# Patient Record
Sex: FEMALE | Race: WHITE | NOT HISPANIC OR LATINO | Employment: FULL TIME | URBAN - METROPOLITAN AREA
[De-identification: names, ages, dates, MRNs, and addresses within clinical notes are randomized per-mention and may not be internally consistent; named-entity substitution may affect disease eponyms.]

---

## 2017-01-26 ENCOUNTER — OFFICE VISIT (OUTPATIENT)
Dept: FAMILY MEDICINE | Facility: CLINIC | Age: 62
End: 2017-01-26
Payer: COMMERCIAL

## 2017-01-26 VITALS
HEIGHT: 66 IN | WEIGHT: 168.63 LBS | RESPIRATION RATE: 18 BRPM | SYSTOLIC BLOOD PRESSURE: 122 MMHG | BODY MASS INDEX: 27.1 KG/M2 | DIASTOLIC BLOOD PRESSURE: 70 MMHG | OXYGEN SATURATION: 99 % | HEART RATE: 73 BPM

## 2017-01-26 DIAGNOSIS — R00.2 PALPITATIONS: Primary | ICD-10-CM

## 2017-01-26 DIAGNOSIS — D35.00 ADRENAL ADENOMA, UNSPECIFIED LATERALITY: ICD-10-CM

## 2017-01-26 PROCEDURE — 3074F SYST BP LT 130 MM HG: CPT | Mod: S$GLB,,, | Performed by: EMERGENCY MEDICINE

## 2017-01-26 PROCEDURE — 99999 PR PBB SHADOW E&M-EST. PATIENT-LVL III: CPT | Mod: PBBFAC,,, | Performed by: EMERGENCY MEDICINE

## 2017-01-26 PROCEDURE — 1159F MED LIST DOCD IN RCRD: CPT | Mod: S$GLB,,, | Performed by: EMERGENCY MEDICINE

## 2017-01-26 PROCEDURE — 99213 OFFICE O/P EST LOW 20 MIN: CPT | Mod: S$GLB,,, | Performed by: EMERGENCY MEDICINE

## 2017-01-26 PROCEDURE — 3078F DIAST BP <80 MM HG: CPT | Mod: S$GLB,,, | Performed by: EMERGENCY MEDICINE

## 2017-01-26 NOTE — PROGRESS NOTES
Subjective:   THIS NOTE IS DONE WITH VOICE RECOGNITION        Patient ID: Ynes Talavera is a 61 y.o. female.    Chief Complaint: Palpitations (pt has adrenal adenoma that are benign, pt gets winded at times)      HPI     She is in today with palpitations for the last 12 weeks.  She has numerous decisions about work, money, and other challenges that are casuing her stress.  No chest pain.  No shortness of breath.      She is concerned about adrenal issues, as she has documented small lesion identified.      Impression       The left adrenal mass is thought to represent 8-mm benign adrenal adenoma.    Benign-appearing parapelvic renal cysts are noted bilaterally, particularly on the left.    Patient status post cholecystectomy and MRI the abdomen is otherwise unremarkable.  ______________________________________     Electronically signed by: YELITZA SHAVER MD  Date: 01/19/12  Time: 08:47         Encounter      View Encounter                Otherwise she is doing well    Immunization History   Administered Date(s) Administered    Hepatitis B 10/22/2000    Influenza Split 12/07/2012, 10/22/2013    influenza - Quadrivalent - PF (ADULT) 11/28/2014         Current Outpatient Prescriptions   Medication Sig Dispense Refill    aspirin (ECOTRIN) 81 MG EC tablet Take 81 mg by mouth once daily.        lisinopril 10 MG tablet TAKE 1 TABLET BY MOUTH ONCE DAILY 90 tablet 3    melatonin 5 mg Tab Take 5 mg by mouth once daily.      multivitamin capsule Take 1 capsule by mouth once daily.        trazodone (DESYREL) 100 MG tablet Take 1 tablet (100 mg total) by mouth nightly. 90 tablet 3     No current facility-administered medications for this visit.          Review of Systems   Constitutional: Negative for activity change, appetite change, chills, diaphoresis, fatigue, fever and unexpected weight change.   HENT: Negative for congestion, ear pain, hearing loss, rhinorrhea, trouble swallowing and voice change.    Eyes:  Negative for pain and visual disturbance.   Respiratory: Negative for cough, chest tightness and shortness of breath.    Cardiovascular: Positive for palpitations. Negative for chest pain and leg swelling.   Gastrointestinal: Negative for abdominal distention, abdominal pain and blood in stool.   Genitourinary: Negative for difficulty urinating, flank pain, frequency and urgency.   Musculoskeletal: Negative for arthralgias, back pain, joint swelling, myalgias, neck pain and neck stiffness.   Skin: Negative for pallor and rash.   Neurological: Negative for dizziness, tremors, syncope, weakness and headaches.   Hematological: Negative for adenopathy.   Psychiatric/Behavioral: Positive for sleep disturbance (worried about things). Negative for dysphoric mood. The patient is nervous/anxious.        Objective:      Physical Exam    Assessment:       1. Palpitations    2. Adrenal adenoma, unspecified laterality        Plan:       1. Palpitations  Increasing  Likely situationally related  Not at a point additional pharmacological intervention recommended  Continue to avoid cardiac stimulants, caffeine, decongestants, etc.  Addressing underlying issues, particularly financial recommended    2. Adrenal adenoma, unspecified laterality  MRI reviewed  I have suggested a follow-up noncontrasted CT for evaluation  This has been communicated to the patient.

## 2017-01-26 NOTE — Clinical Note
After visit, I elected to move forward with noncontrast CT of the abdomen.  A message has been left for her on her phone.  Please contact and schedule at her convenience.  There is no urgency.

## 2017-01-29 NOTE — PATIENT INSTRUCTIONS
Ynes,    As per my phone call, I do recommend a noncontrast CT to follow-up on your adrenal adenoma.    I've notified my staff, and they should be contacting you to arrange for this particular exam.    PAKO

## 2017-01-30 ENCOUNTER — TELEPHONE (OUTPATIENT)
Dept: FAMILY MEDICINE | Facility: CLINIC | Age: 62
End: 2017-01-30

## 2017-01-30 NOTE — TELEPHONE ENCOUNTER
----- Message from Grant Hawkins Jr., MD sent at 1/29/2017 12:17 PM CST -----  After visit, I elected to move forward with noncontrast CT of the abdomen.  A message has been left for her on her phone.    Please contact and schedule at her convenience.  There is no urgency.

## 2017-01-31 ENCOUNTER — TELEPHONE (OUTPATIENT)
Dept: FAMILY MEDICINE | Facility: CLINIC | Age: 62
End: 2017-01-31

## 2017-01-31 DIAGNOSIS — R00.2 PALPITATIONS: Primary | ICD-10-CM

## 2017-01-31 NOTE — TELEPHONE ENCOUNTER
Seen dr tinsley last Thursday talked about some symptoms she is having. recc ekg   She is set up ct scan, feb 9th   Do you want ekg and holter on her please add orders.

## 2017-01-31 NOTE — TELEPHONE ENCOUNTER
----- Message from Luz Maria Abbott sent at 1/31/2017  2:20 PM CST -----  Please call patient in regards to EKG orders, [phone] (home) [phone] (work)

## 2017-02-01 NOTE — TELEPHONE ENCOUNTER
Called, no answer.    I will put an order in for EKG, and Holter.  This would be a 48 hour Holter monitor.  Diagnosis palpitations.

## 2017-02-02 ENCOUNTER — TELEPHONE (OUTPATIENT)
Dept: FAMILY MEDICINE | Facility: CLINIC | Age: 62
End: 2017-02-02

## 2017-02-02 NOTE — TELEPHONE ENCOUNTER
----- Message from Huma Jean-Baptiste sent at 2/2/2017  2:14 PM CST -----  Contact: michael   Needs DX code   And procedure code and NPI number   Call back  [phone]

## 2017-02-09 ENCOUNTER — HOSPITAL ENCOUNTER (OUTPATIENT)
Dept: RADIOLOGY | Facility: HOSPITAL | Age: 62
Discharge: HOME | End: 2017-02-09
Attending: EMERGENCY MEDICINE
Payer: COMMERCIAL

## 2017-02-09 DIAGNOSIS — D35.00 ADRENAL ADENOMA, UNSPECIFIED LATERALITY: ICD-10-CM

## 2017-02-09 PROCEDURE — 74150 CT ABDOMEN W/O CONTRAST: CPT | Mod: TC,PO

## 2017-02-09 PROCEDURE — 74150 CT ABDOMEN W/O CONTRAST: CPT | Mod: 26,,, | Performed by: RADIOLOGY

## 2017-02-15 ENCOUNTER — PATIENT MESSAGE (OUTPATIENT)
Dept: FAMILY MEDICINE | Facility: CLINIC | Age: 62
End: 2017-02-15

## 2017-02-19 ENCOUNTER — PATIENT MESSAGE (OUTPATIENT)
Dept: FAMILY MEDICINE | Facility: CLINIC | Age: 62
End: 2017-02-19

## 2017-02-19 DIAGNOSIS — F32.9 REACTIVE DEPRESSION: Primary | ICD-10-CM

## 2017-02-20 RX ORDER — ESCITALOPRAM OXALATE 10 MG/1
10 TABLET ORAL DAILY
Qty: 30 TABLET | Refills: 1 | Status: SHIPPED | OUTPATIENT
Start: 2017-02-20 | End: 2017-04-17 | Stop reason: SDUPTHER

## 2017-04-03 ENCOUNTER — PATIENT MESSAGE (OUTPATIENT)
Dept: FAMILY MEDICINE | Facility: CLINIC | Age: 62
End: 2017-04-03

## 2017-04-17 DIAGNOSIS — F32.9 REACTIVE DEPRESSION: ICD-10-CM

## 2017-04-17 RX ORDER — ESCITALOPRAM OXALATE 10 MG/1
TABLET ORAL
Qty: 30 TABLET | Refills: 6 | Status: SHIPPED | OUTPATIENT
Start: 2017-04-17 | End: 2017-11-12 | Stop reason: SDUPTHER

## 2017-06-21 ENCOUNTER — NURSE TRIAGE (OUTPATIENT)
Dept: ADMINISTRATIVE | Facility: CLINIC | Age: 62
End: 2017-06-21

## 2017-06-21 NOTE — TELEPHONE ENCOUNTER
Reason for Disposition   Dizziness, lightheadedness, or weakness    Protocols used: ST HEART RATE AND HEARTBEAT [id number]

## 2017-06-21 NOTE — TELEPHONE ENCOUNTER
----- Message from Makenzie Osullivan sent at 6/21/2017  4:27 PM CDT -----  Patient is calling with heart palpitations and feeling fatiqued. I am turning her over to the nurse on call.      Attempted to call pt left message to go to ER. Please advise.

## 2017-06-23 NOTE — TELEPHONE ENCOUNTER
i spoke with pt she did go to the emergency room with light headedness palpitations.wednesday evening. Was told she was slightly dehydrated and ekg was done. She was told to take toprol    She was directed to dr flores. She saw him this am. He is going to do a nuclear stress test, and monitor,   She sis not start taking the toprol, dr flores told her she could take it when she had palpitations. But not when she has chest pain.   She appreciated the call and will call us back id she needs anything from us.

## 2017-09-06 DIAGNOSIS — G47.9 SLEEP DISTURBANCE: Chronic | ICD-10-CM

## 2017-09-06 RX ORDER — TRAZODONE HYDROCHLORIDE 100 MG/1
TABLET ORAL
Qty: 90 TABLET | Refills: 1 | Status: SHIPPED | OUTPATIENT
Start: 2017-09-06 | End: 2018-03-08 | Stop reason: SDUPTHER

## 2017-11-12 DIAGNOSIS — F32.9 REACTIVE DEPRESSION: ICD-10-CM

## 2017-11-12 DIAGNOSIS — I10 ESSENTIAL HYPERTENSION: ICD-10-CM

## 2017-11-12 RX ORDER — ESCITALOPRAM OXALATE 10 MG/1
TABLET ORAL
Qty: 30 TABLET | Refills: 2 | Status: SHIPPED | OUTPATIENT
Start: 2017-11-12 | End: 2018-02-06 | Stop reason: SDUPTHER

## 2017-11-12 RX ORDER — LISINOPRIL 10 MG/1
TABLET ORAL
Qty: 90 TABLET | Refills: 0 | Status: SHIPPED | OUTPATIENT
Start: 2017-11-12 | End: 2018-02-09 | Stop reason: SDUPTHER

## 2017-11-12 NOTE — TELEPHONE ENCOUNTER
Medications refilled.  Labs due in January.  Orders in.  Please schedule.  She is also due for a blood pressure check and influenza.  .

## 2017-11-24 ENCOUNTER — CLINICAL SUPPORT (OUTPATIENT)
Dept: FAMILY MEDICINE | Facility: CLINIC | Age: 62
End: 2017-11-24
Payer: COMMERCIAL

## 2017-11-24 ENCOUNTER — LAB VISIT (OUTPATIENT)
Dept: LAB | Facility: HOSPITAL | Age: 62
End: 2017-11-24
Attending: EMERGENCY MEDICINE
Payer: COMMERCIAL

## 2017-11-24 VITALS — DIASTOLIC BLOOD PRESSURE: 64 MMHG | SYSTOLIC BLOOD PRESSURE: 116 MMHG

## 2017-11-24 DIAGNOSIS — Z01.30 BP CHECK: ICD-10-CM

## 2017-11-24 DIAGNOSIS — I10 ESSENTIAL HYPERTENSION: ICD-10-CM

## 2017-11-24 DIAGNOSIS — I10 HYPERTENSION, UNSPECIFIED TYPE: Primary | ICD-10-CM

## 2017-11-24 LAB
ALBUMIN SERPL BCP-MCNC: 3.6 G/DL
ALP SERPL-CCNC: 84 U/L
ALT SERPL W/O P-5'-P-CCNC: 16 U/L
ANION GAP SERPL CALC-SCNC: 7 MMOL/L
AST SERPL-CCNC: 21 U/L
BILIRUB SERPL-MCNC: 0.5 MG/DL
BUN SERPL-MCNC: 15 MG/DL
CALCIUM SERPL-MCNC: 9.7 MG/DL
CHLORIDE SERPL-SCNC: 107 MMOL/L
CHOLEST SERPL-MCNC: 176 MG/DL
CHOLEST/HDLC SERPL: 3.1 {RATIO}
CO2 SERPL-SCNC: 28 MMOL/L
CREAT SERPL-MCNC: 0.7 MG/DL
EST. GFR  (AFRICAN AMERICAN): >60 ML/MIN/1.73 M^2
EST. GFR  (NON AFRICAN AMERICAN): >60 ML/MIN/1.73 M^2
GLUCOSE SERPL-MCNC: 102 MG/DL
HDLC SERPL-MCNC: 56 MG/DL
HDLC SERPL: 31.8 %
LDLC SERPL CALC-MCNC: 106.2 MG/DL
NONHDLC SERPL-MCNC: 120 MG/DL
POTASSIUM SERPL-SCNC: 4.1 MMOL/L
PROT SERPL-MCNC: 6.8 G/DL
SODIUM SERPL-SCNC: 142 MMOL/L
TRIGL SERPL-MCNC: 69 MG/DL

## 2017-11-24 PROCEDURE — 36415 COLL VENOUS BLD VENIPUNCTURE: CPT | Mod: PO

## 2017-11-24 PROCEDURE — 80061 LIPID PANEL: CPT

## 2017-11-24 PROCEDURE — 99999 PR PBB SHADOW E&M-EST. PATIENT-LVL I: CPT | Mod: PBBFAC,,,

## 2017-11-24 PROCEDURE — 80053 COMPREHEN METABOLIC PANEL: CPT

## 2017-11-24 NOTE — PROGRESS NOTES
Ynes Talavera 62 y.o. female is here today for Blood Pressure check.   History of HTN yes.    Review of patient's allergies indicates:  Not on File  Creatinine   Date Value Ref Range Status   09/22/2016 0.8 0.5 - 1.4 mg/dL Final     Sodium   Date Value Ref Range Status   09/22/2016 138 136 - 145 mmol/L Final     Potassium   Date Value Ref Range Status   09/22/2016 5.2 (H) 3.5 - 5.1 mmol/L Final   ]  Patient verifies taking blood pressure medications on a regular basis at the same time of the day.     Current Outpatient Prescriptions:     aspirin (ECOTRIN) 81 MG EC tablet, Take 81 mg by mouth once daily.  , Disp: , Rfl:     escitalopram oxalate (LEXAPRO) 10 MG tablet, TAKE 1 TABLET BY MOUTH ONCE DAILY., Disp: 30 tablet, Rfl: 2    lisinopril 10 MG tablet, TAKE 1 TABLET BY MOUTH ONCE DAILY, Disp: 90 tablet, Rfl: 0    melatonin 5 mg Tab, Take 5 mg by mouth once daily., Disp: , Rfl:     multivitamin capsule, Take 1 capsule by mouth once daily.  , Disp: , Rfl:     trazodone (DESYREL) 100 MG tablet, TAKE 1 TABLET BY MOUTH NIGHTLY, Disp: 90 tablet, Rfl: 1  Does patient have record of home blood pressure readings no. Readings have been averaging 120/60.   Last dose of blood pressure medication was taken at am.  Patient is asymptomatic.   Complains of none.    BP: 116/64 ,   .    Blood pressure reading after 15 minutes was 00/0, Pulse 0.  Dr. Hawkins notified.

## 2018-02-06 DIAGNOSIS — F32.9 REACTIVE DEPRESSION: ICD-10-CM

## 2018-02-06 RX ORDER — ESCITALOPRAM OXALATE 10 MG/1
TABLET ORAL
Qty: 30 TABLET | Refills: 5 | Status: SHIPPED | OUTPATIENT
Start: 2018-02-06 | End: 2018-08-06 | Stop reason: SDUPTHER

## 2018-02-09 DIAGNOSIS — I10 ESSENTIAL HYPERTENSION: ICD-10-CM

## 2018-02-10 RX ORDER — LISINOPRIL 10 MG/1
TABLET ORAL
Qty: 90 TABLET | Refills: 0 | Status: SHIPPED | OUTPATIENT
Start: 2018-02-10 | End: 2018-05-08 | Stop reason: SDUPTHER

## 2018-03-08 DIAGNOSIS — G47.9 SLEEP DISTURBANCE: Chronic | ICD-10-CM

## 2018-03-08 RX ORDER — TRAZODONE HYDROCHLORIDE 100 MG/1
TABLET ORAL
Qty: 90 TABLET | Refills: 3 | Status: SHIPPED | OUTPATIENT
Start: 2018-03-08 | End: 2019-05-12 | Stop reason: SDUPTHER

## 2018-04-26 ENCOUNTER — PATIENT MESSAGE (OUTPATIENT)
Dept: FAMILY MEDICINE | Facility: CLINIC | Age: 63
End: 2018-04-26

## 2018-04-30 ENCOUNTER — LAB VISIT (OUTPATIENT)
Dept: LAB | Facility: HOSPITAL | Age: 63
End: 2018-04-30
Attending: NURSE PRACTITIONER
Payer: MEDICARE

## 2018-04-30 ENCOUNTER — OFFICE VISIT (OUTPATIENT)
Dept: FAMILY MEDICINE | Facility: CLINIC | Age: 63
End: 2018-04-30
Payer: MEDICARE

## 2018-04-30 VITALS
OXYGEN SATURATION: 96 % | HEART RATE: 75 BPM | DIASTOLIC BLOOD PRESSURE: 82 MMHG | WEIGHT: 163.38 LBS | SYSTOLIC BLOOD PRESSURE: 124 MMHG | HEIGHT: 66 IN | BODY MASS INDEX: 26.26 KG/M2

## 2018-04-30 DIAGNOSIS — Z11.59 ENCOUNTER FOR HEPATITIS C SCREENING TEST FOR LOW RISK PATIENT: ICD-10-CM

## 2018-04-30 DIAGNOSIS — Z23 NEED FOR VACCINATION AGAINST STREPTOCOCCUS PNEUMONIAE USING PNEUMOCOCCAL CONJUGATE VACCINE 13: ICD-10-CM

## 2018-04-30 DIAGNOSIS — R53.83 FATIGUE, UNSPECIFIED TYPE: ICD-10-CM

## 2018-04-30 DIAGNOSIS — G47.20 DYSFUNCTION OF SLEEP STAGE OR AROUSAL: Primary | ICD-10-CM

## 2018-04-30 LAB
BASOPHILS # BLD AUTO: 0.04 K/UL
BASOPHILS NFR BLD: 0.5 %
DIFFERENTIAL METHOD: ABNORMAL
EOSINOPHIL # BLD AUTO: 0.2 K/UL
EOSINOPHIL NFR BLD: 2.7 %
ERYTHROCYTE [DISTWIDTH] IN BLOOD BY AUTOMATED COUNT: 11.8 %
HCT VFR BLD AUTO: 38.4 %
HGB BLD-MCNC: 13 G/DL
IMM GRANULOCYTES # BLD AUTO: 0.02 K/UL
IMM GRANULOCYTES NFR BLD AUTO: 0.2 %
LYMPHOCYTES # BLD AUTO: 2.8 K/UL
LYMPHOCYTES NFR BLD: 33.6 %
MCH RBC QN AUTO: 33.7 PG
MCHC RBC AUTO-ENTMCNC: 33.9 G/DL
MCV RBC AUTO: 100 FL
MONOCYTES # BLD AUTO: 0.7 K/UL
MONOCYTES NFR BLD: 7.8 %
NEUTROPHILS # BLD AUTO: 4.7 K/UL
NEUTROPHILS NFR BLD: 55.2 %
NRBC BLD-RTO: 0 /100 WBC
PLATELET # BLD AUTO: 232 K/UL
PMV BLD AUTO: 10.9 FL
RBC # BLD AUTO: 3.86 M/UL
T4 FREE SERPL-MCNC: 1 NG/DL
TSH SERPL DL<=0.005 MIU/L-ACNC: 1.22 UIU/ML
WBC # BLD AUTO: 8.45 K/UL

## 2018-04-30 PROCEDURE — 3008F BODY MASS INDEX DOCD: CPT | Mod: CPTII,S$GLB,, | Performed by: NURSE PRACTITIONER

## 2018-04-30 PROCEDURE — 36415 COLL VENOUS BLD VENIPUNCTURE: CPT | Mod: PO

## 2018-04-30 PROCEDURE — 86803 HEPATITIS C AB TEST: CPT

## 2018-04-30 PROCEDURE — 99214 OFFICE O/P EST MOD 30 MIN: CPT | Mod: S$GLB,,, | Performed by: NURSE PRACTITIONER

## 2018-04-30 PROCEDURE — 85025 COMPLETE CBC W/AUTO DIFF WBC: CPT

## 2018-04-30 PROCEDURE — 3079F DIAST BP 80-89 MM HG: CPT | Mod: CPTII,S$GLB,, | Performed by: NURSE PRACTITIONER

## 2018-04-30 PROCEDURE — 84443 ASSAY THYROID STIM HORMONE: CPT

## 2018-04-30 PROCEDURE — G0009 ADMIN PNEUMOCOCCAL VACCINE: HCPCS | Mod: S$GLB,,, | Performed by: EMERGENCY MEDICINE

## 2018-04-30 PROCEDURE — 3074F SYST BP LT 130 MM HG: CPT | Mod: CPTII,S$GLB,, | Performed by: NURSE PRACTITIONER

## 2018-04-30 PROCEDURE — 84439 ASSAY OF FREE THYROXINE: CPT

## 2018-04-30 PROCEDURE — 90670 PCV13 VACCINE IM: CPT | Mod: S$GLB,,, | Performed by: EMERGENCY MEDICINE

## 2018-04-30 PROCEDURE — 99999 PR PBB SHADOW E&M-EST. PATIENT-LVL IV: CPT | Mod: PBBFAC,,, | Performed by: NURSE PRACTITIONER

## 2018-04-30 RX ORDER — NICOTINE POLACRILEX 2 MG
GUM BUCCAL
COMMUNITY
End: 2022-03-07

## 2018-04-30 RX ORDER — ESTRADIOL 0.1 MG/G
CREAM VAGINAL DAILY
COMMUNITY
End: 2019-04-11

## 2018-04-30 NOTE — PROGRESS NOTES
Subjective:       Patient ID: Ynes Talavera is a 63 y.o. female.    Chief Complaint: Sleeping Problem    Patient has had ongoing issues with sleeping for many years. She has recently become concerned about her lack of sleep. She wakes un refreshed. Took Ambien 10 years ago and had side effects. The tried taking OTC sleep aids but could not stay asleep. Has been taking trazodone for years, falls asleep without issue, but wakes frequently, very interrupted sleep.She does sometimes snore.   She is on lexapro for the last year, has not noticed any improvement in sleep. She says she would like to wean off the lexapro. Has never had a sleep study.   HTN has been well controlled, compliant with medication.    EPWORTH SLEEPINESS SCALE (ESS) - The ESS subjectively measures sleepiness as it occurs in ordinary life situations. It can be used as a screening test for excessive sleepiness or to follow an individual's subjective response to an intervention.    Eight situations were described to the patient and the following responses were obtained on a questionnaire.  The question was, in the following situations, what is the likelihood that sleep would be induced?  Sitting and reading 1  Watching television 0  Sitting inactively in a public place 0  Riding as a passenger in a car for one hour without a break 1   Lying down to rest in the afternoon when circumstances permit 1  Sitting and talking with someone 0  Sitting quietly after lunch without alcohol 0  Sitting in a car as the , while stopped for a few minutes in traffic 0  TOTAL POINTS 4    Each situation receives a score of zero to three, which is related to the likelihood that sleep will be induced:  0 = would never doze  1 = slight chance of dozing  2 = moderate chance of dozing  3 = high chance of dozing    Thus, the total ESS score can range from zero to 24, with higher scores correlating with increasing degrees of sleepiness     1-6 points: Normal sleep  7-8  points: Average sleepiness  9-24 points: Abnormal (possibly pathologic) sleepiness    A score greater than ten is consistent with excessive sleepiness. This threshold between normal and abnormal subjective sleepiness is derived from an observational study of 180 adults. The mean ESS score was approximately six among healthy adults, compared to 16 or greater among patients with narcolepsy, idiopathic hypersomnia, or moderate to severe obstructive sleep apnea (REJI, defined as a respiratory disturbance index (RDI) greater than 15 events per hour).  The ESS can be performed in the examination room or waiting room. It is relatively simple and generally takes only a few minutes to complete. It should be repeated at subsequent visits to assess for change.  Correlation - Subjective sleepiness measured by the ESS appears to correlate moderately with objective sleep tendency. In the largest population based study, the increased risk of falling asleep during a four session multiple sleep latency test (MSLT) was 30 percent and 69 percent in individuals with intermediate (ESS score six to 11) and high (ESS score >12) subjective sleepiness, respectively. Despite this study, the strength of the correlation between the ESS and objective measures of sleepiness remains controversial due to conflicting small studies:  Early studies demonstrated strong correlation between the ESS and MSLT. This included a study that reported that a score derived from only three of the situations - sitting inactively in a public place, sitting quietly after lunch, and sitting in a car stopped for a few minutes - correlated well with mean sleep latency measured by the MSLT.  More recent studies have found little or no correlation between the ESS and the MSLT. In addition, weak correlation and discordant results between the ESS and the maintenance of wakefulness test (MWT) have been reported among patients with sleep apnea and narcolepsy. Advocates for  the viewpoint that the ESS correlates poorly with the MSLT and MWT hypothesize that the tests measure different aspects of sleepiness.  Advantages - The ESS is widely available, reliable for assessing subjective sleepiness, and can be performed easily and quickly.  Disadvantages - The ESS cannot be used on multiple occasions throughout the day and is not a good measure of short-term acute sleep loss. Although the ESS score tends to increase with the severity of REJI and most patients with moderate to severe REJI have an ESS score greater than ten, the ESS should not be used to screen for REJI because it is neither sensitive nor specific for this purpose.  Hepatitis C Screening due on 1955  TETANUS VACCINE due on 1971  Zoster Vaccine due on 2013  Influenza Vaccine due on 2017  Pneumococcal (65+)(1 of 2 - PCV13) due on 2018    Past Medical History:  Past Medical History:  No date: Adrenal adenoma  No date: Diverticulosis of the colon  No date: Hypertension  2014: Venous insufficiency      Comment: right leg, denies Hx clot  Past Surgical History:  No date: APPENDECTOMY  : BREAST BIOPSY Right      Comment: benign needle biopsy  2006: BREAST BIOPSY Left      Comment: benign needle biopsy  No date:  SECTION, LOW TRANSVERSE  No date: CHOLECYSTECTOMY  No date: THUMB ARTHROSCOPY      Comment: both thumbs, trigger finger release.  No date: TONSILLECTOMY  No date: TUBAL LIGATION  2014: UMBILICAL HERNIA REPAIR  No date: VEIN LIGATION AND STRIPPING      Comment: Left  Review of patient's allergies indicates:  No Known Allergies  Current Outpatient Prescriptions on File Prior to Visit:  aspirin (ECOTRIN) 81 MG EC tablet, Take 81 mg by mouth once daily.  , Disp: , Rfl:   escitalopram oxalate (LEXAPRO) 10 MG tablet, TAKE 1 TABLET BY MOUTH ONCE DAILY., Disp: 30 tablet, Rfl: 5  lisinopril 10 MG tablet, TAKE 1 TABLET BY MOUTH ONCE DAILY, Disp: 90 tablet, Rfl: 0  melatonin 5 mg Tab, Take 5  mg by mouth as needed. , Disp: , Rfl:   multivitamin capsule, Take 1 capsule by mouth once daily.  , Disp: , Rfl:   traZODone (DESYREL) 100 MG tablet, TAKE 1 TABLET BY MOUTH NIGHTLY, Disp: 90 tablet, Rfl: 3    No current facility-administered medications on file prior to visit.     Social History    Marital status:              Spouse name:                       Years of education:                 Number of children: 3             Occupational History  Occupation          Employer            Comment               Dental hygienist                        Working full-time                       [employer]     Social History Main Topics    Smoking status: Former Smoker                                                                Packs/day: 0.25      Years: 25.00          Quit date: 2/17/1993    Smokeless tobacco: Never Used                        Alcohol use: No              Drug use: No              Sexual activity: Yes               Partners with: Male    Other Topics            Concern    None on file    Social History Narrative    Her  has had a CVA. She is major support for him.        Walks regularly      Review of patient's family history indicates:    Cancer                         Mother                    Cancer                         Father                    Heart disease                  Father                    Hypertension                   Father                    Diabetes                       Maternal Grandmother      Heart disease                  Maternal Grandmother      Diabetes                       Maternal Grandfather      Heart disease                  Maternal Grandfather      Breast cancer                  Paternal Grandmother              Review of Systems   Constitutional: Positive for fatigue. Negative for chills and fever.   Respiratory: Negative.  Negative for cough, shortness of breath and wheezing.    Cardiovascular: Negative.  Negative for chest pain and leg  swelling.   Psychiatric/Behavioral: Positive for sleep disturbance. Negative for agitation, behavioral problems, confusion, decreased concentration, dysphoric mood, hallucinations and self-injury. The patient is not nervous/anxious and is not hyperactive.        Objective:      Physical Exam   Constitutional: She is oriented to person, place, and time. No distress.   HENT:   Head: Normocephalic and atraumatic.   Eyes: Pupils are equal, round, and reactive to light.   Neck: Normal range of motion.   Cardiovascular: Normal rate and regular rhythm.  Exam reveals no friction rub.    No murmur heard.  Pulmonary/Chest: Effort normal and breath sounds normal. No respiratory distress. She has no wheezes.   Abdominal: Soft. Bowel sounds are normal.   Musculoskeletal: She exhibits no edema.   Neurological: She is alert and oriented to person, place, and time. No cranial nerve deficit. Coordination normal.   Skin: No rash noted. She is not diaphoretic. No erythema.   Psychiatric: She has a normal mood and affect. Her behavior is normal. Judgment and thought content normal.   Vitals reviewed.      Assessment:       1. Dysfunction of sleep stage or arousal    2. Fatigue, unspecified type    3. Need for vaccination against Streptococcus pneumoniae using pneumococcal conjugate vaccine 13    4. Encounter for hepatitis C screening test for low risk patient        Plan:     Weaning of lexapro and instructions given, if patient decides to wean advised to follow up 1 week after completing wean to review, immediately for any suicidal ideation or worsening depression.     1. Dysfunction of sleep stage or arousal    - Home Sleep Studies; Future  - Ambulatory consult to Sleep Disorders    2. Fatigue, unspecified type    - TSH; Future  - T4, free; Future  - CBC auto differential; Future  - Home Sleep Studies; Future  - Ambulatory consult to Sleep Disorders    3. Need for vaccination against Streptococcus pneumoniae using pneumococcal  conjugate vaccine 13    - (In Office Administered) Pneumococcal Conjugate Vaccine (13 Valent) (IM)    4. Encounter for hepatitis C screening test for low risk patient    - Hepatitis C antibody; Future

## 2018-04-30 NOTE — PATIENT INSTRUCTIONS
lexapro wean- take 1/2 pill daily x 7 days, then 1/2 pill every other day for 7 days then stop.   Follow up after being off x 1 week

## 2018-05-01 ENCOUNTER — TELEPHONE (OUTPATIENT)
Dept: FAMILY MEDICINE | Facility: CLINIC | Age: 63
End: 2018-05-01

## 2018-05-01 DIAGNOSIS — R79.89 ABNORMAL CBC: Primary | ICD-10-CM

## 2018-05-01 LAB — HCV AB SERPL QL IA: NEGATIVE

## 2018-05-04 ENCOUNTER — TELEPHONE (OUTPATIENT)
Dept: FAMILY MEDICINE | Facility: CLINIC | Age: 63
End: 2018-05-04

## 2018-05-04 NOTE — TELEPHONE ENCOUNTER
----- Message from Gabriela Kirkland sent at 5/4/2018  3:02 PM CDT -----  Type:  Patient Returning Call    Who Called:  Ynes  Who Left Message for Patient:  Nurse  Does the patient know what this is regarding?:  Results  Best Call Back Number: [phone]  Additional Information:  Patient stated she has her results form the portal, unless you need to speak with her.

## 2018-05-08 ENCOUNTER — PATIENT MESSAGE (OUTPATIENT)
Dept: FAMILY MEDICINE | Facility: CLINIC | Age: 63
End: 2018-05-08

## 2018-05-08 DIAGNOSIS — I10 ESSENTIAL HYPERTENSION: ICD-10-CM

## 2018-05-09 RX ORDER — LISINOPRIL 10 MG/1
TABLET ORAL
Qty: 90 TABLET | Refills: 0 | Status: SHIPPED | OUTPATIENT
Start: 2018-05-09 | End: 2018-08-06 | Stop reason: SDUPTHER

## 2018-05-24 ENCOUNTER — PATIENT MESSAGE (OUTPATIENT)
Dept: FAMILY MEDICINE | Facility: CLINIC | Age: 63
End: 2018-05-24

## 2018-08-06 DIAGNOSIS — I10 ESSENTIAL HYPERTENSION: ICD-10-CM

## 2018-08-06 DIAGNOSIS — F32.9 REACTIVE DEPRESSION: ICD-10-CM

## 2018-08-06 RX ORDER — ESCITALOPRAM OXALATE 10 MG/1
TABLET ORAL
Qty: 90 TABLET | Refills: 1 | Status: SHIPPED | OUTPATIENT
Start: 2018-08-06 | End: 2019-01-31 | Stop reason: SDUPTHER

## 2018-08-06 RX ORDER — LISINOPRIL 10 MG/1
TABLET ORAL
Qty: 90 TABLET | Refills: 3 | Status: SHIPPED | OUTPATIENT
Start: 2018-08-06 | End: 2019-08-11 | Stop reason: SDUPTHER

## 2018-09-04 ENCOUNTER — OFFICE VISIT (OUTPATIENT)
Dept: FAMILY MEDICINE | Facility: CLINIC | Age: 63
End: 2018-09-04
Payer: MEDICARE

## 2018-09-04 VITALS
OXYGEN SATURATION: 96 % | DIASTOLIC BLOOD PRESSURE: 82 MMHG | WEIGHT: 166.44 LBS | BODY MASS INDEX: 26.75 KG/M2 | HEIGHT: 66 IN | RESPIRATION RATE: 17 BRPM | HEART RATE: 66 BPM | SYSTOLIC BLOOD PRESSURE: 132 MMHG

## 2018-09-04 DIAGNOSIS — I72.8 SPLENIC ARTERY ANEURYSM: ICD-10-CM

## 2018-09-04 DIAGNOSIS — S03.00XS TMJ (DISLOCATION OF TEMPOROMANDIBULAR JOINT), SEQUELA: ICD-10-CM

## 2018-09-04 DIAGNOSIS — I10 ESSENTIAL HYPERTENSION: Primary | Chronic | ICD-10-CM

## 2018-09-04 DIAGNOSIS — D35.00 ADRENAL ADENOMA, UNSPECIFIED LATERALITY: ICD-10-CM

## 2018-09-04 DIAGNOSIS — G47.9 SLEEP DISTURBANCE: Chronic | ICD-10-CM

## 2018-09-04 PROCEDURE — 99214 OFFICE O/P EST MOD 30 MIN: CPT | Mod: S$PBB,,, | Performed by: EMERGENCY MEDICINE

## 2018-09-04 PROCEDURE — 99499 UNLISTED E&M SERVICE: CPT | Mod: S$GLB,,, | Performed by: EMERGENCY MEDICINE

## 2018-09-04 PROCEDURE — 3079F DIAST BP 80-89 MM HG: CPT | Mod: CPTII,,, | Performed by: EMERGENCY MEDICINE

## 2018-09-04 PROCEDURE — 1101F PT FALLS ASSESS-DOCD LE1/YR: CPT | Mod: CPTII,,, | Performed by: EMERGENCY MEDICINE

## 2018-09-04 PROCEDURE — 99999 PR PBB SHADOW E&M-EST. PATIENT-LVL IV: CPT | Mod: PBBFAC,,, | Performed by: EMERGENCY MEDICINE

## 2018-09-04 PROCEDURE — 3075F SYST BP GE 130 - 139MM HG: CPT | Mod: CPTII,,, | Performed by: EMERGENCY MEDICINE

## 2018-09-04 PROCEDURE — 99214 OFFICE O/P EST MOD 30 MIN: CPT | Mod: PBBFAC,PO | Performed by: EMERGENCY MEDICINE

## 2018-09-04 PROCEDURE — 3008F BODY MASS INDEX DOCD: CPT | Mod: CPTII,,, | Performed by: EMERGENCY MEDICINE

## 2018-09-04 NOTE — PROGRESS NOTES
Subjective:   THIS NOTE IS DONE WITH VOICE RECOGNITION        Patient ID: Ynes Talavera is a 63 y.o. female.    Chief Complaint: Hypertension      HPI     Here to follow up on blood pressure.  Taking current medications.  No mention of any cardiac symptomatology.  She is getting some exercise.  She is trying to limit her salt.    BP Readings from Last 3 Encounters:   09/04/18 132/82   04/30/18 124/82   11/24/17 116/64      She is a dental hygienist, and she is working with her employer your to address issues with TMJ and snoring.  She has tried several devices.  No surgery has been done.  Nor would I recommend any.  All of this is done an effort to improve her sleep.    She does feel there may be some improvement with her symptoms.    Also using a diet to reduced inflammatory foods.    Wanting to work an additional 3 years in current job.      She does have some ongoing issues at home they are causing her stress.  These were discussed in detail.   The Lexapro that she is using this primarily for of the stress and mild to moderate depression that she has been experiencing.  She has not had any recent cognitive behavior therapy.    Immunization History   Administered Date(s) Administered    Hepatitis B 10/22/2000    Influenza - Quadrivalent - PF 11/28/2014    Influenza Split 12/07/2012, 10/22/2013    Pneumococcal Conjugate - 13 Valent 04/30/2018    Tdap 08/23/2018     No change in social history, surgical history, medical history, or family history.      Current Outpatient Medications   Medication Sig Dispense Refill    aspirin (ECOTRIN) 81 MG EC tablet Take 81 mg by mouth once daily.        biotin 1 mg Cap Take by mouth.      escitalopram oxalate (LEXAPRO) 10 MG tablet TAKE 1 TABLET BY MOUTH ONCE DAILY. 90 tablet 1    estradiol (ESTRACE) 0.01 % (0.1 mg/gram) vaginal cream Place vaginally once daily.      lisinopril 10 MG tablet TAKE 1 TABLET BY MOUTH ONCE DAILY 90 tablet 3    LUTEIN-ZEAXANTHIN ORAL Take  by mouth.      melatonin 5 mg Tab Take 5 mg by mouth as needed.       multivitamin capsule Take 1 capsule by mouth once daily.        traZODone (DESYREL) 100 MG tablet TAKE 1 TABLET BY MOUTH NIGHTLY 90 tablet 3     No current facility-administered medications for this visit.          Review of Systems   Constitutional: Negative for activity change, appetite change, chills, diaphoresis, fatigue, fever and unexpected weight change.   HENT: Negative for congestion, ear pain, hearing loss, rhinorrhea, trouble swallowing and voice change.    Eyes: Negative for pain and visual disturbance.   Respiratory: Negative for cough, chest tightness and shortness of breath.    Cardiovascular: Negative for chest pain, palpitations and leg swelling.   Gastrointestinal: Negative for abdominal distention, abdominal pain and blood in stool.   Genitourinary: Negative for difficulty urinating, flank pain, frequency and urgency.   Musculoskeletal: Negative for arthralgias, back pain, joint swelling, myalgias, neck pain and neck stiffness.   Skin: Negative for pallor and rash.   Neurological: Negative for dizziness, tremors, syncope, weakness and headaches.   Hematological: Negative for adenopathy.   Psychiatric/Behavioral: Negative for dysphoric mood and sleep disturbance. The patient is not nervous/anxious.        Objective:      Physical Exam   Constitutional: She is oriented to person, place, and time. She appears well-developed and well-nourished. No distress.   HENT:   Head: Normocephalic and atraumatic.       Nose: Nose normal.   Mouth/Throat: Oropharynx is clear and moist.   Eyes: Conjunctivae and EOM are normal. Pupils are equal, round, and reactive to light. No scleral icterus.   Neck: Normal range of motion. Neck supple. No JVD present. No thyromegaly present.   Cardiovascular: Normal rate, regular rhythm, normal heart sounds and intact distal pulses.   No murmur heard.  Pulmonary/Chest: Effort normal and breath sounds normal.  She has no wheezes. She has no rales.   Abdominal: Soft. Bowel sounds are normal. She exhibits no distension. There is no tenderness.   Musculoskeletal: Normal range of motion. She exhibits no edema or tenderness.   Lymphadenopathy:     She has no cervical adenopathy.   Neurological: She is alert and oriented to person, place, and time. She has normal reflexes. No cranial nerve deficit. Coordination normal.   Skin: Skin is warm and dry. No rash noted. No erythema.   Psychiatric: She has a normal mood and affect. Her behavior is normal.   Vitals reviewed.      Assessment:       1. Essential hypertension    2. Sleep disturbance    3. Adrenal adenoma, unspecified laterality    4. Splenic artery aneurysm    5. TMJ (dislocation of temporomandibular joint), sequela        Plan:       1. Essential hypertension  Controlled.  Continue current medications  Continue to avoid excessive sodium  Continue home monitoring  Target blood pressure is 139/89 or less    2. Sleep disturbance  Stress induced  Continue Lexapro    3. Adrenal adenoma, unspecified laterality  Stable with serial CT    4. Splenic artery aneurysm  Stable with serial CT  No symptoms    5. TMJ (dislocation of temporomandibular joint), sequela  Working with dental practice   Encouraged to work on stress as well.

## 2018-09-09 PROBLEM — I72.8 SPLENIC ARTERY ANEURYSM: Chronic | Status: ACTIVE | Noted: 2018-09-09

## 2018-09-09 PROBLEM — I72.8 SPLENIC ARTERY ANEURYSM: Status: ACTIVE | Noted: 2018-09-09

## 2018-09-09 NOTE — PATIENT INSTRUCTIONS
Ynes,      your blood pressure is stable.  I would not make any changes.      I would encourage you to continue to use her Lexapro.  Managing her stress, will likely help with your jaw pain in your TMJ symptomatology.      Regular exercise room could also be a good stress reliever for you, which would probably help as well.      You do need 1 more pneumonia shot next year.  That would be the Pneumovax.  I would recommend getting a flu shot this fall.      Grant Hawkins MD

## 2018-11-05 ENCOUNTER — HOSPITAL ENCOUNTER (OUTPATIENT)
Dept: RADIOLOGY | Facility: HOSPITAL | Age: 63
Discharge: HOME | End: 2018-11-05
Attending: ORTHOPAEDIC SURGERY
Payer: MEDICARE

## 2018-11-05 ENCOUNTER — OFFICE VISIT (OUTPATIENT)
Dept: ORTHOPEDICS | Facility: CLINIC | Age: 63
End: 2018-11-05
Payer: MEDICARE

## 2018-11-05 ENCOUNTER — OFFICE VISIT (OUTPATIENT)
Dept: PRIMARY CARE CLINIC | Facility: CLINIC | Age: 63
End: 2018-11-05
Payer: MEDICARE

## 2018-11-05 VITALS
BODY MASS INDEX: 26.38 KG/M2 | HEIGHT: 66 IN | DIASTOLIC BLOOD PRESSURE: 78 MMHG | SYSTOLIC BLOOD PRESSURE: 126 MMHG | HEART RATE: 76 BPM | WEIGHT: 164.13 LBS

## 2018-11-05 VITALS — WEIGHT: 166 LBS | BODY MASS INDEX: 26.68 KG/M2 | HEIGHT: 66 IN

## 2018-11-05 DIAGNOSIS — M54.16 LUMBAR RADICULOPATHY: Primary | ICD-10-CM

## 2018-11-05 DIAGNOSIS — M25.552 LEFT HIP PAIN: Primary | ICD-10-CM

## 2018-11-05 DIAGNOSIS — M54.16 LUMBAR RADICULITIS: ICD-10-CM

## 2018-11-05 DIAGNOSIS — M25.552 LEFT HIP PAIN: ICD-10-CM

## 2018-11-05 PROCEDURE — 73502 X-RAY EXAM HIP UNI 2-3 VIEWS: CPT | Mod: 26,LT,, | Performed by: RADIOLOGY

## 2018-11-05 PROCEDURE — 3008F BODY MASS INDEX DOCD: CPT | Mod: CPTII,S$GLB,, | Performed by: NURSE PRACTITIONER

## 2018-11-05 PROCEDURE — 72100 X-RAY EXAM L-S SPINE 2/3 VWS: CPT | Mod: TC,PO

## 2018-11-05 PROCEDURE — 1101F PT FALLS ASSESS-DOCD LE1/YR: CPT | Mod: CPTII,S$GLB,, | Performed by: ORTHOPAEDIC SURGERY

## 2018-11-05 PROCEDURE — 99999 PR PBB SHADOW E&M-EST. PATIENT-LVL III: CPT | Mod: PBBFAC,,, | Performed by: ORTHOPAEDIC SURGERY

## 2018-11-05 PROCEDURE — 73502 X-RAY EXAM HIP UNI 2-3 VIEWS: CPT | Mod: TC,PO,LT

## 2018-11-05 PROCEDURE — 1101F PT FALLS ASSESS-DOCD LE1/YR: CPT | Mod: CPTII,S$GLB,, | Performed by: NURSE PRACTITIONER

## 2018-11-05 PROCEDURE — 72100 X-RAY EXAM L-S SPINE 2/3 VWS: CPT | Mod: 26,,, | Performed by: RADIOLOGY

## 2018-11-05 PROCEDURE — 3078F DIAST BP <80 MM HG: CPT | Mod: CPTII,S$GLB,, | Performed by: NURSE PRACTITIONER

## 2018-11-05 PROCEDURE — 3008F BODY MASS INDEX DOCD: CPT | Mod: CPTII,S$GLB,, | Performed by: ORTHOPAEDIC SURGERY

## 2018-11-05 PROCEDURE — 99999 PR PBB SHADOW E&M-EST. PATIENT-LVL IV: CPT | Mod: PBBFAC,,, | Performed by: NURSE PRACTITIONER

## 2018-11-05 PROCEDURE — 99203 OFFICE O/P NEW LOW 30 MIN: CPT | Mod: S$GLB,,, | Performed by: ORTHOPAEDIC SURGERY

## 2018-11-05 PROCEDURE — 3074F SYST BP LT 130 MM HG: CPT | Mod: CPTII,S$GLB,, | Performed by: NURSE PRACTITIONER

## 2018-11-05 PROCEDURE — 99213 OFFICE O/P EST LOW 20 MIN: CPT | Mod: 25,S$GLB,, | Performed by: NURSE PRACTITIONER

## 2018-11-05 PROCEDURE — 96372 THER/PROPH/DIAG INJ SC/IM: CPT | Mod: S$GLB,,, | Performed by: NURSE PRACTITIONER

## 2018-11-05 RX ORDER — METHYLPREDNISOLONE SOD SUCC 125 MG
125 VIAL (EA) INJECTION
Status: COMPLETED | OUTPATIENT
Start: 2018-11-05 | End: 2018-11-05

## 2018-11-05 RX ORDER — MELOXICAM 7.5 MG/1
7.5 TABLET ORAL DAILY
Qty: 14 TABLET | Refills: 0 | Status: SHIPPED | OUTPATIENT
Start: 2018-11-05 | End: 2018-11-19

## 2018-11-05 RX ADMIN — Medication 125 MG: at 01:11

## 2018-11-05 NOTE — PATIENT INSTRUCTIONS
Understanding Lumbar Radiculopathy    Lumbar radiculopathy is irritation or inflammation of a nerve root in the low back. It causes symptoms that spread out from the back down one or both legs. To understand this condition, it helps to understand the parts of the spine:  · Vertebrae. These are bones that stack to form the spine. The lumbar spine contains the 5 bottom vertebrae.  · Disks. These are soft pads of tissue between the vertebrae. They act as shock absorbers for the spine.  · Spinal canal. This is a tunnel formed within the stacked vertebrae. In the lumbar spine, nerves run through this canal.  · Nerves. These branch off and leave the spinal canal, traveling out to parts of the body. As they leave the spinal canal, nerves pass through openings between the vertebrae. The nerve root is the part of the nerve that is closest to the spinal canal.  · Sciatic nerve. This is a large nerve formed from several nerve roots in the low back. This nerve extends down the back of the leg to the foot.  With lumbar radiculopathy, nerve roots in the low back become irritated. This leads to pain and symptoms. The sciatic nerve is commonly involved, so the condition is often called sciatica.  What causes lumbar radiculopathy?  Aging, injury, poor posture, extra body weight, and other issues can lead to problems in the low back. These problems may then irritate nerve roots. They include:  · Damage to a disk in the lumbar spine. The damaged disk may then press on nearby nerve roots.  · Degeneration from wear and tear, and aging. This can lead to narrowing (stenosis) of the openings between the vertebrae. The narrowed openings press on nerve roots as they leave the spinal canal.  · Unstable spine. This is when a vertebra slips forward. It can then press on a nerve root.  Other, less common things can put pressure on nerves in the low back. These include diabetes, infection, or a tumor.  Symptoms of lumbar radiculopathy  These  include:  · Pain in the low back  · Pain, numbness, tingling, or weakness that travels into the buttocks, hip, groin, or leg  · Muscle spasms  Treatment for lumbar radiculopathy  In most cases, your healthcare provider will first try treatments that help relieve symptoms. These may include:  · Prescription and over-the-counter pain medicines. These help relieve pain, swelling, and irritation.  · Limits on positions and activities that increase pain. But lying in bed or avoiding all movement is only recommended for a short period of time.  · Physical therapy, including exercises and stretches. This helps decrease pain and increase movement and function.  · Steroid shots into the lower back. This may help relieve symptoms for a time.  · Weight-loss program. If you are overweight, losing extra pounds may help relieve symptoms.  In some cases, you may need surgery to fix the underlying problem. This depends on the cause, the symptoms, and how long the pain has lasted.  Possible complications  Over time, an irritated and inflamed nerve may become damaged. This may lead to long-lasting (permanent) numbness or weakness in your legs and feet. If symptoms change suddenly or get worse, be sure to let your healthcare provider know.  When to call your healthcare provider  Call your healthcare provider right away if you have any of these:  · New pain or pain that gets worse  · New or increasing weakness, tingling, or numbness in your leg or foot  · Problems controlling your bladder or bowel   Date Last Reviewed: 3/10/2016  © 1790-0549 [employer]. [address] [place] [address], [place] [address]. All rights reserved. This information is not intended as a substitute for professional medical care. Always follow your healthcare professional's instructions.    Today we'll give you a steroid injection and start on the meloxicam.  Start with physical therapy.  If no improvement in 2-4 weeks or if worse, seek consultation with  pain management.    If you have any questions, please call.  You can reach us at [phone] Monday through Thursday (except holidays) 10 a.m. to 6 p.m. or call Dr. Hawkins/JODIE Richardson    Thank you for using the [facility]!    FRANCIE Lopez, CNP, FNP-BC [facility]    To rate your experience with LOUISE Lopez, click on the link below:        [email or web address]

## 2018-11-05 NOTE — PROGRESS NOTES
Ynes Talavera is a 63 y.o. female patient of Dr. Grant Hawkins Jr, MD who presents to the clinic today for   Chief Complaint   Patient presents with    Back Pain   .    HPI      Pt, who is not known to me, reports a new problem to me:  Low back pain.  Was seen in orthopedics this morning.  Dr. Aparicio .  He recommended a steroid pack but she wanted an injection.  She had an injection in her shoulder once in the past and the pain resolved.    These symptoms began 2 months ago and status is continuing.     Pt denies the following symptoms:  Numbness down the leg, problem controlling her urine (but reports increased urgency).    Aggravating factors include none known .    Relieving factors include none known .    OTC Medications tried are none.  She was trying OTC NSAID in the past but it didn't help    Prescription medications taken for symptoms are none.    Pertinent medical history:  H/o shoulder pain in the past    ROS    MS:  See Chief Complaint/HPI    All other ROS neg.      PAST MEDICAL HISTORY:  Past Medical History:   Diagnosis Date    Adrenal adenoma     Diverticulosis of the colon     Hypertension     Venous insufficiency 2014    right leg, denies Hx clot       PAST SURGICAL HISTORY:  Past Surgical History:   Procedure Laterality Date    APPENDECTOMY      BREAST BIOPSY Right     benign needle biopsy    BREAST BIOPSY Left     benign needle biopsy     SECTION, LOW TRANSVERSE      CHOLECYSTECTOMY      COLONOSCOPY N/A 2012    [facility]    REPAIR-HERNIA-UMBILICAL N/A 2014    [facility]ar [facility] at [facility]    THUMB ARTHROSCOPY      both thumbs, trigger finger release.    TONSILLECTOMY      TUBAL LIGATION      UMBILICAL HERNIA REPAIR  2014    VEIN LIGATION AND STRIPPING      Left       SOCIAL HISTORY:  Social History     Socioeconomic History    Marital status:      Spouse name: Not on file    Number of  children: 3    Years of education: Not on file    Highest education level: Not on file   Social Needs    Financial resource strain: Not on file    Food insecurity - worry: Not on file    Food insecurity - inability: Not on file    Transportation needs - medical: Not on file    Transportation needs - non-medical: Not on file   Occupational History    Occupation: Dental hygienist     Comment: Working full-time     Employer:  [employer]   Tobacco Use    Smoking status: Former Smoker     Packs/day: 0.25     Years: 25.00     Pack years: 6.25     Last attempt to quit: 1993     Years since quittin.7    Smokeless tobacco: Never Used   Substance and Sexual Activity    Alcohol use: No     Alcohol/week: 0.0 oz    Drug use: No    Sexual activity: Yes     Partners: Male   Other Topics Concern    Not on file   Social History Narrative    Her  has had a CVA. She is major support for him.        Walks regularly       FAMILY HISTORY:  Family History   Problem Relation Age of Onset    Cancer Mother     Cancer Father     Heart disease Father     Hypertension Father     Diabetes Maternal Grandmother     Heart disease Maternal Grandmother     Diabetes Maternal Grandfather     Heart disease Maternal Grandfather     Breast cancer Paternal Grandmother 42       ALLERGIES AND MEDICATIONS: updated and reviewed.  Review of patient's allergies indicates:  No Known Allergies  Current Outpatient Medications   Medication Sig Dispense Refill    aspirin (ECOTRIN) 81 MG EC tablet Take 81 mg by mouth once daily.        biotin 1 mg Cap Take by mouth.      escitalopram oxalate (LEXAPRO) 10 MG tablet TAKE 1 TABLET BY MOUTH ONCE DAILY. (Patient taking differently: TAKE 1 TABLET BY MOUTH EVERY OTHER DAY) 90 tablet 1    lisinopril 10 MG tablet TAKE 1 TABLET BY MOUTH ONCE DAILY 90 tablet 3    LUTEIN-ZEAXANTHIN ORAL Take by mouth.      melatonin 5 mg Tab Take 5 mg by mouth as needed.        multivitamin capsule Take 1 capsule by mouth once daily.        traZODone (DESYREL) 100 MG tablet TAKE 1 TABLET BY MOUTH NIGHTLY (Patient taking differently: TAKE 1/2 TABLET BY MOUTH NIGHTLY) 90 tablet 3    estradiol (ESTRACE) 0.01 % (0.1 mg/gram) vaginal cream Place vaginally once daily.       No current facility-administered medications for this visit.          PHYSICAL EXAM    Alert, coop 63 y.o. female patient in no acute distress.    Vitals:    11/05/18 1326   BP: 126/78   Pulse: 76     VS reviewed.  VS stable.  CC, nursing note, medications & PMH all reviewed today.    Head:  Normocephalic, atraumatic.    EENT: Ext nose/ears normal.  Eyes with normal lids, not injected.           Resp:  Respirations even, unlabored     Heart:  Normal rate.    See Dr. Aparicio's note from today.    Skin:  Warm, dry, color good.    Psych:  Responds appropriately throughout the visit.               Relaxed.  Well-groomed.    Lumbar radiculopathy  -     methylPREDNISolone sodium succinate injection 125 mg  -     meloxicam (MOBIC) 7.5 MG tablet; Take 1 tablet (7.5 mg total) by mouth once daily. for 14 days  Dispense: 14 tablet; Refill: 0  -     Ambulatory referral to Pain Clinic      Pt today presents with report of 2 month h/o LBP and was diagnosed with lumbar radiculopathy today by orthopedist Dr. Aparicio.  He recommended that she take a steroid pack but she wanted an injection, so she was referred here.  She had resolution of her shoulder pain in the past with a steroid injection in her shoulder.    This is a new problem to me.  No work up is planned.        I have reviewed the following additional tests today: CMP for renal function    I explained this is not the same type of steroid injection she had in her shoulder.  However, she can still have it then start on the meloxicam tomorrow.  She will also start PT.  If she's not improved in 2-4 weeks, she'll go ahead with pain management evaluation.  Pt advised to perform comfort  measures recommended on patient instruction sheet .    If not better in 2-4 weeks, the patient is advised to see pain management..  If worse or concerns, the patient is advised to call  The PCP office.  Explained exam findings, diagnosis and treatment plan to patient.  Questions answered and patient states understanding.

## 2018-11-05 NOTE — Clinical Note
Grant Hawkins Jr, MD,  I saw your patient today in the [facility].  If you have any questions, please do not hesitate to contact me.  Thank you!  LOUISE Lopez

## 2018-11-05 NOTE — PROGRESS NOTES
DATE: 2018  PATIENT: Ynes Talavera  REFERRING MD:  CHIEF COMPLAINT:   Chief Complaint   Patient presents with    Left Hip - Pain       HISTORY:  Ynes Talavera is a 63 y.o. female  who presents for initial evaluation of left lower extremity pain. She notes a 2 month history of pain starting in the posterior hip and buttocks radiating down the lateral leg to below the knee.  She notes increased pain climbing stairs.  She notes throbbing pain down her leg. Pain is reported at 5/10.  She has been taking ibuprofen with only minimal improvement.  She denies any groin pain.  She denies any previous injuries to her hip or spine.      PAST MEDICAL/SURGICAL HISTORY:  Past Medical History:   Diagnosis Date    Adrenal adenoma     Diverticulosis of the colon     Hypertension     Venous insufficiency 2014    right leg, denies Hx clot     Past Surgical History:   Procedure Laterality Date    APPENDECTOMY      BREAST BIOPSY Right     benign needle biopsy    BREAST BIOPSY Left     benign needle biopsy     SECTION, LOW TRANSVERSE      CHOLECYSTECTOMY      COLONOSCOPY N/A 2012    [facility]    REPAIR-HERNIA-UMBILICAL N/A 2014    [facility] at [facility] OR    THUMB ARTHROSCOPY      both thumbs, trigger finger release.    TONSILLECTOMY      TUBAL LIGATION      UMBILICAL HERNIA REPAIR      VEIN LIGATION AND STRIPPING      Left       Current Medications:   Current Outpatient Medications:     aspirin (ECOTRIN) 81 MG EC tablet, Take 81 mg by mouth once daily.  , Disp: , Rfl:     biotin 1 mg Cap, Take by mouth., Disp: , Rfl:     escitalopram oxalate (LEXAPRO) 10 MG tablet, TAKE 1 TABLET BY MOUTH ONCE DAILY., Disp: 90 tablet, Rfl: 1    estradiol (ESTRACE) 0.01 % (0.1 mg/gram) vaginal cream, Place vaginally once daily., Disp: , Rfl:     lisinopril 10 MG tablet, TAKE 1 TABLET BY MOUTH ONCE DAILY, Disp: 90 tablet, Rfl: 3     LUTEIN-ZEAXANTHIN ORAL, Take by mouth., Disp: , Rfl:     melatonin 5 mg Tab, Take 5 mg by mouth as needed. , Disp: , Rfl:     multivitamin capsule, Take 1 capsule by mouth once daily.  , Disp: , Rfl:     traZODone (DESYREL) 100 MG tablet, TAKE 1 TABLET BY MOUTH NIGHTLY, Disp: 90 tablet, Rfl: 3    Family History: family history was reviewed and is noncontributory  Social History:   Social History     Socioeconomic History    Marital status:      Spouse name: Not on file    Number of children: 3    Years of education: Not on file    Highest education level: Not on file   Social Needs    Financial resource strain: Not on file    Food insecurity - worry: Not on file    Food insecurity - inability: Not on file    Transportation needs - medical: Not on file    Transportation needs - non-medical: Not on file   Occupational History    Occupation: Dental hygienist     Comment: Working full-time     Employer:  [employer]   Tobacco Use    Smoking status: Former Smoker     Packs/day: 0.25     Years: 25.00     Pack years: 6.25     Last attempt to quit: 1993     Years since quittin.7    Smokeless tobacco: Never Used   Substance and Sexual Activity    Alcohol use: No     Alcohol/week: 0.0 oz    Drug use: No    Sexual activity: Yes     Partners: Male   Other Topics Concern    Not on file   Social History Narrative    Her  has had a CVA. She is major support for him.        Walks regularly       ROS:  Constitution: Negative for chills, fever, and sweats. Negative for unexplained weight loss.  HENT: Negative for headaches and blurry vision.   Cardiovascular: Negative for chest pain, irregular heartbeat, leg swelling and palpitations.   Respiratory: Negative for cough and shortness of breath.   Gastrointestinal: Negative for abdominal pain, heartburn, nausea and vomiting.   Genitourinary: Negative for bladder incontinence and dysuria.   Musculoskeletal: Negative for systemic  "arthritis, muscle weakness and myalgias.   Neurological: Negative for numbness.   Psychiatric/Behavioral: Negative for depression.  Endocrine: Negative for polyuria.   Hematologic/Lymphatic: Negative for bleeding disorders.   Skin: Negative for poor wound healing.        PHYSICAL EXAM:  Ht 5' 6" (1.676 m)   Wt 75.3 kg (166 lb)   BMI 26.79 kg/m²   Ynes Talavera is a well developed, well nourished female in no acute distress. Physical examination of the left hip and lumbar spine evaluated the following:    Gait and Alignment  Lumbar spine range of motion  Inspection for ecchymosis, swelling, atrophy, or deformity  Tenderness to palpation over the bony and soft tissue structures around the lower back/hip  Inspection for intra-articular and/or bursal effusions  Range of Motion and presence of contractures  Sensation and motor strength to the lower extremity  Pain/pop/click with logrolling or range of motion  Straight leg raise testing  Vascular exam to include skin temperature/color/capillary refill    Remarkable findings included:  Mild decreased range of motion lumbar spine flexion.  Tenderness in the left iliolumbar region.  She sits comfortably on the exam table. Sensation intact L2-S1.  Motor exam 5/5 in the quadriceps, hamstrings, tibialis anterior, gastrocnemius and extensor hallucis longus muscles.  Reflexes are symmetric in the knee and ankle jerk.  Straight leg raise testing negative.              IMAGING:   X-rays of the left hip and lumbar spine are personally reviewed.  No acute fractures or dislocations are seen.  Mild to moderate degenerative changes of the lumbar spine without malalignment noted.  Mild degenerative changes about the left hip present     ASSESSMENT:   Lumbar spondylosis with left lower extremity radiculitis    PLAN:  The nature of the diagnosis, using models and diagrams when appropriate, was explained to the patient in detail.Treatment option discussed included observation, Medrol " Dosepak, physical therapy, referral to the [facility].. All questions answered and the patient wishes to proceed with therapy.  Additionally she is hoping for a steroid injection. I have explained that this is not performed in the orthopedic clinic but may be performed in primary care. We have contacted her primary care physician and have been informed that the nurse practitioner would be more than happy to order the steroid injection. Should she continue to remain symptomatic, she will contact the office for referral to the Back and Spine.  Follow-up as needed.      This note was dictated using voice recognition software. Please excuse any grammatical or typographical errors.

## 2018-11-16 DIAGNOSIS — M54.2 NECK PAIN: Primary | ICD-10-CM

## 2018-11-19 ENCOUNTER — TELEPHONE (OUTPATIENT)
Dept: ORTHOPEDICS | Facility: CLINIC | Age: 63
End: 2018-11-19

## 2018-11-19 ENCOUNTER — CLINICAL SUPPORT (OUTPATIENT)
Dept: REHABILITATION | Facility: HOSPITAL | Age: 63
End: 2018-11-19
Attending: ORTHOPAEDIC SURGERY
Payer: MEDICARE

## 2018-11-19 ENCOUNTER — OFFICE VISIT (OUTPATIENT)
Dept: ORTHOPEDICS | Facility: CLINIC | Age: 63
End: 2018-11-19
Payer: MEDICARE

## 2018-11-19 ENCOUNTER — HOSPITAL ENCOUNTER (OUTPATIENT)
Dept: RADIOLOGY | Facility: HOSPITAL | Age: 63
Discharge: HOME | End: 2018-11-19
Attending: ORTHOPAEDIC SURGERY
Payer: MEDICARE

## 2018-11-19 VITALS — HEIGHT: 66 IN | BODY MASS INDEX: 26.36 KG/M2 | WEIGHT: 164 LBS

## 2018-11-19 DIAGNOSIS — M54.12 CERVICAL RADICULITIS: ICD-10-CM

## 2018-11-19 DIAGNOSIS — M47.812 SPONDYLOSIS OF CERVICAL REGION WITHOUT MYELOPATHY OR RADICULOPATHY: ICD-10-CM

## 2018-11-19 DIAGNOSIS — M62.81 MUSCLE WEAKNESS: ICD-10-CM

## 2018-11-19 DIAGNOSIS — M54.2 NECK PAIN: ICD-10-CM

## 2018-11-19 DIAGNOSIS — M79.605 LEFT LEG PAIN: ICD-10-CM

## 2018-11-19 DIAGNOSIS — M54.16 LUMBAR RADICULITIS: Primary | ICD-10-CM

## 2018-11-19 PROCEDURE — G8978 MOBILITY CURRENT STATUS: HCPCS | Mod: CL,PN

## 2018-11-19 PROCEDURE — 97161 PT EVAL LOW COMPLEX 20 MIN: CPT | Mod: PN

## 2018-11-19 PROCEDURE — 72050 X-RAY EXAM NECK SPINE 4/5VWS: CPT | Mod: TC,PO

## 2018-11-19 PROCEDURE — 1101F PT FALLS ASSESS-DOCD LE1/YR: CPT | Mod: CPTII,S$GLB,, | Performed by: ORTHOPAEDIC SURGERY

## 2018-11-19 PROCEDURE — 97110 THERAPEUTIC EXERCISES: CPT | Mod: PN

## 2018-11-19 PROCEDURE — G8979 MOBILITY GOAL STATUS: HCPCS | Mod: CK,PN

## 2018-11-19 PROCEDURE — 3008F BODY MASS INDEX DOCD: CPT | Mod: CPTII,S$GLB,, | Performed by: ORTHOPAEDIC SURGERY

## 2018-11-19 PROCEDURE — 99214 OFFICE O/P EST MOD 30 MIN: CPT | Mod: S$GLB,,, | Performed by: ORTHOPAEDIC SURGERY

## 2018-11-19 PROCEDURE — 99999 PR PBB SHADOW E&M-EST. PATIENT-LVL III: CPT | Mod: PBBFAC,,, | Performed by: ORTHOPAEDIC SURGERY

## 2018-11-19 PROCEDURE — 72050 X-RAY EXAM NECK SPINE 4/5VWS: CPT | Mod: 26,,, | Performed by: RADIOLOGY

## 2018-11-19 NOTE — PROGRESS NOTES
DATE: 2018  PATIENT: Ynes Talavera  REFERRING MD:   CHIEF COMPLAINT:   Chief Complaint   Patient presents with    Neck - Pain       HISTORY:  Ynes Talavera is a 63 y.o. right hand dominant female  who presents for initial evaluation of longstanding neck pain. She notes pain for many years.  States pain is in the cervical region and radiates to the lower neck.  She denies any shoulder arm pain. She did undergo a intramuscular steroid injection for her low back which seemed to help her neck for a few days.  She notes decreased range of motion and constant pain.  She now presents for evaluation    PAST MEDICAL/SURGICAL HISTORY:  Past Medical History:   Diagnosis Date    Adrenal adenoma     Diverticulosis of the colon     Hypertension     Venous insufficiency 2014    right leg, denies Hx clot     Past Surgical History:   Procedure Laterality Date    APPENDECTOMY      BREAST BIOPSY Right     benign needle biopsy    BREAST BIOPSY Left     benign needle biopsy     SECTION, LOW TRANSVERSE      CHOLECYSTECTOMY      COLONOSCOPY N/A 2012    [facility]    REPAIR-HERNIA-UMBILICAL N/A 2014    Performed by [facility] at [facility] OR    THUMB ARTHROSCOPY      both thumbs, trigger finger release.    TONSILLECTOMY      TUBAL LIGATION      UMBILICAL HERNIA REPAIR      VEIN LIGATION AND STRIPPING      Left       Current Medications:   Current Outpatient Medications:     aspirin (ECOTRIN) 81 MG EC tablet, Take 81 mg by mouth once daily.  , Disp: , Rfl:     biotin 1 mg Cap, Take by mouth., Disp: , Rfl:     escitalopram oxalate (LEXAPRO) 10 MG tablet, TAKE 1 TABLET BY MOUTH ONCE DAILY. (Patient taking differently: TAKE 1 TABLET BY MOUTH EVERY OTHER DAY), Disp: 90 tablet, Rfl: 1    estradiol (ESTRACE) 0.01 % (0.1 mg/gram) vaginal cream, Place vaginally once daily., Disp: , Rfl:     lisinopril 10 MG tablet, TAKE 1 TABLET BY MOUTH  ONCE DAILY, Disp: 90 tablet, Rfl: 3    LUTEIN-ZEAXANTHIN ORAL, Take by mouth., Disp: , Rfl:     melatonin 5 mg Tab, Take 5 mg by mouth as needed. , Disp: , Rfl:     meloxicam (MOBIC) 7.5 MG tablet, Take 1 tablet (7.5 mg total) by mouth once daily. for 14 days, Disp: 14 tablet, Rfl: 0    multivitamin capsule, Take 1 capsule by mouth once daily.  , Disp: , Rfl:     traZODone (DESYREL) 100 MG tablet, TAKE 1 TABLET BY MOUTH NIGHTLY (Patient taking differently: TAKE 1/2 TABLET BY MOUTH NIGHTLY), Disp: 90 tablet, Rfl: 3    Family History: family history was reviewed and is noncontributory  Social History:   Social History     Socioeconomic History    Marital status:      Spouse name: Not on file    Number of children: 3    Years of education: Not on file    Highest education level: Not on file   Social Needs    Financial resource strain: Not on file    Food insecurity - worry: Not on file    Food insecurity - inability: Not on file    Transportation needs - medical: Not on file    Transportation needs - non-medical: Not on file   Occupational History    Occupation: Dental hygienist     Comment: Working full-time     Employer:  [employer]   Tobacco Use    Smoking status: Former Smoker     Packs/day: 0.25     Years: 25.00     Pack years: 6.25     Last attempt to quit: 1993     Years since quittin.7    Smokeless tobacco: Never Used   Substance and Sexual Activity    Alcohol use: No     Alcohol/week: 0.0 oz    Drug use: No    Sexual activity: Yes     Partners: Male   Other Topics Concern    Not on file   Social History Narrative    Her  has had a CVA. She is major support for him.        Walks regularly       ROS:  Constitution: Negative for chills, fever, and sweats. Negative for unexplained weight loss.  HENT: Negative for headaches and blurry vision.   Cardiovascular: Negative for chest pain, irregular heartbeat, leg swelling and palpitations.   Respiratory:  "Negative for cough and shortness of breath.   Gastrointestinal: Negative for abdominal pain, heartburn, nausea and vomiting.   Genitourinary: Negative for bladder incontinence and dysuria.   Musculoskeletal: Negative for systemic arthritis, joint swelling, muscle weakness and myalgias.   Neurological: Negative for numbness.   Psychiatric/Behavioral: Negative for depression.   Endocrine: Negative for polyuria.   Hematologic/Lymphatic: Negative for bleeding disorders.  Skin: Negative for poor wound healing.       PHYSICAL EXAM:  Ht 5' 6" (1.676 m)   Wt 74.4 kg (164 lb)   BMI 26.47 kg/m²   Ynes Talavera is a well developed, well nourished female in no acute distress. Physical examination of the bilateral shoulder evaluated the following:    Inspection, palpation and ROM of the cervical spine  Disc compression testing bilaterally  Inspection for swelling, ecchymosis, erythema, deformity and atrophy  Tenderness to palpation of the soft tissue and bony structures  Active and passive range of motion  Sensation of the shoulder and upper extremity  Motor strength in the deltoid, supraspinatus, internal rotators and external rotators  Impingement, apprehension, relocation and Speed's tests  Upper extremity vascular exam (skin temp,color, capillary refill)  Inspection for pseudomotor signs    Remarkable findings included:  Decreased range of motion of the cervical spine to extension and flexion.  Rotation also mildly limited.  Sensation intact C5-T1.  Motor exam intact to both upper extremities.  Supraspinatus and external rotation strength testing within normal limits bilaterally. Negative impingement sign to either shoulder.          IMAGING:   X-rays of the cervical spine are reviewed.  There is significant degenerative change most notably at C6-7 and C5-6.  Reversal of the normal cervical lordosis is seen.      ASSESSMENT:   Cervical spondylosis    PLAN:  The nature of the diagnosis, using models and diagrams when " appropriate, was explained to the patient in detail. I went over the x-rays and explained treatment options which include anti-inflammatory, steroid pack, physical therapy, referral to the Back and Spine or pain management. At the present time her biggest problem is still her lumbar spine which is not improved.  I have ordered an MRI of the lumbar spine and referral to pain management for lumbar epidural steroid injections.  They will also evaluate her cervical spine for further treatment recommendations.  She will finish up her course of meloxicam as she is not sure whether it is actually helping.  She contact the office if I can be of further assistance.      This note was dictated using voice recognition software. Please excuse any grammatical or typographical errors.

## 2018-11-19 NOTE — PROGRESS NOTES
Physical Therapy Evaluation    Name: Ynes Talavera  Clinic Number: [id number]      Diagnosis:   Encounter Diagnoses   Name Primary?    Neck pain     Muscle weakness      Physician: Mik Aparicio MD  Treatment Orders: PT Eval and Treat    Past Medical History:   Diagnosis Date    Adrenal adenoma     Diverticulosis of the colon     Hypertension     Venous insufficiency Sept 2014    right leg, denies Hx clot     Current Outpatient Medications   Medication Sig    aspirin (ECOTRIN) 81 MG EC tablet Take 81 mg by mouth once daily.      biotin 1 mg Cap Take by mouth.    escitalopram oxalate (LEXAPRO) 10 MG tablet TAKE 1 TABLET BY MOUTH ONCE DAILY. (Patient taking differently: TAKE 1 TABLET BY MOUTH EVERY OTHER DAY)    estradiol (ESTRACE) 0.01 % (0.1 mg/gram) vaginal cream Place vaginally once daily.    lisinopril 10 MG tablet TAKE 1 TABLET BY MOUTH ONCE DAILY    LUTEIN-ZEAXANTHIN ORAL Take by mouth.    melatonin 5 mg Tab Take 5 mg by mouth as needed.     meloxicam (MOBIC) 7.5 MG tablet Take 1 tablet (7.5 mg total) by mouth once daily. for 14 days    multivitamin capsule Take 1 capsule by mouth once daily.      traZODone (DESYREL) 100 MG tablet TAKE 1 TABLET BY MOUTH NIGHTLY (Patient taking differently: TAKE 1/2 TABLET BY MOUTH NIGHTLY)     No current facility-administered medications for this visit.      Review of patient's allergies indicates:  No Known Allergies  Precautions: HTN    Evaluation Date: 11/19/18  Authorization period: 11/19/19  Plan of care Expiration: 1/14/18      Subjective   Onset/GWENDOLYN: pt reports L hip and knee pain for 3 months.  She had a steroid injection which relieved the pain for 3-4 days and has been taking meloxicam.  She states she has not had pain to L lateral gastroc since the injection.  Pt reports neck pain for years which is getting worse (tighter, more pain, decreased ROM).  Neck pain is more concentrated on L side to posterior neck and to L UT.    Pt has gone to  chiropractor 2.5 years ago without relief.  Pt states she wears a splint for TMJ    Pt reports increased urgency for urination    Primary concern/ Chief complaints:    Ynes is a 63 y.o. female that presents to [facility] outpatient physical therapy secondary to neck and low back pain.    X-ray was taken and revealed 1. Lumbar scoliosis.  2. Mild posterior subluxation of L2 and L3 related to degenerative disc disease.  3. Multilevel degenerative disc disease.  4. L4-5 and L5-S1 degenerative facet arthrosis.    An anterolisthesis is noted of the C3 on C4 vertebral body of 3 mm with a retrolisthesis of the C5 on C6 vertebral body of 5 mm.  A minimal anterolisthesis is noted of the C7 on T1 vertebral body of 3 mm.    Intervertebral disc height loss is noted at the C3-C4, C5-C6, C6-C7, C7-T1 levels.  Vertebral body heights appear well preserved.    Osseous demineralization is noted diffusely.  Osseous neural foraminal narrowing is noted at the C3-C4,C4-C5, C5-C6, C6-C7, C7-T1 levels on the left and at the C3-C4 C5-C6-C6-C7 levels on the right.  The C7-T1 level on the right is not well evaluated    Previous treatment included injection and meloxicam, TENS. Pt has a decrease ability to perform ADLs such as driving, lifting, any cervical rotation. 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 Pt denies numbness and tingling to UE and LE. No cultural, environmental, or spiritual barriers identified to treatment or learning.    Pt lives with  (had prior CVA) in 1 story house with 1 curb step to enter    Pain Scale: Ynes rates pain on a scale of 0-10 to be 10 at worst; 0 currently; 0 at best .  Increased pain: neck: sitting with L cervical rotation to clean teeth, neck pain at night, cerv rotation to drive, pain to L hip and knee (referred from lumbar): standing, walking, stairs, L SL, sit  to stand, carrying granddaughter  Decreased pain: neck: R SL    Pt watches 3 yo grandchild who lives in 2 story house    Patient Goals: Pt would like to decrease pain and increase function so she can return to  her normal daily activities.        Objective     Observation: pt is pleasant and cooperative    Posture:  Slight rounded sh and fwd head    Cervical ROM:  Flex 40, tightness to L post neck  Ext 40, tight to L post neck with some shooting pain  R SB 20, tight on L, pain on R  L SB 20, pain on L  R rot 41, tight on L  L rot 33, pain on L    Cervical strength:  Flex 4/5  Ext 5/5  R SB 5/5  L SB 5/5       Lumbar Range of Motion:    % Pain   Flexion 50   Pain to center of lumbar spine        Extension 80   Pain to sacrum        Left Side Bending 90 no        Right Side Bending 90  no        Left rotation   100 no        Right Rotation   100 no             Lower Extremity Strength  Right LE  Left LE    Knee extension: 5/5 Knee extension: 5/5   Knee flexion: 5/5 Knee flexion: 5/5   Hip flexion: 5/5 Hip flexion: 5/5   Hip extension:  3+/5 Hip extension: 4-/5   Hip abduction: 3+/5 Hip abduction: 4/5   Hip adduction: 4+/5 Hip adduction 4+/5   Ankle dorsiflexion: 5/5 Ankle dorsiflexion: 5/5         Special Tests:    -Piriformis Test: (-)  -Prone Instability Test: NT      Neuro Dynamic Testing:    Sciatic nerve:      SLR: NT--will test next visit         Palpation: TTP to B UT and levator scap (L>R) trigger pt to L levator scap    Sensation: grossly intact to light touch    Flexibility:    Ely's test: R = will test next visit   Popliteal Angle: will test next visit     Functional Limitations Reports - G Codes  Category: mobility  CMS Impairment/Limitation/Restriction for FOTO Hip Survey  Status Limitation G-Code CMS Severity Modifier  Intake 38% 62% Current Status CL - At least 60 percent but less than 80 percent  Predicted 56% 44% Goal Status+ CK - At least 40 percent but less than 60 percent      PT Evaluation  Completed? Yes  Discussed Plan of Care with patient: Yes    TREATMENT:  Ynes instructed in and performed therapeutic exercises to develop strength and endurance, flexibility for 15 minutes including:see media section for HeP  PPT with TA set 10x5 sec  glute sets 10x5 sec  Seated cervical retraction 10x5 sec  UT stretch 3x20 sec  Levator scap stretch 3x20 sec  Corner pec stretch 3x20 sec    Will add next visit:  Bruegger's  Gentle cervical rotation  Sh ext with scap depression and TA set  SL clams  bridges      HEP provided: pt given verbal and written instruction for all ex listed above (see media for HEP handout)  Instructed pt. Regarding: Proper technique with all exercises. Pt demo good understanding of the education provided. Ynes demonstrated good return demonstration of activities.      Assessment     This is a 63 y.o. female referred to outpatient physical therapy and presents with a medical diagnosis of lumbar and cervical radiculitis and demonstrates limitations as described in the problem list. Pt will benefit from physcial therapy services in order to maximize pain free functional independence. The following goals were discussed with the patient and patient is in agreement with them as to be addressed in the treatment plan.     Anticipated barriers to physical therapy: scheduling due to pt works until 4pm on [facility] [place].  Pt given option to be treated at [facility].  Pt scheduled 1 appt on Thurs 11/29/19.    Medical necessity is demonstrated by the following IMPAIRMENTS/PROBLEM LIST:   1)Increase in pain level limiting function   2)decreased lumbar ROM   3)decreased LE strength   4)decreased LE flexibility   5)Lack of HEP    GOALS: Short Term Goals:  4 weeks  1.Report decreased    Neck and low back/L LE    pain  <   / =  5  /10 at its worst  to increase tolerance for ADLs  2. 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 Increased R LE  strength by 1/3 muscle grade in all deficient planes to increase tolerance for ADL and work activities.  4. Increased L LE strength by 1/3 muscle grade in all deficient planes to increase tolerance for ADL and work activities.  5. Increased L cervical rotation by 5 deg for increased ease with driving   6. Pt to tolerate HEP to improve ROM and independence with ADL's  7. Increase lumbar flexion ROM to 80% for increased ease with donning shoes  8. Pt will ascend//descend 4 steps with 1 rail mod I    Long Term Goals: 8 weeks  1.Report decreased    Neck and back/L LE    pain  <   / =  3  /10  to increase tolerance for ADLs  2.Pt will ascend/descend 12 steps with 1 rail mod I while carrying 20-30lbs  3.Increased R LE strength by 1 muscle grade in all deficient planes to increase tolerance for ADL and work activities.  4. Increased L LE strength by 1 muscle grade in all deficient planes to increase tolerance for ADL and work activities.  5. Pt to be Independent with HEP to improve ROM and independence with ADL's  6. Pt to increase B Ely's Test by > or = 20 degrees in order to improve flexibility and posture.   7. Pt to demonstrate negative Bridge Test in order to show improved core strength for lumbar stabilization.       Plan     Pt will be treated by physical therapy 1-3 times a week for 8-12 weeks for Pt Education, HEP, therapeutic exercises, neuromuscular re-education, joint mobilizations, modalities prn to achieve established goals. Ynes may at times be seen by a PTA as part of the Rehab Team.     Cont PT for 8-12 weeks.     I certify the need for these services furnished under this plan of treatment and while under my care.______________________________ Physician/Referring Practitioner  Date of Signature

## 2018-11-19 NOTE — TELEPHONE ENCOUNTER
Patient in office for neck pain. Dr. Aparicio requested to add PT for pt c spine also. Referral entered. Patient is scheduled for today at 1 PM. Thank you!

## 2018-11-20 NOTE — PLAN OF CARE
Physical Therapy Evaluation    Name: Ynes Talavera  Clinic Number: [id number]      Diagnosis:   Encounter Diagnoses   Name Primary?    Neck pain     Muscle weakness      Physician: Mik Aparicio MD  Treatment Orders: PT Eval and Treat    Past Medical History:   Diagnosis Date    Adrenal adenoma     Diverticulosis of the colon     Hypertension     Venous insufficiency Sept 2014    right leg, denies Hx clot     Current Outpatient Medications   Medication Sig    aspirin (ECOTRIN) 81 MG EC tablet Take 81 mg by mouth once daily.      biotin 1 mg Cap Take by mouth.    escitalopram oxalate (LEXAPRO) 10 MG tablet TAKE 1 TABLET BY MOUTH ONCE DAILY. (Patient taking differently: TAKE 1 TABLET BY MOUTH EVERY OTHER DAY)    estradiol (ESTRACE) 0.01 % (0.1 mg/gram) vaginal cream Place vaginally once daily.    lisinopril 10 MG tablet TAKE 1 TABLET BY MOUTH ONCE DAILY    LUTEIN-ZEAXANTHIN ORAL Take by mouth.    melatonin 5 mg Tab Take 5 mg by mouth as needed.     meloxicam (MOBIC) 7.5 MG tablet Take 1 tablet (7.5 mg total) by mouth once daily. for 14 days    multivitamin capsule Take 1 capsule by mouth once daily.      traZODone (DESYREL) 100 MG tablet TAKE 1 TABLET BY MOUTH NIGHTLY (Patient taking differently: TAKE 1/2 TABLET BY MOUTH NIGHTLY)     No current facility-administered medications for this visit.      Review of patient's allergies indicates:  No Known Allergies  Precautions: HTN    Evaluation Date: 11/19/18  Authorization period: 11/19/19  Plan of care Expiration: 1/14/18      Subjective   Onset/GWENDOLYN: pt reports L hip and knee pain for 3 months.  She had a steroid injection which relieved the pain for 3-4 days and has been taking meloxicam.  She states she has not had pain to L lateral gastroc since the injection.  Pt reports neck pain for years which is getting worse (tighter, more pain, decreased ROM).  Neck pain is more concentrated on L side to posterior neck and to L UT.    Pt has gone to  chiropractor 2.5 years ago without relief.  Pt states she wears a splint for TMJ    Pt reports increased urgency for urination    Primary concern/ Chief complaints:    Ynes is a 63 y.o. female that presents to [facility] outpatient physical therapy secondary to neck and low back pain.    X-ray was taken and revealed 1. Lumbar scoliosis.  2. Mild posterior subluxation of L2 and L3 related to degenerative disc disease.  3. Multilevel degenerative disc disease.  4. L4-5 and L5-S1 degenerative facet arthrosis.    An anterolisthesis is noted of the C3 on C4 vertebral body of 3 mm with a retrolisthesis of the C5 on C6 vertebral body of 5 mm.  A minimal anterolisthesis is noted of the C7 on T1 vertebral body of 3 mm.    Intervertebral disc height loss is noted at the C3-C4, C5-C6, C6-C7, C7-T1 levels.  Vertebral body heights appear well preserved.    Osseous demineralization is noted diffusely.  Osseous neural foraminal narrowing is noted at the C3-C4,C4-C5, C5-C6, C6-C7, C7-T1 levels on the left and at the C3-C4 C5-C6-C6-C7 levels on the right.  The C7-T1 level on the right is not well evaluated    Previous treatment included injection and meloxicam, TENS. Pt has a decrease ability to perform ADLs such as driving, lifting, any cervical rotation. 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 Pt denies numbness and tingling to UE and LE. No cultural, environmental, or spiritual barriers identified to treatment or learning.    Pt lives with  (had prior CVA) in 1 story house with 1 curb step to enter    Pain Scale: Ynes rates pain on a scale of 0-10 to be 10 at worst; 0 currently; 0 at best .  Increased pain: neck: sitting with L cervical rotation to clean teeth, neck pain at night, cerv rotation to drive, pain to L hip and knee (referred from lumbar): standing, walking, stairs, L SL, sit  to stand, carrying granddaughter  Decreased pain: neck: R SL    Pt watches 1 yo grandchild who lives in 2 story house    Patient Goals: Pt would like to decrease pain and increase function so she can return to  her normal daily activities.        Objective     Observation: pt is pleasant and cooperative    Posture:  Slight rounded sh and fwd head    Cervical ROM:  Flex 40, tightness to L post neck  Ext 40, tight to L post neck with some shooting pain  R SB 20, tight on L, pain on R  L SB 20, pain on L  R rot 41, tight on L  L rot 33, pain on L    Cervical strength:  Flex 4/5  Ext 5/5  R SB 5/5  L SB 5/5       Lumbar Range of Motion:    % Pain   Flexion 50   Pain to center of lumbar spine        Extension 80   Pain to sacrum        Left Side Bending 90 no        Right Side Bending 90  no        Left rotation   100 no        Right Rotation   100 no             Lower Extremity Strength  Right LE  Left LE    Knee extension: 5/5 Knee extension: 5/5   Knee flexion: 5/5 Knee flexion: 5/5   Hip flexion: 5/5 Hip flexion: 5/5   Hip extension:  3+/5 Hip extension: 4-/5   Hip abduction: 3+/5 Hip abduction: 4/5   Hip adduction: 4+/5 Hip adduction 4+/5   Ankle dorsiflexion: 5/5 Ankle dorsiflexion: 5/5         Special Tests:    -Piriformis Test: (-)  -Prone Instability Test: NT      Neuro Dynamic Testing:    Sciatic nerve:      SLR: NT--will test next visit         Palpation: TTP to B UT and levator scap (L>R) trigger pt to L levator scap    Sensation: grossly intact to light touch    Flexibility:    Ely's test: R = will test next visit   Popliteal Angle: will test next visit     Functional Limitations Reports - G Codes  Category: mobility  CMS Impairment/Limitation/Restriction for FOTO Hip Survey  Status Limitation G-Code CMS Severity Modifier  Intake 38% 62% Current Status CL - At least 60 percent but less than 80 percent  Predicted 56% 44% Goal Status+ CK - At least 40 percent but less than 60 percent      PT Evaluation  Completed? Yes  Discussed Plan of Care with patient: Yes    TREATMENT:  Ynes instructed in and performed therapeutic exercises to develop strength and endurance, flexibility for 15 minutes including:see media section for HeP  PPT with TA set 10x5 sec  glute sets 10x5 sec  Seated cervical retraction 10x5 sec  UT stretch 3x20 sec  Levator scap stretch 3x20 sec  Corner pec stretch 3x20 sec    Will add next visit:  Bruegger's  Gentle cervical rotation  Sh ext with scap depression and TA set  SL clams  bridges      HEP provided: pt given verbal and written instruction for all ex listed above (see media for HEP handout)  Instructed pt. Regarding: Proper technique with all exercises. Pt demo good understanding of the education provided. Ynes demonstrated good return demonstration of activities.      Assessment     This is a 63 y.o. female referred to outpatient physical therapy and presents with a medical diagnosis of lumbar and cervical radiculitis and demonstrates limitations as described in the problem list. Pt will benefit from physcial therapy services in order to maximize pain free functional independence. The following goals were discussed with the patient and patient is in agreement with them as to be addressed in the treatment plan.     Anticipated barriers to physical therapy: scheduling due to pt works until 4pm on [facility].  Pt given option to be treated at [facility].  Pt scheduled 1 appt on Thurs 11/29/19.    Medical necessity is demonstrated by the following IMPAIRMENTS/PROBLEM LIST:   1)Increase in pain level limiting function   2)decreased lumbar ROM   3)decreased LE strength   4)decreased LE flexibility   5)Lack of HEP    GOALS: Short Term Goals:  4 weeks  1.Report decreased    Neck and low back/L LE    pain  <   / =  5  /10 at its worst  to increase tolerance for ADLs  2. 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 Increased R LE  strength by 1/3 muscle grade in all deficient planes to increase tolerance for ADL and work activities.  4. Increased L LE strength by 1/3 muscle grade in all deficient planes to increase tolerance for ADL and work activities.  5. Increased L cervical rotation by 5 deg for increased ease with driving   6. Pt to tolerate HEP to improve ROM and independence with ADL's  7. Increase lumbar flexion ROM to 80% for increased ease with donning shoes  8. Pt will ascend//descend 4 steps with 1 rail mod I    Long Term Goals: 8 weeks  1.Report decreased    Neck and back/L LE    pain  <   / =  3  /10  to increase tolerance for ADLs  2.Pt will ascend/descend 12 steps with 1 rail mod I while carrying 20-30lbs  3.Increased R LE strength by 1 muscle grade in all deficient planes to increase tolerance for ADL and work activities.  4. Increased L LE strength by 1 muscle grade in all deficient planes to increase tolerance for ADL and work activities.  5. Pt to be Independent with HEP to improve ROM and independence with ADL's  6. Pt to increase B Ely's Test by > or = 20 degrees in order to improve flexibility and posture.   7. Pt to demonstrate negative Bridge Test in order to show improved core strength for lumbar stabilization.       Plan     Pt will be treated by physical therapy 1-3 times a week for 8-12 weeks for Pt Education, HEP, therapeutic exercises, neuromuscular re-education, joint mobilizations, modalities prn to achieve established goals. Ynes may at times be seen by a PTA as part of the Rehab Team.     Cont PT for 8-12 weeks.     I certify the need for these services furnished under this plan of treatment and while under my care.______________________________ Physician/Referring Practitioner  Date of Signature

## 2018-11-24 ENCOUNTER — HOSPITAL ENCOUNTER (OUTPATIENT)
Dept: RADIOLOGY | Facility: HOSPITAL | Age: 63
Discharge: HOME | End: 2018-11-24
Attending: ORTHOPAEDIC SURGERY
Payer: MEDICARE

## 2018-11-24 DIAGNOSIS — M54.16 LUMBAR RADICULITIS: ICD-10-CM

## 2018-11-24 PROCEDURE — 72148 MRI LUMBAR SPINE W/O DYE: CPT | Mod: TC,PO

## 2018-11-24 PROCEDURE — 72148 MRI LUMBAR SPINE W/O DYE: CPT | Mod: 26,,, | Performed by: RADIOLOGY

## 2018-11-29 ENCOUNTER — CLINICAL SUPPORT (OUTPATIENT)
Dept: REHABILITATION | Facility: HOSPITAL | Age: 63
End: 2018-11-29
Attending: ORTHOPAEDIC SURGERY
Payer: MEDICARE

## 2018-11-29 DIAGNOSIS — M62.81 MUSCLE WEAKNESS: ICD-10-CM

## 2018-11-29 DIAGNOSIS — M79.605 LEFT LEG PAIN: ICD-10-CM

## 2018-11-29 DIAGNOSIS — M54.2 NECK PAIN: ICD-10-CM

## 2018-11-29 PROCEDURE — 97110 THERAPEUTIC EXERCISES: CPT | Mod: PN

## 2018-11-29 PROCEDURE — 97140 MANUAL THERAPY 1/> REGIONS: CPT | Mod: PN

## 2018-11-29 NOTE — PROGRESS NOTES
"  Physical Therapy Daily Treatment Note     [facility]  [facility] Number: [id number]    Therapy Diagnosis:   Encounter Diagnoses   Name Primary?    Neck pain     Muscle weakness     Left leg pain      Physician: Mik Aparicio MD    Visit Date: 11/29/2018  Evaluation Date: 11/19/18  Authorization Period Expiration: 11/19/19  Plan of Care Certification Period: 1/24/19  Visit #/Visits authorized: 2 / 12     Time In: 5:05  Time Out: 6:07  Total Billable Time: 30 minutes    Precautions: Standard    Subjective     Pt reports: feeling about the same. States she finished the meloxicam but did not notice a difference. Still has pain from going sitting to standing, cannot lay on L side due to onset of radicular symptoms. Having an injection to the lumbar region in 8 days. States she has been treated for TMJ but points to pain posterior to mandible under the ear.  She was compliant with home exercise program.  Response to previous treatment: felt the same, some increased symptoms to the neck  Functional change: none    Pain: 5/10 to the neck, 0/10 low back pain seated, 5/10 with sit-stand which escalates with walking  Location: neck and L low back LE pain    Objective   R AR rotation   Ynes received therapeutic exercises to develop strength, endurance, ROM, flexibility, posture and core stabilization for 45 minutes including:  Push/pull (R hip ext/ L hip flex)   PPT with TA set 10x5 sec  glute sets 10x5 sec  Seated cervical retraction 10x5 sec  UT stretch 3x20 sec  Levator scap stretch 3x20 sec  Corner pec stretch 3x20 sec  ADDED:   Bruegger's 2x10x3"  Gentle cervical rotation x 5  Supine Sh ext with scap depression YTB and TA set 2x10  SL clams 2x10  bridges 2x10    Ynes received the following manual therapy techniques: Soft tissue Mobilization were applied to the: neck  for 15 minutes, including:  STM to cervical paraspinals, L scalenes and UT, suboccipital release L > R    Written Home Exercises " "Provided: yes. Patient given HEP from [email or web address] including the following exercises: bruegger's, clams, bridges, and push/pull. Written instructions can be found at "exercise-[email or web address]" using code:  [email or web address]    Exercises were reviewed and Ynes was able to demonstrate them prior to the end of the session.  Ynes demonstrated good  understanding of the education provided.     Assessment     Pt demonstrated overall good tolerance to treatment this date without increase of pain. Decreased symptoms to the low back and no onset of radicular symptoms into the L LE post tx. Decrease of antalgic gait following push pull as well. Limited cervical mobility and paraspinal tightness noted with manual techniques. Patient will benefit from continued strengthening as per goals.   Ynes is progressing well towards her goals.   Pt prognosis is Good.     Pt will continue to benefit from skilled outpatient physical therapy to address the deficits listed in the problem list box on initial evaluation, provide pt/family education and to maximize pt's level of independence in the home and community environment.     Pt's spiritual, cultural and educational needs considered and pt agreeable to plan of care and goals.    Anticipated barriers to physical therapy: scheduling due to pt works until 4pm on [facility].  Pt given option to be treated at [facility].    Goals:   Short Term Goals:  4 weeks  1.Report decreased    Neck and low back/L LE    pain  <   / =  5  /10 at its worst  to increase tolerance for ADLs  2. 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 Increased R LE strength by 1/3 muscle grade in all deficient planes to increase tolerance for ADL and work activities.  4. Increased L LE strength by 1/3 muscle grade in all deficient planes to increase tolerance for ADL and work activities.  5. Increased L cervical rotation by 5 deg for increased ease with " driving   6. Pt to tolerate HEP to improve ROM and independence with ADL's  7. Increase lumbar flexion ROM to 80% for increased ease with donning shoes  8. Pt will ascend//descend 4 steps with 1 rail mod I     Long Term Goals: 8 weeks  1.Report decreased    Neck and back/L LE    pain  <   / =  3  /10  to increase tolerance for ADLs  2.Pt will ascend/descend 12 steps with 1 rail mod I while carrying 20-30lbs  3.Increased R LE strength by 1 muscle grade in all deficient planes to increase tolerance for ADL and work activities.  4. Increased L LE strength by 1 muscle grade in all deficient planes to increase tolerance for ADL and work activities.  5. Pt to be Independent with HEP to improve ROM and independence with ADL's  6. Pt to increase B Ely's Test by > or = 20 degrees in order to improve flexibility and posture.   7. Pt to demonstrate negative Bridge Test in order to show improved core strength for lumbar stabilization.     Plan     Plan to continue with established POC toward current PT goals.    Nadia Higgins, PTA

## 2018-12-05 NOTE — PROGRESS NOTES
"  Physical Therapy Daily Treatment Note     [facility]  [facility] [id number]    Therapy Diagnosis:   Encounter Diagnoses   Name Primary?    Neck pain     Muscle weakness     Left leg pain      Physician: Mik Aparicio MD    Visit Date: 12/6/2018  Evaluation Date: 11/19/18  Authorization Period Expiration: 11/19/19  Plan of Care Certification Period: 1/24/19  Visit #/Visits authorized: 3 / 12     Time In: 4:40  Time Out: 5:40  Total Billable Time: 60 minutes    Precautions: Standard    Subjective     Pt reports: continued pain to the low back L hip with getting out of a chair, climbing stairs, walking long distances. Neck continues with tightness and pain, especially with rotation toward the L, pain and tightness travels up the neck and into the head. L calf pain when sleeping on the L side, goes away when turns off the L side. Patient states the neck appears to give her more functional limitations than the low back/L hip, but it is hard to choose.  She was compliant with home exercise program.  Response to previous treatment: felt the same, some increased symptoms to the neck  Functional change: none    Pain: 6-7/10 to the neck currently and increases with rotation toward the R, 0/10 low back pain seated, 6/10 with sit-stand which escalates with walking  Location: neck and L low back LE pain    Objective   R AR rotation     Ynes received therapeutic exercises to develop strength, endurance, ROM, flexibility, posture and core stabilization for 45 minutes including:  Push/pull (R hip ext/ L hip flex)   Sciatic oscillation stretch/glide  PPT with TA set 10x5 sec  glute sets + bridge 15x5 sec  SL clams with YTB 2x10  (NP) Supine Sh ext with scap depression YTB and TA set 2x10  Supine chin tuck 10 x 3"    Seated cervical retraction 10x5 sec  UT stretch 3x20 sec  Levator scap stretch 3x20 sec  Corner pec stretch 3x20 sec  Bruegger's 2x10x3"  Gentle cervical rotation (with slight chin tuck) x " 5    Ynes received the following manual therapy techniques: Soft tissue Mobilization were applied to the: neck  for 15 minutes, including:  STM to cervical paraspinals, L scalenes and UT, suboccipital release L > R    Written Home Exercises Provided: yes. Patient given additional sciatic nerve flossing. See patient instructions in notes section.    Exercises were reviewed and Ynes was able to demonstrate them prior to the end of the session.  Ynes demonstrated good  understanding of the education provided.     Assessment     Pt tolerated treatment well overall this date without exacerbation of symptoms. Added supine sciatic nerve flosses in 3 cycles for reduced gastroc symptoms while sleeping. Tightness of L cervical musculature including scalenes, SCM, UT, and lev scap with manual techniques as well as suboccipital tightness. Cues to reduce force with stretches to prevent onset of pulling into skull. Pt may benefit from functional dry needling to reduce soft tissue limitations to the neck and L scapula.    Ynes is progressing well towards her goals.   Pt prognosis is Good.     Pt will continue to benefit from skilled outpatient physical therapy to address the deficits listed in the problem list box on initial evaluation, provide pt/family education and to maximize pt's level of independence in the home and community environment.     Pt's spiritual, cultural and educational needs considered and pt agreeable to plan of care and goals.    Anticipated barriers to physical therapy: scheduling due to pt works until 4pm on [facility].  Pt given option to be treated at [facility].    Goals:   Short Term Goals:  4 weeks  1.Report decreased    Neck and low back/L LE    pain  <   / =  5  /10 at its worst  to increase tolerance for ADLs  2. 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 Increased R LE strength by 1/3 muscle grade in all deficient planes  to increase tolerance for ADL and work activities.  4. Increased L LE strength by 1/3 muscle grade in all deficient planes to increase tolerance for ADL and work activities.  5. Increased L cervical rotation by 5 deg for increased ease with driving   6. Pt to tolerate HEP to improve ROM and independence with ADL's  7. Increase lumbar flexion ROM to 80% for increased ease with donning shoes  8. Pt will ascend//descend 4 steps with 1 rail mod I     Long Term Goals: 8 weeks  1.Report decreased    Neck and back/L LE    pain  <   / =  3  /10  to increase tolerance for ADLs  2.Pt will ascend/descend 12 steps with 1 rail mod I while carrying 20-30lbs  3.Increased R LE strength by 1 muscle grade in all deficient planes to increase tolerance for ADL and work activities.  4. Increased L LE strength by 1 muscle grade in all deficient planes to increase tolerance for ADL and work activities.  5. Pt to be Independent with HEP to improve ROM and independence with ADL's  6. Pt to increase B Ely's Test by > or = 20 degrees in order to improve flexibility and posture.   7. Pt to demonstrate negative Bridge Test in order to show improved core strength for lumbar stabilization.     Plan     Plan to continue with established POC toward current PT goals.    Nadia Higgins, PTA

## 2018-12-06 ENCOUNTER — CLINICAL SUPPORT (OUTPATIENT)
Dept: REHABILITATION | Facility: HOSPITAL | Age: 63
End: 2018-12-06
Attending: ORTHOPAEDIC SURGERY
Payer: MEDICARE

## 2018-12-06 DIAGNOSIS — M79.605 LEFT LEG PAIN: ICD-10-CM

## 2018-12-06 DIAGNOSIS — M62.81 MUSCLE WEAKNESS: ICD-10-CM

## 2018-12-06 DIAGNOSIS — M54.2 NECK PAIN: ICD-10-CM

## 2018-12-06 PROCEDURE — 97140 MANUAL THERAPY 1/> REGIONS: CPT | Mod: PN

## 2018-12-06 PROCEDURE — 97110 THERAPEUTIC EXERCISES: CPT | Mod: PN

## 2018-12-06 NOTE — PATIENT INSTRUCTIONS
sourced from: Neural Mobilization: Examination & Intervention Strategies  Moises Peck, PT, DPT, OCS, Cert. MDT    Sciatic oscillation stretch/glide    -bring knee toward your chest to 90/90 deg position  -flex and point foot x10   -straighten knee slightly until you feel a gentle pull behind knee, then flex and point foot x10 reps   -perform 3 cycles    Must be GENTLE to avoid irritating nerve

## 2018-12-12 ENCOUNTER — CLINICAL SUPPORT (OUTPATIENT)
Dept: REHABILITATION | Facility: HOSPITAL | Age: 63
End: 2018-12-12
Attending: ORTHOPAEDIC SURGERY
Payer: MEDICARE

## 2018-12-12 DIAGNOSIS — M62.81 MUSCLE WEAKNESS: ICD-10-CM

## 2018-12-12 DIAGNOSIS — M79.605 LEFT LEG PAIN: ICD-10-CM

## 2018-12-12 DIAGNOSIS — M54.2 NECK PAIN: ICD-10-CM

## 2018-12-12 PROCEDURE — 97140 MANUAL THERAPY 1/> REGIONS: CPT | Mod: PN

## 2018-12-12 PROCEDURE — 97110 THERAPEUTIC EXERCISES: CPT | Mod: PN

## 2018-12-12 NOTE — PROGRESS NOTES
"  Physical Therapy Daily Treatment Note     Name: Ynes Talavera  Clinic Number: [id number]    Therapy Diagnosis:   Encounter Diagnoses   Name Primary?    Neck pain     Muscle weakness     Left leg pain      Physician: Mik Aparicio MD    Visit Date: 12/12/2018  Evaluation Date: 11/19/18  Authorization Period Expiration: 11/19/19  Plan of Care Certification Period: 1/24/19  Visit #/Visits authorized: 4 / 12     Time In: 5:00  Time Out: 6:00  Total Billable Time: 60 minutes    Precautions: Standard    Subjective     Pt reports: she had intense pain to L gastroc last night.  She states her L lateral gastroc is better during the day.    She was compliant with home exercise program.  Response to previous treatment: did fine  Functional change: none    Pain: 5-6/10 to the neck currently and increases with rotation toward the R, 0/10 low back pain seated, 6/10 with sit-stand which escalates with walking  Location: neck and L low back LE pain    Objective   R AR rotation     Ynes received therapeutic exercises to develop strength, endurance, ROM, flexibility, posture and core stabilization for 45 minutes including:  Push/pull (R hip ext/ L hip flex)   Piriformis stretch 3x20 sec  Sciatic oscillation stretch/glide  PPT with TA set 10x5 sec  glute sets + bridge 20x5 sec  (NP due to pain to L gastroc with L SL--performed in supine instead) SL clams with YTB 2x10   Supine TA set with hip abd YTB 2x10  (NP) Supine Sh ext with scap depression YTB and TA set 2x10  Supine chin tuck 10 x 5"    Seated cervical retraction 10x5 sec  UT stretch 3x20 sec  Levator scap stretch 3x20 sec  Corner pec stretch 3x20 sec  Bruegger's 2x10x3" (vc and mc to avoid sh hike)  Gentle cervical rotation (with slight chin tuck) 10x5 sec ea  Sh ext with scap depression RTB 2x10 (vc for chin tuck and TA set)    Ynes received the following manual therapy techniques: Soft tissue Mobilization were applied to the: neck  for 15 minutes, " including:  METs to correct:  FRSL at C5  ERSL at AA  ESL at OA    Written Home Exercises Provided: yes. Pt given verbal and written instruction for updated HeP (sh ext with scap depression and SUPINE clams instead of sidelying).  Pt demonstrated understanding.    Exercises were reviewed and Ynes was able to demonstrate them prior to the end of the session.  Ynes demonstrated good  understanding of the education provided.     Assessment     Pt tolerated treatment well overall this date without exacerbation of symptoms. Pt presented with hypomobility to cervical spine as listed above which improved with METs.  Pt presented with dull aching to lateral L gastroc which resolved with push/pull and sciatic n glides.  Pt unable to perform clams in SL due to pain to L LE, but able to perform in supine.  Ynes is progressing well towards her goals.   Pt prognosis is Good.     Pt will continue to benefit from skilled outpatient physical therapy to address the deficits listed in the problem list box on initial evaluation, provide pt/family education and to maximize pt's level of independence in the home and community environment.     Pt's spiritual, cultural and educational needs considered and pt agreeable to plan of care and goals.    Anticipated barriers to physical therapy: scheduling due to pt works until 4pm on [facility].  Pt given option to be treated at [facility].    Goals:   Short Term Goals:  4 weeks  1.Report decreased    Neck and low back/L LE    pain  <   / =  5  /10 at its worst  to increase tolerance for ADLs  2. 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 Increased R LE strength by 1/3 muscle grade in all deficient planes to increase tolerance for ADL and work activities.  4. Increased L LE strength by 1/3 muscle grade in all deficient planes to increase tolerance for ADL and work activities.  5. Increased L cervical rotation by 5 deg  for increased ease with driving   6. Pt to tolerate HEP to improve ROM and independence with ADL's  7. Increase lumbar flexion ROM to 80% for increased ease with donning shoes  8. Pt will ascend//descend 4 steps with 1 rail mod I     Long Term Goals: 8 weeks  1.Report decreased    Neck and back/L LE    pain  <   / =  3  /10  to increase tolerance for ADLs  2.Pt will ascend/descend 12 steps with 1 rail mod I while carrying 20-30lbs  3.Increased R LE strength by 1 muscle grade in all deficient planes to increase tolerance for ADL and work activities.  4. Increased L LE strength by 1 muscle grade in all deficient planes to increase tolerance for ADL and work activities.  5. Pt to be Independent with HEP to improve ROM and independence with ADL's  6. Pt to increase B Ely's Test by > or = 20 degrees in order to improve flexibility and posture.   7. Pt to demonstrate negative Bridge Test in order to show improved core strength for lumbar stabilization.     Plan     Plan to continue with established POC toward current PT goals.    Jojo Haas, PT

## 2018-12-13 ENCOUNTER — CLINICAL SUPPORT (OUTPATIENT)
Dept: REHABILITATION | Facility: HOSPITAL | Age: 63
End: 2018-12-13
Attending: ORTHOPAEDIC SURGERY
Payer: MEDICARE

## 2018-12-13 ENCOUNTER — OFFICE VISIT (OUTPATIENT)
Dept: PAIN MEDICINE | Facility: CLINIC | Age: 63
End: 2018-12-13
Payer: MEDICARE

## 2018-12-13 VITALS
WEIGHT: 166.25 LBS | SYSTOLIC BLOOD PRESSURE: 147 MMHG | DIASTOLIC BLOOD PRESSURE: 70 MMHG | OXYGEN SATURATION: 99 % | HEART RATE: 80 BPM | BODY MASS INDEX: 26.83 KG/M2

## 2018-12-13 DIAGNOSIS — M54.42 CHRONIC LEFT-SIDED LOW BACK PAIN WITH LEFT-SIDED SCIATICA: ICD-10-CM

## 2018-12-13 DIAGNOSIS — M62.81 MUSCLE WEAKNESS: ICD-10-CM

## 2018-12-13 DIAGNOSIS — M54.2 NECK PAIN: ICD-10-CM

## 2018-12-13 DIAGNOSIS — M54.16 LUMBAR RADICULOPATHY: Primary | ICD-10-CM

## 2018-12-13 DIAGNOSIS — M79.605 LEFT LEG PAIN: ICD-10-CM

## 2018-12-13 DIAGNOSIS — G89.29 CHRONIC LEFT-SIDED LOW BACK PAIN WITH LEFT-SIDED SCIATICA: ICD-10-CM

## 2018-12-13 PROCEDURE — 1101F PT FALLS ASSESS-DOCD LE1/YR: CPT | Mod: CPTII,S$GLB,, | Performed by: ANESTHESIOLOGY

## 2018-12-13 PROCEDURE — 3008F BODY MASS INDEX DOCD: CPT | Mod: CPTII,S$GLB,, | Performed by: ANESTHESIOLOGY

## 2018-12-13 PROCEDURE — 97110 THERAPEUTIC EXERCISES: CPT | Mod: PN

## 2018-12-13 PROCEDURE — 3078F DIAST BP <80 MM HG: CPT | Mod: CPTII,S$GLB,, | Performed by: ANESTHESIOLOGY

## 2018-12-13 PROCEDURE — 99999 PR PBB SHADOW E&M-EST. PATIENT-LVL III: CPT | Mod: PBBFAC,,, | Performed by: ANESTHESIOLOGY

## 2018-12-13 PROCEDURE — 3077F SYST BP >= 140 MM HG: CPT | Mod: CPTII,S$GLB,, | Performed by: ANESTHESIOLOGY

## 2018-12-13 PROCEDURE — 99204 OFFICE O/P NEW MOD 45 MIN: CPT | Mod: S$GLB,,, | Performed by: ANESTHESIOLOGY

## 2018-12-13 NOTE — LETTER
December 13, 2018      Mik Aparicio MD  [address] [facility] [address] [place] [address]           [facility]  [address] [facility] [address] [place] [address]  Phone: [phone]  Fax: [phone]          Patient: Ynes Talavera   MR Number: [id number]   YOB: 1955   Date of Visit: 12/13/2018       Dear Dr. Mik Aparicio:    Thank you for referring Ynes Talavera to me for evaluation. Attached you will find relevant portions of my assessment and plan of care.    If you have questions, please do not hesitate to call me. I look forward to following Ynes Talavera along with you.    Sincerely,    Ronnell Echeverria MD    Enclosure  CC:  No Recipients    If you would like to receive this communication electronically, please contact [email or web address] or [phone] to request more information on [employer] access.    For providers and/or their staff who would like to refer a patient to [facility], please contact us through our one-stop-shop provider referral line, [facility], at [phone].    If you feel you have received this communication in error or would no longer like to receive these types of communications, please e-mail [email or web address]

## 2018-12-13 NOTE — PROGRESS NOTES
[facility] New Patient Evaluation    Referred by: Dr. Aparicio  Reason for referral: back pain    CC:   Chief Complaint   Patient presents with    Low-back Pain     pt c/o lower back pain       No flowsheet data found.    HPI:   Ynes Talavera is a 63 y.o. female who complains of back pain    Onset: a couple months  Inciting Event: none  Progression: since onset, pain is gradually worsening  Current Pain Score: 7/10  Typical Range: 5-8/10  Timing: frequent  Quality: Aching and Sharp  Radiation: yes, down outside of left leg  Associated numbness or weakness: no numbness, no weakness  Exacerbated by: walking up stairs, going from sitting to standing  Allievated by: laying down, rest  Is Pain Level Acceptable?: No    Previous Therapies:  PT/OT: currently doing  HEP: yes  Interventions:   Surgery:  Medications:   - NSAIDS: meloxicam, ibuprofen  - MSK Relaxants:   - TCAs:   - SNRIs:   - Topicals:   - Anticonvulsants:   - Opioids:     History:    Current Outpatient Medications:     aspirin (ECOTRIN) 81 MG EC tablet, Take 81 mg by mouth once daily.  , Disp: , Rfl:     biotin 1 mg Cap, Take by mouth., Disp: , Rfl:     escitalopram oxalate (LEXAPRO) 10 MG tablet, TAKE 1 TABLET BY MOUTH ONCE DAILY. (Patient taking differently: TAKE 1 TABLET BY MOUTH EVERY OTHER DAY), Disp: 90 tablet, Rfl: 1    lisinopril 10 MG tablet, TAKE 1 TABLET BY MOUTH ONCE DAILY, Disp: 90 tablet, Rfl: 3    LUTEIN-ZEAXANTHIN ORAL, Take by mouth., Disp: , Rfl:     multivitamin capsule, Take 1 capsule by mouth once daily.  , Disp: , Rfl:     traZODone (DESYREL) 100 MG tablet, TAKE 1 TABLET BY MOUTH NIGHTLY (Patient taking differently: TAKE 1/2 TABLET BY MOUTH NIGHTLY), Disp: 90 tablet, Rfl: 3    estradiol (ESTRACE) 0.01 % (0.1 mg/gram) vaginal cream, Place vaginally once daily., Disp: , Rfl:     melatonin 5 mg Tab, Take 5 mg by mouth as needed. , Disp: , Rfl:     Past Medical History:   Diagnosis Date    Adrenal adenoma      Diverticulosis of the colon     Hypertension     Venous insufficiency 2014    right leg, denies Hx clot       Past Surgical History:   Procedure Laterality Date    APPENDECTOMY      BREAST BIOPSY Right     benign needle biopsy    BREAST BIOPSY Left     benign needle biopsy     SECTION, LOW TRANSVERSE      CHOLECYSTECTOMY      COLONOSCOPY N/A 2012    Performed by [facility] at [facility]O    REPAIR-HERNIA-UMBILICAL N/A 2014    Performed by Raheem Alcantar [facility] at [facility] OR    THUMB ARTHROSCOPY      both thumbs, trigger finger release.    TONSILLECTOMY      TUBAL LIGATION      UMBILICAL HERNIA REPAIR      VEIN LIGATION AND STRIPPING      Left       Family History   Problem Relation Age of Onset    Cancer Mother     Cancer Father     Heart disease Father     Hypertension Father     Diabetes Maternal Grandmother     Heart disease Maternal Grandmother     Diabetes Maternal Grandfather     Heart disease Maternal Grandfather     Breast cancer Paternal Grandmother 42       Social History     Socioeconomic History    Marital status:      Spouse name: None    Number of children: 3    Years of education: None    Highest education level: None   Social Needs    Financial resource strain: None    Food insecurity - worry: None    Food insecurity - inability: None    Transportation needs - medical: None    Transportation needs - non-medical: None   Occupational History    Occupation: Dental hygienist     Comment: Working full-time     Employer:  [employer]   Tobacco Use    Smoking status: Former Smoker     Packs/day: 0.25     Years: 25.00     Pack years: 6.25     Last attempt to quit: 1993     Years since quittin.8    Smokeless tobacco: Never Used   Substance and Sexual Activity    Alcohol use: No     Alcohol/week: 0.0 oz    Drug use: No    Sexual activity: Yes     Partners: Male   Other Topics Concern    None   Social  History Narrative    Her  has had a CVA. She is major support for him.        Walks regularly       Review of patient's allergies indicates:  No Known Allergies    Review of Systems:  General ROS: negative for - fever  Psychological ROS: negative for - hostility  Hematological and Lymphatic ROS: negative for - bleeding problems  Endocrine ROS: negative for - unexpected weight changes  Respiratory ROS: no cough, shortness of breath, or wheezing  Cardiovascular ROS: no chest pain or dyspnea on exertion  Gastrointestinal ROS: no abdominal pain, change in bowel habits, or black or bloody stools  Musculoskeletal ROS: negative for - gait disturbance or muscular weakness  Neurological ROS: negative for - bowel and bladder control changes or numbness/tingling  Dermatological ROS: negative for rash    Physical Exam:  Vitals:    12/13/18 1438   BP: (!) 147/70   Pulse: 80   SpO2: 99%   Weight: 75.4 kg (166 lb 3.6 oz)   PainSc:   6   PainLoc: Back     Body mass index is 26.83 kg/m².     Gen: NAD  Gait: gait intact  Psych:  Mood appropriate for given condition  HEENT: eyes anicteric   GI: Abd soft  CV: RRR  Lungs: breathing unlabored   ROM: limited AROM of the L spine in all planes, full ROM at ankles, knees and hips  Lumbar flexion 90 degrees, extension 50 degrees, side bending 30 degrees.    Sensation: intact to light touch in all dermatomes tested from L2-S1 bilaterally  Reflexes: 2+ b/l patella and Achilles  Palpation: Diffusely tender over lumbar paraspinals  -TTP over the b/l greater trochanters and bilateral SI joint  Tone: normal in the b/l knees and hips   Lymph: no lymphadenopathy at groin or popliteal fossa  Skin: intact  Extremities: No edema in b/l ankles or hands  Provacative tests: - SLR, - Olguin's, - TENISHA testing, - FADIR testing       Right Left   L2/3 Iliacus Hip flexion  5  5   L3/4 Qudratus Femoris Knee Extension  5  5   L4/5 Tib Anterior Ankle Dorsiflexion   5  5   L5/S1 Extensor Hallicus Longus Great  toe extension  5  5   L4/5 Tib Anterior/Posterior Inversion  5  5   L5/S1 Extensor Digitorum Longus, Peronues Eversion  5  5   L5/S1 Glut Med Hip Abudction  5  5   S1/2 Glut max and Hamstring Hip Extension  5  5   S1/2 Gastroc/Soleus Plantar Fexion  5  5       Imaging:  MRI lumbar spine 11/2018  FINDINGS:  There is a 2-3 mm anterolisthesis of L3 upon L4 and L4 upon L5.  This is thought to relate to facet disease.  There is diffuse disc desiccation and disc space loss with prominent mixed but primarily edematous degenerative endplate changes seen about L2/L3.  The conus terminates at the L1 level and has an unremarkable appearance.  There is a mild dextroscoliotic curvature in the mid lumbar region and levo scoliotic curvature in the lower lumbar region.  The lumbar vertebrae otherwise display normal signal, morphology and alignment.  Parapelvic renal cyst formation is evident bilaterally.  The individual disc levels appears follows:    T12/L1: There is no evidence of disc protrusion, canal or foraminal stenosis.  L1/L2: Mild degenerative facet changes are present and there is no evidence of disc protrusion, canal or foraminal stenosis.  L2/L3: Annular disc bulge and osteophyte formation are present and there is effacement of the anterior thecal sac.  There is a mild superimposed left posterolateral disc protrusion.  There is moderate narrowing the left foramen and moderate degenerative facet changes are noted bilaterally.  Central canal is mildly narrowed.  L3/L4: There is annular disc bulging with a superimposed small central disc extrusion.  This effaces the anterior thecal sac and produces mild central canal stenosis.  There is mild foraminal narrowing bilaterally degenerative facet changes are noted bilaterally.  L4/L5: There is annular disc bulging and there is moderate degenerative facet disease.  There is mild resultant canal and foraminal narrowing.  L5/S1: Moderate degenerative facet changes are noted and  there is annular disc bulging causing mild central canal stenosis.    Labs:  BMP  Lab Results   Component Value Date     11/24/2017    K 4.1 11/24/2017     11/24/2017    CO2 28 11/24/2017    BUN 15 11/24/2017    CREATININE 0.7 11/24/2017    CALCIUM 9.7 11/24/2017    ANIONGAP 7 (L) 11/24/2017    ESTGFRAFRICA >60.0 11/24/2017    EGFRNONAA >60.0 11/24/2017     Lab Results   Component Value Date    ALT 16 11/24/2017    AST 21 11/24/2017    ALKPHOS 84 11/24/2017    BILITOT 0.5 11/24/2017       Assessment:  Problem List Items Addressed This Visit        Neuro    Lumbar radiculopathy - Primary       Orthopedic    Chronic left-sided low back pain with left-sided sciatica          Treatment Plan:  63 y.o. year old female with PMH of HTN and TMJ presents today with lumbar radiculopathy.  She has tried PT, HEP, ibuprofen, and meloxicam for her pain without significant pain relief.  MRI lumbar spine reviewed and is consistent with NFS.  Her pain is limiting her mobility and interfering with her ADL's.  Will schedule for left L2/3 and L3/4 TFESI.  Procedure explained using an anatomical model.  Risks, benefits, alternatives explained to patient who verbalized understanding, including increased risk of infection, bleeding, need for additional procedures or surgery, and nerve damage.  Questions regarding the procedure, risks, expected outcome, and possible side effects were solicited and answered to the patient's satisfaction.  Ynes wishes to proceed with the injection.  Verbal and written consent were obtained in clinic today.  Follow up 2 weeks post procedure.      Procedures: left L2/3 and L3/4 TFESI  PT/OT/HEP: continue PT and HEP  Medications: continue ibuprofen prn  Labs: Reviewed and medications are appropriately dosed for current hepatorenal function.  Imaging: No additional recommended at this time.    : Not applicable    Ronnell Echeverria M.D.  Interventional Pain Medicine / Anesthesiology

## 2018-12-13 NOTE — PROGRESS NOTES
"  Physical Therapy Daily Treatment Note     [facility]  Cl[facility] Number: [id number]    Therapy Diagnosis:   Encounter Diagnoses   Name Primary?    Neck pain     Muscle weakness     Left leg pain      Physician: Mik Aparicio MD    Visit Date: 12/13/2018  Evaluation Date: 11/19/18  Authorization Period Expiration: 11/19/19  Plan of Care Certification Period: 1/24/19  Visit #/Visits authorized: 5 / 12     Time In: 4:02  Time Out: 4:47  Total Billable Time: 45 minutes    Precautions: Standard    Subjective     Pt reports: slept on opposite side of bed last night and slept exclusively on her right side. Did not sleep as well but got up out of bed without pain. Did some [place] shopping and then saw Dr. Echeverria in which they discussed possibly doing an epidural after the new year. Was able to take the stairs but not without discomfort. Overall feeling better.    She was compliant with home exercise program.  Response to previous treatment: felt better  Functional change: none    Pain: 5/10 to the neck currently with motion. Tightness to low back pain seated, 5/10 into the L hip with every step.   Location: neck and L low back LE pain    Objective   R AR rotation     Ynes received therapeutic exercises to develop strength, endurance, ROM, flexibility, posture and core stabilization for 45 minutes including:  Push/pull (R hip ext/ L hip flex)   Piriformis stretch 3x20 sec  Sciatic oscillation stretch/glide  PPT with TA set 10x5 sec  glute sets + bridge 20x5 sec  Supine TA set with unilateral hip abd YTB 2x10 (use red next session)  Supine chin tuck 10 x 5"    Seated cervical retraction 10x5 sec  UT stretch 3x20 sec  Levator scap stretch 3x20 sec  Corner pec stretch 3x20 sec  Bruegger's 2x10x3" (vc and mc to avoid sh hike)  Gentle cervical rotation (with slight chin tuck) 10x5 sec ea  Sh ext with scap depression RTB 2x10 (vc for chin tuck and TA set)    Held due to having PT just yesterday: Ynes " received the following manual therapy techniques: Soft tissue Mobilization were applied to the: neck  for 15 minutes, including:  METs to correct:  FRSL at C5  ERSL at AA  ESL at OA    Written Home Exercises Provided: yes. Pt given verbal and written instruction for updated HeP (sh ext with scap depression and SUPINE clams instead of sidelying).  Pt demonstrated understanding.    Exercises were reviewed and Ynes was able to demonstrate them prior to the end of the session.  Ynes demonstrated good  understanding of the education provided.     Assessment     Pt tolerated treatment well this date without increased soreness or pain. Pt with continued discomfort at end range cervical rotation however understood rationale to avoid pain. Pt able to maintain proper pelvic alignment throughout session, mild muscle fatigue with strengthening exercises. Will resume manual techniques at next session as tolerated.  Ynes is progressing well towards her goals.   Pt prognosis is Good.     Pt will continue to benefit from skilled outpatient physical therapy to address the deficits listed in the problem list box on initial evaluation, provide pt/family education and to maximize pt's level of independence in the home and community environment.     Pt's spiritual, cultural and educational needs considered and pt agreeable to plan of care and goals.    Anticipated barriers to physical therapy: scheduling due to pt works until 4pm on [facility].  Pt given option to be treated at [facility].    Goals:   Short Term Goals:  4 weeks  1.Report decreased    Neck and low back/L LE    pain  <   / =  5  /10 at its worst  to increase tolerance for ADLs  2. 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 Increased R LE strength by 1/3 muscle grade in all deficient planes to increase tolerance for ADL and work activities.  4. Increased L LE strength by 1/3 muscle grade in all  deficient planes to increase tolerance for ADL and work activities.  5. Increased L cervical rotation by 5 deg for increased ease with driving   6. Pt to tolerate HEP to improve ROM and independence with ADL's  7. Increase lumbar flexion ROM to 80% for increased ease with donning shoes  8. Pt will ascend//descend 4 steps with 1 rail mod I     Long Term Goals: 8 weeks  1.Report decreased    Neck and back/L LE    pain  <   / =  3  /10  to increase tolerance for ADLs  2.Pt will ascend/descend 12 steps with 1 rail mod I while carrying 20-30lbs  3.Increased R LE strength by 1 muscle grade in all deficient planes to increase tolerance for ADL and work activities.  4. Increased L LE strength by 1 muscle grade in all deficient planes to increase tolerance for ADL and work activities.  5. Pt to be Independent with HEP to improve ROM and independence with ADL's  6. Pt to increase B Ely's Test by > or = 20 degrees in order to improve flexibility and posture.   7. Pt to demonstrate negative Bridge Test in order to show improved core strength for lumbar stabilization.     Plan     Plan to continue with established POC toward current PT goals.    Nadia Higgins, PTA

## 2018-12-18 ENCOUNTER — CLINICAL SUPPORT (OUTPATIENT)
Dept: REHABILITATION | Facility: HOSPITAL | Age: 63
End: 2018-12-18
Attending: ORTHOPAEDIC SURGERY
Payer: MEDICARE

## 2018-12-18 DIAGNOSIS — M79.605 LEFT LEG PAIN: ICD-10-CM

## 2018-12-18 DIAGNOSIS — M62.81 MUSCLE WEAKNESS: ICD-10-CM

## 2018-12-18 DIAGNOSIS — M54.2 NECK PAIN: ICD-10-CM

## 2018-12-18 PROCEDURE — 97110 THERAPEUTIC EXERCISES: CPT | Mod: PN

## 2018-12-18 PROCEDURE — 97140 MANUAL THERAPY 1/> REGIONS: CPT | Mod: PN

## 2018-12-18 NOTE — PROGRESS NOTES
"  Physical Therapy Daily Treatment Note     Name: Ynes Talavera  Clinic Number: [id number]    Therapy Diagnosis:   Encounter Diagnoses   Name Primary?    Neck pain     Muscle weakness     Left leg pain      Physician: Mik Aparicio MD    Visit Date: 12/18/2018  Evaluation Date: 11/19/18  Authorization Period Expiration: 11/19/19  Plan of Care Certification Period: 1/24/19  Visit #/Visits authorized: 6 / 12     Time In: 4:40  Time Out: 5:55  Total Billable Time: 60 minutes    Precautions: Standard    Subjective     Pt reports: she continues with difficulty sleeping, avoiding sleeping on L side with some relief, but lately position does not matter. She is performing push pull with relief, but not always able to. She reports neck pain also which goes up to her head. She reports TMJ issues and wears an appliance during the day and noticed that this is when her L hip increased. Still has L hip pain with walking, but not so much pain in her L calf any more.  Continues with difficulty stepping up onto a curb with L hip. Neck feels about the same. Dr. Echeverria recommended an epidural to the lumbar spine and she is considering this but has some reservations.     She was compliant with home exercise program.  Response to previous treatment: felt better  Functional change: none    Pain: 8/10 to the neck currently with motion. Tightness to low back pain seated, 8/10 into the L hip with every step. Pt reports decreased pain to 4/10 at end of treatment.   Location: neck and L low back LE pain    Objective   R AR rotation     Ynes received therapeutic exercises to develop strength, endurance, ROM, flexibility, posture and core stabilization for 20 minutes including:  Push/pull (R hip ext/ L hip flex)   Piriformis stretch 3x20 sec  Seated cervical retraction 10x5 sec  UT stretch 3x20 sec  Levator scap stretch 3x20 sec  Bruegger's 2x10x3" (vc and mc to avoid sh hike)    Not performed:  Sciatic oscillation stretch/glide  PPT " "with TA set 10x5 sec  glute sets + bridge 20x5 sec  Supine TA set with unilateral hip abd YTB 2x10 (use red next session)  Supine chin tuck 10 x 5"  Corner pec stretch 3x20 sec  Gentle cervical rotation (with slight chin tuck) 10x5 sec ea  Sh ext with scap depression RTB 2x10 (vc for chin tuck and TA set)    Ynes received the following manual therapy techniques: Soft tissue Mobilization were applied to the: neck  for 15 minutes, including:  METs to correct:  FRSL at C5  ERSL at AA  ESL at OA    Pt received dry needling to B upper traps, suboccipitals, C3-5 in prone position with pinch technique performed for upper traps. Twitch felt on L upper trap laterally  Twenty to thirty mm depth of insertion with needles. Unilateral winding performed to all needles. Needles to neck remained in place for 5-10 minutes. Pt signed consent form for dry needling and was given a copy; a copy of consent can also be found in pts chart. Pt verbally agreed to dry needling also. Pt responded well without adverse effects. Performed for a total of 25 minutes.    Written Home Exercises Provided: Patient instructed to cont prior HEP.     Exercises were reviewed and Ynes was able to demonstrate them prior to the end of the session.  Ynes demonstrated good  understanding of the education provided.     Assessment     Pt received dry needling without adverse effects. She presents with AR on R illium which corrected with push/pull technique. She performed all exercises without reports of increased pain and reports decreased pain at end or session.   Ynes is progressing well towards her goals.   Pt prognosis is Good.     Pt will continue to benefit from skilled outpatient physical therapy to address the deficits listed in the problem list box on initial evaluation, provide pt/family education and to maximize pt's level of independence in the home and community environment.     Pt's spiritual, cultural and educational needs considered " and pt agreeable to plan of care and goals.    Anticipated barriers to physical therapy: scheduling due to pt works until 4pm on [facility].  Pt given option to be treated at [facility].    Goals:   Short Term Goals:  4 weeks  1.Report decreased    Neck and low back/L LE    pain  <   / =  5  /10 at its worst  to increase tolerance for ADLs  2. 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 Increased R LE strength by 1/3 muscle grade in all deficient planes to increase tolerance for ADL and work activities.  4. Increased L LE strength by 1/3 muscle grade in all deficient planes to increase tolerance for ADL and work activities.  5. Increased L cervical rotation by 5 deg for increased ease with driving   6. Pt to tolerate HEP to improve ROM and independence with ADL's  7. Increase lumbar flexion ROM to 80% for increased ease with donning shoes  8. Pt will ascend//descend 4 steps with 1 rail mod I     Long Term Goals: 8 weeks  1.Report decreased    Neck and back/L LE    pain  <   / =  3  /10  to increase tolerance for ADLs  2.Pt will ascend/descend 12 steps with 1 rail mod I while carrying 20-30lbs  3.Increased R LE strength by 1 muscle grade in all deficient planes to increase tolerance for ADL and work activities.  4. Increased L LE strength by 1 muscle grade in all deficient planes to increase tolerance for ADL and work activities.  5. Pt to be Independent with HEP to improve ROM and independence with ADL's  6. Pt to increase B Ely's Test by > or = 20 degrees in order to improve flexibility and posture.   7. Pt to demonstrate negative Bridge Test in order to show improved core strength for lumbar stabilization.     Plan     Plan to continue with established POC toward current PT goals.    Nicolas Reyes, PT

## 2019-01-31 DIAGNOSIS — F32.9 REACTIVE DEPRESSION: ICD-10-CM

## 2019-01-31 RX ORDER — ESCITALOPRAM OXALATE 10 MG/1
TABLET ORAL
Qty: 90 TABLET | Refills: 1 | Status: SHIPPED | OUTPATIENT
Start: 2019-01-31 | End: 2019-08-21 | Stop reason: SDUPTHER

## 2019-03-14 ENCOUNTER — OFFICE VISIT (OUTPATIENT)
Dept: URGENT CARE | Facility: CLINIC | Age: 64
End: 2019-03-14
Payer: MEDICARE

## 2019-03-14 VITALS
DIASTOLIC BLOOD PRESSURE: 75 MMHG | OXYGEN SATURATION: 95 % | HEIGHT: 67 IN | BODY MASS INDEX: 26.37 KG/M2 | TEMPERATURE: 98 F | SYSTOLIC BLOOD PRESSURE: 114 MMHG | WEIGHT: 168 LBS | RESPIRATION RATE: 18 BRPM | HEART RATE: 84 BPM

## 2019-03-14 DIAGNOSIS — M26.649 TMJ ARTHRITIS: Primary | ICD-10-CM

## 2019-03-14 DIAGNOSIS — J32.9 BACTERIAL SINUSITIS: ICD-10-CM

## 2019-03-14 DIAGNOSIS — B96.89 BACTERIAL SINUSITIS: ICD-10-CM

## 2019-03-14 PROCEDURE — 3074F SYST BP LT 130 MM HG: CPT | Mod: CPTII,S$GLB,, | Performed by: PHYSICIAN ASSISTANT

## 2019-03-14 PROCEDURE — 3078F PR MOST RECENT DIASTOLIC BLOOD PRESSURE < 80 MM HG: ICD-10-PCS | Mod: CPTII,S$GLB,, | Performed by: PHYSICIAN ASSISTANT

## 2019-03-14 PROCEDURE — 3074F PR MOST RECENT SYSTOLIC BLOOD PRESSURE < 130 MM HG: ICD-10-PCS | Mod: CPTII,S$GLB,, | Performed by: PHYSICIAN ASSISTANT

## 2019-03-14 PROCEDURE — 1101F PT FALLS ASSESS-DOCD LE1/YR: CPT | Mod: CPTII,S$GLB,, | Performed by: PHYSICIAN ASSISTANT

## 2019-03-14 PROCEDURE — 3078F DIAST BP <80 MM HG: CPT | Mod: CPTII,S$GLB,, | Performed by: PHYSICIAN ASSISTANT

## 2019-03-14 PROCEDURE — 99214 PR OFFICE/OUTPT VISIT, EST, LEVL IV, 30-39 MIN: ICD-10-PCS | Mod: S$GLB,,, | Performed by: PHYSICIAN ASSISTANT

## 2019-03-14 PROCEDURE — 99214 OFFICE O/P EST MOD 30 MIN: CPT | Mod: S$GLB,,, | Performed by: PHYSICIAN ASSISTANT

## 2019-03-14 PROCEDURE — 1101F PR PT FALLS ASSESS DOC 0-1 FALLS W/OUT INJ PAST YR: ICD-10-PCS | Mod: CPTII,S$GLB,, | Performed by: PHYSICIAN ASSISTANT

## 2019-03-14 RX ORDER — METHOCARBAMOL 750 MG/1
750 TABLET, FILM COATED ORAL 3 TIMES DAILY
Qty: 30 TABLET | Refills: 0 | Status: SHIPPED | OUTPATIENT
Start: 2019-03-14 | End: 2019-03-24

## 2019-03-14 RX ORDER — DOXYCYCLINE 100 MG/1
100 CAPSULE ORAL 2 TIMES DAILY
Qty: 14 CAPSULE | Refills: 0 | Status: SHIPPED | OUTPATIENT
Start: 2019-03-14 | End: 2019-03-21

## 2019-03-14 NOTE — PROGRESS NOTES
"Subjective:       Patient ID: Ynes Talavera is a 64 y.o. female.    Vitals:  height is 5' 6.5" (1.689 m) and weight is 76.2 kg (168 lb). Her oral temperature is 97.5 °F (36.4 °C). Her blood pressure is 114/75 and her pulse is 84. Her respiration is 18 and oxygen saturation is 95%.     Chief Complaint: Otalgia    Pt complains of left ear pain for about two weeks and nasal congestion longer than two weeks. Pt states when she walks her ear hurts       Otalgia    There is pain in the left ear. This is a new problem. The current episode started 1 to 4 weeks ago. The problem occurs constantly. The problem has been gradually worsening. There has been no fever. The pain is at a severity of 5/10. The pain is mild. Associated symptoms include headaches. Pertinent negatives include no coughing, diarrhea, rash, sore throat or vomiting. She has tried nothing for the symptoms.       Constitution: Negative for chills, fatigue and fever.   HENT: Positive for ear pain and congestion. Negative for sore throat.    Neck: Negative for painful lymph nodes.   Cardiovascular: Negative for chest pain and leg swelling.   Eyes: Negative for double vision and blurred vision.   Respiratory: Negative for cough and shortness of breath.    Gastrointestinal: Negative for nausea, vomiting and diarrhea.   Genitourinary: Negative for dysuria, frequency, urgency and history of kidney stones.   Musculoskeletal: Negative for joint pain, joint swelling, muscle cramps and muscle ache.   Skin: Negative for color change, pale, rash and bruising.   Allergic/Immunologic: Negative for seasonal allergies.   Neurological: Positive for headaches. Negative for dizziness, history of vertigo, light-headedness and passing out.   Hematologic/Lymphatic: Negative for swollen lymph nodes.   Psychiatric/Behavioral: Negative for nervous/anxious, sleep disturbance and depression. The patient is not nervous/anxious.        Objective:      Physical Exam   Constitutional: " She is oriented to person, place, and time. She appears well-developed and well-nourished. She is cooperative.  Non-toxic appearance. She does not appear ill. No distress.   HENT:   Head: Normocephalic and atraumatic.   Right Ear: Hearing, external ear and ear canal normal. A middle ear effusion (Clear) is present.   Left Ear: Hearing, external ear and ear canal normal. A middle ear effusion (Clear) is present.   Nose: No mucosal edema, rhinorrhea or nasal deformity. No epistaxis. Right sinus exhibits maxillary sinus tenderness. Right sinus exhibits no frontal sinus tenderness. Left sinus exhibits maxillary sinus tenderness. Left sinus exhibits no frontal sinus tenderness.   Mouth/Throat: Uvula is midline, oropharynx is clear and moist and mucous membranes are normal. No trismus in the jaw. Normal dentition. No uvula swelling. No posterior oropharyngeal erythema.   Eyes: Conjunctivae and lids are normal. No scleral icterus.   Sclera clear bilat   Neck: Trachea normal, full passive range of motion without pain and phonation normal. Neck supple.   Cardiovascular: Normal rate, regular rhythm, normal heart sounds, intact distal pulses and normal pulses.   Pulmonary/Chest: Effort normal and breath sounds normal. No respiratory distress.   Abdominal: Soft. Normal appearance and bowel sounds are normal. She exhibits no distension. There is no tenderness.   Musculoskeletal: Normal range of motion. She exhibits no edema or deformity.   Neurological: She is alert and oriented to person, place, and time. She exhibits normal muscle tone. Coordination normal.   Skin: Skin is warm, dry and intact. She is not diaphoretic. No pallor.   Psychiatric: She has a normal mood and affect. Her speech is normal and behavior is normal. Judgment and thought content normal. Cognition and memory are normal.   Nursing note and vitals reviewed.      Assessment:       1. TMJ arthritis    2. Bacterial sinusitis        Plan:         TMJ  arthritis    Bacterial sinusitis    Other orders  -     doxycycline (VIBRAMYCIN) 100 MG Cap; Take 1 capsule (100 mg total) by mouth 2 (two) times daily. for 7 days  Dispense: 14 capsule; Refill: 0  -     methocarbamol (ROBAXIN) 750 MG Tab; Take 1 tablet (750 mg total) by mouth 3 (three) times daily. for 10 days  Dispense: 30 tablet; Refill: 0     Patient has a history of TMJ.  She has no evidence of otitis media.  Ear pain is likely referred from TMJ.  She does have tenderness to palpation over bilateral maxillary sinuses with symptoms of congestion over 2 weeks.  Will treat with above-listed medications for her diagnoses today.  She will take ibuprofen or other NSAIDs at home also.    You must understand that you've received an Urgent Care treatment only and that you may be released before all your medical problems are known or treated. You, the patient, will arrange for follow up care as instructed.  Follow up with your PCP or specialty clinic as directed in the next 1-2 weeks if not improved or as needed.  You can call [phone] to schedule an appointment with the appropriate provider.  If your condition worsens we recommend that you receive another evaluation at the emergency room immediately or contact your primary medical clinics after hours call service to discuss your concerns.  Please return here or go to the Emergency Department for any concerns or worsening of condition.

## 2019-03-14 NOTE — PATIENT INSTRUCTIONS

## 2019-03-17 ENCOUNTER — TELEPHONE (OUTPATIENT)
Dept: URGENT CARE | Facility: CLINIC | Age: 64
End: 2019-03-17

## 2019-04-04 ENCOUNTER — OFFICE VISIT (OUTPATIENT)
Dept: SPINE | Facility: CLINIC | Age: 64
End: 2019-04-04
Payer: MEDICARE

## 2019-04-04 VITALS
BODY MASS INDEX: 26.37 KG/M2 | HEIGHT: 67 IN | SYSTOLIC BLOOD PRESSURE: 134 MMHG | DIASTOLIC BLOOD PRESSURE: 84 MMHG | WEIGHT: 168 LBS | HEART RATE: 73 BPM

## 2019-04-04 DIAGNOSIS — M47.892 OTHER OSTEOARTHRITIS OF SPINE, CERVICAL REGION: ICD-10-CM

## 2019-04-04 DIAGNOSIS — M54.2 CERVICALGIA: Primary | ICD-10-CM

## 2019-04-04 PROCEDURE — 99999 PR PBB SHADOW E&M-EST. PATIENT-LVL IV: ICD-10-PCS | Mod: PBBFAC,,, | Performed by: PHYSICIAN ASSISTANT

## 2019-04-04 PROCEDURE — 99203 PR OFFICE/OUTPT VISIT, NEW, LEVL III, 30-44 MIN: ICD-10-PCS | Mod: S$GLB,,, | Performed by: PHYSICIAN ASSISTANT

## 2019-04-04 PROCEDURE — 99203 OFFICE O/P NEW LOW 30 MIN: CPT | Mod: S$GLB,,, | Performed by: PHYSICIAN ASSISTANT

## 2019-04-04 PROCEDURE — 1101F PT FALLS ASSESS-DOCD LE1/YR: CPT | Mod: CPTII,S$GLB,, | Performed by: PHYSICIAN ASSISTANT

## 2019-04-04 PROCEDURE — 3075F SYST BP GE 130 - 139MM HG: CPT | Mod: CPTII,S$GLB,, | Performed by: PHYSICIAN ASSISTANT

## 2019-04-04 PROCEDURE — 3075F PR MOST RECENT SYSTOLIC BLOOD PRESS GE 130-139MM HG: ICD-10-PCS | Mod: CPTII,S$GLB,, | Performed by: PHYSICIAN ASSISTANT

## 2019-04-04 PROCEDURE — 3079F PR MOST RECENT DIASTOLIC BLOOD PRESSURE 80-89 MM HG: ICD-10-PCS | Mod: CPTII,S$GLB,, | Performed by: PHYSICIAN ASSISTANT

## 2019-04-04 PROCEDURE — 3079F DIAST BP 80-89 MM HG: CPT | Mod: CPTII,S$GLB,, | Performed by: PHYSICIAN ASSISTANT

## 2019-04-04 PROCEDURE — 99999 PR PBB SHADOW E&M-EST. PATIENT-LVL IV: CPT | Mod: PBBFAC,,, | Performed by: PHYSICIAN ASSISTANT

## 2019-04-04 PROCEDURE — 1101F PR PT FALLS ASSESS DOC 0-1 FALLS W/OUT INJ PAST YR: ICD-10-PCS | Mod: CPTII,S$GLB,, | Performed by: PHYSICIAN ASSISTANT

## 2019-04-04 RX ORDER — VIT C/E/ZN/COPPR/LUTEIN/ZEAXAN 250MG-90MG
1000 CAPSULE ORAL DAILY
COMMUNITY
End: 2022-09-29

## 2019-04-04 RX ORDER — MAGNESIUM 250 MG
250 TABLET ORAL DAILY
COMMUNITY
End: 2022-03-07

## 2019-04-10 DIAGNOSIS — M54.2 CERVICALGIA: ICD-10-CM

## 2019-04-10 DIAGNOSIS — M47.892 OTHER OSTEOARTHRITIS OF SPINE, CERVICAL REGION: Primary | ICD-10-CM

## 2019-04-10 NOTE — PROGRESS NOTES
I have discussed the results with this patient over the phone.   She will need a CT scan and an appointment with a surgeon  I have her scheduled to see Dr. DIEHL on 4/16.  Please schedule her CT some time prior to that.   Thank you. - Loan

## 2019-04-11 ENCOUNTER — TELEPHONE (OUTPATIENT)
Dept: NEUROSURGERY | Facility: CLINIC | Age: 64
End: 2019-04-11

## 2019-04-11 NOTE — TELEPHONE ENCOUNTER
----- Message from Loan Blancas PA-C sent at 4/10/2019  2:18 PM CDT -----  I have discussed the results with this patient over the phone.   She will need a CT scan and an appointment with a surgeon  I have her scheduled to see Dr. DIEHL on 4/16.  Please schedule her CT some time prior to that. Thank you. - Loan

## 2019-04-11 NOTE — PROGRESS NOTES
Neurosurgery History & Physical    Patient ID: Ynes Talavera is a 64 y.o. female.    Chief Complaint   Patient presents with    Neck Pain     She has had neck pain for years but it has gotten worse in the last 2-3 months. Pain is changing, it is shocking. When she walks it causes pain in the left posterior neck and head. Pain is constant. Had a Medrol Dose pack for nasal surgery and it made the pain a little better but it was short lived. She was going to PT but stoped because it was not helping.       Review of Systems   Constitutional: Negative for activity change, appetite change, chills, fever and unexpected weight change.   HENT: Negative for tinnitus, trouble swallowing and voice change.    Respiratory: Negative for apnea, cough, chest tightness and shortness of breath.    Cardiovascular: Negative for chest pain and palpitations.   Gastrointestinal: Negative for constipation, diarrhea, nausea and vomiting.   Genitourinary: Negative for difficulty urinating, dysuria, frequency and urgency.   Musculoskeletal: Positive for neck pain. Negative for back pain, gait problem and neck stiffness.   Skin: Negative for wound.   Neurological: Positive for headaches. Negative for dizziness, tremors, seizures, facial asymmetry, speech difficulty, weakness, light-headedness and numbness.   Psychiatric/Behavioral: Negative for confusion and decreased concentration.       Past Medical History:   Diagnosis Date    Adrenal adenoma     Diverticulosis of the colon     Hypertension     Venous insufficiency Sept 2014    right leg, denies Hx clot     Social History     Socioeconomic History    Marital status:      Spouse name: Not on file    Number of children: 3    Years of education: Not on file    Highest education level: Not on file   Occupational History    Occupation: Dental hygienist     Comment: Working full-time     Employer:  [employer]   Social Needs    Financial resource strain: Not on  file    Food insecurity:     Worry: Not on file     Inability: Not on file    Transportation needs:     Medical: Not on file     Non-medical: Not on file   Tobacco Use    Smoking status: Former Smoker     Packs/day: 0.25     Years: 25.00     Pack years: 6.25     Last attempt to quit: 1993     Years since quittin.1    Smokeless tobacco: Never Used   Substance and Sexual Activity    Alcohol use: No     Alcohol/week: 0.0 oz    Drug use: No    Sexual activity: Yes     Partners: Male   Lifestyle    Physical activity:     Days per week: Not on file     Minutes per session: Not on file    Stress: Not on file   Relationships    Social connections:     Talks on phone: Not on file     Gets together: Not on file     Attends Muslim service: Not on file     Active member of club or organization: Not on file     Attends meetings of clubs or organizations: Not on file     Relationship status: Not on file   Other Topics Concern    Not on file   Social History Narrative    Her  has had a CVA. She is major support for him.        Walks regularly     Family History   Problem Relation Age of Onset    Cancer Mother     Cancer Father     Heart disease Father     Hypertension Father     Diabetes Maternal Grandmother     Heart disease Maternal Grandmother     Diabetes Maternal Grandfather     Heart disease Maternal Grandfather     Breast cancer Paternal Grandmother 42     Review of patient's allergies indicates:  No Known Allergies    Current Outpatient Medications:     aspirin (ECOTRIN) 81 MG EC tablet, Take 81 mg by mouth once daily.  , Disp: , Rfl:     biotin 1 mg Cap, Take by mouth., Disp: , Rfl:     cholecalciferol, vitamin D3, (VITAMIN D3) 1,000 unit capsule, Take 1,000 Units by mouth once daily., Disp: , Rfl:     escitalopram oxalate (LEXAPRO) 10 MG tablet, TAKE 1 TABLET BY MOUTH EVERY DAY, Disp: 90 tablet, Rfl: 1    estradiol (ESTRACE) 0.01 % (0.1 mg/gram) vaginal cream, Place vaginally  "once daily., Disp: , Rfl:     Lactobacillus rhamnosus GG (CULTURELLE) 10 billion cell capsule, Take 1 capsule by mouth once daily., Disp: , Rfl:     lisinopril 10 MG tablet, TAKE 1 TABLET BY MOUTH ONCE DAILY, Disp: 90 tablet, Rfl: 3    LUTEIN-ZEAXANTHIN ORAL, Take by mouth., Disp: , Rfl:     magnesium 250 mg Tab, Take 250 mg by mouth once daily., Disp: , Rfl:     multivitamin capsule, Take 1 capsule by mouth once daily.  , Disp: , Rfl:     traZODone (DESYREL) 100 MG tablet, TAKE 1 TABLET BY MOUTH NIGHTLY (Patient taking differently: TAKE 1/2 TABLET BY MOUTH NIGHTLY), Disp: 90 tablet, Rfl: 3    melatonin 5 mg Tab, Take 5 mg by mouth as needed. , Disp: , Rfl:     Vitals:    04/04/19 1517   BP: 134/84   BP Location: Right arm   Patient Position: Sitting   BP Method: Medium (Automatic)   Pulse: 73   Weight: 76.2 kg (167 lb 15.9 oz)   Height: 5' 6.5" (1.689 m)       Physical Exam   Constitutional: She is oriented to person, place, and time. She appears well-developed and well-nourished.   HENT:   Head: Normocephalic and atraumatic.   Eyes: Pupils are equal, round, and reactive to light.   Neck: Normal range of motion. Neck supple.   Cardiovascular: Normal rate.   Pulmonary/Chest: Effort normal.   Musculoskeletal: Normal range of motion. She exhibits no edema.   Neurological: She is alert and oriented to person, place, and time. She has a normal Finger-Nose-Finger Test, a normal Heel to Shin Test, a normal Romberg Test and a normal Tandem Gait Test. Gait normal.   Reflex Scores:       Tricep reflexes are 2+ on the right side and 2+ on the left side.       Bicep reflexes are 2+ on the right side and 2+ on the left side.       Brachioradialis reflexes are 2+ on the right side and 2+ on the left side.       Patellar reflexes are 2+ on the right side and 2+ on the left side.       Achilles reflexes are 2+ on the right side and 2+ on the left side.  Skin: Skin is warm, dry and intact.   Psychiatric: She has a normal " mood and affect. Her speech is normal and behavior is normal. Judgment and thought content normal.   Nursing note and vitals reviewed.      Neurologic Exam     Mental Status   Oriented to person, place, and time.   Oriented to person.   Oriented to place.   Oriented to time.   Follows 3 step commands.   Attention: normal. Concentration: normal.   Speech: speech is normal   Level of consciousness: alert  Knowledge: consistent with education.   Able to name object. Able to read. Able to repeat. Able to write. Normal comprehension.     Cranial Nerves     CN II   Visual acuity: normal  Right visual field deficit: none  Left visual field deficit: none     CN III, IV, VI   Pupils are equal, round, and reactive to light.  Right pupil: Size: 3 mm. Shape: regular. Reactivity: brisk. Consensual response: intact.   Left pupil: Size: 3 mm. Shape: regular. Reactivity: brisk. Consensual response: intact.   CN III: no CN III palsy  CN VI: no CN VI palsy  Nystagmus: none   Diplopia: none  Ophthalmoparesis: none  Conjugate gaze: present    CN V   Right facial sensation deficit: none  Left facial sensation deficit: none    CN VII   Right facial weakness: none  Left facial weakness: none    CN VIII   Hearing: intact    CN IX, X   CN IX normal.   CN X normal.     CN XI   Right sternocleidomastoid strength: normal  Left sternocleidomastoid strength: normal  Right trapezius strength: normal  Left trapezius strength: normal    CN XII   Fasciculations: absent  Tongue deviation: none    Motor Exam   Muscle bulk: normal  Overall muscle tone: normal  Right arm pronator drift: absent  Left arm pronator drift: absent    Strength   Right neck flexion: 5/5  Left neck flexion: 5/5  Right neck extension: 5/5  Left neck extension: 5/5  Right deltoid: 5/5  Left deltoid: 5/5  Right biceps: 5/5  Left biceps: 5/5  Right triceps: 5/5  Left triceps: 5/5  Right wrist flexion: 5/5  Left wrist flexion: 5/5  Right wrist extension: 5/5  Left wrist extension:  5/5  Right interossei: 5/5  Left interossei: 5/5  Right abdominals: 5/5  Left abdominals: 5/5  Right iliopsoas: 5/5  Left iliopsoas: 5/5  Right quadriceps: 5/5  Left quadriceps: 5/5  Right hamstrin/5  Left hamstrin/5  Right glutei: 5/5  Left glutei: 5/5  Right anterior tibial: 5/5  Left anterior tibial: 5/5  Right posterior tibial: 5/5  Left posterior tibial: 5/5  Right peroneal: 5/5  Left peroneal: 5/5  Right gastroc: 5/5  Left gastroc: 5/5    Sensory Exam   Right arm light touch: normal  Left arm light touch: normal  Right leg light touch: normal  Left leg light touch: normal  Right arm vibration: normal  Left arm vibration: normal  Right arm pinprick: normal  Left arm pinprick: normal    Gait, Coordination, and Reflexes     Gait  Gait: normal    Coordination   Romberg: negative  Finger to nose coordination: normal  Heel to shin coordination: normal  Tandem walking coordination: normal    Tremor   Resting tremor: absent  Intention tremor: absent  Action tremor: absent    Reflexes   Right brachioradialis: 2+  Left brachioradialis: 2+  Right biceps: 2+  Left biceps: 2+  Right triceps: 2+  Left triceps: 2+  Right patellar: 2+  Left patellar: 2+  Right achilles: 2+  Left achilles: 2+  Right Weiner: absent  Left Weiner: absent  Right ankle clonus: absent  Left ankle clonus: absent      Provider dictation:she  64 year old female former smoker with TMJ, osteopenia and hypertension is self referred for evaluation of neck pain.  She has had neck pain intermittently for years.  Pain has increased in the last 3 months and feels different than normal pain.  Pain is felt in the left side of the neck/ head into the left trapezius as well.  She denies any significant radicular arm pain, numbness or tingling  She denies bowel/ bladder dysfunction.  Pain increases with standing.  She has tried some PT and stopped due to no benefit.  A medrol dose pack after recent nasal surgery did help some with neck pain.  She will  occasionally take ibuprofen for pain.  She has not had POLLY.  NDI:  52%.  PHQ:  6.    She has n o neurological deficits.  5/5 strength with 2+ muscle stretch reflexes and no sensory defictis in the upper and lower extremities.    I have reviewed xrays of the cervical spine from 11/ 2018 demonstrating disk height loss at C3/4, C5/6 and C6/7.  Multi level degenerative changes and osseous foraminal narrowing were noted.    Ms. Talavera has worsening and changing chronic left sided neck pain and headaches without radiculopathy.  Pain can be referred from degenerative changes in the neck seen on xray.  She has had no improvement with PT thus far.  I recommend obtaining flexion/ extension studies and an MRI to further evaluate.  Follow up after studies are complete.    Visit Diagnosis:  Cervicalgia  -     MRI Cervical Spine Without Contrast; Future; Expected date: 04/04/2019  -     X-Ray Cervical Spine 5 View W Flex Extxt; Future; Expected date: 04/04/2019    Other osteoarthritis of spine, cervical region  -     MRI Cervical Spine Without Contrast; Future; Expected date: 04/04/2019  -     X-Ray Cervical Spine 5 View W Flex Extxt; Future; Expected date: 04/04/2019        Total time spent counseling greater than fifty percent of total visit time.  Counseling included discussion regarding imaging findings, diagnosis possibilities, treatment options, risks and benefits.   The patient had many questions regarding the options and long-term effects.

## 2019-04-12 ENCOUNTER — HOSPITAL ENCOUNTER (OUTPATIENT)
Dept: RADIOLOGY | Facility: HOSPITAL | Age: 64
Discharge: HOME | End: 2019-04-12
Attending: PHYSICIAN ASSISTANT
Payer: MEDICARE

## 2019-04-12 DIAGNOSIS — M47.892 OTHER OSTEOARTHRITIS OF SPINE, CERVICAL REGION: ICD-10-CM

## 2019-04-12 DIAGNOSIS — M54.2 CERVICALGIA: ICD-10-CM

## 2019-04-12 PROCEDURE — 72125 CT NECK SPINE W/O DYE: CPT | Mod: TC,PO

## 2019-04-12 PROCEDURE — 72125 CT NECK SPINE W/O DYE: CPT | Mod: 26,,, | Performed by: RADIOLOGY

## 2019-04-12 PROCEDURE — 72125 CT CERVICAL SPINE WITHOUT CONTRAST: ICD-10-PCS | Mod: 26,,, | Performed by: RADIOLOGY

## 2019-04-16 ENCOUNTER — OFFICE VISIT (OUTPATIENT)
Dept: NEUROSURGERY | Facility: CLINIC | Age: 64
End: 2019-04-16
Payer: MEDICARE

## 2019-04-16 VITALS
TEMPERATURE: 98 F | SYSTOLIC BLOOD PRESSURE: 130 MMHG | DIASTOLIC BLOOD PRESSURE: 76 MMHG | BODY MASS INDEX: 27 KG/M2 | HEART RATE: 88 BPM | WEIGHT: 162.06 LBS | HEIGHT: 65 IN

## 2019-04-16 DIAGNOSIS — M47.812 FACET ARTHROPATHY, CERVICAL: ICD-10-CM

## 2019-04-16 DIAGNOSIS — M54.2 NECK PAIN: Primary | ICD-10-CM

## 2019-04-16 PROCEDURE — 99214 PR OFFICE/OUTPT VISIT, EST, LEVL IV, 30-39 MIN: ICD-10-PCS | Mod: S$GLB,,, | Performed by: NEUROLOGICAL SURGERY

## 2019-04-16 PROCEDURE — 99214 OFFICE O/P EST MOD 30 MIN: CPT | Mod: S$GLB,,, | Performed by: NEUROLOGICAL SURGERY

## 2019-04-16 PROCEDURE — 3078F DIAST BP <80 MM HG: CPT | Mod: CPTII,S$GLB,, | Performed by: NEUROLOGICAL SURGERY

## 2019-04-16 PROCEDURE — 3075F SYST BP GE 130 - 139MM HG: CPT | Mod: CPTII,S$GLB,, | Performed by: NEUROLOGICAL SURGERY

## 2019-04-16 PROCEDURE — 3075F PR MOST RECENT SYSTOLIC BLOOD PRESS GE 130-139MM HG: ICD-10-PCS | Mod: CPTII,S$GLB,, | Performed by: NEUROLOGICAL SURGERY

## 2019-04-16 PROCEDURE — 1101F PT FALLS ASSESS-DOCD LE1/YR: CPT | Mod: CPTII,S$GLB,, | Performed by: NEUROLOGICAL SURGERY

## 2019-04-16 PROCEDURE — 1101F PR PT FALLS ASSESS DOC 0-1 FALLS W/OUT INJ PAST YR: ICD-10-PCS | Mod: CPTII,S$GLB,, | Performed by: NEUROLOGICAL SURGERY

## 2019-04-16 PROCEDURE — 99999 PR PBB SHADOW E&M-EST. PATIENT-LVL III: ICD-10-PCS | Mod: PBBFAC,,, | Performed by: NEUROLOGICAL SURGERY

## 2019-04-16 PROCEDURE — 3078F PR MOST RECENT DIASTOLIC BLOOD PRESSURE < 80 MM HG: ICD-10-PCS | Mod: CPTII,S$GLB,, | Performed by: NEUROLOGICAL SURGERY

## 2019-04-16 PROCEDURE — 99999 PR PBB SHADOW E&M-EST. PATIENT-LVL III: CPT | Mod: PBBFAC,,, | Performed by: NEUROLOGICAL SURGERY

## 2019-04-16 NOTE — PROGRESS NOTES
Neurosurgery History and Physical    Patient ID: Ynes Talavera is a 64 y.o. female.    Chief Complaint   Patient presents with    Cervical Spine Pain (C-spine)     Long history of worsening neck pain noting occipital pain and pain to her left upper extremity to the elbow. Deines numbness or weakness. Denies bladder or bowel dysfnction. Denies history of injections. DId do PT with dry needling back in December with no improvment. Pt states pain is most severe upon wakening.       Review of Systems   Constitutional: Negative.    HENT: Negative.    Eyes: Negative.    Respiratory: Negative.    Cardiovascular: Negative.    Gastrointestinal: Negative.    Endocrine: Negative.    Genitourinary: Negative.    Musculoskeletal: Positive for neck pain and neck stiffness.   Skin: Negative.    Allergic/Immunologic: Negative.    Neurological: Negative.  Negative for weakness and numbness.   Hematological: Negative.    Psychiatric/Behavioral: Negative.        Past Medical History:   Diagnosis Date    Adrenal adenoma     Diverticulosis of the colon     Hypertension     Venous insufficiency 2014    right leg, denies Hx clot     Social History     Socioeconomic History    Marital status:      Spouse name: Not on file    Number of children: 3    Years of education: Not on file    Highest education level: Not on file   Occupational History    Occupation: Dental hygienist     Comment: Working full-time     Employer:  [employer]   Social Needs    Financial resource strain: Not on file    Food insecurity:     Worry: Not on file     Inability: Not on file    Transportation needs:     Medical: Not on file     Non-medical: Not on file   Tobacco Use    Smoking status: Former Smoker     Packs/day: 0.25     Years: 25.00     Pack years: 6.25     Last attempt to quit: 1993     Years since quittin.1    Smokeless tobacco: Never Used   Substance and Sexual Activity    Alcohol use: No      Alcohol/week: 0.0 oz    Drug use: No    Sexual activity: Yes     Partners: Male   Lifestyle    Physical activity:     Days per week: Not on file     Minutes per session: Not on file    Stress: Not on file   Relationships    Social connections:     Talks on phone: Not on file     Gets together: Not on file     Attends [employer] service: Not on file     Active member of club or organization: Not on file     Attends meetings of clubs or organizations: Not on file     Relationship status: Not on file   Other Topics Concern    Not on file   Social History Narrative    Her  has had a CVA. She is major support for him.        Walks regularly     Family History   Problem Relation Age of Onset    Cancer Mother     Cancer Father     Heart disease Father     Hypertension Father     Diabetes Maternal Grandmother     Heart disease Maternal Grandmother     Diabetes Maternal Grandfather     Heart disease Maternal Grandfather     Breast cancer Paternal Grandmother 42     Review of patient's allergies indicates:  No Known Allergies    Current Outpatient Medications:     aspirin (ECOTRIN) 81 MG EC tablet, Take 81 mg by mouth once daily.  , Disp: , Rfl:     biotin 1 mg Cap, Take by mouth., Disp: , Rfl:     cholecalciferol, vitamin D3, (VITAMIN D3) 1,000 unit capsule, Take 1,000 Units by mouth once daily., Disp: , Rfl:     escitalopram oxalate (LEXAPRO) 10 MG tablet, TAKE 1 TABLET BY MOUTH EVERY DAY, Disp: 90 tablet, Rfl: 1    Lactobacillus rhamnosus GG (CULTURELLE) 10 billion cell capsule, Take 1 capsule by mouth once daily., Disp: , Rfl:     lisinopril 10 MG tablet, TAKE 1 TABLET BY MOUTH ONCE DAILY, Disp: 90 tablet, Rfl: 3    LUTEIN-ZEAXANTHIN ORAL, Take by mouth., Disp: , Rfl:     magnesium 250 mg Tab, Take 250 mg by mouth once daily., Disp: , Rfl:     melatonin 5 mg Tab, Take 5 mg by mouth as needed. , Disp: , Rfl:     multivitamin capsule, Take 1 capsule by mouth once daily.  , Disp: , Rfl:      "traZODone (DESYREL) 100 MG tablet, TAKE 1 TABLET BY MOUTH NIGHTLY (Patient taking differently: TAKE 1/2 TABLET BY MOUTH NIGHTLY), Disp: 90 tablet, Rfl: 3  Blood pressure 130/76, pulse 88, temperature 98.4 °F (36.9 °C), height 5' 5" (1.651 m), weight 73.5 kg (162 lb 0.6 oz).      Neurologic Exam     Mental Status   Oriented to person, place, and time.   Attention: normal. Concentration: normal.   Speech: speech is normal   Level of consciousness: alert  Knowledge: good.     Cranial Nerves     CN II   Visual acuity: normal    CN III, IV, VI   Pupils are equal, round, and reactive to light.  Extraocular motions are normal.     CN V   Facial sensation intact.     CN VII   Facial expression full, symmetric.     CN VIII   Hearing: intact    CN IX, X   Palate: symmetric    CN XI   CN XI normal.     CN XII   CN XII normal.     Motor Exam   Muscle bulk: normal  Overall muscle tone: normal  Right arm pronator drift: absent  Left arm pronator drift: absent    Strength   Right deltoid: 5/5  Left deltoid: 5/5  Right biceps: 5/5  Left biceps: 5/5  Right triceps: 5/5  Left triceps: 5/5  Right wrist flexion: 5/5  Left wrist flexion: 5/5  Right wrist extension: 5/5  Left wrist extension: 5/5  Right interossei: 5/5  Left interossei: 5/5  Right iliopsoas: 5/5  Left iliopsoas: 5/5  Right quadriceps: 5/5  Left quadriceps: 5/5  Right hamstrin/5  Left hamstrin/5  Right anterior tibial: 5/5  Left anterior tibial: 5/5  Right posterior tibial: 5/5  Left posterior tibial: 5/5  Right peroneal: 5/5  Left peroneal: 5/5  Right gastroc: 5/5  Left gastroc: 5/5    Sensory Exam   Light touch normal.     Gait, Coordination, and Reflexes     Gait  Gait: normal    Coordination   Romberg: negative  Finger to nose coordination: normal    Tremor   Resting tremor: absent    Reflexes   Right brachioradialis: 2+  Left brachioradialis: 2+  Right biceps: 2+  Left biceps: 2+  Right triceps: 2+  Left triceps: 2+  Right patellar: 2+  Left patellar: " "2+  Right achilles: 1+  Left achilles: 1+  Right plantar: normal  Left plantar: normal  Right Weiner: absent  Left Weiner: absent  Right ankle clonus: absent  Left ankle clonus: absent      Physical Exam   Constitutional: She is oriented to person, place, and time. She appears well-developed and well-nourished.   HENT:   Head: Normocephalic and atraumatic.   Eyes: Pupils are equal, round, and reactive to light. EOM are normal.   Neck: Neck supple.   Cardiovascular: Normal rate and regular rhythm.   Pulmonary/Chest: Effort normal.   Abdominal: Soft.   Musculoskeletal: Normal range of motion.   Neurological: She is alert and oriented to person, place, and time. She has a normal Finger-Nose-Finger Test and a normal Romberg Test. Gait normal.   Reflex Scores:       Tricep reflexes are 2+ on the right side and 2+ on the left side.       Bicep reflexes are 2+ on the right side and 2+ on the left side.       Brachioradialis reflexes are 2+ on the right side and 2+ on the left side.       Patellar reflexes are 2+ on the right side and 2+ on the left side.       Achilles reflexes are 1+ on the right side and 1+ on the left side.  Skin: Skin is warm and dry.   Psychiatric: She has a normal mood and affect. Her speech is normal and behavior is normal. Judgment and thought content normal.   Nursing note and vitals reviewed.      Vital Signs  Temp: 98.4 °F (36.9 °C)  Pulse: 88  BP: 130/76  Pain Score:   6  Pain Loc: Neck  Height and Weight  Height: 5' 5" (165.1 cm)  Weight: 73.5 kg (162 lb 0.6 oz)  BSA (Calculated - sq m): 1.84 sq meters  BMI (Calculated): 27  Weight in (lb) to have BMI = 25: 149.9]    Provider dictation:  I reviewed the imaging. She has inflammation in the left C1-C2 joint on MRI and joint hypertrophy on CT. There is no atlantoaxial instability on dynamic XR but there is slight dynamic listhesis at C3-C4 and C4-C5 on flexion. There is multilevel disc degeneration and spondylotic stenosis without spinal cord " compression.    Occipitocervical junction pain for several years, worsening over the last 3 months. The pain is worse on the left and radiates to her occiput. Worse when turning her head. Alleviated by lying down. 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 No radiation to her upper extremities.     Neurologically intact on exam.    I suspect that her pain is due to the left C1-C2 arthropathy, with possibly some C2 radiculopathy. It is possible that there is a contribution from C3-C4 and C4-C5 instability as well.     At this point there is no absolute indication for neurosurgical intervention. I will refer the patient to pain management. I recommend trying cervical POLLY and if that fails, considering C2 ganglion block on the left and if that fails, facet injections in C3-C4 and C4-C5.    If she fails conservative measures, she may be referred back to me.    Visit Diagnosis:  Neck pain    Facet arthropathy, cervical

## 2019-04-30 ENCOUNTER — OFFICE VISIT (OUTPATIENT)
Dept: FAMILY MEDICINE | Facility: CLINIC | Age: 64
End: 2019-04-30
Payer: MEDICARE

## 2019-04-30 VITALS
TEMPERATURE: 98 F | HEART RATE: 91 BPM | DIASTOLIC BLOOD PRESSURE: 72 MMHG | SYSTOLIC BLOOD PRESSURE: 150 MMHG | WEIGHT: 166.69 LBS | BODY MASS INDEX: 27.77 KG/M2 | HEIGHT: 65 IN | OXYGEN SATURATION: 95 %

## 2019-04-30 DIAGNOSIS — R51.9 HEADACHE IN BACK OF HEAD: Primary | ICD-10-CM

## 2019-04-30 DIAGNOSIS — R11.0 NAUSEA IN ADULT: ICD-10-CM

## 2019-04-30 DIAGNOSIS — M47.812 FACET ARTHROPATHY, CERVICAL: ICD-10-CM

## 2019-04-30 DIAGNOSIS — I10 ESSENTIAL HYPERTENSION: Chronic | ICD-10-CM

## 2019-04-30 DIAGNOSIS — M54.2 NECK PAIN: ICD-10-CM

## 2019-04-30 PROCEDURE — 99499 UNLISTED E&M SERVICE: CPT | Mod: S$GLB,,, | Performed by: NURSE PRACTITIONER

## 2019-04-30 PROCEDURE — 1101F PR PT FALLS ASSESS DOC 0-1 FALLS W/OUT INJ PAST YR: ICD-10-PCS | Mod: CPTII,S$GLB,, | Performed by: NURSE PRACTITIONER

## 2019-04-30 PROCEDURE — 99214 OFFICE O/P EST MOD 30 MIN: CPT | Mod: S$GLB,,, | Performed by: NURSE PRACTITIONER

## 2019-04-30 PROCEDURE — 1101F PT FALLS ASSESS-DOCD LE1/YR: CPT | Mod: CPTII,S$GLB,, | Performed by: NURSE PRACTITIONER

## 2019-04-30 PROCEDURE — 3078F PR MOST RECENT DIASTOLIC BLOOD PRESSURE < 80 MM HG: ICD-10-PCS | Mod: CPTII,S$GLB,, | Performed by: NURSE PRACTITIONER

## 2019-04-30 PROCEDURE — 3077F SYST BP >= 140 MM HG: CPT | Mod: CPTII,S$GLB,, | Performed by: NURSE PRACTITIONER

## 2019-04-30 PROCEDURE — 99999 PR PBB SHADOW E&M-EST. PATIENT-LVL III: ICD-10-PCS | Mod: PBBFAC,,, | Performed by: NURSE PRACTITIONER

## 2019-04-30 PROCEDURE — 99214 PR OFFICE/OUTPT VISIT, EST, LEVL IV, 30-39 MIN: ICD-10-PCS | Mod: S$GLB,,, | Performed by: NURSE PRACTITIONER

## 2019-04-30 PROCEDURE — 3078F DIAST BP <80 MM HG: CPT | Mod: CPTII,S$GLB,, | Performed by: NURSE PRACTITIONER

## 2019-04-30 PROCEDURE — 3077F PR MOST RECENT SYSTOLIC BLOOD PRESSURE >= 140 MM HG: ICD-10-PCS | Mod: CPTII,S$GLB,, | Performed by: NURSE PRACTITIONER

## 2019-04-30 PROCEDURE — 99499 RISK ADDL DX/OHS AUDIT: ICD-10-PCS | Mod: S$GLB,,, | Performed by: NURSE PRACTITIONER

## 2019-04-30 PROCEDURE — 99999 PR PBB SHADOW E&M-EST. PATIENT-LVL III: CPT | Mod: PBBFAC,,, | Performed by: NURSE PRACTITIONER

## 2019-04-30 RX ORDER — ONDANSETRON 8 MG/1
8 TABLET, ORALLY DISINTEGRATING ORAL EVERY 8 HOURS PRN
Qty: 6 TABLET | Refills: 0 | Status: SHIPPED | OUTPATIENT
Start: 2019-04-30 | End: 2020-07-28

## 2019-04-30 RX ORDER — TIZANIDINE 4 MG/1
4 TABLET ORAL NIGHTLY PRN
Qty: 14 TABLET | Refills: 0 | Status: SHIPPED | OUTPATIENT
Start: 2019-04-30 | End: 2019-05-14

## 2019-04-30 NOTE — PROGRESS NOTES
Subjective:       Patient ID: Ynes Talavera is a 64 y.o. female.    Chief Complaint: Headache and Nausea    Patient who is new to me presents with headache and nausea. She has been taking 3 aleve a day. She tried aspirin yesterday to no relief. She has tried PT in the past with little pain relief. Patient has been seen by neurosurgery and was told that her symptoms are related to arthritis in her cervical spine. She has a referral placed for pain management and will see them next week. Patient takes BP medication at night.     Headache    This is a new problem. The current episode started 1 to 4 weeks ago. The problem occurs daily. The problem has been gradually worsening. The pain is located in the occipital region. The pain radiates to the left neck. The pain quality is similar to prior headaches. Quality: pounding headache. The pain is moderate. Pertinent negatives include no back pain, blurred vision, dizziness or sinus pressure. Nothing aggravates the symptoms. Treatments tried: advil, aspirin, pain medication (does nothing) Her past medical history is significant for hypertension. There is no history of obesity, recent head traumas or TMJ.     Review of Systems   HENT: Negative for sinus pressure, sinus pain and sneezing.    Eyes: Negative for blurred vision.   Respiratory: Negative for chest tightness, shortness of breath and wheezing.    Cardiovascular: Negative for palpitations and leg swelling.   Gastrointestinal: Negative for abdominal distention, constipation and diarrhea.   Genitourinary: Negative for decreased urine volume, difficulty urinating, dysuria, frequency and urgency.   Musculoskeletal: Negative for back pain and gait problem.   Skin: Negative for wound.   Neurological: Negative for dizziness and light-headedness.       Objective:      Physical Exam   Constitutional: She is oriented to person, place, and time. She appears well-developed and well-nourished.   HENT:   Head: Normocephalic and  atraumatic.   Right Ear: Hearing, tympanic membrane, external ear and ear canal normal.   Left Ear: Hearing, tympanic membrane, external ear and ear canal normal.   Nose: Nose normal. Right sinus exhibits no maxillary sinus tenderness and no frontal sinus tenderness. Left sinus exhibits no maxillary sinus tenderness and no frontal sinus tenderness.   Mouth/Throat: Oropharynx is clear and moist.   Eyes: Pupils are equal, round, and reactive to light. Conjunctivae and lids are normal. Right eye exhibits normal extraocular motion and no nystagmus. Left eye exhibits normal extraocular motion and no nystagmus.   Neck: Normal range of motion.   Cardiovascular: Normal rate, regular rhythm, normal heart sounds and intact distal pulses.   Pulmonary/Chest: Effort normal and breath sounds normal.   Abdominal: Soft. Bowel sounds are normal.   Musculoskeletal:        Cervical back: She exhibits decreased range of motion, tenderness, pain and spasm. She exhibits no swelling.   Muscle tension noted to trapezius muscle   Neurological: She is alert and oriented to person, place, and time. She has normal strength. No cranial nerve deficit. Coordination and gait normal.   No focal deficits on neuro exam.    Skin: Skin is warm and dry.   Nursing note and vitals reviewed.      Assessment:       1. Headache in back of head    2. Essential hypertension    3. Neck pain    4. Facet arthropathy, cervical    5. Nausea in adult        Plan:       Headache in back of head  -     tiZANidine (ZANAFLEX) 4 MG tablet; Take 1 tablet (4 mg total) by mouth nightly as needed.  Dispense: 14 tablet; Refill: 0    Essential hypertension  May be related to pain. Patient will take blood pressure medication tonight.   Neck pain  -     tiZANidine (ZANAFLEX) 4 MG tablet; Take 1 tablet (4 mg total) by mouth nightly as needed.  Dispense: 14 tablet; Refill: 0    Facet arthropathy, cervical  -     tiZANidine (ZANAFLEX) 4 MG tablet; Take 1 tablet (4 mg total) by  mouth nightly as needed.  Dispense: 14 tablet; Refill: 0    Nausea in adult  -     ondansetron (ZOFRAN-ODT) 8 MG TbDL; Take 1 tablet (8 mg total) by mouth every 8 (eight) hours as needed.  Dispense: 6 tablet; Refill: 0      Neurologically intact. Advised to follow up with pain management. If headache continues, will consider CT scan. Discussed s/s that warrant ED evaluation.

## 2019-04-30 NOTE — PATIENT INSTRUCTIONS
"  Headache, Unspecified    A number of things can cause headaches. The cause of your headache isnt clear. But it doesnt seem to be a sign of any serious illness.  You could have a tension headache or a migraine headache.  Stress can cause a tension headache. This can happen if you tense the muscles of your shoulders, neck, and scalp without knowing it. If this stress lasts long enough, you may develop a tension headache.  It is not clear why migraines occur, but certain things called" triggers" can raise the risk of having a migraine attack. Migraine triggers may include emotional stress or depression, or by hormone changes during the menstrual cycle. Other triggers include birth control pills and other medicines, alcohol or caffeine, foods with tyramine (such as aged cheese, wine), eyestrain, weather changes, missed meals, and lack of sleep or oversleeping.  Other causes of headache include:  · Viral illness with high fever  · Head injury with concussion  · Sinus, ear, or throat infection  · Dental pain and jaw joint (TMJ) pain  More serious but less common causes of headache include stroke, brain hemorrhage, brain tumor, meningitis, and encephalitis.  Home care  Follow these tips when taking care of yourself at home:  · Dont drive yourself home if you were given pain medicine for your headache. Instead, have someone else drive you home. Try to sleep when you get home. You should feel much better when you wake up.  · Apply heat to the back of your neck to ease a neck muscle spasm. Take care of a migraine headache by putting an ice pack on your forehead or at the base of your skull.  · If you have nausea or vomiting, eat a light diet until your headache eases.  · If you have a migraine headache, use sunglasses when in the daylight or around bright indoor lighting until your symptoms get better. Bright glaring light can make this type of headache worse.  Follow-up care  Follow up with your healthcare provider, or " as advised. Talk with your provider if you have frequent headaches. He or she can help figure out a treatment plan. By knowing the earliest signs of headache, and starting treatment right away, you may be able to stop the pain yourself.  When to seek medical advice  Call your healthcare provider right away if any of these occur:  · Your head pain suddenly gets worse after sexual intercourse or strenuous activity  · Your head pain doesnt get better within 24 hours  · You arent able to keep liquids down (repeated vomiting)  · Fever of 100.4ºF (38ºC) or higher, or as directed by your healthcare provider  · Stiff neck  · Extreme drowsiness, confusion, or fainting  · Dizziness or dizziness with spinning sensation (vertigo)  · Weakness in an arm or leg or one side of your face  · You have trouble talking or seeing  Date Last Reviewed: 8/1/2016  © 0903-1478 [employer]. [address] [place] [address], [place] [address]. All rights reserved. This information is not intended as a substitute for professional medical care. Always follow your healthcare professional's instructions.

## 2019-05-02 RX ORDER — TIZANIDINE 4 MG/1
TABLET ORAL
Qty: 90 TABLET | Refills: 0 | OUTPATIENT
Start: 2019-05-02

## 2019-05-07 ENCOUNTER — OFFICE VISIT (OUTPATIENT)
Dept: PAIN MEDICINE | Facility: CLINIC | Age: 64
End: 2019-05-07
Payer: MEDICARE

## 2019-05-07 VITALS
HEIGHT: 65 IN | WEIGHT: 161.69 LBS | HEART RATE: 85 BPM | DIASTOLIC BLOOD PRESSURE: 60 MMHG | BODY MASS INDEX: 26.94 KG/M2 | SYSTOLIC BLOOD PRESSURE: 118 MMHG

## 2019-05-07 DIAGNOSIS — M54.81 OCCIPITAL NEURALGIA OF LEFT SIDE: Primary | ICD-10-CM

## 2019-05-07 DIAGNOSIS — M47.812 SPONDYLOSIS OF CERVICAL REGION WITHOUT MYELOPATHY OR RADICULOPATHY: ICD-10-CM

## 2019-05-07 PROCEDURE — 3074F SYST BP LT 130 MM HG: CPT | Mod: CPTII,S$GLB,, | Performed by: ANESTHESIOLOGY

## 2019-05-07 PROCEDURE — 64405 NJX AA&/STRD GR OCPL NRV: CPT | Mod: LT,S$GLB,, | Performed by: ANESTHESIOLOGY

## 2019-05-07 PROCEDURE — 99999 PR PBB SHADOW E&M-EST. PATIENT-LVL III: CPT | Mod: PBBFAC,,, | Performed by: ANESTHESIOLOGY

## 2019-05-07 PROCEDURE — 3078F DIAST BP <80 MM HG: CPT | Mod: CPTII,S$GLB,, | Performed by: ANESTHESIOLOGY

## 2019-05-07 PROCEDURE — 3074F PR MOST RECENT SYSTOLIC BLOOD PRESSURE < 130 MM HG: ICD-10-PCS | Mod: CPTII,S$GLB,, | Performed by: ANESTHESIOLOGY

## 2019-05-07 PROCEDURE — 1101F PR PT FALLS ASSESS DOC 0-1 FALLS W/OUT INJ PAST YR: ICD-10-PCS | Mod: CPTII,S$GLB,, | Performed by: ANESTHESIOLOGY

## 2019-05-07 PROCEDURE — 64405 PR NERVE BLOCK INJ, ANES/STEROID, OCCIPITAL: ICD-10-PCS | Mod: LT,S$GLB,, | Performed by: ANESTHESIOLOGY

## 2019-05-07 PROCEDURE — 3078F PR MOST RECENT DIASTOLIC BLOOD PRESSURE < 80 MM HG: ICD-10-PCS | Mod: CPTII,S$GLB,, | Performed by: ANESTHESIOLOGY

## 2019-05-07 PROCEDURE — 99214 OFFICE O/P EST MOD 30 MIN: CPT | Mod: 25,S$GLB,, | Performed by: ANESTHESIOLOGY

## 2019-05-07 PROCEDURE — 99999 PR PBB SHADOW E&M-EST. PATIENT-LVL III: ICD-10-PCS | Mod: PBBFAC,,, | Performed by: ANESTHESIOLOGY

## 2019-05-07 PROCEDURE — 1101F PT FALLS ASSESS-DOCD LE1/YR: CPT | Mod: CPTII,S$GLB,, | Performed by: ANESTHESIOLOGY

## 2019-05-07 PROCEDURE — 99214 PR OFFICE/OUTPT VISIT, EST, LEVL IV, 30-39 MIN: ICD-10-PCS | Mod: 25,S$GLB,, | Performed by: ANESTHESIOLOGY

## 2019-05-07 RX ORDER — METHYLPREDNISOLONE ACETATE 40 MG/ML
40 INJECTION, SUSPENSION INTRA-ARTICULAR; INTRALESIONAL; INTRAMUSCULAR; SOFT TISSUE
Status: COMPLETED | OUTPATIENT
Start: 2019-05-07 | End: 2019-05-07

## 2019-05-07 RX ORDER — CYCLOSPORINE 0.5 MG/ML
EMULSION OPHTHALMIC
Refills: 1 | COMMUNITY
Start: 2019-04-22 | End: 2021-08-26

## 2019-05-07 RX ADMIN — METHYLPREDNISOLONE ACETATE 40 MG: 40 INJECTION, SUSPENSION INTRA-ARTICULAR; INTRALESIONAL; INTRAMUSCULAR; SOFT TISSUE at 03:05

## 2019-05-07 NOTE — PROGRESS NOTES
[facility] Follow up Evaluation    Referred by: Dr. Aparicio  Reason for referral: back pain    CC:   Chief Complaint   Patient presents with    Neck Pain     head, neck and left shoulder     Headache      No flowsheet data found.    Interval HPI 5/7/19: Ms. Talavera returns to the office for follow up.  Today she c/o neck pain for the past couple of months, 8/10, constant, burning, radiating up her left occiput.  Denies any radicular pain or radiculopathy.  No balance changes, no bowel/bladder dysfunction.      HPI:   Ynes Talavera is a 64 y.o. female who complains of back pain    Onset: a couple months  Inciting Event: none  Progression: since onset, pain is gradually worsening  Current Pain Score: 7/10  Typical Range: 5-8/10  Timing: frequent  Quality: Aching and Sharp  Radiation: yes, down outside of left leg  Associated numbness or weakness: no numbness, no weakness  Exacerbated by: walking up stairs, going from sitting to standing  Allievated by: laying down, rest  Is Pain Level Acceptable?: No    Previous Therapies:  PT/OT: currently doing  HEP: yes  Interventions:   Surgery:  Medications:   - NSAIDS: meloxicam, ibuprofen  - MSK Relaxants:   - TCAs:   - SNRIs:   - Topicals:   - Anticonvulsants:   - Opioids:     History:    Current Outpatient Medications:     aspirin (ECOTRIN) 81 MG EC tablet, Take 81 mg by mouth once daily.  , Disp: , Rfl:     biotin 1 mg Cap, Take by mouth., Disp: , Rfl:     cholecalciferol, vitamin D3, (VITAMIN D3) 1,000 unit capsule, Take 1,000 Units by mouth once daily., Disp: , Rfl:     escitalopram oxalate (LEXAPRO) 10 MG tablet, TAKE 1 TABLET BY MOUTH EVERY DAY, Disp: 90 tablet, Rfl: 1    Lactobacillus rhamnosus GG (CULTURELLE) 10 billion cell capsule, Take 1 capsule by mouth once daily., Disp: , Rfl:     lisinopril 10 MG tablet, TAKE 1 TABLET BY MOUTH ONCE DAILY, Disp: 90 tablet, Rfl: 3    LUTEIN-ZEAXANTHIN ORAL, Take by mouth., Disp: , Rfl:     magnesium 250 mg  Tab, Take 250 mg by mouth once daily., Disp: , Rfl:     melatonin 5 mg Tab, Take 5 mg by mouth as needed. , Disp: , Rfl:     multivitamin capsule, Take 1 capsule by mouth once daily.  , Disp: , Rfl:     ondansetron (ZOFRAN-ODT) 8 MG TbDL, Take 1 tablet (8 mg total) by mouth every 8 (eight) hours as needed., Disp: 6 tablet, Rfl: 0    RESTASIS 0.05 % ophthalmic emulsion, INT 1 GTT IN OU BID, Disp: , Rfl: 1    tiZANidine (ZANAFLEX) 4 MG tablet, Take 1 tablet (4 mg total) by mouth nightly as needed., Disp: 14 tablet, Rfl: 0    traZODone (DESYREL) 100 MG tablet, TAKE 1 TABLET BY MOUTH NIGHTLY (Patient taking differently: TAKE 1/2 TABLET BY MOUTH NIGHTLY), Disp: 90 tablet, Rfl: 3  No current facility-administered medications for this visit.     Past Medical History:   Diagnosis Date    Adrenal adenoma     Diverticulosis of the colon     Hypertension     Venous insufficiency 2014    right leg, denies Hx clot       Past Surgical History:   Procedure Laterality Date    APPENDECTOMY      BREAST BIOPSY Right     benign needle biopsy    BREAST BIOPSY Left     benign needle biopsy     SECTION, LOW TRANSVERSE      CHOLECYSTECTOMY      COLONOSCOPY N/A 2012    [facility]formed [facility]    REPAIR-HERNIA-UMBILICAL N/A 2014    Per[facility]em [facility]cantar [facility] at [facility] OR    THUMB ARTHROSCOPY      both thumbs, trigger finger release.    TONSILLECTOMY      TUBAL LIGATION      UMBILICAL HERNIA REPAIR      VEIN LIGATION AND STRIPPING      Left       Family History   Problem Relation Age of Onset    Cancer Mother     Cancer Father     Heart disease Father     Hypertension Father     Diabetes Maternal Grandmother     Heart disease Maternal Grandmother     Diabetes Maternal Grandfather     Heart disease Maternal Grandfather     Breast cancer Paternal Grandmother 42       Social History     Socioeconomic History    Marital status:      Spouse name:  "Not on file    Number of children: 3    Years of education: Not on file    Highest education level: Not on file   Occupational History    Occupation: Dental hygienist     Comment: Working full-time     Employer:  [employer]   Social Needs    Financial resource strain: Not on file    Food insecurity:     Worry: Not on file     Inability: Not on file    Transportation needs:     Medical: Not on file     Non-medical: Not on file   Tobacco Use    Smoking status: Former Smoker     Packs/day: 0.25     Years: 25.00     Pack years: 6.25     Last attempt to quit: 1993     Years since quittin.2    Smokeless tobacco: Never Used   Substance and Sexual Activity    Alcohol use: No     Alcohol/week: 0.0 oz    Drug use: No    Sexual activity: Yes     Partners: Male   Lifestyle    Physical activity:     Days per week: Not on file     Minutes per session: Not on file    Stress: Not on file   Relationships    Social connections:     Talks on phone: Not on file     Gets together: Not on file     Attends Oriental orthodox service: Not on file     Active member of club or organization: Not on file     Attends meetings of clubs or organizations: Not on file     Relationship status: Not on file   Other Topics Concern    Not on file   Social History Narrative    Her  has had a CVA. She is major support for him.        Walks regularly       Review of patient's allergies indicates:  No Known Allergies    Review of Systems: no changes since the patient was last seen by me in the office on 18    Physical Exam:  Vitals:    19 1456   BP: 118/60   Pulse: 85   Weight: 73.4 kg (161 lb 11.3 oz)   Height: 5' 5" (1.651 m)   PainSc:   8   PainLoc: Neck     Body mass index is 26.91 kg/m².     Gen: NAD  Psych: mood appropriate for given condition  CV: 2+ radial pulse  HEENT: anicteric   Respiratory: non labored  Abd: soft nt, nd  Skin: intact  Sensation: intact to lt touch bilaterally in c4-t1 "   Reflexes: 2+ b/l Bicep, triceps and patella Weiner negative  ROM: Cervical ROM full, shoulder, elbow and wrist ROM full, no scapular dysmotility   Tone:  Normal at elbow, wrist and shoulder   Inspection: no atrophy of bicep, FDI or APB noted, no scapular winging  Palpation: tender over left lesser and greater occipital nerves    Motor:    Right Left   C4 Shoulder Abduction  5  5   C5 Elbow Flexion    5  5   C6 Wrist Extension  5  5   C7 Elbow Extension   5  5   C8/T1 Hand Intrinsics   5  5   C8 First Dorsal Interosseus  5  5   C8 Abductor Pollicus Brevis  5  5       Imaging:  MRI lumbar spine 11/2018  FINDINGS:  There is a 2-3 mm anterolisthesis of L3 upon L4 and L4 upon L5.  This is thought to relate to facet disease.  There is diffuse disc desiccation and disc space loss with prominent mixed but primarily edematous degenerative endplate changes seen about L2/L3.  The conus terminates at the L1 level and has an unremarkable appearance.  There is a mild dextroscoliotic curvature in the mid lumbar region and levo scoliotic curvature in the lower lumbar region.  The lumbar vertebrae otherwise display normal signal, morphology and alignment.  Parapelvic renal cyst formation is evident bilaterally.  The individual disc levels appears follows:    T12/L1: There is no evidence of disc protrusion, canal or foraminal stenosis.  L1/L2: Mild degenerative facet changes are present and there is no evidence of disc protrusion, canal or foraminal stenosis.  L2/L3: Annular disc bulge and osteophyte formation are present and there is effacement of the anterior thecal sac.  There is a mild superimposed left posterolateral disc protrusion.  There is moderate narrowing the left foramen and moderate degenerative facet changes are noted bilaterally.  Central canal is mildly narrowed.  L3/L4: There is annular disc bulging with a superimposed small central disc extrusion.  This effaces the anterior thecal sac and produces mild central  canal stenosis.  There is mild foraminal narrowing bilaterally degenerative facet changes are noted bilaterally.  L4/L5: There is annular disc bulging and there is moderate degenerative facet disease.  There is mild resultant canal and foraminal narrowing.  L5/S1: Moderate degenerative facet changes are noted and there is annular disc bulging causing mild central canal stenosis.    MRI cervical spine 5/7/19  FINDINGS:  Straightening of the normal cervical curvature likely from spondylosis.  There is a approximately 2 mm anterolisthesis of C3 on C4 and slight retrolisthesis of C5 on C6.  Craniovertebral alignment is within normal limits.    There is a localized less than 1 cm T1 and T2 hyperintense lesion in the body of the C2, lateralize to the right representing a hemangioma.  However in the lateral mass of C2 on the left there is significant marrow edema.  There is also fluid-like signal intensity within the C1-C2 articulation.  There is also significant marrow edema in the lateral mass of the C1.  Findings are highly suggestive of severe arthritic changes of the left C1-C2 articulation.  Infectious process cannot be totally excluded.  However there is no abnormal epidural fluid collection to suggest an epidural abscess.  There is also minimal prevertebral soft tissue swelling anterior to the left C1-C2 articulation.    The rest of the vertebral bodies demonstrate no significant marrow abnormalities.  The signal intensities within the cervical cord are within normal limits.    C2-3: No disc herniations or spinal stenosis or foraminal stenosis.  C3-4: There is a approximately 2 mm anterior subluxation of C3 on C4.  There is a broad-based right paracentral osteophyte and disc bulge complex with effacement of the anterior subarachnoid space and mild posterior displacement of the cervical cord.  There is a severe right foraminal stenosis and moderate left foraminal stenosis.  C4-5: Spondylosis associated with anterior  marginal spondylotic osteophytes and a circumferential annular disc bulge and osteophyte complex with slight effacement anterior subarachnoid space.  There is a moderate left foraminal stenosis and mild right foraminal stenosis.  Facet arthropathy noted bilaterally.  C5-6: Spondylosis with disc space narrowing and marginal anterior and posterior osteophytes and a broad-based posterior osteophyte and disc bulge complex with effacement of the anterior subarachnoid space.  There is no cord compression.  There is moderate to severe bilateral foraminal stenosis.  C6-C7: Spondylosis with anterior marginal spondylitic osteophytes and a broad-based posterior osteophyte and a disc bulge complex.  Effacement of the anterior subarachnoid space as well as subarachnoid space dorsal to the cervical cord, suggesting spinal stenosis at this level.  Moderate bilateral foraminal stenosis.  C7-T1: Mild spondylosis associated with mild broad-based posterior disc bulge and osteophyte complex without cord compression or significant spinal stenosis.  There is mild bilateral foraminal stenosis.    CT cervical spine 4/12/19  FINDINGS:  Vertebral column: There is multilevel degenerative change.  The vertebral bodies maintain normal height.  There is no fracture.  There is 3 mm anterolisthesis of C3 on C4, trace anterolisthesis of C4 on C5, 3 mm retrolisthesis of C5 on C6 and trace anterolisthesis of C7 on T1.  There is marked disc space narrowing at the C5-6 and C6-7 levels with mild-to-moderate disc space narrowing at the C3-4 and C4-5 levels there is moderate disc space narrowing at the C7-T1 level.  The odontoid process is intact.  The anterior and posterior arches of C1 are normal.  The facet joints maintain normal articulation but there is multilevel facet joint arthropathy.    Spinal canal, cord, epidural space: The spinal canal may be borderline small on a developmental basis.  There is no abnormal epidural mass or fluid  collection.    Findings by level:    C1-2: Alignment is normal but there is marked left and moderate right joint space narrowing with degenerative cystic change.  C2-3: There is no spinal canal or foraminal stenosis.  There is a mild disc bulge.  There is no significant change.  C3-4: There is anterolisthesis of C3 on C4.  There is disc space narrowing.  There is marked right and moderate left facet joint arthropathy.  There is right greater than left uncovertebral spurring and there is a broad disc osteophyte complex eccentric to the right, narrowing the right ventral subarachnoid space and resulting in moderate spinal stenosis with marked right foraminal stenosis.  C4-5: There is trace anterolisthesis.  There is disc space narrowing.  There is left greater than right facet joint arthropathy and uncovertebral spurring.  There is a disc osteophyte complex which narrows the subarachnoid space.  There is mild spinal stenosis with mild to moderate left foraminal stenosis.  C5-6: There is marked disc space narrowing.  There is bilateral uncovertebral spurring.  There is a broad disc osteophyte complex which narrows the subarachnoid space.  There is moderate spinal stenosis with marked bilateral foraminal stenosis.  C6-7: There is marked disc space narrowing.  There is bilateral, right greater than left, uncovertebral spurring with mild facet joint arthropathy.  There is a broad disc osteophyte complex which narrows the subarachnoid space.  There is moderate spinal stenosis with moderate-to-marked bilateral foraminal stenosis.  C7-T1: There is trace anterolisthesis of C7 on T1.  There is bilateral facet joint arthropathy.  There is no significant spinal stenosis.  There is mild-to-moderate bilateral foraminal stenosis.    Soft tissues, other: The prevertebral soft tissues are grossly normal.  There is scarring in the lung apex bilaterally.  The airway is patent.  The thyroid gland is heterogeneous in density and there  may be a nodule in the left lobe.  The thyroid gland could be further evaluated with ultrasound.  Shotty lymph nodes are present in the neck bilaterally.    Labs:  BMP  Lab Results   Component Value Date     11/24/2017    K 4.1 11/24/2017     11/24/2017    CO2 28 11/24/2017    BUN 15 11/24/2017    CREATININE 0.7 11/24/2017    CALCIUM 9.7 11/24/2017    ANIONGAP 7 (L) 11/24/2017    ESTGFRAFRICA >60.0 11/24/2017    EGFRNONAA >60.0 11/24/2017     Lab Results   Component Value Date    ALT 16 11/24/2017    AST 21 11/24/2017    ALKPHOS 84 11/24/2017    BILITOT 0.5 11/24/2017       Assessment:  Problem List Items Addressed This Visit        Neuro    Spondylosis of cervical region without myelopathy or radiculopathy       Orthopedic    Occipital neuralgia of left side - Primary    Relevant Medications    methylPREDNISolone acetate injection 40 mg (Completed)          Treatment Plan:  64 y.o. year old female with PMH of HTN and TMJ presents today with lumbar radiculopathy.  She has tried PT, HEP, ibuprofen, and meloxicam for her pain without significant pain relief.  MRI lumbar spine reviewed and is consistent with NFS.  Her pain is limiting her mobility and interfering with her ADL's.  Will schedule for left L2/3 and L3/4 TFESI.  Procedure explained using an anatomical model.  Risks, benefits, alternatives explained to patient who verbalized understanding, including increased risk of infection, bleeding, need for additional procedures or surgery, and nerve damage.  Questions regarding the procedure, risks, expected outcome, and possible side effects were solicited and answered to the patient's satisfaction.  Ynes wishes to proceed with the injection.  Verbal and written consent were obtained in clinic today.  Follow up 2 weeks post procedure.    5/7/19 - Ms. Talavera returns to the office for follow up.  Today she c/o neck pain for the past couple of months, 8/10, constant, burning, radiating up her left  occiput.  Denies any radicular pain or radiculopathy.  No balance changes, no bowel/bladder dysfunction.  On exam 5/5 motor strength, 2+ b/l Bicep, triceps and patella Weiner negative.  MRI cervical spine in the lateral mass of C2 on the left there is significant marrow edema.  There is also fluid-like signal intensity within the C1-C2 articulation.  There is also significant marrow edema in the lateral mass of the C1.  Findings are highly suggestive of severe arthritic changes of the left C1-C2 articulation.  CT cervical spine C1-2 alignment is normal but there is marked left and moderate right joint space narrowing with degenerative cystic change.    I discussed a few interventional options with Ms. Talavera.  She previously was hesitant for lumbar POLLY for her lumbar radicular pain.  I educated her on cervical POLLY.  I discussed I would attempt to use a catheter to ensure spread at high cervical levels, however she does have significant stenosis at C6/7 which may make threading a catheter difficult.  If I was unable to thread it, I would injection steroid at C7/T  which will still spread cephalad.  Other interventions I would consider are medial branch blocks and lesser and greater occipital nerve block.  Today she says her main pain is burning on the left occiput and would like to try to occipital nerve blocks first.  We will do that today in the office and she will follow up in 2 weeks.      Procedures: left greater and lesser occipital nerve block.  Procedure explained using an anatomical model.  Risks, benefits, alternatives explained to patient who verbalized understanding, including increased risk of infection, bleeding, need for additional procedures or surgery, and nerve damage.  Questions regarding the procedure, risks, expected outcome, and possible side effects were solicited and answered to the patient's satisfaction.  Ynes wishes to proceed with the injection.  Verbal and written consent were  obtained in clinic today.  PT/OT/HEP: continue PT and HEP  Medications: continue ibuprofen prn   Labs: Reviewed and medications are appropriately dosed for current hepatorenal function.  Imaging: No additional recommended at this time.    : Not applicable    Ronnell Echeverria M.D.  Interventional Pain Medicine / Anesthesiology    Procedure Note    This is a pain clinic procedure note.    Procedure: left greater and lesser Occipital Nerve Block with Steroid  Procedure Date: 5/7/19  Subjective: left Occipital Tenderness is present  Preoperative Diagnosis: Occipital Neuralgia  Post Procedure Diagnosis: Occipital Neuralgia  Complications: None  Anesthetic: lidocaine 2% 2ml, Bupivacaine 0.25% 2 mL + Kenalog 40 mg 1 mL (divided evenly between greater and lesser)    Procedure details: The inion and left mastoid process were palpated.  A point 1/3 and 2/3 along the occipital ridge between these points was identified.  This point was associated with tenderness.  A 25 G needle connected the a syringe containing the injectate was advanced through the skin after cleaning with chloroprep. Contact with os was made.  Following heme-negative aspiration, 2.5 mL of the injectate was administered incrementally without difficulty or pain.    Total injections: 2    Dispo: no complications, pt tolerated the procedure well.

## 2019-05-09 ENCOUNTER — PATIENT MESSAGE (OUTPATIENT)
Dept: PAIN MEDICINE | Facility: CLINIC | Age: 64
End: 2019-05-09

## 2019-05-10 NOTE — TELEPHONE ENCOUNTER
We did the injection 3 days ago.  The early relief was likely due to the numbing medicine in the injection which is probably wearing off.  The steroid in the injection can sometimes take up to a week work, but that would be the part of the injection that provides longer relief.      Let's wait a week.  If still no decent relief we can proceed with the epidural injection.

## 2019-05-12 DIAGNOSIS — G47.9 SLEEP DISTURBANCE: Chronic | ICD-10-CM

## 2019-05-13 RX ORDER — TRAZODONE HYDROCHLORIDE 100 MG/1
TABLET ORAL
Qty: 90 TABLET | Refills: 0 | Status: SHIPPED | OUTPATIENT
Start: 2019-05-13 | End: 2019-08-09 | Stop reason: SDUPTHER

## 2019-05-23 ENCOUNTER — OFFICE VISIT (OUTPATIENT)
Dept: PAIN MEDICINE | Facility: CLINIC | Age: 64
End: 2019-05-23
Payer: MEDICARE

## 2019-05-23 VITALS
BODY MASS INDEX: 27.2 KG/M2 | RESPIRATION RATE: 18 BRPM | HEART RATE: 78 BPM | TEMPERATURE: 97 F | WEIGHT: 163.5 LBS | OXYGEN SATURATION: 97 % | SYSTOLIC BLOOD PRESSURE: 122 MMHG | DIASTOLIC BLOOD PRESSURE: 67 MMHG

## 2019-05-23 DIAGNOSIS — M54.12 CERVICAL RADICULOPATHY: Primary | ICD-10-CM

## 2019-05-23 DIAGNOSIS — M54.2 NECK PAIN: ICD-10-CM

## 2019-05-23 PROCEDURE — 99213 PR OFFICE/OUTPT VISIT, EST, LEVL III, 20-29 MIN: ICD-10-PCS | Mod: S$GLB,,, | Performed by: ANESTHESIOLOGY

## 2019-05-23 PROCEDURE — 3074F PR MOST RECENT SYSTOLIC BLOOD PRESSURE < 130 MM HG: ICD-10-PCS | Mod: CPTII,S$GLB,, | Performed by: ANESTHESIOLOGY

## 2019-05-23 PROCEDURE — 3078F DIAST BP <80 MM HG: CPT | Mod: CPTII,S$GLB,, | Performed by: ANESTHESIOLOGY

## 2019-05-23 PROCEDURE — 1101F PT FALLS ASSESS-DOCD LE1/YR: CPT | Mod: CPTII,S$GLB,, | Performed by: ANESTHESIOLOGY

## 2019-05-23 PROCEDURE — 3074F SYST BP LT 130 MM HG: CPT | Mod: CPTII,S$GLB,, | Performed by: ANESTHESIOLOGY

## 2019-05-23 PROCEDURE — 99999 PR PBB SHADOW E&M-EST. PATIENT-LVL III: CPT | Mod: PBBFAC,,, | Performed by: ANESTHESIOLOGY

## 2019-05-23 PROCEDURE — 3078F PR MOST RECENT DIASTOLIC BLOOD PRESSURE < 80 MM HG: ICD-10-PCS | Mod: CPTII,S$GLB,, | Performed by: ANESTHESIOLOGY

## 2019-05-23 PROCEDURE — 99213 OFFICE O/P EST LOW 20 MIN: CPT | Mod: S$GLB,,, | Performed by: ANESTHESIOLOGY

## 2019-05-23 PROCEDURE — 1101F PR PT FALLS ASSESS DOC 0-1 FALLS W/OUT INJ PAST YR: ICD-10-PCS | Mod: CPTII,S$GLB,, | Performed by: ANESTHESIOLOGY

## 2019-05-23 PROCEDURE — 99999 PR PBB SHADOW E&M-EST. PATIENT-LVL III: ICD-10-PCS | Mod: PBBFAC,,, | Performed by: ANESTHESIOLOGY

## 2019-05-23 RX ORDER — ALPRAZOLAM 0.5 MG/1
0.5 TABLET, ORALLY DISINTEGRATING ORAL ONCE AS NEEDED
Status: CANCELLED | OUTPATIENT
Start: 2019-06-04 | End: 2030-10-30

## 2019-05-23 NOTE — H&P (VIEW-ONLY)
[facility] Follow Up Evaluation    Referred by: Dr. Aparicio  Reason for referral: back pain    CC:   Chief Complaint   Patient presents with    Follow-up      Last 3 PDI Scores 5/23/2019   Pain Disability Index (PDI) 0       Interval HPI 5/23/19: Ms. Talavera returns to the office for follow up.  She is s/p left greater and lesser occipital nerve block with steroid on 5/7/19 with about 20% relief lasting for about 2-3 days.  Today she has her typical neck pain for the past couple of months, 8/10, constant, burning, radiating up her left occiput.  No new numbness or weakness.     Interval HPI 5/7/19: Ms. Talavera returns to the office for follow up.  Today she c/o neck pain for the past couple of months, 8/10, constant, burning, radiating up her left occiput.  Denies any radicular pain or radiculopathy.  No balance changes, no bowel/bladder dysfunction.      HPI:   Ynes Talavera is a 64 y.o. female who complains of back pain    Onset: a couple months  Inciting Event: none  Progression: since onset, pain is gradually worsening  Current Pain Score: 7/10  Typical Range: 5-8/10  Timing: frequent  Quality: Aching and Sharp  Radiation: yes, down outside of left leg  Associated numbness or weakness: no numbness, no weakness  Exacerbated by: walking up stairs, going from sitting to standing  Allievated by: laying down, rest  Is Pain Level Acceptable?: No    Previous Therapies:  PT/OT: currently doing  HEP: yes  Interventions:   Surgery:  Medications:   - NSAIDS: meloxicam, ibuprofen  - MSK Relaxants:   - TCAs:   - SNRIs:   - Topicals:   - Anticonvulsants:   - Opioids:     History:    Current Outpatient Medications:     aspirin (ECOTRIN) 81 MG EC tablet, Take 81 mg by mouth once daily.  , Disp: , Rfl:     biotin 1 mg Cap, Take by mouth., Disp: , Rfl:     cholecalciferol, vitamin D3, (VITAMIN D3) 1,000 unit capsule, Take 1,000 Units by mouth once daily., Disp: , Rfl:     escitalopram oxalate (LEXAPRO) 10 MG  tablet, TAKE 1 TABLET BY MOUTH EVERY DAY, Disp: 90 tablet, Rfl: 1    Lactobacillus rhamnosus GG (CULTURELLE) 10 billion cell capsule, Take 1 capsule by mouth once daily., Disp: , Rfl:     lisinopril 10 MG tablet, TAKE 1 TABLET BY MOUTH ONCE DAILY, Disp: 90 tablet, Rfl: 3    LUTEIN-ZEAXANTHIN ORAL, Take by mouth., Disp: , Rfl:     magnesium 250 mg Tab, Take 250 mg by mouth once daily., Disp: , Rfl:     melatonin 5 mg Tab, Take 5 mg by mouth as needed. , Disp: , Rfl:     multivitamin capsule, Take 1 capsule by mouth once daily.  , Disp: , Rfl:     ondansetron (ZOFRAN-ODT) 8 MG TbDL, Take 1 tablet (8 mg total) by mouth every 8 (eight) hours as needed., Disp: 6 tablet, Rfl: 0    RESTASIS 0.05 % ophthalmic emulsion, INT 1 GTT IN OU BID, Disp: , Rfl: 1    traZODone (DESYREL) 100 MG tablet, TAKE 1 TABLET BY MOUTH NIGHTLY, Disp: 90 tablet, Rfl: 0    Past Medical History:   Diagnosis Date    Adrenal adenoma     Diverticulosis of the colon     Hypertension     Venous insufficiency 2014    right leg, denies Hx clot       Past Surgical History:   Procedure Laterality Date    APPENDECTOMY      BREAST BIOPSY Right     benign needle biopsy    BREAST BIOPSY Left     benign needle biopsy     SECTION, LOW TRANSVERSE      CHOLECYSTECTOMY      COLONOSCOPY N/A 2012    Performed by [facility] at [facility]    REPAIR-HERNIA-UMBILICAL N/A 2014    Performed by Raheem Alcantar MD at [facility] OR    THUMB ARTHROSCOPY      both thumbs, trigger finger release.    TONSILLECTOMY      TUBAL LIGATION      UMBILICAL HERNIA REPAIR      VEIN LIGATION AND STRIPPING      Left       Family History   Problem Relation Age of Onset    Cancer Mother     Cancer Father     Heart disease Father     Hypertension Father     Diabetes Maternal Grandmother     Heart disease Maternal Grandmother     Diabetes Maternal Grandfather     Heart disease Maternal Grandfather     Breast cancer  Paternal Grandmother 42       Social History     Socioeconomic History    Marital status:      Spouse name: Not on file    Number of children: 3    Years of education: Not on file    Highest education level: Not on file   Occupational History    Occupation: Dental hygienist     Comment: Working full-time     Employer:  [employer]   Social Needs    Financial resource strain: Not on file    Food insecurity:     Worry: Not on file     Inability: Not on file    Transportation needs:     Medical: Not on file     Non-medical: Not on file   Tobacco Use    Smoking status: Former Smoker     Packs/day: 0.25     Years: 25.00     Pack years: 6.25     Last attempt to quit: 1993     Years since quittin.2    Smokeless tobacco: Never Used   Substance and Sexual Activity    Alcohol use: No     Alcohol/week: 0.0 oz    Drug use: No    Sexual activity: Yes     Partners: Male   Lifestyle    Physical activity:     Days per week: Not on file     Minutes per session: Not on file    Stress: Not on file   Relationships    Social connections:     Talks on phone: Not on file     Gets together: Not on file     Attends Bahai service: Not on file     Active member of club or organization: Not on file     Attends meetings of clubs or organizations: Not on file     Relationship status: Not on file   Other Topics Concern    Not on file   Social History Narrative    Her  has had a CVA. She is major support for him.        Walks regularly       Review of patient's allergies indicates:  No Known Allergies    Review of Systems: no changes since the patient was last seen by me in the office on 18    Physical Exam:  Vitals:    19 1428   BP: 122/67   Pulse: 78   Resp: 18   Temp: 97 °F (36.1 °C)   TempSrc: Oral   SpO2: 97%   Weight: 74.2 kg (163 lb 7.5 oz)   PainSc:   4   PainLoc: Head     Body mass index is 27.2 kg/m².     Gen: NAD  Psych: mood appropriate for given condition  CV: 2+  radial pulse  HEENT: anicteric   Respiratory: non labored  Abd: soft nt, nd  Skin: intact  Sensation: intact to lt touch bilaterally in c4-t1   Reflexes: 2+ b/l Bicep, triceps and patella Weiner negative  ROM: Cervical ROM full, shoulder, elbow and wrist ROM full, no scapular dysmotility   Tone:  Normal at elbow, wrist and shoulder   Inspection: no atrophy of bicep, FDI or APB noted, no scapular winging  Palpation: tender over left lesser and greater occipital nerves    Motor:    Right Left   C4 Shoulder Abduction  5  5   C5 Elbow Flexion    5  5   C6 Wrist Extension  5  5   C7 Elbow Extension   5  5   C8/T1 Hand Intrinsics   5  5   C8 First Dorsal Interosseus  5  5   C8 Abductor Pollicus Brevis  5  5       Imaging:  MRI lumbar spine 11/2018  FINDINGS:  There is a 2-3 mm anterolisthesis of L3 upon L4 and L4 upon L5.  This is thought to relate to facet disease.  There is diffuse disc desiccation and disc space loss with prominent mixed but primarily edematous degenerative endplate changes seen about L2/L3.  The conus terminates at the L1 level and has an unremarkable appearance.  There is a mild dextroscoliotic curvature in the mid lumbar region and levo scoliotic curvature in the lower lumbar region.  The lumbar vertebrae otherwise display normal signal, morphology and alignment.  Parapelvic renal cyst formation is evident bilaterally.  The individual disc levels appears follows:    T12/L1: There is no evidence of disc protrusion, canal or foraminal stenosis.  L1/L2: Mild degenerative facet changes are present and there is no evidence of disc protrusion, canal or foraminal stenosis.  L2/L3: Annular disc bulge and osteophyte formation are present and there is effacement of the anterior thecal sac.  There is a mild superimposed left posterolateral disc protrusion.  There is moderate narrowing the left foramen and moderate degenerative facet changes are noted bilaterally.  Central canal is mildly narrowed.  L3/L4:  There is annular disc bulging with a superimposed small central disc extrusion.  This effaces the anterior thecal sac and produces mild central canal stenosis.  There is mild foraminal narrowing bilaterally degenerative facet changes are noted bilaterally.  L4/L5: There is annular disc bulging and there is moderate degenerative facet disease.  There is mild resultant canal and foraminal narrowing.  L5/S1: Moderate degenerative facet changes are noted and there is annular disc bulging causing mild central canal stenosis.    MRI cervical spine 5/7/19  FINDINGS:  Straightening of the normal cervical curvature likely from spondylosis.  There is a approximately 2 mm anterolisthesis of C3 on C4 and slight retrolisthesis of C5 on C6.  Craniovertebral alignment is within normal limits.    There is a localized less than 1 cm T1 and T2 hyperintense lesion in the body of the C2, lateralize to the right representing a hemangioma.  However in the lateral mass of C2 on the left there is significant marrow edema.  There is also fluid-like signal intensity within the C1-C2 articulation.  There is also significant marrow edema in the lateral mass of the C1.  Findings are highly suggestive of severe arthritic changes of the left C1-C2 articulation.  Infectious process cannot be totally excluded.  However there is no abnormal epidural fluid collection to suggest an epidural abscess.  There is also minimal prevertebral soft tissue swelling anterior to the left C1-C2 articulation.    The rest of the vertebral bodies demonstrate no significant marrow abnormalities.  The signal intensities within the cervical cord are within normal limits.    C2-3: No disc herniations or spinal stenosis or foraminal stenosis.  C3-4: There is a approximately 2 mm anterior subluxation of C3 on C4.  There is a broad-based right paracentral osteophyte and disc bulge complex with effacement of the anterior subarachnoid space and mild posterior displacement of  the cervical cord.  There is a severe right foraminal stenosis and moderate left foraminal stenosis.  C4-5: Spondylosis associated with anterior marginal spondylotic osteophytes and a circumferential annular disc bulge and osteophyte complex with slight effacement anterior subarachnoid space.  There is a moderate left foraminal stenosis and mild right foraminal stenosis.  Facet arthropathy noted bilaterally.  C5-6: Spondylosis with disc space narrowing and marginal anterior and posterior osteophytes and a broad-based posterior osteophyte and disc bulge complex with effacement of the anterior subarachnoid space.  There is no cord compression.  There is moderate to severe bilateral foraminal stenosis.  C6-C7: Spondylosis with anterior marginal spondylitic osteophytes and a broad-based posterior osteophyte and a disc bulge complex.  Effacement of the anterior subarachnoid space as well as subarachnoid space dorsal to the cervical cord, suggesting spinal stenosis at this level.  Moderate bilateral foraminal stenosis.  C7-T1: Mild spondylosis associated with mild broad-based posterior disc bulge and osteophyte complex without cord compression or significant spinal stenosis.  There is mild bilateral foraminal stenosis.    CT cervical spine 4/12/19  FINDINGS:  Vertebral column: There is multilevel degenerative change.  The vertebral bodies maintain normal height.  There is no fracture.  There is 3 mm anterolisthesis of C3 on C4, trace anterolisthesis of C4 on C5, 3 mm retrolisthesis of C5 on C6 and trace anterolisthesis of C7 on T1.  There is marked disc space narrowing at the C5-6 and C6-7 levels with mild-to-moderate disc space narrowing at the C3-4 and C4-5 levels there is moderate disc space narrowing at the C7-T1 level.  The odontoid process is intact.  The anterior and posterior arches of C1 are normal.  The facet joints maintain normal articulation but there is multilevel facet joint arthropathy.    Spinal canal,  cord, epidural space: The spinal canal may be borderline small on a developmental basis.  There is no abnormal epidural mass or fluid collection.    Findings by level:    C1-2: Alignment is normal but there is marked left and moderate right joint space narrowing with degenerative cystic change.  C2-3: There is no spinal canal or foraminal stenosis.  There is a mild disc bulge.  There is no significant change.  C3-4: There is anterolisthesis of C3 on C4.  There is disc space narrowing.  There is marked right and moderate left facet joint arthropathy.  There is right greater than left uncovertebral spurring and there is a broad disc osteophyte complex eccentric to the right, narrowing the right ventral subarachnoid space and resulting in moderate spinal stenosis with marked right foraminal stenosis.  C4-5: There is trace anterolisthesis.  There is disc space narrowing.  There is left greater than right facet joint arthropathy and uncovertebral spurring.  There is a disc osteophyte complex which narrows the subarachnoid space.  There is mild spinal stenosis with mild to moderate left foraminal stenosis.  C5-6: There is marked disc space narrowing.  There is bilateral uncovertebral spurring.  There is a broad disc osteophyte complex which narrows the subarachnoid space.  There is moderate spinal stenosis with marked bilateral foraminal stenosis.  C6-7: There is marked disc space narrowing.  There is bilateral, right greater than left, uncovertebral spurring with mild facet joint arthropathy.  There is a broad disc osteophyte complex which narrows the subarachnoid space.  There is moderate spinal stenosis with moderate-to-marked bilateral foraminal stenosis.  C7-T1: There is trace anterolisthesis of C7 on T1.  There is bilateral facet joint arthropathy.  There is no significant spinal stenosis.  There is mild-to-moderate bilateral foraminal stenosis.    Soft tissues, other: The prevertebral soft tissues are grossly  normal.  There is scarring in the lung apex bilaterally.  The airway is patent.  The thyroid gland is heterogeneous in density and there may be a nodule in the left lobe.  The thyroid gland could be further evaluated with ultrasound.  Shotty lymph nodes are present in the neck bilaterally.    Labs:  BMP  Lab Results   Component Value Date     11/24/2017    K 4.1 11/24/2017     11/24/2017    CO2 28 11/24/2017    BUN 15 11/24/2017    CREATININE 0.7 11/24/2017    CALCIUM 9.7 11/24/2017    ANIONGAP 7 (L) 11/24/2017    ESTGFRAFRICA >60.0 11/24/2017    EGFRNONAA >60.0 11/24/2017     Lab Results   Component Value Date    ALT 16 11/24/2017    AST 21 11/24/2017    ALKPHOS 84 11/24/2017    BILITOT 0.5 11/24/2017       Assessment:  Problem List Items Addressed This Visit        Neuro    Cervical radiculopathy - Primary       Orthopedic    Neck pain          Treatment Plan:  64 y.o. year old female with PMH of HTN and TMJ presents today with lumbar radiculopathy.  She has tried PT, HEP, ibuprofen, and meloxicam for her pain without significant pain relief.  MRI lumbar spine reviewed and is consistent with NFS.  Her pain is limiting her mobility and interfering with her ADL's.  Will schedule for left L2/3 and L3/4 TFESI.  Procedure explained using an anatomical model.  Risks, benefits, alternatives explained to patient who verbalized understanding, including increased risk of infection, bleeding, need for additional procedures or surgery, and nerve damage.  Questions regarding the procedure, risks, expected outcome, and possible side effects were solicited and answered to the patient's satisfaction.  Ynes wishes to proceed with the injection.  Verbal and written consent were obtained in clinic today.  Follow up 2 weeks post procedure.    5/7/19 - Ms. Talavera returns to the office for follow up.  Today she c/o neck pain for the past couple of months, 8/10, constant, burning, radiating up her left occiput.  Denies  any radicular pain or radiculopathy.  No balance changes, no bowel/bladder dysfunction.  On exam 5/5 motor strength, 2+ b/l Bicep, triceps and patella Weiner negative.  MRI cervical spine in the lateral mass of C2 on the left there is significant marrow edema.  There is also fluid-like signal intensity within the C1-C2 articulation.  There is also significant marrow edema in the lateral mass of the C1.  Findings are highly suggestive of severe arthritic changes of the left C1-C2 articulation.  CT cervical spine C1-2 alignment is normal but there is marked left and moderate right joint space narrowing with degenerative cystic change.    I discussed a few interventional options with Ms. Talavera.  She previously was hesitant for lumbar POLLY for her lumbar radicular pain.  I educated her on cervical POLLY.  I discussed I would attempt to use a catheter to ensure spread at high cervical levels, however she does have significant stenosis at C6/7 which may make threading a catheter difficult.  If I was unable to thread it, I would injection steroid at C7/T  which will still spread cephalad.  Other interventions I would consider are medial branch blocks and lesser and greater occipital nerve block.  Today she says her main pain is burning on the left occiput and would like to try to occipital nerve blocks first.  We will do that today in the office and she will follow up in 2 weeks.      5/23/19 - Ms. Talavera returns to the office for follow up.  She is s/p left greater and lesser occipital nerve block with steroid on 5/7/19 with about 20% relief lasting for about 2-3 days.  Today she has her typical neck pain for the past couple of months, 8/10, constant, burning, radiating up her left occiput.  No new numbness or weakness.  I have reviewed nsgy recommendations, there is no absolute indication for neurosurgical intervention, recommend trying cervical POLLY.  Likely some degree of pain related to C2 NFS.  Given less occipital n.  block was unsuccessful, will do cervical POLLY.  Given her stenosis at C6/7, I don't believe that a catheter can be safely and comfortably passed up her epidural space. I still anticipate cephalad spread of steroid with C7/T1 approach.  Follow up 2 weeks post injection.  Can consider cervical mbb's pending response to POLLY.    Procedures: Cervical POLLY.  Procedure explained using an anatomical model.  Risks, benefits, alternatives explained to patient who verbalized understanding, including increased risk of infection, bleeding, need for additional procedures or surgery, and nerve damage.  Questions regarding the procedure, risks, expected outcome, and possible side effects were solicited and answered to the patient's satisfaction.  Ynes wishes to proceed with the injection.  Verbal and written consent were obtained in clinic today.  PT/OT/HEP: continue PT and HEP  Medications: continue ibuprofen prn   Labs: Reviewed and medications are appropriately dosed for current hepatorenal function.  Imaging: No additional recommended at this time.    : Not applicable    Ronnell Echeverria M.D.  Interventional Pain Medicine / Anesthesiology

## 2019-05-23 NOTE — PROGRESS NOTES
[facility] Follow Up Evaluation    Referred by: Dr. Aparicio  Reason for referral: back pain    CC:   Chief Complaint   Patient presents with    Follow-up      Last 3 PDI Scores 5/23/2019   Pain Disability Index (PDI) 0       Interval HPI 5/23/19: Ms. Talavera returns to the office for follow up.  She is s/p left greater and lesser occipital nerve block with steroid on 5/7/19 with about 20% relief lasting for about 2-3 days.  Today she has her typical neck pain for the past couple of months, 8/10, constant, burning, radiating up her left occiput.  No new numbness or weakness.     Interval HPI 5/7/19: Ms. Talavera returns to the office for follow up.  Today she c/o neck pain for the past couple of months, 8/10, constant, burning, radiating up her left occiput.  Denies any radicular pain or radiculopathy.  No balance changes, no bowel/bladder dysfunction.      HPI:   Ynes Talavera is a 64 y.o. female who complains of back pain    Onset: a couple months  Inciting Event: none  Progression: since onset, pain is gradually worsening  Current Pain Score: 7/10  Typical Range: 5-8/10  Timing: frequent  Quality: Aching and Sharp  Radiation: yes, down outside of left leg  Associated numbness or weakness: no numbness, no weakness  Exacerbated by: walking up stairs, going from sitting to standing  Allievated by: laying down, rest  Is Pain Level Acceptable?: No    Previous Therapies:  PT/OT: currently doing  HEP: yes  Interventions:   Surgery:  Medications:   - NSAIDS: meloxicam, ibuprofen  - MSK Relaxants:   - TCAs:   - SNRIs:   - Topicals:   - Anticonvulsants:   - Opioids:     History:    Current Outpatient Medications:     aspirin (ECOTRIN) 81 MG EC tablet, Take 81 mg by mouth once daily.  , Disp: , Rfl:     biotin 1 mg Cap, Take by mouth., Disp: , Rfl:     cholecalciferol, vitamin D3, (VITAMIN D3) 1,000 unit capsule, Take 1,000 Units by mouth once daily., Disp: , Rfl:     escitalopram oxalate (LEXAPRO) 10 MG  tablet, TAKE 1 TABLET BY MOUTH EVERY DAY, Disp: 90 tablet, Rfl: 1    Lactobacillus rhamnosus GG (CULTURELLE) 10 billion cell capsule, Take 1 capsule by mouth once daily., Disp: , Rfl:     lisinopril 10 MG tablet, TAKE 1 TABLET BY MOUTH ONCE DAILY, Disp: 90 tablet, Rfl: 3    LUTEIN-ZEAXANTHIN ORAL, Take by mouth., Disp: , Rfl:     magnesium 250 mg Tab, Take 250 mg by mouth once daily., Disp: , Rfl:     melatonin 5 mg Tab, Take 5 mg by mouth as needed. , Disp: , Rfl:     multivitamin capsule, Take 1 capsule by mouth once daily.  , Disp: , Rfl:     ondansetron (ZOFRAN-ODT) 8 MG TbDL, Take 1 tablet (8 mg total) by mouth every 8 (eight) hours as needed., Disp: 6 tablet, Rfl: 0    RESTASIS 0.05 % ophthalmic emulsion, INT 1 GTT IN OU BID, Disp: , Rfl: 1    traZODone (DESYREL) 100 MG tablet, TAKE 1 TABLET BY MOUTH NIGHTLY, Disp: 90 tablet, Rfl: 0    Past Medical History:   Diagnosis Date    Adrenal adenoma     Diverticulosis of the colon     Hypertension     Venous insufficiency 2014    right leg, denies Hx clot       Past Surgical History:   Procedure Laterality Date    APPENDECTOMY      BREAST BIOPSY Right     benign needle biopsy    BREAST BIOPSY Left     benign needle biopsy     SECTION, LOW TRANSVERSE      CHOLECYSTECTOMY      COLONOSCOPY N/A 2012    Performed [facility]    REPAIR-HERNIA-UMBILICAL N/A 2014    Performed by Raheem Alcantar [facility] at [facility] OR    THUMB ARTHROSCOPY      both thumbs, trigger finger release.    TONSILLECTOMY      TUBAL LIGATION      UMBILICAL HERNIA REPAIR      VEIN LIGATION AND STRIPPING      Left       Family History   Problem Relation Age of Onset    Cancer Mother     Cancer Father     Heart disease Father     Hypertension Father     Diabetes Maternal Grandmother     Heart disease Maternal Grandmother     Diabetes Maternal Grandfather     Heart disease Maternal Grandfather     Breast cancer  Paternal Grandmother 42       Social History     Socioeconomic History    Marital status:      Spouse name: Not on file    Number of children: 3    Years of education: Not on file    Highest education level: Not on file   Occupational History    Occupation: Dental hygienist     Comment: Working full-time     Employer:  [employer]   Social Needs    Financial resource strain: Not on file    Food insecurity:     Worry: Not on file     Inability: Not on file    Transportation needs:     Medical: Not on file     Non-medical: Not on file   Tobacco Use    Smoking status: Former Smoker     Packs/day: 0.25     Years: 25.00     Pack years: 6.25     Last attempt to quit: 1993     Years since quittin.2    Smokeless tobacco: Never Used   Substance and Sexual Activity    Alcohol use: No     Alcohol/week: 0.0 oz    Drug use: No    Sexual activity: Yes     Partners: Male   Lifestyle    Physical activity:     Days per week: Not on file     Minutes per session: Not on file    Stress: Not on file   Relationships    Social connections:     Talks on phone: Not on file     Gets together: Not on file     Attends [employer] service: Not on file     Active member of club or organization: Not on file     Attends meetings of clubs or organizations: Not on file     Relationship status: Not on file   Other Topics Concern    Not on file   Social History Narrative    Her  has had a CVA. She is major support for him.        Walks regularly       Review of patient's allergies indicates:  No Known Allergies    Review of Systems: no changes since the patient was last seen by me in the office on 18    Physical Exam:  Vitals:    19 1428   BP: 122/67   Pulse: 78   Resp: 18   Temp: 97 °F (36.1 °C)   TempSrc: Oral   SpO2: 97%   Weight: 74.2 kg (163 lb 7.5 oz)   PainSc:   4   PainLoc: Head     Body mass index is 27.2 kg/m².     Gen: NAD  Psych: mood appropriate for given condition  CV: 2+  radial pulse  HEENT: anicteric   Respiratory: non labored  Abd: soft nt, nd  Skin: intact  Sensation: intact to lt touch bilaterally in c4-t1   Reflexes: 2+ b/l Bicep, triceps and patella Weiner negative  ROM: Cervical ROM full, shoulder, elbow and wrist ROM full, no scapular dysmotility   Tone:  Normal at elbow, wrist and shoulder   Inspection: no atrophy of bicep, FDI or APB noted, no scapular winging  Palpation: tender over left lesser and greater occipital nerves    Motor:    Right Left   C4 Shoulder Abduction  5  5   C5 Elbow Flexion    5  5   C6 Wrist Extension  5  5   C7 Elbow Extension   5  5   C8/T1 Hand Intrinsics   5  5   C8 First Dorsal Interosseus  5  5   C8 Abductor Pollicus Brevis  5  5       Imaging:  MRI lumbar spine 11/2018  FINDINGS:  There is a 2-3 mm anterolisthesis of L3 upon L4 and L4 upon L5.  This is thought to relate to facet disease.  There is diffuse disc desiccation and disc space loss with prominent mixed but primarily edematous degenerative endplate changes seen about L2/L3.  The conus terminates at the L1 level and has an unremarkable appearance.  There is a mild dextroscoliotic curvature in the mid lumbar region and levo scoliotic curvature in the lower lumbar region.  The lumbar vertebrae otherwise display normal signal, morphology and alignment.  Parapelvic renal cyst formation is evident bilaterally.  The individual disc levels appears follows:    T12/L1: There is no evidence of disc protrusion, canal or foraminal stenosis.  L1/L2: Mild degenerative facet changes are present and there is no evidence of disc protrusion, canal or foraminal stenosis.  L2/L3: Annular disc bulge and osteophyte formation are present and there is effacement of the anterior thecal sac.  There is a mild superimposed left posterolateral disc protrusion.  There is moderate narrowing the left foramen and moderate degenerative facet changes are noted bilaterally.  Central canal is mildly narrowed.  L3/L4:  There is annular disc bulging with a superimposed small central disc extrusion.  This effaces the anterior thecal sac and produces mild central canal stenosis.  There is mild foraminal narrowing bilaterally degenerative facet changes are noted bilaterally.  L4/L5: There is annular disc bulging and there is moderate degenerative facet disease.  There is mild resultant canal and foraminal narrowing.  L5/S1: Moderate degenerative facet changes are noted and there is annular disc bulging causing mild central canal stenosis.    MRI cervical spine 5/7/19  FINDINGS:  Straightening of the normal cervical curvature likely from spondylosis.  There is a approximately 2 mm anterolisthesis of C3 on C4 and slight retrolisthesis of C5 on C6.  Craniovertebral alignment is within normal limits.    There is a localized less than 1 cm T1 and T2 hyperintense lesion in the body of the C2, lateralize to the right representing a hemangioma.  However in the lateral mass of C2 on the left there is significant marrow edema.  There is also fluid-like signal intensity within the C1-C2 articulation.  There is also significant marrow edema in the lateral mass of the C1.  Findings are highly suggestive of severe arthritic changes of the left C1-C2 articulation.  Infectious process cannot be totally excluded.  However there is no abnormal epidural fluid collection to suggest an epidural abscess.  There is also minimal prevertebral soft tissue swelling anterior to the left C1-C2 articulation.    The rest of the vertebral bodies demonstrate no significant marrow abnormalities.  The signal intensities within the cervical cord are within normal limits.    C2-3: No disc herniations or spinal stenosis or foraminal stenosis.  C3-4: There is a approximately 2 mm anterior subluxation of C3 on C4.  There is a broad-based right paracentral osteophyte and disc bulge complex with effacement of the anterior subarachnoid space and mild posterior displacement of  the cervical cord.  There is a severe right foraminal stenosis and moderate left foraminal stenosis.  C4-5: Spondylosis associated with anterior marginal spondylotic osteophytes and a circumferential annular disc bulge and osteophyte complex with slight effacement anterior subarachnoid space.  There is a moderate left foraminal stenosis and mild right foraminal stenosis.  Facet arthropathy noted bilaterally.  C5-6: Spondylosis with disc space narrowing and marginal anterior and posterior osteophytes and a broad-based posterior osteophyte and disc bulge complex with effacement of the anterior subarachnoid space.  There is no cord compression.  There is moderate to severe bilateral foraminal stenosis.  C6-C7: Spondylosis with anterior marginal spondylitic osteophytes and a broad-based posterior osteophyte and a disc bulge complex.  Effacement of the anterior subarachnoid space as well as subarachnoid space dorsal to the cervical cord, suggesting spinal stenosis at this level.  Moderate bilateral foraminal stenosis.  C7-T1: Mild spondylosis associated with mild broad-based posterior disc bulge and osteophyte complex without cord compression or significant spinal stenosis.  There is mild bilateral foraminal stenosis.    CT cervical spine 4/12/19  FINDINGS:  Vertebral column: There is multilevel degenerative change.  The vertebral bodies maintain normal height.  There is no fracture.  There is 3 mm anterolisthesis of C3 on C4, trace anterolisthesis of C4 on C5, 3 mm retrolisthesis of C5 on C6 and trace anterolisthesis of C7 on T1.  There is marked disc space narrowing at the C5-6 and C6-7 levels with mild-to-moderate disc space narrowing at the C3-4 and C4-5 levels there is moderate disc space narrowing at the C7-T1 level.  The odontoid process is intact.  The anterior and posterior arches of C1 are normal.  The facet joints maintain normal articulation but there is multilevel facet joint arthropathy.    Spinal canal,  cord, epidural space: The spinal canal may be borderline small on a developmental basis.  There is no abnormal epidural mass or fluid collection.    Findings by level:    C1-2: Alignment is normal but there is marked left and moderate right joint space narrowing with degenerative cystic change.  C2-3: There is no spinal canal or foraminal stenosis.  There is a mild disc bulge.  There is no significant change.  C3-4: There is anterolisthesis of C3 on C4.  There is disc space narrowing.  There is marked right and moderate left facet joint arthropathy.  There is right greater than left uncovertebral spurring and there is a broad disc osteophyte complex eccentric to the right, narrowing the right ventral subarachnoid space and resulting in moderate spinal stenosis with marked right foraminal stenosis.  C4-5: There is trace anterolisthesis.  There is disc space narrowing.  There is left greater than right facet joint arthropathy and uncovertebral spurring.  There is a disc osteophyte complex which narrows the subarachnoid space.  There is mild spinal stenosis with mild to moderate left foraminal stenosis.  C5-6: There is marked disc space narrowing.  There is bilateral uncovertebral spurring.  There is a broad disc osteophyte complex which narrows the subarachnoid space.  There is moderate spinal stenosis with marked bilateral foraminal stenosis.  C6-7: There is marked disc space narrowing.  There is bilateral, right greater than left, uncovertebral spurring with mild facet joint arthropathy.  There is a broad disc osteophyte complex which narrows the subarachnoid space.  There is moderate spinal stenosis with moderate-to-marked bilateral foraminal stenosis.  C7-T1: There is trace anterolisthesis of C7 on T1.  There is bilateral facet joint arthropathy.  There is no significant spinal stenosis.  There is mild-to-moderate bilateral foraminal stenosis.    Soft tissues, other: The prevertebral soft tissues are grossly  normal.  There is scarring in the lung apex bilaterally.  The airway is patent.  The thyroid gland is heterogeneous in density and there may be a nodule in the left lobe.  The thyroid gland could be further evaluated with ultrasound.  Shotty lymph nodes are present in the neck bilaterally.    Labs:  BMP  Lab Results   Component Value Date     11/24/2017    K 4.1 11/24/2017     11/24/2017    CO2 28 11/24/2017    BUN 15 11/24/2017    CREATININE 0.7 11/24/2017    CALCIUM 9.7 11/24/2017    ANIONGAP 7 (L) 11/24/2017    ESTGFRAFRICA >60.0 11/24/2017    EGFRNONAA >60.0 11/24/2017     Lab Results   Component Value Date    ALT 16 11/24/2017    AST 21 11/24/2017    ALKPHOS 84 11/24/2017    BILITOT 0.5 11/24/2017       Assessment:  Problem List Items Addressed This Visit        Neuro    Cervical radiculopathy - Primary       Orthopedic    Neck pain          Treatment Plan:  64 y.o. year old female with PMH of HTN and TMJ presents today with lumbar radiculopathy.  She has tried PT, HEP, ibuprofen, and meloxicam for her pain without significant pain relief.  MRI lumbar spine reviewed and is consistent with NFS.  Her pain is limiting her mobility and interfering with her ADL's.  Will schedule for left L2/3 and L3/4 TFESI.  Procedure explained using an anatomical model.  Risks, benefits, alternatives explained to patient who verbalized understanding, including increased risk of infection, bleeding, need for additional procedures or surgery, and nerve damage.  Questions regarding the procedure, risks, expected outcome, and possible side effects were solicited and answered to the patient's satisfaction.  Ynes wishes to proceed with the injection.  Verbal and written consent were obtained in clinic today.  Follow up 2 weeks post procedure.    5/7/19 - Ms. Talavera returns to the office for follow up.  Today she c/o neck pain for the past couple of months, 8/10, constant, burning, radiating up her left occiput.  Denies  any radicular pain or radiculopathy.  No balance changes, no bowel/bladder dysfunction.  On exam 5/5 motor strength, 2+ b/l Bicep, triceps and patella Weiner negative.  MRI cervical spine in the lateral mass of C2 on the left there is significant marrow edema.  There is also fluid-like signal intensity within the C1-C2 articulation.  There is also significant marrow edema in the lateral mass of the C1.  Findings are highly suggestive of severe arthritic changes of the left C1-C2 articulation.  CT cervical spine C1-2 alignment is normal but there is marked left and moderate right joint space narrowing with degenerative cystic change.    I discussed a few interventional options with Ms. Talavera.  She previously was hesitant for lumbar POLLY for her lumbar radicular pain.  I educated her on cervical POLLY.  I discussed I would attempt to use a catheter to ensure spread at high cervical levels, however she does have significant stenosis at C6/7 which may make threading a catheter difficult.  If I was unable to thread it, I would injection steroid at C7/T  which will still spread cephalad.  Other interventions I would consider are medial branch blocks and lesser and greater occipital nerve block.  Today she says her main pain is burning on the left occiput and would like to try to occipital nerve blocks first.  We will do that today in the office and she will follow up in 2 weeks.      5/23/19 - Ms. Talavera returns to the office for follow up.  She is s/p left greater and lesser occipital nerve block with steroid on 5/7/19 with about 20% relief lasting for about 2-3 days.  Today she has her typical neck pain for the past couple of months, 8/10, constant, burning, radiating up her left occiput.  No new numbness or weakness.  I have reviewed nsgy recommendations, there is no absolute indication for neurosurgical intervention, recommend trying cervical POLLY.  Likely some degree of pain related to C2 NFS.  Given less occipital n.  block was unsuccessful, will do cervical POLLY.  Given her stenosis at C6/7, I don't believe that a catheter can be safely and comfortably passed up her epidural space. I still anticipate cephalad spread of steroid with C7/T1 approach.  Follow up 2 weeks post injection.  Can consider cervical mbb's pending response to POLLY.    Procedures: Cervical POLLY.  Procedure explained using an anatomical model.  Risks, benefits, alternatives explained to patient who verbalized understanding, including increased risk of infection, bleeding, need for additional procedures or surgery, and nerve damage.  Questions regarding the procedure, risks, expected outcome, and possible side effects were solicited and answered to the patient's satisfaction.  Ynes wishes to proceed with the injection.  Verbal and written consent were obtained in clinic today.  PT/OT/HEP: continue PT and HEP  Medications: continue ibuprofen prn   Labs: Reviewed and medications are appropriately dosed for current hepatorenal function.  Imaging: No additional recommended at this time.    : Not applicable    Ronnell Echeverria M.D.  Interventional Pain Medicine / Anesthesiology

## 2019-06-03 DIAGNOSIS — M50.30 DDD (DEGENERATIVE DISC DISEASE), CERVICAL: Primary | ICD-10-CM

## 2019-06-04 ENCOUNTER — HOSPITAL ENCOUNTER (OUTPATIENT)
Dept: RADIOLOGY | Facility: HOSPITAL | Age: 64
Discharge: HOME | End: 2019-06-04
Attending: ANESTHESIOLOGY | Admitting: ANESTHESIOLOGY
Payer: MEDICARE

## 2019-06-04 ENCOUNTER — HOSPITAL ENCOUNTER (OUTPATIENT)
Facility: HOSPITAL | Age: 64
Discharge: HOME | End: 2019-06-04
Attending: ANESTHESIOLOGY | Admitting: ANESTHESIOLOGY
Payer: MEDICARE

## 2019-06-04 VITALS
RESPIRATION RATE: 16 BRPM | OXYGEN SATURATION: 98 % | TEMPERATURE: 98 F | HEART RATE: 70 BPM | WEIGHT: 163 LBS | DIASTOLIC BLOOD PRESSURE: 74 MMHG | BODY MASS INDEX: 25.58 KG/M2 | HEIGHT: 67 IN | SYSTOLIC BLOOD PRESSURE: 140 MMHG

## 2019-06-04 DIAGNOSIS — M54.12 CERVICAL RADICULOPATHY: Primary | ICD-10-CM

## 2019-06-04 DIAGNOSIS — M50.30 DDD (DEGENERATIVE DISC DISEASE), CERVICAL: ICD-10-CM

## 2019-06-04 PROCEDURE — 25500020 PHARM REV CODE 255: Mod: PO | Performed by: ANESTHESIOLOGY

## 2019-06-04 PROCEDURE — 62321 NJX INTERLAMINAR CRV/THRC: CPT | Mod: ,,, | Performed by: ANESTHESIOLOGY

## 2019-06-04 PROCEDURE — 63600175 PHARM REV CODE 636 W HCPCS: Mod: PO | Performed by: ANESTHESIOLOGY

## 2019-06-04 PROCEDURE — 25000003 PHARM REV CODE 250: Mod: PO | Performed by: ANESTHESIOLOGY

## 2019-06-04 PROCEDURE — 62321 PR INJ CERV/THORAC, W/GUIDANCE: ICD-10-PCS | Mod: ,,, | Performed by: ANESTHESIOLOGY

## 2019-06-04 PROCEDURE — 76000 FLUOROSCOPY <1 HR PHYS/QHP: CPT | Mod: TC,PO

## 2019-06-04 PROCEDURE — 62321 NJX INTERLAMINAR CRV/THRC: CPT | Mod: PO | Performed by: ANESTHESIOLOGY

## 2019-06-04 RX ORDER — DEXAMETHASONE SODIUM PHOSPHATE 10 MG/ML
INJECTION INTRAMUSCULAR; INTRAVENOUS
Status: DISCONTINUED | OUTPATIENT
Start: 2019-06-04 | End: 2019-06-04 | Stop reason: HOSPADM

## 2019-06-04 RX ORDER — LIDOCAINE HYDROCHLORIDE 10 MG/ML
INJECTION, SOLUTION EPIDURAL; INFILTRATION; INTRACAUDAL; PERINEURAL
Status: DISCONTINUED | OUTPATIENT
Start: 2019-06-04 | End: 2019-06-04 | Stop reason: HOSPADM

## 2019-06-04 RX ORDER — ALPRAZOLAM 0.5 MG/1
0.5 TABLET, ORALLY DISINTEGRATING ORAL ONCE AS NEEDED
Status: COMPLETED | OUTPATIENT
Start: 2019-06-04 | End: 2019-06-04

## 2019-06-04 RX ADMIN — ALPRAZOLAM 0.5 MG: 0.5 TABLET, ORALLY DISINTEGRATING ORAL at 10:06

## 2019-06-04 NOTE — INTERVAL H&P NOTE
The patient has been examined and the H&P has been reviewed:    I concur with the findings and no changes have occurred since H&P was written.    Anesthesia/Surgery risks, benefits and alternative options discussed and understood by patient/family.      Active Hospital Problems    Diagnosis  POA    Cervical radiculopathy [M54.12]  Yes      Resolved Hospital Problems   No resolved problems to display.

## 2019-06-04 NOTE — DISCHARGE INSTRUCTIONS
Recovery After Procedural Sedation (Adult)  You have been given medicine by vein to make you sleep during your surgery. This may have included both a pain medicine and sleeping medicine. Most of the effects have worn off. But you may still have some drowsiness for the next 6 to 8 hours.  Home care  Follow these guidelines when you get home:  · For the next 8 hours, you should be watched by a responsible adult. This person should make sure your condition is not getting worse.  · Don't drink any alcohol for the next 24 hours.  · Don't drive, operate dangerous machinery, or make important business or personal decisions during the next 24 hours.  Note: Your healthcare provider may tell you not to take any medicine by mouth for pain or sleep in the next 4 hours. These medicines may react with the medicines you were given in the hospital. This could cause a much stronger response than usual.  Follow-up care  Follow up with your healthcare provider if you are not alert and back to your usual level of activity within 12 hours.  When to seek medical advice  Call your healthcare provider right away if any of these occur:  · Drowsiness gets worse  · Weakness or dizziness gets worse  · Repeated vomiting  · You can't be awakened   Date Last Reviewed: 10/18/2016  © 3479-7043 [employer]. [address] [place] [address], [place] [address]. All rights reserved. This information is not intended as a substitute for professional medical care. Always follow your healthcare professional's instructions.    Home care instructions  Apply ice pack to the injection site for 20 minutes periods for the first 24 hrs for soreness/discomfort at injection site DO NOT USE HEAT FOR 24 HOURS  Keep site clean and dry for 24 hours, remove bandaid when desired  Do not drive until tomorrow  Take care when walking after a cervical injection  Avoid strenuous activities for 2 days  Make take 2 weeks to feel the full effects   Resume home medication  as prescribed today  Resume Aspirin, Plavix, or Coumadin the day after the procedure unless otherwise instructed.    SEE IMMEDIATE MEDICAL HELP FOR:  Severe increase in your usual pain or appearance of new pain  Prolonged or increasing weakness or numbness in the legs or arms  Drainage, redness, active bleeding, or increased swelling at the injection site  Temperature over 100.0 degrees F.  Headache that increases when your head is upright and decreases when you lie flat    CALL 911 OR GO DIRECTLY TO EMERGENCY DEPARTMENT FOR:  Shortness of breath, chest pain, or problems breathing

## 2019-06-04 NOTE — OP NOTE
"Procedure Note    Procedure Date: 6/4/2019    Procedure Performed:  C7-T1 cervical interlaminar epidural steroid injection under fluoroscopy.    Indications: Patient has failed conservative therapy.      Pre-op diagnosis: Cervical Radiculopathy    Post-op diagnosis: same    Physician: Ronnell Echeverria MD    IV Sedation medications: none    Medications injected: Dexamethasone 10mg, 1% Lidocaine 1ml, 3.5 mL sterile, preservative-free normal saline.    Local anesthetic used: 1% Lidocaine, 1 ml, 8.4% sodium bicarbonate 0.25ml    Estimated Blood Loss: none    Complications:  none    Technique:  The patient was interviewed in the holding area and Risks/Benefits were discussed, including, but not limited to, the possibility of new or different pain, bleeding or infection.   All questions were answered.  The patient understood and accepted risks.  Consent was verfied.  A time-out was taken to identify patient and procedure prior to starting the procedure.  With the patient laying in a prone position with the neck in a mid-flexed forward position, the area was prepped and draped in the usual sterile fashion using ChloraPrep and a fenestrated drape.  The area was determined under AP fluoroscopic guidance.  The skin and subcutaneous tissues overlying the targeted interspace were anesthetized with 3-5 mL of 1% Lidocaine using a 25G 1.5" needle.  An 18G, 3.5" Tuohy epidural needle was inserted through the anesthetized skin and directed toward the interspace under fluoroscopic guidance until T1 lamina was contacted.  The fluoroscope was then adjusted to yield a contralateral oblique view of the C7-T1 interspace.  The epidural needle was incrementally walked cephalad off of the lamina until the ligamentum flavum was engaged. From this point, a loss-of-resistance technique was used to identify entrance of the needle into the epidural space.  Once the tip of the needle was in the desired position, the contrast dye Omnipaque was injected " to determine placement and no vascular uptake.  Then, after negative aspiration, 1 cc of 1% Lidocaine with dexamethasone 10 mg + 3.5 cc NS was injected.  The needle was flushed with normal saline and removed. The contrast was seen to be displaced after injection. Patient was awake/responsive during all injections.  The patient tolerated the procedure well and was transferred to the .AC.. in stable condition.  The patient was monitored after the procedure and was given post-procedure and discharge instructions to follow at home. The patient was discharged in a stable condition.

## 2019-06-04 NOTE — DISCHARGE SUMMARY
[facility]  Discharge Note  Short Stay    Admit Date: 6/4/2019    Discharge Date: 6/4/2019    Attending Physician: Ronnell Echeverria     Discharge Provider: Ronnell Echeverria    Diagnoses:  Active Hospital Problems    Diagnosis  POA    Cervical radiculopathy [M54.12]  Yes      Resolved Hospital Problems   No resolved problems to display.       Discharged Condition: Good    Final Diagnoses: Cervical radiculopathy [M54.12]    Disposition: Home or Self Care    Hospital Course: No complications, uneventful    Outcome of Hospitalization, Treatment, Procedure, or Surgery:  Patient was admitted for outpatient interventional pain management procedure. The patient tolerated the procedure well with no complications.    Follow up/Patient Instructions:  Follow up as scheduled in Pain Management office in 3-4 weeks.  Patient has received instructions and follow up date and time.    Medications:  Continue previous medications    Discharge Procedure Orders   Notify your health care provider if you experience any of the following:  temperature >100.4     Notify your health care provider if you experience any of the following:  persistent nausea and vomiting or diarrhea     Notify your health care provider if you experience any of the following:  severe uncontrolled pain     Notify your health care provider if you experience any of the following:  redness, tenderness, or signs of infection (pain, swelling, redness, odor or green/yellow discharge around incision site)     Notify your health care provider if you experience any of the following:  difficulty breathing or increased cough     Notify your health care provider if you experience any of the following:  severe persistent headache     Notify your health care provider if you experience any of the following:  worsening rash     Notify your health care provider if you experience any of the following:  persistent dizziness, light-headedness, or visual disturbances     Notify  your health care provider if you experience any of the following:  increased confusion or weakness     Activity as tolerated         Discharge Procedure Orders (must include Diet, Follow-up, Activity):   Discharge Procedure Orders (must include Diet, Follow-up, Activity)   Notify your health care provider if you experience any of the following:  temperature >100.4     Notify your health care provider if you experience any of the following:  persistent nausea and vomiting or diarrhea     Notify your health care provider if you experience any of the following:  severe uncontrolled pain     Notify your health care provider if you experience any of the following:  redness, tenderness, or signs of infection (pain, swelling, redness, odor or green/yellow discharge around incision site)     Notify your health care provider if you experience any of the following:  difficulty breathing or increased cough     Notify your health care provider if you experience any of the following:  severe persistent headache     Notify your health care provider if you experience any of the following:  worsening rash     Notify your health care provider if you experience any of the following:  persistent dizziness, light-headedness, or visual disturbances     Notify your health care provider if you experience any of the following:  increased confusion or weakness     Activity as tolerated

## 2019-06-20 ENCOUNTER — OFFICE VISIT (OUTPATIENT)
Dept: PAIN MEDICINE | Facility: CLINIC | Age: 64
End: 2019-06-20
Payer: MEDICARE

## 2019-06-20 VITALS
HEART RATE: 75 BPM | TEMPERATURE: 97 F | SYSTOLIC BLOOD PRESSURE: 122 MMHG | WEIGHT: 160.25 LBS | DIASTOLIC BLOOD PRESSURE: 68 MMHG | BODY MASS INDEX: 25.48 KG/M2 | OXYGEN SATURATION: 97 % | RESPIRATION RATE: 18 BRPM

## 2019-06-20 DIAGNOSIS — M54.12 CERVICAL RADICULOPATHY: Primary | ICD-10-CM

## 2019-06-20 PROCEDURE — 1101F PR PT FALLS ASSESS DOC 0-1 FALLS W/OUT INJ PAST YR: ICD-10-PCS | Mod: CPTII,S$GLB,, | Performed by: ANESTHESIOLOGY

## 2019-06-20 PROCEDURE — 99213 OFFICE O/P EST LOW 20 MIN: CPT | Mod: S$GLB,,, | Performed by: ANESTHESIOLOGY

## 2019-06-20 PROCEDURE — 3074F PR MOST RECENT SYSTOLIC BLOOD PRESSURE < 130 MM HG: ICD-10-PCS | Mod: CPTII,S$GLB,, | Performed by: ANESTHESIOLOGY

## 2019-06-20 PROCEDURE — 3078F PR MOST RECENT DIASTOLIC BLOOD PRESSURE < 80 MM HG: ICD-10-PCS | Mod: CPTII,S$GLB,, | Performed by: ANESTHESIOLOGY

## 2019-06-20 PROCEDURE — 3078F DIAST BP <80 MM HG: CPT | Mod: CPTII,S$GLB,, | Performed by: ANESTHESIOLOGY

## 2019-06-20 PROCEDURE — 3074F SYST BP LT 130 MM HG: CPT | Mod: CPTII,S$GLB,, | Performed by: ANESTHESIOLOGY

## 2019-06-20 PROCEDURE — 99999 PR PBB SHADOW E&M-EST. PATIENT-LVL III: ICD-10-PCS | Mod: PBBFAC,,, | Performed by: ANESTHESIOLOGY

## 2019-06-20 PROCEDURE — 99999 PR PBB SHADOW E&M-EST. PATIENT-LVL III: CPT | Mod: PBBFAC,,, | Performed by: ANESTHESIOLOGY

## 2019-06-20 PROCEDURE — 1101F PT FALLS ASSESS-DOCD LE1/YR: CPT | Mod: CPTII,S$GLB,, | Performed by: ANESTHESIOLOGY

## 2019-06-20 PROCEDURE — 99213 PR OFFICE/OUTPT VISIT, EST, LEVL III, 20-29 MIN: ICD-10-PCS | Mod: S$GLB,,, | Performed by: ANESTHESIOLOGY

## 2019-06-27 ENCOUNTER — TELEPHONE (OUTPATIENT)
Dept: PAIN MEDICINE | Facility: CLINIC | Age: 64
End: 2019-06-27

## 2019-06-27 NOTE — TELEPHONE ENCOUNTER
Spoke to patient about the appointment, she now has the address and phone number of that office in case she needs it.

## 2019-06-27 NOTE — TELEPHONE ENCOUNTER
----- Message from Ronnell Echeverria MD sent at 6/25/2019 11:45 AM CDT -----  Please assist Ms. Talavera with getting a follow up appointment with Dr. Willoughby on the [place] for follow up.  Please let Ms. Talavera know of the appointment.

## 2019-06-27 NOTE — TELEPHONE ENCOUNTER
----- Message from Ap Manuel sent at 6/27/2019 12:06 PM CDT -----  Type:  Patient Returning Call    Who Called:  Patient  Who Left Message for Patient:  Dominique  Does the patient know what this is regarding?: Referral questions  Best Call Back Number:  [phone]  Additional Information:

## 2019-07-18 ENCOUNTER — PATIENT MESSAGE (OUTPATIENT)
Dept: FAMILY MEDICINE | Facility: CLINIC | Age: 64
End: 2019-07-18

## 2019-07-19 ENCOUNTER — HOSPITAL ENCOUNTER (OUTPATIENT)
Dept: RADIOLOGY | Facility: HOSPITAL | Age: 64
Discharge: HOME | End: 2019-07-19
Attending: NEUROMUSCULOSKELETAL MEDICINE & OMM
Payer: MEDICARE

## 2019-07-19 ENCOUNTER — OFFICE VISIT (OUTPATIENT)
Dept: SPORTS MEDICINE | Facility: CLINIC | Age: 64
End: 2019-07-19
Payer: MEDICARE

## 2019-07-19 VITALS
BODY MASS INDEX: 25.11 KG/M2 | HEIGHT: 67 IN | HEART RATE: 86 BPM | SYSTOLIC BLOOD PRESSURE: 107 MMHG | DIASTOLIC BLOOD PRESSURE: 65 MMHG | WEIGHT: 160 LBS

## 2019-07-19 DIAGNOSIS — G44.86 CERVICOGENIC HEADACHE: ICD-10-CM

## 2019-07-19 DIAGNOSIS — M47.812 ARTHROPATHY OF CERVICAL FACET JOINT: ICD-10-CM

## 2019-07-19 DIAGNOSIS — M99.00 SOMATIC DYSFUNCTION OF HEAD REGION: ICD-10-CM

## 2019-07-19 DIAGNOSIS — M99.01 CERVICAL (NECK) REGION SOMATIC DYSFUNCTION: ICD-10-CM

## 2019-07-19 DIAGNOSIS — M99.02 SOMATIC DYSFUNCTION OF THORACIC REGION: ICD-10-CM

## 2019-07-19 DIAGNOSIS — M79.10 MYALGIA: ICD-10-CM

## 2019-07-19 DIAGNOSIS — M25.512 LEFT SHOULDER PAIN, UNSPECIFIED CHRONICITY: ICD-10-CM

## 2019-07-19 DIAGNOSIS — M99.08 SOMATIC DYSFUNCTION OF RIB CAGE REGION: ICD-10-CM

## 2019-07-19 DIAGNOSIS — G89.29 CHRONIC NECK PAIN: Primary | ICD-10-CM

## 2019-07-19 DIAGNOSIS — M54.2 CHRONIC NECK PAIN: Primary | ICD-10-CM

## 2019-07-19 DIAGNOSIS — M47.812 SPONDYLOSIS OF CERVICAL REGION WITHOUT MYELOPATHY OR RADICULOPATHY: ICD-10-CM

## 2019-07-19 DIAGNOSIS — M99.07 UPPER EXTREMITY SOMATIC DYSFUNCTION: ICD-10-CM

## 2019-07-19 DIAGNOSIS — R29.3 POOR POSTURE: ICD-10-CM

## 2019-07-19 PROCEDURE — 98927 OSTEOPATH MANJ 5-6 REGIONS: CPT | Mod: S$GLB,,, | Performed by: NEUROMUSCULOSKELETAL MEDICINE & OMM

## 2019-07-19 PROCEDURE — 1101F PT FALLS ASSESS-DOCD LE1/YR: CPT | Mod: CPTII,S$GLB,, | Performed by: NEUROMUSCULOSKELETAL MEDICINE & OMM

## 2019-07-19 PROCEDURE — 99214 OFFICE O/P EST MOD 30 MIN: CPT | Mod: 25,S$GLB,, | Performed by: NEUROMUSCULOSKELETAL MEDICINE & OMM

## 2019-07-19 PROCEDURE — 98927 PR OSTEOPATHIC MANIP,5-6 BODY REGN: ICD-10-PCS | Mod: S$GLB,,, | Performed by: NEUROMUSCULOSKELETAL MEDICINE & OMM

## 2019-07-19 PROCEDURE — 1101F PR PT FALLS ASSESS DOC 0-1 FALLS W/OUT INJ PAST YR: ICD-10-PCS | Mod: CPTII,S$GLB,, | Performed by: NEUROMUSCULOSKELETAL MEDICINE & OMM

## 2019-07-19 PROCEDURE — 97110 PR THERAPEUTIC EXERCISES: ICD-10-PCS | Mod: GP,S$GLB,, | Performed by: NEUROMUSCULOSKELETAL MEDICINE & OMM

## 2019-07-19 PROCEDURE — 3078F PR MOST RECENT DIASTOLIC BLOOD PRESSURE < 80 MM HG: ICD-10-PCS | Mod: CPTII,S$GLB,, | Performed by: NEUROMUSCULOSKELETAL MEDICINE & OMM

## 2019-07-19 PROCEDURE — 99999 PR PBB SHADOW E&M-EST. PATIENT-LVL III: ICD-10-PCS | Mod: PBBFAC,,, | Performed by: NEUROMUSCULOSKELETAL MEDICINE & OMM

## 2019-07-19 PROCEDURE — 97110 THERAPEUTIC EXERCISES: CPT | Mod: GP,S$GLB,, | Performed by: NEUROMUSCULOSKELETAL MEDICINE & OMM

## 2019-07-19 PROCEDURE — 3078F DIAST BP <80 MM HG: CPT | Mod: CPTII,S$GLB,, | Performed by: NEUROMUSCULOSKELETAL MEDICINE & OMM

## 2019-07-19 PROCEDURE — 99999 PR PBB SHADOW E&M-EST. PATIENT-LVL III: CPT | Mod: PBBFAC,,, | Performed by: NEUROMUSCULOSKELETAL MEDICINE & OMM

## 2019-07-19 PROCEDURE — 99214 PR OFFICE/OUTPT VISIT, EST, LEVL IV, 30-39 MIN: ICD-10-PCS | Mod: 25,S$GLB,, | Performed by: NEUROMUSCULOSKELETAL MEDICINE & OMM

## 2019-07-19 PROCEDURE — 3074F SYST BP LT 130 MM HG: CPT | Mod: CPTII,S$GLB,, | Performed by: NEUROMUSCULOSKELETAL MEDICINE & OMM

## 2019-07-19 PROCEDURE — 3074F PR MOST RECENT SYSTOLIC BLOOD PRESSURE < 130 MM HG: ICD-10-PCS | Mod: CPTII,S$GLB,, | Performed by: NEUROMUSCULOSKELETAL MEDICINE & OMM

## 2019-07-19 NOTE — PROGRESS NOTES
"Subjective:     Ynes Talavera     Chief Complaint   Patient presents with    Left Shoulder - Pain       HPI    Ynes is a 64 y.o. female coming in today for left sided neck pain that began several year(s) ago, referred by Dr. Abida Matos (her employer). Pt. describes the pain as a 9/10 burning pain that does not radiate. She has a general achy pain of 4/10 all the time and "zaps" of pain that get up to 9/10. There was not a fall/injury/ or trauma associated with the onset of symptoms. She had a cervical POLLY on 6/4/19 that relieved her headaches. Dr. Matos did a trigger point injection in her upper trapezius. She has an appointment with Dr. Willoughby on 7/23 to discuss possible facet injection or ablation. The pain is better with POLLY, trigger point injection and worse with turning neck, driving. Pt. Denies any other musculoskeletal complaints at this time.     Joint instability? no  Mechanical locking/clicking? no  Affecting ADL's? yes  Affecting sleep? yes    Occupation: dental hygenist    Review of Systems   Constitutional: Negative for chills and fever.   HENT: Negative for hearing loss and tinnitus.    Eyes: Negative for blurred vision and photophobia.   Respiratory: Negative for cough and shortness of breath.    Cardiovascular: Negative for chest pain and leg swelling.   Gastrointestinal: Negative for abdominal pain, heartburn, nausea and vomiting.   Genitourinary: Negative for dysuria and hematuria.   Musculoskeletal: Positive for myalgias and neck pain. Negative for back pain, falls and joint pain.   Skin: Negative for rash.   Neurological: Positive for headaches. Negative for dizziness, tingling, focal weakness and weakness.   Endo/Heme/Allergies: Negative for environmental allergies. Does not bruise/bleed easily.   Psychiatric/Behavioral: Negative for depression. The patient is not nervous/anxious.        PAST MEDICAL HISTORY:   Past Medical History:   Diagnosis Date    Adrenal adenoma     Diverticulosis " of the colon     Hypertension     Venous insufficiency 2014    right leg, denies Hx clot     PAST SURGICAL HISTORY:   Past Surgical History:   Procedure Laterality Date    APPENDECTOMY      BREAST BIOPSY Right     benign needle biopsy    BREAST BIOPSY Left     benign needle biopsy     SECTION, LOW TRANSVERSE      CHOLECYSTECTOMY      COLONOSCOPY N/A 2012    Performed by [facility] at [facility] ENDO    Injection-steroid-epidural-cervical C7/T1 N/A 2019    Performed by [facility]ria [facility] at [facility] OR    REPAIR-HERNIA-UMBILICAL N/A 2014    Performed by Raheem Alcantar MD at [facility] OR    THUMB ARTHROSCOPY      both thumbs, trigger finger release.    TONSILLECTOMY      TUBAL LIGATION      UMBILICAL HERNIA REPAIR  2014    VEIN LIGATION AND STRIPPING      Left     FAMILY HISTORY:   Family History   Problem Relation Age of Onset    Cancer Mother     Cancer Father     Heart disease Father     Hypertension Father     Diabetes Maternal Grandmother     Heart disease Maternal Grandmother     Diabetes Maternal Grandfather     Heart disease Maternal Grandfather     Breast cancer Paternal Grandmother 42     SOCIAL HISTORY:   Social History     Socioeconomic History    Marital status:      Spouse name: Not on file    Number of children: 3    Years of education: Not on file    Highest education level: Not on file   Occupational History    Occupation: Dental hygienist     Comment: Working full-time     Employer:  [employer]   Social Needs    Financial resource strain: Not on file    Food insecurity:     Worry: Not on file     Inability: Not on file    Transportation needs:     Medical: Not on file     Non-medical: Not on file   Tobacco Use    Smoking status: Former Smoker     Packs/day: 0.25     Years: 25.00     Pack years: 6.25     Last attempt to quit: 1993     Years since quittin.4    Smokeless tobacco: Never Used   Substance  and Sexual Activity    Alcohol use: No     Alcohol/week: 0.0 oz    Drug use: No    Sexual activity: Yes     Partners: Male   Lifestyle    Physical activity:     Days per week: Not on file     Minutes per session: Not on file    Stress: Not on file   Relationships    Social connections:     Talks on phone: Not on file     Gets together: Not on file     Attends Confucianist service: Not on file     Active member of club or organization: Not on file     Attends meetings of clubs or organizations: Not on file     Relationship status: Not on file   Other Topics Concern    Not on file   Social History Narrative    Her  has had a CVA. She is major support for him.        Walks regularly       MEDICATIONS:   Current Outpatient Medications:     aspirin (ECOTRIN) 81 MG EC tablet, Take 81 mg by mouth once daily.  , Disp: , Rfl:     biotin 1 mg Cap, Take by mouth., Disp: , Rfl:     cholecalciferol, vitamin D3, (VITAMIN D3) 1,000 unit capsule, Take 1,000 Units by mouth once daily., Disp: , Rfl:     escitalopram oxalate (LEXAPRO) 10 MG tablet, TAKE 1 TABLET BY MOUTH EVERY DAY, Disp: 90 tablet, Rfl: 1    Lactobacillus rhamnosus GG (CULTURELLE) 10 billion cell capsule, Take 1 capsule by mouth once daily., Disp: , Rfl:     lisinopril 10 MG tablet, TAKE 1 TABLET BY MOUTH ONCE DAILY, Disp: 90 tablet, Rfl: 3    LUTEIN-ZEAXANTHIN ORAL, Take by mouth., Disp: , Rfl:     magnesium 250 mg Tab, Take 250 mg by mouth once daily., Disp: , Rfl:     melatonin 5 mg Tab, Take 5 mg by mouth as needed. , Disp: , Rfl:     multivitamin capsule, Take 1 capsule by mouth once daily.  , Disp: , Rfl:     ondansetron (ZOFRAN-ODT) 8 MG TbDL, Take 1 tablet (8 mg total) by mouth every 8 (eight) hours as needed., Disp: 6 tablet, Rfl: 0    RESTASIS 0.05 % ophthalmic emulsion, INT 1 GTT IN OU BID, Disp: , Rfl: 1    traZODone (DESYREL) 100 MG tablet, TAKE 1 TABLET BY MOUTH NIGHTLY, Disp: 90 tablet, Rfl: 0  ALLERGIES: Review of patient's  "allergies indicates:  No Known Allergies    Office note from Dr. Echeverria (pain management) on 06/20/2019 reviewed:  Status post cervical POLLY on 06/04/2019, noting improvement with upper cervical and occipital pain following this procedure. Left cervical pain persists.  Recommend follow-up with [facility] to discuss further surgical interventions that may be appropriate.  New referral to PT given. Recommended continuing ibuprofen as needed for pain control.    Imaging and report of cervical spine MRI from 4/9/19 personally reviewed:  Abnormal marrow signal intensity in the left lateral mass of C1 and C2 and fluid-like signal intensity in the C1-C2 joint space, likely related to severe arthritic changes. Spondylotic changes at C3 through T1 levels.  There is spinal stenotic changes at the C6-C7 disc space.  Also multilevel foraminal stenotic disease noted.  There is also a moderately prominent right paracentral osteophyte and a disc bulge complex at C3-4 disc space. Straightening of the normal cervical curvature likely from spondylosis.  There is a approximately 2 mm anterolisthesis of C3 on C4 and slight retrolisthesis of C5 on C6.    Imaging report of cervical CT from 04/12/2019 personally reviewed: There is multilevel degenerative change.  There is some degree of disc space narrowing, degenerative listhesis, disc osteophyte complex, uncovertebral spurring and facet joint arthropathy at multiple levels contributing to spinal canal and/or foraminal stenosis.  The spinal canal may be borderline small on a developmental basis.  Considering differences in modality, there is no definite change compared to the recent cervical spine MRI. There is marked left and moderate right joint space narrowing at the C1-2 level but alignment is normal.    Objective:     VITAL SIGNS: /65   Pulse 86   Ht 5' 6.5" (1.689 m)   Wt 72.6 kg (160 lb)   BMI 25.44 kg/m²    General    Nursing note and vitals " reviewed.  Constitutional: She is oriented to person, place, and time. She appears well-developed and well-nourished.   HENT:   Head: Normocephalic and atraumatic.   no nasal discharge, no external ear redness or discharge   Eyes:   EOM is full and smooth  No eye redness or discharge   Neck: Neck supple. No tracheal deviation present.   Cardiovascular: Normal rate.    2+ Radial pulse bilaterally  2+ Dorsalis Pedis pulse bilaterally  No LE edema appreciated   Pulmonary/Chest: Effort normal. No respiratory distress.   Abdominal: She exhibits no distension.   No rigidity   Neurological: She is alert and oriented to person, place, and time. She exhibits normal muscle tone. Coordination normal.   See details below   Psychiatric: She has a normal mood and affect. Her behavior is normal.               MUSCULOSKELETAL EXAM    Cervical Spine: left cervical region    Observation:    Posture:  Increased thoracic kyphosis and Anterior head carriage  Left superior shoulder un-leveling while standing.    No edema, erythema, or ecchymosis noted in cervical and thoraic spine regions.    No midline skin abnormalities.    No atrophy of upper limb musculature.  Gait: Non-antalgic gait without trendelenberg, heel walking, toe walking, and tandem walking.    Tenderness:  No tenderness throughout the cervical spine upon spinous process palpation  No tenderness over the base of the occiput  No bony deformities or step-offs palpated.   + trigger point tenderness of levator scapulae, upper trapezius, and parascapular musculature on the left    Range of Motion (* = with pain):  CERVICAL SPINE  limited AROM in flexion, extension, sidebending, and rotation with increase tension on the left with movements    Strength Testing (* = with pain):  Sternocleidomastoid - 5/5 on left and 5/5 on right  Upper trapezius-  5/5 on left and 5/5 on right  Deltoid - 5/5 on left and 5/5 on right  Biceps - 5/5 on left and 5/5 on right  Triceps - 5/5 on left and  5/5 on right  Wrist extension - 5/5 on left and 5/5 on right  Wrist flexion - 5/5 on left and 5/5 on right   - 5/5 on left and 5/5 on right  Finger extension - 5/5 on left and 5/5 on right  Finger abduction - 5/5 on left and 5/5 on right    Structural Exam:  TART (Tissue texture abnormality, Asymmetry,  Restriction of motion and/or Tenderness) changes:    Head: Occipitoatlantal (OA) Joint ES-left, R-right     Cervical Spine   C1 TTA RIGHT   C2 FRS LEFT, bilateral Herniated trigger point (HTP) fascial distortions   C3 Neutral   C4 Neutral   C5 Neutral   C6 ERS LEFT   C7 ERS LEFT   Left paracervical Trigger band (TB) fascial distortion      Thoracic Spine   T1 Neutral   T2 Neutral   T3 Neutral   T4 Neutral   T5 FRS RIGHT   T6 FRS RIGHT   T7 FRS RIGHT   T8 Neutral   T9 Neutral   T10 Neutral   T11 Neutral   T12 Neutral     Rib cage: Rib 5 external torsion on the right, Rib 1 inhaled on the left    Upper extremity: Right thoracic outlet restriction, left supraclavicular Herniated trigger point (HTP) fascial distortion     Key   F= Flexed   E = Extended   R = Rotated   S = Sidebent   TTA = tissue texture abnormality     NEURO EXAM:      DTR's                          1. Biceps (C5)   2+/4  2. Brachioradialis (C6) 2+/4  3. Triceps (C7)  2+/4    Strength Testing: ('*' = with pain)           Upper Extremity  Deltoid                                    5/5 B/L  Biceps               5/5 B/L  Triceps              5/5 B/L  Wrist Flexion   5/5 B/L  Wrist Extension  5/5 B/L      5/5 B/L  Finger ABduction  5/5 B/L  Finger ABduction  5/5 B/L  EPL (Ext. pollicis longus) 5/5 B/L  Pinch Mechanism  5/5 B/L     Special Tests:                          Spurling's  Negative B/L - but left facet loading reproduce left neck pain  SLR   Negative B/L    Sensation to light touch intact for both UE  dermatomes      Assessment:      Encounter Diagnoses   Name Primary?    Spondylosis of cervical region without myelopathy or radiculopathy  Yes    Arthropathy of cervical facet joint     Myalgia     Somatic dysfunction of head region     Cervical (neck) region somatic dysfunction     Somatic dysfunction of thoracic region     Somatic dysfunction of rib cage region     Upper extremity somatic dysfunction           Plan:     1.  Chronic neck pain and headaches secondary to cervical spine spondylosis and facet arthropathy, most significant at C1-2, both symptomatically and on imaging. There are also myofascial contributions from poor posture.   - OMT performed today to address associated biomechanical restrictions  and HEP started.   - Recommend continuing home exercise program from PT as well  - Continue ice up to 20 min at a time and over-the-counter NSAIDs as needed for pain control  - Recommend continued follow-up with Pain Management and possible future medial branch blocks of the upper cervical facet joints    - Imaging and report of cervical spine MRI from 4/9/19 personally reviewed:  Abnormal marrow signal intensity in the left lateral mass of C1 and C2 and fluid-like signal intensity in the C1-C2 joint space, likely related to severe arthritic changes. Spondylotic changes at C3 through T1 levels.  There is spinal stenotic changes at the C6-C7 disc space.  Also multilevel foraminal stenotic disease noted.  There is also a moderately prominent right paracentral osteophyte and a disc bulge complex at C3-4 disc space. Straightening of the normal cervical curvature likely from spondylosis.  There is a approximately 2 mm anterolisthesis of C3 on C4 and slight retrolisthesis of C5 on C6.    - Imaging report of cervical CT from 04/12/2019 personally reviewed: There is multilevel degenerative change.  There is some degree of disc space narrowing, degenerative listhesis, disc osteophyte complex, uncovertebral spurring and facet joint arthropathy at multiple levels contributing to spinal canal and/or foraminal stenosis.  The spinal canal may be  borderline small on a developmental basis.  Considering differences in modality, there is no definite change compared to the recent cervical spine MRI. There is marked left and moderate right joint space narrowing at the C1-2 level but alignment is normal.    2. OMT 5-6 regions. Oral consent obtained.  Reviewed benefits and potential side effects, including bruising at site of treatment and increased soreness for the next 24-48 hours. Pt. Instructed to increased water intake by 1 L today and tomorrow to help with any residual soreness.   - OMT indicated today due to signs and symptoms as well as local and remote somatic dysfunction findings and their related neurokinetic, lymphatic, fascial and/or arteriovenous body connections.   - OMT techniques used: Myofascial Release, Muscle Energy, Still's Technique and Fascial Distortion Model   - Treatment was tolerated well. Improvement noted in segmental mobility post-treatment in dysfunctional regions. There were no adverse events and no complications immediately following treatment.     3. Pt. Given the following HEP:    A)  Supine Thoracic extension exercise: 10-15 reps, twice daily. Handout also given.   B) Cervicothoracic junction mobilization exercise: 10-15 reps, twice daily. Handout also given.   C) Self suboccipital release with lacrosse ball for 3- 5 minutes, 1-2 times daily. Handout also given.     [address]018 HOME EXERCISE PROGRAM (HEP):  The patient was taught a homegoing physical therapy regimen as described above. The patient demonstrated understanding of the exercises and proper technique of their execution. This interaction took 15 minutes.     4. Follow-up in 2 weeks for reevaluation    5. Patient agreeable to today's plan and all questions were answered    This note is dictated using the M*Modal Fluency Direct word recognition program. There are word recognition mistakes that are occasionally missed on review.

## 2019-07-22 ENCOUNTER — PATIENT OUTREACH (OUTPATIENT)
Dept: ADMINISTRATIVE | Facility: OTHER | Age: 64
End: 2019-07-22

## 2019-07-23 ENCOUNTER — OFFICE VISIT (OUTPATIENT)
Dept: PAIN MEDICINE | Facility: CLINIC | Age: 64
End: 2019-07-23
Attending: ANESTHESIOLOGY
Payer: MEDICARE

## 2019-07-23 VITALS
SYSTOLIC BLOOD PRESSURE: 113 MMHG | DIASTOLIC BLOOD PRESSURE: 76 MMHG | WEIGHT: 160.25 LBS | HEIGHT: 67 IN | BODY MASS INDEX: 25.15 KG/M2 | HEART RATE: 81 BPM | TEMPERATURE: 97 F

## 2019-07-23 DIAGNOSIS — M47.22 OSTEOARTHRITIS OF SPINE WITH RADICULOPATHY, CERVICAL REGION: ICD-10-CM

## 2019-07-23 DIAGNOSIS — M50.30 DDD (DEGENERATIVE DISC DISEASE), CERVICAL: Primary | ICD-10-CM

## 2019-07-23 PROCEDURE — 1101F PR PT FALLS ASSESS DOC 0-1 FALLS W/OUT INJ PAST YR: ICD-10-PCS | Mod: CPTII,S$GLB,, | Performed by: ANESTHESIOLOGY

## 2019-07-23 PROCEDURE — 3074F PR MOST RECENT SYSTOLIC BLOOD PRESSURE < 130 MM HG: ICD-10-PCS | Mod: CPTII,S$GLB,, | Performed by: ANESTHESIOLOGY

## 2019-07-23 PROCEDURE — 99999 PR PBB SHADOW E&M-EST. PATIENT-LVL III: CPT | Mod: PBBFAC,,, | Performed by: ANESTHESIOLOGY

## 2019-07-23 PROCEDURE — 3074F SYST BP LT 130 MM HG: CPT | Mod: CPTII,S$GLB,, | Performed by: ANESTHESIOLOGY

## 2019-07-23 PROCEDURE — 3078F DIAST BP <80 MM HG: CPT | Mod: CPTII,S$GLB,, | Performed by: ANESTHESIOLOGY

## 2019-07-23 PROCEDURE — 3078F PR MOST RECENT DIASTOLIC BLOOD PRESSURE < 80 MM HG: ICD-10-PCS | Mod: CPTII,S$GLB,, | Performed by: ANESTHESIOLOGY

## 2019-07-23 PROCEDURE — 1101F PT FALLS ASSESS-DOCD LE1/YR: CPT | Mod: CPTII,S$GLB,, | Performed by: ANESTHESIOLOGY

## 2019-07-23 PROCEDURE — 99999 PR PBB SHADOW E&M-EST. PATIENT-LVL III: ICD-10-PCS | Mod: PBBFAC,,, | Performed by: ANESTHESIOLOGY

## 2019-07-23 PROCEDURE — 99214 PR OFFICE/OUTPT VISIT, EST, LEVL IV, 30-39 MIN: ICD-10-PCS | Mod: S$GLB,,, | Performed by: ANESTHESIOLOGY

## 2019-07-23 PROCEDURE — 99214 OFFICE O/P EST MOD 30 MIN: CPT | Mod: S$GLB,,, | Performed by: ANESTHESIOLOGY

## 2019-07-23 NOTE — PROGRESS NOTES
Subjective:      Patient ID: Ynes Talavera is a 64 y.o. female.    Chief Complaint: Neck Pain; Low-back Pain; and Headache    Referred by: No ref. provider found     Pain Scales  Best: 5/10  Worst: 10/10  Usually: 6/10  Today: 8/10    Neck Pain    Associated symptoms include headaches.   Low-back Pain   Associated symptoms include headaches.   Headache    Associated symptoms include back pain and neck pain.     HPI:  65 yo F that presents as a referral from [facility] for possible cervical facet injections for neck pain.  Patient has been seen by Dr. Echeverria for neck pain for past year. Patient describes pain as sharp achey pain in her left neck, constant, 8/10, radiates to occiput and left trap muscle. Worse with head movement and and prolong sitting, better with ice and heat. She denies radiation down to the upper extremities.  She has recently had cervical POLLY on 19 with Dr. Echeverria that relieved her HA's, as well as TPI's of left trap that provided mild relief to her left neck pain. She has also started seeing PT two weeks ago for her chronic neck pain. She has noticed mild relief since going to 3 sessions.  She reports mild relief with OTC NSAIDS, she denies any trauma or surgery to the area, she denies parathesia, weakness, or f/c.    Past Medical History:   Diagnosis Date    Adrenal adenoma     Diverticulosis of the colon     Hypertension     Venous insufficiency 2014    right leg, denies Hx clot       Past Surgical History:   Procedure Laterality Date    APPENDECTOMY      BREAST BIOPSY Right     benign needle biopsy    BREAST BIOPSY Left     benign needle biopsy     SECTION, LOW TRANSVERSE      CHOLECYSTECTOMY      COLONOSCOPY N/A 2012    Performed by [facility] ENDO    Injection-steroid-epidural-cervical C7/T1 N/A 2019    [facility] by [facility] OR    REPAIR-HERNIA-UMBILICAL N/A 2014    Performed by Raheem [facility]cantar [facility] at [facility] OR     THUMB ARTHROSCOPY      both thumbs, trigger finger release.    TONSILLECTOMY      TUBAL LIGATION      UMBILICAL HERNIA REPAIR  2014    VEIN LIGATION AND STRIPPING      Left       Review of patient's allergies indicates:  No Known Allergies    Current Outpatient Medications   Medication Sig Dispense Refill    aspirin (ECOTRIN) 81 MG EC tablet Take 81 mg by mouth once daily.        biotin 1 mg Cap Take by mouth.      cholecalciferol, vitamin D3, (VITAMIN D3) 1,000 unit capsule Take 1,000 Units by mouth once daily.      escitalopram oxalate (LEXAPRO) 10 MG tablet TAKE 1 TABLET BY MOUTH EVERY DAY 90 tablet 1    Lactobacillus rhamnosus GG (CULTURELLE) 10 billion cell capsule Take 1 capsule by mouth once daily.      lisinopril 10 MG tablet TAKE 1 TABLET BY MOUTH ONCE DAILY 90 tablet 3    LUTEIN-ZEAXANTHIN ORAL Take by mouth.      magnesium 250 mg Tab Take 250 mg by mouth once daily.      melatonin 5 mg Tab Take 5 mg by mouth as needed.       multivitamin capsule Take 1 capsule by mouth once daily.        ondansetron (ZOFRAN-ODT) 8 MG TbDL Take 1 tablet (8 mg total) by mouth every 8 (eight) hours as needed. 6 tablet 0    RESTASIS 0.05 % ophthalmic emulsion INT 1 GTT IN OU BID  1    traZODone (DESYREL) 100 MG tablet TAKE 1 TABLET BY MOUTH NIGHTLY 90 tablet 0     No current facility-administered medications for this visit.        Family History   Problem Relation Age of Onset    Cancer Mother     Cancer Father     Heart disease Father     Hypertension Father     Diabetes Maternal Grandmother     Heart disease Maternal Grandmother     Diabetes Maternal Grandfather     Heart disease Maternal Grandfather     Breast cancer Paternal Grandmother 42       Social History     Socioeconomic History    Marital status:      Spouse name: Not on file    Number of children: 3    Years of education: Not on file    Highest education level: Not on file   Occupational History    Occupation: Dental  hygienist     Comment: Working full-time     Employer:  [employer]   Social Needs    Financial resource strain: Not on file    Food insecurity:     Worry: Not on file     Inability: Not on file    Transportation needs:     Medical: Not on file     Non-medical: Not on file   Tobacco Use    Smoking status: Former Smoker     Packs/day: 0.25     Years: 25.00     Pack years: 6.25     Last attempt to quit: 1993     Years since quittin.4    Smokeless tobacco: Never Used   Substance and Sexual Activity    Alcohol use: No     Alcohol/week: 0.0 oz    Drug use: No    Sexual activity: Yes     Partners: Male   Lifestyle    Physical activity:     Days per week: Not on file     Minutes per session: Not on file    Stress: Not on file   Relationships    Social connections:     Talks on phone: Not on file     Gets together: Not on file     Attends Bahai service: Not on file     Active member of club or organization: Not on file     Attends meetings of clubs or organizations: Not on file     Relationship status: Not on file   Other Topics Concern    Not on file   Social History Narrative    Her  has had a CVA. She is major support for him.        Walks regularly       Images:  MRI Cervical Spine 19:  Straightening of the normal cervical curvature likely from spondylosis.  There is a approximately 2 mm anterolisthesis of C3 on C4 and slight retrolisthesis of C5 on C6.  Craniovertebral alignment is within normal limits.    There is a localized less than 1 cm T1 and T2 hyperintense lesion in the body of the C2, lateralize to the right representing a hemangioma.  However in the lateral mass of C2 on the left there is significant marrow edema.  There is also fluid-like signal intensity within the C1-C2 articulation.  There is also significant marrow edema in the lateral mass of the C1.  Findings are highly suggestive of severe arthritic changes of the left C1-C2 articulation.  Infectious  process cannot be totally excluded.  However there is no abnormal epidural fluid collection to suggest an epidural abscess.  There is also minimal prevertebral soft tissue swelling anterior to the left C1-C2 articulation.    The rest of the vertebral bodies demonstrate no significant marrow abnormalities.  The signal intensities within the cervical cord are within normal limits.    C2-3: No disc herniations or spinal stenosis or foraminal stenosis.    C3-4: There is a approximately 2 mm anterior subluxation of C3 on C4.  There is a broad-based right paracentral osteophyte and disc bulge complex with effacement of the anterior subarachnoid space and mild posterior displacement of the cervical cord.  There is a severe right foraminal stenosis and moderate left foraminal stenosis.    C4-5: Spondylosis associated with anterior marginal spondylotic osteophytes and a circumferential annular disc bulge and osteophyte complex with slight effacement anterior subarachnoid space.  There is a moderate left foraminal stenosis and mild right foraminal stenosis.  Facet arthropathy noted bilaterally.    C5-6: Spondylosis with disc space narrowing and marginal anterior and posterior osteophytes and a broad-based posterior osteophyte and disc bulge complex with effacement of the anterior subarachnoid space.  There is no cord compression.  There is moderate to severe bilateral foraminal stenosis.    C6-C7: Spondylosis with anterior marginal spondylitic osteophytes and a broad-based posterior osteophyte and a disc bulge complex.  Effacement of the anterior subarachnoid space as well as subarachnoid space dorsal to the cervical cord, suggesting spinal stenosis at this level.  Moderate bilateral foraminal stenosis.    C7-T1: Mild spondylosis associated with mild broad-based posterior disc bulge and osteophyte complex without cord compression or significant spinal stenosis.  There is mild bilateral foraminal stenosis.      Impression    "    1. Abnormal marrow signal intensity in the left lateral mass of C1 and C2 and fluid-like signal intensity in the C1-C2 joint space, likely related to severe arthritic changes.  Given the presence of fluid within the joint space and slight irregularity of the articular surface of the C1-C2 articulation infectious process not totally excluded.  There is however no definite signs for an epidural abscess.  2. Spondylotic changes at C3 through T1 levels as discussed above.  There is spinal stenotic changes at the C6-C7 disc space.  Also multilevel foraminal stenotic disease noted.  There is also a moderately prominent right paracentral osteophyte and a disc bulge complex at C3-4 disc space as above.       Review of Systems   Constitutional: Negative for chills and fever.   HENT: Negative for hearing loss and tinnitus.    Eyes: Negative for blurred vision and photophobia.   Respiratory: Negative for cough and shortness of breath.    Cardiovascular: Negative for chest pain and leg swelling.   Gastrointestinal: Negative for abdominal pain, heartburn, nausea and vomiting.   Genitourinary: Negative for dysuria and hematuria.   Musculoskeletal: Positive for myalgias and neck pain. Negative for back pain, falls and joint pain.   Skin: Negative for rash.   Neurological: Positive for headaches. Negative for dizziness, tingling, focal weakness and weakness.   Endo/Heme/Allergies: Negative for environmental allergies. Does not bruise/bleed easily.   Psychiatric/Behavioral: Negative for depression. The patient is not nervous/anxious.              Objective:   /76   Pulse 81   Temp 97 °F (36.1 °C)   Ht 5' 6.5" (1.689 m)   Wt 72.7 kg (160 lb 4.4 oz)   BMI 25.48 kg/m²   Pain Disability Index Review:  Last 3 PDI Scores 7/23/2019 6/20/2019 5/23/2019   Pain Disability Index (PDI) 31 0 0     Normocephalic.  Atraumatic.  Affect appropriate.  Breathing unlabored.  Extra ocular muscles intact.         OBJECTIVE:    /76   " "Pulse 81   Temp 97 °F (36.1 °C)   Ht 5' 6.5" (1.689 m)   Wt 72.7 kg (160 lb 4.4 oz)   BMI 25.48 kg/m²     PHYSICAL EXAMINATION:    GENERAL: Well appearing, in no acute distress, alert and oriented x3.  PSYCH:  Mood and affect appropriate.  SKIN: Skin color, texture, turgor normal, no rashes or lesions.  HEAD/FACE:  Normocephalic, atraumatic. Cranial nerves grossly intact.  NECK: TTP over cervical paraspinous muscles and left trap, decreased ROM 2/2 pain, +facet loading L>R, negative spurling bilaterally,    CV: RRR with palpation of the radial artery.  PULM: No evidence of respiratory difficulty, symmetric chest rise.  GI:  Soft and non-tender.  BACK: Straight leg raising in the sitting and supine positions is negative to radicular pain. No pain to palpation over the facet joints of the lumbar spine or spinous processes. Normal range of motion without pain reproduction.  EXTREMITIES: Peripheral joint ROM is full and pain free without obvious instability or laxity in all four extremities. No deformities, edema, or skin discoloration. Good capillary refill.  MUSCULOSKELETAL: Shoulder, hip, and knee provocative maneuvers are negative.  There is no pain with palpation over the sacroiliac joints bilaterally.  FABERs test is negative.  FADIRs test is negative.   Bilateral upper and lower extremity strength is normal and symmetric.  No atrophy or tone abnormalities are noted.  NEURO: Bilateral upper and lower extremity coordination and muscle stretch reflexes are physiologic and symmetric.  Plantar response are downgoing. No clonus.  No loss of sensation is noted.  GAIT: normal.          Assessment:       Encounter Diagnoses   Name Primary?    DDD (degenerative disc disease), cervical Yes    Osteoarthritis of spine with radiculopathy, cervical region          Plan:   We discussed with the patient the assessment and recommendations. The following is the plan we agreed on:    -Reviewed MRI Cervical Spine and findings " with patient, findings as noted by radiology.  -Will schedule for left C1, C2 facet injections for DJD of cervical spine. (Patient seen regularly by Dr. Echeverria, referred for cervical facet injections as he does not perform these).  -If cervical facet injections effective, will perform cervical MBB and possible RFA.  -Continue PT  -RTC 2 weeks after procedure    MD Ynes Gates was seen today for neck pain, low-back pain and headache.    Diagnoses and all orders for this visit:    DDD (degenerative disc disease), cervical    Osteoarthritis of spine with radiculopathy, cervical region       I have personally taken the history and examined this patient and agree with the fellow's note as stated above.

## 2019-07-23 NOTE — H&P (VIEW-ONLY)
Subjective:      Patient ID: Ynes Talavera is a 64 y.o. female.    Chief Complaint: Neck Pain; Low-back Pain; and Headache    Referred by: No ref. provider found     Pain Scales  Best: 5/10  Worst: 10/10  Usually: 6/10  Today: 8/10    Neck Pain    Associated symptoms include headaches.   Low-back Pain   Associated symptoms include headaches.   Headache    Associated symptoms include back pain and neck pain.     HPI:  63 yo F that presents as a referral from [facility] for possible cervical facet injections for neck pain.  Patient has been seen by Dr. Echeverria for neck pain for past year. Patient describes pain as sharp achey pain in her left neck, constant, 8/10, radiates to occiput and left trap muscle. Worse with head movement and and prolong sitting, better with ice and heat. She denies radiation down to the upper extremities.  She has recently had cervical POLLY on 19 with Dr. Echeverria that relieved her HA's, as well as TPI's of left trap that provided mild relief to her left neck pain. She has also started seeing PT two weeks ago for her chronic neck pain. She has noticed mild relief since going to 3 sessions.  She reports mild relief with OTC NSAIDS, she denies any trauma or surgery to the area, she denies parathesia, weakness, or f/c.    Past Medical History:   Diagnosis Date    Adrenal adenoma     Diverticulosis of the colon     Hypertension     Venous insufficiency 2014    right leg, denies Hx clot       Past Surgical History:   Procedure Laterality Date    APPENDECTOMY      BREAST BIOPSY Right     benign needle biopsy    BREAST BIOPSY Left     benign needle biopsy     SECTION, LOW TRANSVERSE      CHOLECYSTECTOMY      COLONOSCOPY N/A 2012    [facility]    Injection-steroid-epidural-cervical C7/T1 N/A 2019    [facility]    REPAIR-HERNIA-UMBILICAL N/A 2014    [facility]     THUMB ARTHROSCOPY      both thumbs, trigger finger release.    TONSILLECTOMY      TUBAL LIGATION      UMBILICAL HERNIA REPAIR  2014    VEIN LIGATION AND STRIPPING      Left       Review of patient's allergies indicates:  No Known Allergies    Current Outpatient Medications   Medication Sig Dispense Refill    aspirin (ECOTRIN) 81 MG EC tablet Take 81 mg by mouth once daily.        biotin 1 mg Cap Take by mouth.      cholecalciferol, vitamin D3, (VITAMIN D3) 1,000 unit capsule Take 1,000 Units by mouth once daily.      escitalopram oxalate (LEXAPRO) 10 MG tablet TAKE 1 TABLET BY MOUTH EVERY DAY 90 tablet 1    Lactobacillus rhamnosus GG (CULTURELLE) 10 billion cell capsule Take 1 capsule by mouth once daily.      lisinopril 10 MG tablet TAKE 1 TABLET BY MOUTH ONCE DAILY 90 tablet 3    LUTEIN-ZEAXANTHIN ORAL Take by mouth.      magnesium 250 mg Tab Take 250 mg by mouth once daily.      melatonin 5 mg Tab Take 5 mg by mouth as needed.       multivitamin capsule Take 1 capsule by mouth once daily.        ondansetron (ZOFRAN-ODT) 8 MG TbDL Take 1 tablet (8 mg total) by mouth every 8 (eight) hours as needed. 6 tablet 0    RESTASIS 0.05 % ophthalmic emulsion INT 1 GTT IN OU BID  1    traZODone (DESYREL) 100 MG tablet TAKE 1 TABLET BY MOUTH NIGHTLY 90 tablet 0     No current facility-administered medications for this visit.        Family History   Problem Relation Age of Onset    Cancer Mother     Cancer Father     Heart disease Father     Hypertension Father     Diabetes Maternal Grandmother     Heart disease Maternal Grandmother     Diabetes Maternal Grandfather     Heart disease Maternal Grandfather     Breast cancer Paternal Grandmother 42       Social History     Socioeconomic History    Marital status:      Spouse name: Not on file    Number of children: 3    Years of education: Not on file    Highest education level: Not on file   Occupational History    Occupation: Dental  hygienist     Comment: Working full-time     Employer:  [employer]   Social Needs    Financial resource strain: Not on file    Food insecurity:     Worry: Not on file     Inability: Not on file    Transportation needs:     Medical: Not on file     Non-medical: Not on file   Tobacco Use    Smoking status: Former Smoker     Packs/day: 0.25     Years: 25.00     Pack years: 6.25     Last attempt to quit: 1993     Years since quittin.4    Smokeless tobacco: Never Used   Substance and Sexual Activity    Alcohol use: No     Alcohol/week: 0.0 oz    Drug use: No    Sexual activity: Yes     Partners: Male   Lifestyle    Physical activity:     Days per week: Not on file     Minutes per session: Not on file    Stress: Not on file   Relationships    Social connections:     Talks on phone: Not on file     Gets together: Not on file     Attends Spiritism service: Not on file     Active member of club or organization: Not on file     Attends meetings of clubs or organizations: Not on file     Relationship status: Not on file   Other Topics Concern    Not on file   Social History Narrative    Her  has had a CVA. She is major support for him.        Walks regularly       Images:  MRI Cervical Spine 19:  Straightening of the normal cervical curvature likely from spondylosis.  There is a approximately 2 mm anterolisthesis of C3 on C4 and slight retrolisthesis of C5 on C6.  Craniovertebral alignment is within normal limits.    There is a localized less than 1 cm T1 and T2 hyperintense lesion in the body of the C2, lateralize to the right representing a hemangioma.  However in the lateral mass of C2 on the left there is significant marrow edema.  There is also fluid-like signal intensity within the C1-C2 articulation.  There is also significant marrow edema in the lateral mass of the C1.  Findings are highly suggestive of severe arthritic changes of the left C1-C2 articulation.  Infectious  process cannot be totally excluded.  However there is no abnormal epidural fluid collection to suggest an epidural abscess.  There is also minimal prevertebral soft tissue swelling anterior to the left C1-C2 articulation.    The rest of the vertebral bodies demonstrate no significant marrow abnormalities.  The signal intensities within the cervical cord are within normal limits.    C2-3: No disc herniations or spinal stenosis or foraminal stenosis.    C3-4: There is a approximately 2 mm anterior subluxation of C3 on C4.  There is a broad-based right paracentral osteophyte and disc bulge complex with effacement of the anterior subarachnoid space and mild posterior displacement of the cervical cord.  There is a severe right foraminal stenosis and moderate left foraminal stenosis.    C4-5: Spondylosis associated with anterior marginal spondylotic osteophytes and a circumferential annular disc bulge and osteophyte complex with slight effacement anterior subarachnoid space.  There is a moderate left foraminal stenosis and mild right foraminal stenosis.  Facet arthropathy noted bilaterally.    C5-6: Spondylosis with disc space narrowing and marginal anterior and posterior osteophytes and a broad-based posterior osteophyte and disc bulge complex with effacement of the anterior subarachnoid space.  There is no cord compression.  There is moderate to severe bilateral foraminal stenosis.    C6-C7: Spondylosis with anterior marginal spondylitic osteophytes and a broad-based posterior osteophyte and a disc bulge complex.  Effacement of the anterior subarachnoid space as well as subarachnoid space dorsal to the cervical cord, suggesting spinal stenosis at this level.  Moderate bilateral foraminal stenosis.    C7-T1: Mild spondylosis associated with mild broad-based posterior disc bulge and osteophyte complex without cord compression or significant spinal stenosis.  There is mild bilateral foraminal stenosis.      Impression    "    1. Abnormal marrow signal intensity in the left lateral mass of C1 and C2 and fluid-like signal intensity in the C1-C2 joint space, likely related to severe arthritic changes.  Given the presence of fluid within the joint space and slight irregularity of the articular surface of the C1-C2 articulation infectious process not totally excluded.  There is however no definite signs for an epidural abscess.  2. Spondylotic changes at C3 through T1 levels as discussed above.  There is spinal stenotic changes at the C6-C7 disc space.  Also multilevel foraminal stenotic disease noted.  There is also a moderately prominent right paracentral osteophyte and a disc bulge complex at C3-4 disc space as above.       Review of Systems   Constitutional: Negative for chills and fever.   HENT: Negative for hearing loss and tinnitus.    Eyes: Negative for blurred vision and photophobia.   Respiratory: Negative for cough and shortness of breath.    Cardiovascular: Negative for chest pain and leg swelling.   Gastrointestinal: Negative for abdominal pain, heartburn, nausea and vomiting.   Genitourinary: Negative for dysuria and hematuria.   Musculoskeletal: Positive for myalgias and neck pain. Negative for back pain, falls and joint pain.   Skin: Negative for rash.   Neurological: Positive for headaches. Negative for dizziness, tingling, focal weakness and weakness.   Endo/Heme/Allergies: Negative for environmental allergies. Does not bruise/bleed easily.   Psychiatric/Behavioral: Negative for depression. The patient is not nervous/anxious.              Objective:   /76   Pulse 81   Temp 97 °F (36.1 °C)   Ht 5' 6.5" (1.689 m)   Wt 72.7 kg (160 lb 4.4 oz)   BMI 25.48 kg/m²   Pain Disability Index Review:  Last 3 PDI Scores 7/23/2019 6/20/2019 5/23/2019   Pain Disability Index (PDI) 31 0 0     Normocephalic.  Atraumatic.  Affect appropriate.  Breathing unlabored.  Extra ocular muscles intact.         OBJECTIVE:    /76   " "Pulse 81   Temp 97 °F (36.1 °C)   Ht 5' 6.5" (1.689 m)   Wt 72.7 kg (160 lb 4.4 oz)   BMI 25.48 kg/m²     PHYSICAL EXAMINATION:    GENERAL: Well appearing, in no acute distress, alert and oriented x3.  PSYCH:  Mood and affect appropriate.  SKIN: Skin color, texture, turgor normal, no rashes or lesions.  HEAD/FACE:  Normocephalic, atraumatic. Cranial nerves grossly intact.  NECK: TTP over cervical paraspinous muscles and left trap, decreased ROM 2/2 pain, +facet loading L>R, negative spurling bilaterally,    CV: RRR with palpation of the radial artery.  PULM: No evidence of respiratory difficulty, symmetric chest rise.  GI:  Soft and non-tender.  BACK: Straight leg raising in the sitting and supine positions is negative to radicular pain. No pain to palpation over the facet joints of the lumbar spine or spinous processes. Normal range of motion without pain reproduction.  EXTREMITIES: Peripheral joint ROM is full and pain free without obvious instability or laxity in all four extremities. No deformities, edema, or skin discoloration. Good capillary refill.  MUSCULOSKELETAL: Shoulder, hip, and knee provocative maneuvers are negative.  There is no pain with palpation over the sacroiliac joints bilaterally.  FABERs test is negative.  FADIRs test is negative.   Bilateral upper and lower extremity strength is normal and symmetric.  No atrophy or tone abnormalities are noted.  NEURO: Bilateral upper and lower extremity coordination and muscle stretch reflexes are physiologic and symmetric.  Plantar response are downgoing. No clonus.  No loss of sensation is noted.  GAIT: normal.          Assessment:       Encounter Diagnoses   Name Primary?    DDD (degenerative disc disease), cervical Yes    Osteoarthritis of spine with radiculopathy, cervical region          Plan:   We discussed with the patient the assessment and recommendations. The following is the plan we agreed on:    -Reviewed MRI Cervical Spine and findings " with patient, findings as noted by radiology.  -Will schedule for left C1, C2 facet injections for DJD of cervical spine. (Patient seen regularly by Dr. Echeverria, referred for cervical facet injections as he does not perform these).  -If cervical facet injections effective, will perform cervical MBB and possible RFA.  -Continue PT  -RTC 2 weeks after procedure    MD Ynes Gates was seen today for neck pain, low-back pain and headache.    Diagnoses and all orders for this visit:    DDD (degenerative disc disease), cervical    Osteoarthritis of spine with radiculopathy, cervical region       I have personally taken the history and examined this patient and agree with the fellow's note as stated above.

## 2019-07-25 ENCOUNTER — TELEPHONE (OUTPATIENT)
Dept: PAIN MEDICINE | Facility: CLINIC | Age: 64
End: 2019-07-25

## 2019-07-25 NOTE — TELEPHONE ENCOUNTER
----- Message from Ting Titus sent at 7/25/2019  3:59 PM CDT -----  Left message asking pt. To call to confirm procedure date 8/22/19 with Dr. Willoughby.

## 2019-07-25 NOTE — TELEPHONE ENCOUNTER
----- Message from Oscar Gutiérrez sent at 7/25/2019  2:55 PM CDT -----  Contact: TYRONE HARKINS [[id number]]  Name of Who is Calling:TYRONE HARKINS [[id number]]    What is the request in detail: Pt requesting to schedule injection discussed in visit. Contact pt to further discuss.     Can the clinic reply by [facility]: N    What Number to Call Back if not in [facility]: [phone]

## 2019-08-01 ENCOUNTER — OFFICE VISIT (OUTPATIENT)
Dept: ORTHOPEDICS | Facility: CLINIC | Age: 64
End: 2019-08-01
Payer: MEDICARE

## 2019-08-01 VITALS
SYSTOLIC BLOOD PRESSURE: 112 MMHG | DIASTOLIC BLOOD PRESSURE: 62 MMHG | WEIGHT: 160.25 LBS | BODY MASS INDEX: 25.15 KG/M2 | HEIGHT: 67 IN

## 2019-08-01 DIAGNOSIS — M99.05 SOMATIC DYSFUNCTION OF PELVIC REGION: ICD-10-CM

## 2019-08-01 DIAGNOSIS — M99.04 SACRAL REGION SOMATIC DYSFUNCTION: ICD-10-CM

## 2019-08-01 DIAGNOSIS — M54.50 CHRONIC BILATERAL LOW BACK PAIN WITHOUT SCIATICA: ICD-10-CM

## 2019-08-01 DIAGNOSIS — M99.02 SOMATIC DYSFUNCTION OF THORACIC REGION: ICD-10-CM

## 2019-08-01 DIAGNOSIS — M47.812 SPONDYLOSIS OF CERVICAL REGION WITHOUT MYELOPATHY OR RADICULOPATHY: Primary | ICD-10-CM

## 2019-08-01 DIAGNOSIS — M54.2 NECK PAIN: ICD-10-CM

## 2019-08-01 DIAGNOSIS — M79.10 MYALGIA: ICD-10-CM

## 2019-08-01 DIAGNOSIS — G89.29 CHRONIC BILATERAL LOW BACK PAIN WITHOUT SCIATICA: ICD-10-CM

## 2019-08-01 DIAGNOSIS — M99.00 SOMATIC DYSFUNCTION OF HEAD REGION: ICD-10-CM

## 2019-08-01 DIAGNOSIS — M99.03 SOMATIC DYSFUNCTION OF LUMBAR REGION: ICD-10-CM

## 2019-08-01 DIAGNOSIS — M99.01 CERVICAL (NECK) REGION SOMATIC DYSFUNCTION: ICD-10-CM

## 2019-08-01 DIAGNOSIS — M99.08 SOMATIC DYSFUNCTION OF RIB CAGE REGION: ICD-10-CM

## 2019-08-01 PROCEDURE — 1101F PT FALLS ASSESS-DOCD LE1/YR: CPT | Mod: CPTII,S$GLB,, | Performed by: NEUROMUSCULOSKELETAL MEDICINE & OMM

## 2019-08-01 PROCEDURE — 3074F PR MOST RECENT SYSTOLIC BLOOD PRESSURE < 130 MM HG: ICD-10-PCS | Mod: CPTII,S$GLB,, | Performed by: NEUROMUSCULOSKELETAL MEDICINE & OMM

## 2019-08-01 PROCEDURE — 99214 PR OFFICE/OUTPT VISIT, EST, LEVL IV, 30-39 MIN: ICD-10-PCS | Mod: 25,S$GLB,, | Performed by: NEUROMUSCULOSKELETAL MEDICINE & OMM

## 2019-08-01 PROCEDURE — 97110 PR THERAPEUTIC EXERCISES: ICD-10-PCS | Mod: GP,S$GLB,, | Performed by: NEUROMUSCULOSKELETAL MEDICINE & OMM

## 2019-08-01 PROCEDURE — 98928 OSTEOPATH MANJ 7-8 REGIONS: CPT | Mod: S$GLB,,, | Performed by: NEUROMUSCULOSKELETAL MEDICINE & OMM

## 2019-08-01 PROCEDURE — 99999 PR PBB SHADOW E&M-EST. PATIENT-LVL II: ICD-10-PCS | Mod: PBBFAC,,, | Performed by: NEUROMUSCULOSKELETAL MEDICINE & OMM

## 2019-08-01 PROCEDURE — 99999 PR PBB SHADOW E&M-EST. PATIENT-LVL II: CPT | Mod: PBBFAC,,, | Performed by: NEUROMUSCULOSKELETAL MEDICINE & OMM

## 2019-08-01 PROCEDURE — 98928 PR OSTEOPATHIC MANIP,7-8 BODY REGN: ICD-10-PCS | Mod: S$GLB,,, | Performed by: NEUROMUSCULOSKELETAL MEDICINE & OMM

## 2019-08-01 PROCEDURE — 3078F PR MOST RECENT DIASTOLIC BLOOD PRESSURE < 80 MM HG: ICD-10-PCS | Mod: CPTII,S$GLB,, | Performed by: NEUROMUSCULOSKELETAL MEDICINE & OMM

## 2019-08-01 PROCEDURE — 3074F SYST BP LT 130 MM HG: CPT | Mod: CPTII,S$GLB,, | Performed by: NEUROMUSCULOSKELETAL MEDICINE & OMM

## 2019-08-01 PROCEDURE — 97110 THERAPEUTIC EXERCISES: CPT | Mod: GP,S$GLB,, | Performed by: NEUROMUSCULOSKELETAL MEDICINE & OMM

## 2019-08-01 PROCEDURE — 99214 OFFICE O/P EST MOD 30 MIN: CPT | Mod: 25,S$GLB,, | Performed by: NEUROMUSCULOSKELETAL MEDICINE & OMM

## 2019-08-01 PROCEDURE — 1101F PR PT FALLS ASSESS DOC 0-1 FALLS W/OUT INJ PAST YR: ICD-10-PCS | Mod: CPTII,S$GLB,, | Performed by: NEUROMUSCULOSKELETAL MEDICINE & OMM

## 2019-08-01 PROCEDURE — 3078F DIAST BP <80 MM HG: CPT | Mod: CPTII,S$GLB,, | Performed by: NEUROMUSCULOSKELETAL MEDICINE & OMM

## 2019-08-01 NOTE — PROGRESS NOTES
Subjective:     Ynes Talavera     Chief Complaint   Patient presents with    Follow-up     Neck pain       HPI      Ynes is a 64 y.o. female coming in today for left sided neck pain. Since last visit the pain has remained unchanged. Some days are better than others, but the pain never goes away. The lacrosse ball is very painful. She had another trigger point injection in her left upper trap on  with good relief. She saw Dr. Willoughby and is scheduled for C1 and C2 facet injections. The pain is better with rest, HEP, trigger point injections and worse with sitting, leaning forward, turning head, driving. Pt. describes the pain as a 5/10 achy pain that does radiate to the left side of her head. There has not been any new a fall/injury/ or traumas since last visit.  Pt. denies any new musculoskeletal complaints at this time.     Office note from 19 reviewed    Occupation: dental hygenist    Review of Systems   Constitutional: Negative for chills and fever.   Musculoskeletal: Positive for neck pain. Negative for back pain, falls, joint pain and myalgias.   Neurological: Negative for dizziness, tingling, focal weakness, weakness and headaches.       PAST MEDICAL HISTORY:   Past Medical History:   Diagnosis Date    Adrenal adenoma     Diverticulosis of the colon     Hypertension     Venous insufficiency 2014    right leg, denies Hx clot     PAST SURGICAL HISTORY:   Past Surgical History:   Procedure Laterality Date    APPENDECTOMY      BREAST BIOPSY Right     benign needle biopsy    BREAST BIOPSY Left     benign needle biopsy     SECTION, LOW TRANSVERSE      CHOLECYSTECTOMY      COLONOSCOPY N/A 2012    [facility]    Injection-steroid-epidural-cervical C7/T1 N/A 2019    [facility]ria [facility] at [facility]    REPAIR-HERNIA-UMBILICAL N/A 2014    Performed by Raheem Alcantar MD at [facility]    THUMB ARTHROSCOPY      both  thumbs, trigger finger release.    TONSILLECTOMY      TUBAL LIGATION      UMBILICAL HERNIA REPAIR  2014    VEIN LIGATION AND STRIPPING      Left     FAMILY HISTORY:   Family History   Problem Relation Age of Onset    Cancer Mother     Cancer Father     Heart disease Father     Hypertension Father     Diabetes Maternal Grandmother     Heart disease Maternal Grandmother     Diabetes Maternal Grandfather     Heart disease Maternal Grandfather     Breast cancer Paternal Grandmother 42     SOCIAL HISTORY:   Social History     Socioeconomic History    Marital status:      Spouse name: Not on file    Number of children: 3    Years of education: Not on file    Highest education level: Not on file   Occupational History    Occupation: Dental hygienist     Comment: Working full-time     Employer:  [employer]   Social Needs    Financial resource strain: Not on file    Food insecurity:     Worry: Not on file     Inability: Not on file    Transportation needs:     Medical: Not on file     Non-medical: Not on file   Tobacco Use    Smoking status: Former Smoker     Packs/day: 0.25     Years: 25.00     Pack years: 6.25     Last attempt to quit: 1993     Years since quittin.4    Smokeless tobacco: Never Used   Substance and Sexual Activity    Alcohol use: No     Alcohol/week: 0.0 oz    Drug use: No    Sexual activity: Yes     Partners: Male   Lifestyle    Physical activity:     Days per week: Not on file     Minutes per session: Not on file    Stress: Not on file   Relationships    Social connections:     Talks on phone: Not on file     Gets together: Not on file     Attends Muslim service: Not on file     Active member of club or organization: Not on file     Attends meetings of clubs or organizations: Not on file     Relationship status: Not on file   Other Topics Concern    Not on file   Social History Narrative    Her  has had a CVA. She is major support  "for him.        Walks regularly       MEDICATIONS:   Current Outpatient Medications:     aspirin (ECOTRIN) 81 MG EC tablet, Take 81 mg by mouth once daily.  , Disp: , Rfl:     biotin 1 mg Cap, Take by mouth., Disp: , Rfl:     cholecalciferol, vitamin D3, (VITAMIN D3) 1,000 unit capsule, Take 1,000 Units by mouth once daily., Disp: , Rfl:     escitalopram oxalate (LEXAPRO) 10 MG tablet, TAKE 1 TABLET BY MOUTH EVERY DAY, Disp: 90 tablet, Rfl: 1    Lactobacillus rhamnosus GG (CULTURELLE) 10 billion cell capsule, Take 1 capsule by mouth once daily., Disp: , Rfl:     lisinopril 10 MG tablet, TAKE 1 TABLET BY MOUTH ONCE DAILY, Disp: 90 tablet, Rfl: 3    LUTEIN-ZEAXANTHIN ORAL, Take by mouth., Disp: , Rfl:     magnesium 250 mg Tab, Take 250 mg by mouth once daily., Disp: , Rfl:     melatonin 5 mg Tab, Take 5 mg by mouth as needed. , Disp: , Rfl:     multivitamin capsule, Take 1 capsule by mouth once daily.  , Disp: , Rfl:     ondansetron (ZOFRAN-ODT) 8 MG TbDL, Take 1 tablet (8 mg total) by mouth every 8 (eight) hours as needed., Disp: 6 tablet, Rfl: 0    RESTASIS 0.05 % ophthalmic emulsion, INT 1 GTT IN OU BID, Disp: , Rfl: 1    traZODone (DESYREL) 100 MG tablet, TAKE 1 TABLET BY MOUTH NIGHTLY, Disp: 90 tablet, Rfl: 0  ALLERGIES: Review of patient's allergies indicates:  No Known Allergies      Objective:     VITAL SIGNS: /62 (BP Location: Right arm, Patient Position: Sitting, BP Method: Medium (Manual))   Ht 5' 6.5" (1.689 m)   Wt 72.7 kg (160 lb 4.4 oz)   BMI 25.48 kg/m²    General    Vitals reviewed.  Constitutional: She is oriented to person, place, and time. She appears well-developed and well-nourished.   Neurological: She is alert and oriented to person, place, and time.   Psychiatric: She has a normal mood and affect. Her behavior is normal.               MUSCULOSKELETAL EXAM    Cervical Spine: left cervical region    Observation:    Posture:  Increased thoracic kyphosis and Anterior head " carriage  Left superior shoulder un-leveling while standing.    No edema, erythema, or ecchymosis noted in cervical and thoraic spine regions.    No midline skin abnormalities.    No atrophy of upper limb musculature.  Gait: Non-antalgic gait without trendelenberg, heel walking, toe walking, and tandem walking.    Tenderness:  No tenderness throughout the cervical spine upon spinous process palpation  No tenderness over the base of the occiput  No bony deformities or step-offs palpated.   + trigger point tenderness of levator scapulae, upper trapezius, and parascapular musculature on the left    Range of Motion (* = with pain):  CERVICAL SPINE  limited AROM in flexion, extension, sidebending, and rotation with increase tension on the left with movements    Strength Testing (* = with pain):  Sternocleidomastoid - 5/5 on left and 5/5 on right  Upper trapezius-  5/5 on left and 5/5 on right  Deltoid - 5/5 on left and 5/5 on right  Biceps - 5/5 on left and 5/5 on right  Triceps - 5/5 on left and 5/5 on right  Wrist extension - 5/5 on left and 5/5 on right  Wrist flexion - 5/5 on left and 5/5 on right   - 5/5 on left and 5/5 on right  Finger extension - 5/5 on left and 5/5 on right  Finger abduction - 5/5 on left and 5/5 on right    Structural Exam:  TART (Tissue texture abnormality, Asymmetry,  Restriction of motion and/or Tenderness) changes:    Head:   Cranial Rhythmic Impulse (CRI): restricted with Decreased amplitude    Occipitoatlantal (OA) Joint: ES-left, R-right  Suture/Bone Restriction Side   Occipitomastoid (OM)  Absent    Parietal mastoid (PM) Absent    Petrojugular (PJ) Absent    Sphenosquamous pivot (SSP) Present right   Zygomaticotemporal (ZT) Absent    Zygomaticofrontal (ZF) Absent    Frontonasal Absent    [place] bone Present right   Parietal bone Absent    Frontal bone Absent      Muscle Tissue Texture Abnormality Side   Lateral pterygoid Present right   Medial pterygoid Present right         Cervical Spine   C1 TTA RIGHT   C2 Neutral   C3 Neutral   C4 Neutral   C5 Neutral   C6 Neutral   C7 ERS LEFT        Thoracic Spine   T1 Neutral   T2 Neutral   T3 FRS RIGHT   T4 FRS RIGHT   T5 FRS RIGHT   T6 Neutral   T7 Neutral   T8 Neutral   T9 Neutral   T10 NS-right,R-left   T11 NS-right,R-left   T12 NS-right,R-left     Rib cage: Rib 4 external torsion on the right, Rib 1 inhaled on the left     Lumbar Spine   L1 FRS LEFT   L2 FRS RIGHT   L3 ERS RIGHT   L4 Neutral   L5 FRS RIGHT       Pelvis:  · Innominate:Right anterior rotation  · Pubic bone:Right inferior pubic shear    Sacrum:Right on Right sacral torsion      Key   F= Flexed   E = Extended   R = Rotated   S = Sidebent   TTA = tissue texture abnormality     NEURO EXAM:     Special Tests:                          Spurling's  Negative B/L - but left facet loading reproduce left neck pain  SLR   Negative B/L    Sensation to light touch intact for both UE  dermatomes      Assessment:      Encounter Diagnoses   Name Primary?    Spondylosis of cervical region without myelopathy or radiculopathy Yes    Neck pain     Chronic bilateral low back pain without sciatica     Myalgia     Somatic dysfunction of head region     Cervical (neck) region somatic dysfunction     Somatic dysfunction of thoracic region     Somatic dysfunction of rib cage region     Somatic dysfunction of lumbar region     Sacral region somatic dysfunction     Somatic dysfunction of pelvic region           Plan:     1.  Chronic neck pain and headaches secondary to cervical spine spondylosis and facet arthropathy, most significant at C1-2, both symptomatically and on imaging. There are also myofascial contributions from poor posture.   - OMT performed again today to address associated biomechanical restrictions  and HEP reviewed.   - Recommend continuing home exercise program from PT as well  - Continue ice up to 20 min at a time and over-the-counter NSAIDs as needed for pain control  -  Recommend continued follow-up with Pain Management (Dr. Willoughby) for upper cervical facet joints scheduled on 8/22/19    - Imaging and report of cervical spine MRI from 4/9/19 personally reviewed:  Abnormal marrow signal intensity in the left lateral mass of C1 and C2 and fluid-like signal intensity in the C1-C2 joint space, likely related to severe arthritic changes. Spondylotic changes at C3 through T1 levels.  There is spinal stenotic changes at the C6-C7 disc space.  Also multilevel foraminal stenotic disease noted.  There is also a moderately prominent right paracentral osteophyte and a disc bulge complex at C3-4 disc space. Straightening of the normal cervical curvature likely from spondylosis.  There is a approximately 2 mm anterolisthesis of C3 on C4 and slight retrolisthesis of C5 on C6.    - Imaging report of cervical CT from 04/12/2019 personally reviewed: There is multilevel degenerative change.  There is some degree of disc space narrowing, degenerative listhesis, disc osteophyte complex, uncovertebral spurring and facet joint arthropathy at multiple levels contributing to spinal canal and/or foraminal stenosis.  The spinal canal may be borderline small on a developmental basis.  Considering differences in modality, there is no definite change compared to the recent cervical spine MRI. There is marked left and moderate right joint space narrowing at the C1-2 level but alignment is normal.    2. Acute on chronic low back pain, appears to be more mechanical in nature  - OMT performed today to address associated biomechanical restrictions  and HEP started.     3. OMT 7-8 regions. Oral consent obtained.  Reviewed benefits and potential side effects, including bruising at site of treatment and increased soreness for the next 24-48 hours. Pt. Instructed to increased water intake by 1 L today and tomorrow to help with any residual soreness.   - OMT indicated today due to signs and symptoms as well as local and  remote somatic dysfunction findings and their related neurokinetic, lymphatic, fascial and/or arteriovenous body connections.   - OMT techniques used: Soft Tissue, Myofascial Release, Muscle Energy and Still's Technique   - Treatment was tolerated well. Improvement noted in segmental mobility post-treatment in dysfunctional regions. There were no adverse events and no complications immediately following treatment.     4. Reviewed with pt. the following HEP:  Continue:  A)  Supine Thoracic extension exercise: 10-15 reps, twice daily. Handout also given.   B) Cervicothoracic junction mobilization exercise: 10-15 reps, twice daily. Handout also given.   C) Self suboccipital release with lacrosse ball for 3- 5 minutes, 1-2 times daily. Handout also given.   ADD  D)  Pelvic clock exercises given to do from the 6-12 o'clock positions:10-15 reps, twice daily. Hand out of exercise also given.   E) Quadratus lumborum self-stretch on all fours: hold stretch for 30 seconds, repeating 2-3 times on each side. Do stretch twice daily. Hand-out also given.   F)  Arm [place] exercises: from the side-laying position, rotate top arm in a clockwise direction x 10 reps, following by rotating in a counter-clockwise position x 10 reps, repeat on other side, twice daily. Hand-out also given.     [address] HOME EXERCISE PROGRAM (HEP):  The patient was taught a homegoing physical therapy regimen as described above. The patient demonstrated understanding of the exercises and proper technique of their execution. This interaction took 15 minutes.     5. Follow-up in 6 weeks for reevaluation following facet injections with Dr. Willoughby    6. Patient agreeable to today's plan and all questions were answered    This note is dictated using the M*Modal Fluency Direct word recognition program. There are word recognition mistakes that are occasionally missed on review.

## 2019-08-09 DIAGNOSIS — G47.9 SLEEP DISTURBANCE: Chronic | ICD-10-CM

## 2019-08-09 RX ORDER — TRAZODONE HYDROCHLORIDE 100 MG/1
TABLET ORAL
Qty: 90 TABLET | Refills: 0 | Status: SHIPPED | OUTPATIENT
Start: 2019-08-09 | End: 2019-08-21 | Stop reason: SDUPTHER

## 2019-08-11 DIAGNOSIS — I10 ESSENTIAL HYPERTENSION: ICD-10-CM

## 2019-08-11 RX ORDER — LISINOPRIL 10 MG/1
TABLET ORAL
Qty: 90 TABLET | Refills: 0 | Status: SHIPPED | OUTPATIENT
Start: 2019-08-11 | End: 2019-12-04 | Stop reason: SDUPTHER

## 2019-08-21 DIAGNOSIS — G47.9 SLEEP DISTURBANCE: Chronic | ICD-10-CM

## 2019-08-21 DIAGNOSIS — F32.9 REACTIVE DEPRESSION: ICD-10-CM

## 2019-08-22 ENCOUNTER — HOSPITAL ENCOUNTER (OUTPATIENT)
Facility: OTHER | Age: 64
Discharge: HOME | End: 2019-08-22
Attending: ANESTHESIOLOGY | Admitting: ANESTHESIOLOGY
Payer: MEDICARE

## 2019-08-22 VITALS
RESPIRATION RATE: 14 BRPM | HEART RATE: 74 BPM | OXYGEN SATURATION: 100 % | DIASTOLIC BLOOD PRESSURE: 68 MMHG | TEMPERATURE: 98 F | WEIGHT: 160 LBS | BODY MASS INDEX: 25.71 KG/M2 | HEIGHT: 66 IN | SYSTOLIC BLOOD PRESSURE: 141 MMHG

## 2019-08-22 DIAGNOSIS — M47.812 SPONDYLOSIS OF CERVICAL REGION WITHOUT MYELOPATHY OR RADICULOPATHY: ICD-10-CM

## 2019-08-22 DIAGNOSIS — M47.9 OA (OSTEOARTHRITIS OF SPINE): ICD-10-CM

## 2019-08-22 DIAGNOSIS — M47.812 OSTEOARTHRITIS OF CERVICAL SPINE, UNSPECIFIED SPINAL OSTEOARTHRITIS COMPLICATION STATUS: Primary | ICD-10-CM

## 2019-08-22 PROCEDURE — 63600175 PHARM REV CODE 636 W HCPCS: Performed by: ANESTHESIOLOGY

## 2019-08-22 PROCEDURE — 64490 INJ PARAVERT F JNT C/T 1 LEV: CPT | Mod: LT,,, | Performed by: ANESTHESIOLOGY

## 2019-08-22 PROCEDURE — 25000003 PHARM REV CODE 250: Performed by: ANESTHESIOLOGY

## 2019-08-22 PROCEDURE — 64490 PR INJ DX/THER AGNT PARAVERT FACET JOINT,IMG GUIDE,CERV/THORAC, 1ST LEVEL: ICD-10-PCS | Mod: LT,,, | Performed by: ANESTHESIOLOGY

## 2019-08-22 PROCEDURE — 64490 INJ PARAVERT F JNT C/T 1 LEV: CPT | Performed by: ANESTHESIOLOGY

## 2019-08-22 RX ORDER — SODIUM CHLORIDE 9 MG/ML
500 INJECTION, SOLUTION INTRAVENOUS CONTINUOUS
Status: DISCONTINUED | OUTPATIENT
Start: 2019-08-22 | End: 2019-08-22 | Stop reason: HOSPADM

## 2019-08-22 RX ORDER — TRAZODONE HYDROCHLORIDE 100 MG/1
TABLET ORAL
Qty: 90 TABLET | Refills: 1 | Status: SHIPPED | OUTPATIENT
Start: 2019-08-22 | End: 2020-05-04

## 2019-08-22 RX ORDER — ESCITALOPRAM OXALATE 10 MG/1
10 TABLET ORAL DAILY
Qty: 90 TABLET | Refills: 0 | Status: SHIPPED | OUTPATIENT
Start: 2019-08-22 | End: 2019-12-18 | Stop reason: SDUPTHER

## 2019-08-22 RX ORDER — LIDOCAINE HYDROCHLORIDE 10 MG/ML
INJECTION, SOLUTION EPIDURAL; INFILTRATION; INTRACAUDAL; PERINEURAL
Status: DISCONTINUED | OUTPATIENT
Start: 2019-08-22 | End: 2019-08-22 | Stop reason: HOSPADM

## 2019-08-22 RX ORDER — ALPRAZOLAM 0.5 MG/1
1 TABLET ORAL
Status: COMPLETED | OUTPATIENT
Start: 2019-08-22 | End: 2019-08-22

## 2019-08-22 RX ORDER — BUPIVACAINE HYDROCHLORIDE 2.5 MG/ML
INJECTION, SOLUTION EPIDURAL; INFILTRATION; INTRACAUDAL
Status: DISCONTINUED | OUTPATIENT
Start: 2019-08-22 | End: 2019-08-22 | Stop reason: HOSPADM

## 2019-08-22 RX ORDER — LIDOCAINE HYDROCHLORIDE 10 MG/ML
INJECTION INFILTRATION; PERINEURAL
Status: DISCONTINUED | OUTPATIENT
Start: 2019-08-22 | End: 2019-08-22 | Stop reason: HOSPADM

## 2019-08-22 RX ORDER — DEXAMETHASONE SODIUM PHOSPHATE 10 MG/ML
INJECTION INTRAMUSCULAR; INTRAVENOUS
Status: DISCONTINUED | OUTPATIENT
Start: 2019-08-22 | End: 2019-08-22 | Stop reason: HOSPADM

## 2019-08-22 RX ADMIN — ALPRAZOLAM 1 MG: 0.5 TABLET ORAL at 10:08

## 2019-08-22 NOTE — DISCHARGE INSTRUCTIONS
Thank you for allowing us to care for you today. You may receive a survey about the care we provided. Your feedback is valuable and helps us provide excellent care throughout the community.     Home Care Instructions for Pain Management:    1. DIET:   You may resume your normal diet today.   2. BATHING:   You may shower with luke warm water. No tub baths or anything that will soak injection sites under water for the next 24 hours.  3. DRESSING:   You may remove your bandage today.   4. ACTIVITY LEVEL:   You may resume your normal activities 24 hrs after your procedure. Nothing strenuous today.  5. MEDICATIONS:   You may resume your normal medications today. To restart blood thinners, ask your doctor.  6. DRIVING    If you have received any sedatives by mouth today, you may not drive for 12 hours.    If you have received any sedation through your IV, you may not drive for 24 hrs.   7. SPECIAL INSTRUCTIONS:   No heat to the injection site for 24 hrs including, hot bath or shower, heating pad, moist heat, or hot tubs.    Use ice pack to injection site for any pain or discomfort.  Apply ice packs for 20 minute intervals as needed.    IF you have diabetes, be sure to monitor your blood sugar more closely. IF your injection contained steroids your blood sugar levels may become higher than normal.    If you are still having pain upon discharge:  Your pain may improve over the next 48 hours. The anesthetic (numbing medication) works immediately to 48 hours. IF your injection contained a steroid (anti-inflammatory medication), it takes approximately 3 days to start feeling relief and 7-10 days to see your greatest results from the medication. It is possible you may need subsequent injections. This would be discussed at your follow up appointment with pain management or your referring doctor.    Please call my office or pager at [phone] if experienced any weakness or loss of sensation, fever > 101.5, pain uncontrolled  with oral medications, persistent nausea/vomiting/or diarrhea, redness or drainage from the incisions, or any other worrisome concerns. If physician on call was not reached or could not communicate with our office for any reason please go to the nearest emergency department.

## 2019-08-22 NOTE — DISCHARGE SUMMARY
Discharge Note  Short Stay      SUMMARY     Admit Date: 8/22/2019    Attending Physician: Samantha Willoughby      Discharge Physician: Samantha Willoughby      Discharge Date: 8/22/2019 10:56 AM    Procedure(s) (LRB):  INJECTION, FACET JOINT, C1-C2 Medial Branch (Left)    Final Diagnosis: Osteoarthritis of cervical spine, unspecified spinal osteoarthritis complication status [M47.812]    Disposition: Home or self care    Patient Instructions:   Current Discharge Medication List      CONTINUE these medications which have NOT CHANGED    Details   aspirin (ECOTRIN) 81 MG EC tablet Take 81 mg by mouth once daily.        biotin 1 mg Cap Take by mouth.      cholecalciferol, vitamin D3, (VITAMIN D3) 1,000 unit capsule Take 1,000 Units by mouth once daily.      escitalopram oxalate (LEXAPRO) 10 MG tablet Take 1 tablet (10 mg total) by mouth once daily.  Qty: 90 tablet, Refills: 0    Associated Diagnoses: Reactive depression      Lactobacillus rhamnosus GG (CULTURELLE) 10 billion cell capsule Take 1 capsule by mouth once daily.      lisinopril 10 MG tablet TAKE 1 TABLET BY MOUTH ONCE DAILY  Qty: 90 tablet, Refills: 0    Associated Diagnoses: Essential hypertension      LUTEIN-ZEAXANTHIN ORAL Take by mouth.      magnesium 250 mg Tab Take 250 mg by mouth once daily.      melatonin 5 mg Tab Take 5 mg by mouth as needed.       multivitamin capsule Take 1 capsule by mouth once daily.        ondansetron (ZOFRAN-ODT) 8 MG TbDL Take 1 tablet (8 mg total) by mouth every 8 (eight) hours as needed.  Qty: 6 tablet, Refills: 0    Associated Diagnoses: Nausea in adult      RESTASIS 0.05 % ophthalmic emulsion INT 1 GTT IN OU BID  Refills: 1      traZODone (DESYREL) 100 MG tablet TAKE 1 TABLET BY MOUTH NIGHTLY  Qty: 90 tablet, Refills: 1    Associated Diagnoses: Sleep disturbance                 Discharge Diagnosis: Osteoarthritis of cervical spine, unspecified spinal osteoarthritis complication status [M47.812]  Condition on Discharge: Stable with no  complications to procedure   Diet on Discharge: Same as before.  Activity: as per instruction sheet.  Discharge to: Home with a responsible adult.  Follow up: 2-4 weeks       Please call my office or pager at [phone] if experienced any weakness or loss of sensation, fever > 101.5, pain uncontrolled with oral medications, persistent nausea/vomiting/or diarrhea, redness or drainage from the incisions, or any other worrisome concerns. If physician on call was not reached or could not communicate with our office for any reason please go to the nearest emergency department

## 2019-08-22 NOTE — PLAN OF CARE
Pt tolerated procedure well. Pt reports 4 /10 pain after procedure. Assisted pt up for first time. Steady on feet. All discharge instructions given.

## 2019-08-22 NOTE — OP NOTE
Cervical Facet Joint Injection Under Fluoroscopy  Time-out taken to identify patient and procedure side prior to starting the procedure. I attest that I have reviewed the patient's home medications prior to the procedure and no contraindication have been identified. I  re-evaluated the patient after the patient was positioned for the procedure in the procedure room immediately before the procedural time-out. The vital signs are current and represent the current state of the patient which has not significantly changed since the preprocedure assessment.         Date of Service: 08/22/2019    PCP: Grant Hawkins Jr, MD    Referring Physician:                                                          PROCEDURE: left C1-2 facet joint injection under fluoroscopy.    REASON FOR PROCEDURE: Osteoarthritis of cervical spine, unspecified spinal osteoarthritis complication status [M47.812]  1. Osteoarthritis of cervical spine, unspecified spinal osteoarthritis complication status    2. Spondylosis of cervical region without myelopathy or radiculopathy    3. OA (osteoarthritis of spine)      POSTOP DIAGNOSIS: Osteoarthritis of cervical spine, unspecified spinal osteoarthritis complication status [M47.812]  1. Osteoarthritis of cervical spine, unspecified spinal osteoarthritis complication status    2. Spondylosis of cervical region without myelopathy or radiculopathy    3. OA (osteoarthritis of spine)      PHYSICIAN: Samantha Willoughby MD    ASSISTANTS: Torres Danielson MD Fellow        MEDICATIONS INJECTED:  Preservative-free dexamethasone 5mg total.  Xylocaine MPF 1% 5 ml.  1ml of mixture injected per level.    SEDATION MEDICATIONS: None    ESTIMATED BLOOD LOSS:  None.    COMPLICATIONS:  None.    TECHNIQUE:   Laying in a prone position, the patient was prepped and draped in the usual sterile fashion using ChloraPrep and fenestrated drape.  The level was determined under fluoroscopic guidance. The 3.5in 26-gauge needle was  introduced to the appropriate facet joint at C1-2.  Negative pressure applied to make sure that there was no intravascular placement.  Omnipaque was injected to confirm placement.  It was also done to confirm that there was no vascular runoff using digital subtraction.  Medication was then injected slowly.  The patient tolerated the procedure well.     PAIN BEFORE THE PROCEDURE:  5/10.    PAIN AFTER THE PROCEDURE:  0/10.    The patient was monitored after the procedure.  Patient was given post procedure and discharge instructions to follow at home.  We will see the patient back in two weeks or the patient may call to inform of status. The patient was discharged in a stable condition

## 2019-08-22 NOTE — INTERVAL H&P NOTE
The patient has been examined and the H&P has been reviewed:    I concur with the findings and no changes have occurred since H&P was written.    Anesthesia/Surgery risks, benefits and alternative options discussed and understood by patient/family.          Active Hospital Problems    Diagnosis  POA    OA (osteoarthritis of spine) [M47.9]  Yes      Resolved Hospital Problems   No resolved problems to display.

## 2019-09-03 ENCOUNTER — PATIENT OUTREACH (OUTPATIENT)
Dept: ADMINISTRATIVE | Facility: OTHER | Age: 64
End: 2019-09-03

## 2019-09-04 ENCOUNTER — OFFICE VISIT (OUTPATIENT)
Dept: PAIN MEDICINE | Facility: CLINIC | Age: 64
End: 2019-09-04
Attending: ANESTHESIOLOGY
Payer: MEDICARE

## 2019-09-04 VITALS
SYSTOLIC BLOOD PRESSURE: 129 MMHG | HEIGHT: 66 IN | BODY MASS INDEX: 25.71 KG/M2 | HEART RATE: 69 BPM | TEMPERATURE: 97 F | RESPIRATION RATE: 18 BRPM | WEIGHT: 160 LBS | DIASTOLIC BLOOD PRESSURE: 76 MMHG

## 2019-09-04 DIAGNOSIS — M47.812 SPONDYLOSIS OF CERVICAL REGION WITHOUT MYELOPATHY OR RADICULOPATHY: Primary | ICD-10-CM

## 2019-09-04 DIAGNOSIS — M47.26 OSTEOARTHRITIS OF SPINE WITH RADICULOPATHY, LUMBAR REGION: ICD-10-CM

## 2019-09-04 PROCEDURE — 99213 PR OFFICE/OUTPT VISIT, EST, LEVL III, 20-29 MIN: ICD-10-PCS | Mod: S$GLB,,, | Performed by: ANESTHESIOLOGY

## 2019-09-04 PROCEDURE — 3074F PR MOST RECENT SYSTOLIC BLOOD PRESSURE < 130 MM HG: ICD-10-PCS | Mod: CPTII,S$GLB,, | Performed by: ANESTHESIOLOGY

## 2019-09-04 PROCEDURE — 3078F DIAST BP <80 MM HG: CPT | Mod: CPTII,S$GLB,, | Performed by: ANESTHESIOLOGY

## 2019-09-04 PROCEDURE — 99213 OFFICE O/P EST LOW 20 MIN: CPT | Mod: S$GLB,,, | Performed by: ANESTHESIOLOGY

## 2019-09-04 PROCEDURE — 99999 PR PBB SHADOW E&M-EST. PATIENT-LVL III: CPT | Mod: PBBFAC,,, | Performed by: ANESTHESIOLOGY

## 2019-09-04 PROCEDURE — 99999 PR PBB SHADOW E&M-EST. PATIENT-LVL III: ICD-10-PCS | Mod: PBBFAC,,, | Performed by: ANESTHESIOLOGY

## 2019-09-04 PROCEDURE — 3078F PR MOST RECENT DIASTOLIC BLOOD PRESSURE < 80 MM HG: ICD-10-PCS | Mod: CPTII,S$GLB,, | Performed by: ANESTHESIOLOGY

## 2019-09-04 PROCEDURE — 1101F PT FALLS ASSESS-DOCD LE1/YR: CPT | Mod: CPTII,S$GLB,, | Performed by: ANESTHESIOLOGY

## 2019-09-04 PROCEDURE — 1101F PR PT FALLS ASSESS DOC 0-1 FALLS W/OUT INJ PAST YR: ICD-10-PCS | Mod: CPTII,S$GLB,, | Performed by: ANESTHESIOLOGY

## 2019-09-04 PROCEDURE — 3074F SYST BP LT 130 MM HG: CPT | Mod: CPTII,S$GLB,, | Performed by: ANESTHESIOLOGY

## 2019-09-04 NOTE — PROGRESS NOTES
Subjective:      Patient ID: Ynes Talavera is a 64 y.o. female.    Chief Complaint: Low-back Pain and Neck Pain    Referred by: No ref. provider found     HPI  She returns for follow-up.  She thinks the C1-2 helped tremendously.  Over 50% relief of her pain. She has occasional pain in the left occiput its and sometimes on the right parietal area that only occurs temporarily and the superior without significant discomfort or disability.  Mostly, she is suffering with her lower back and radicular symptoms which was managed per  in the [facility].  No new symptomatology otherwise.      Past Medical History:   Diagnosis Date    Adrenal adenoma     Diverticulosis of the colon     Hypertension     Venous insufficiency 2014    right leg, denies Hx clot       Past Surgical History:   Procedure Laterality Date    APPENDECTOMY      BREAST BIOPSY Right     benign needle biopsy    BREAST BIOPSY Left     benign needle biopsy     SECTION, LOW TRANSVERSE      CHOLECYSTECTOMY      COLONOSCOPY N/A 2012    Performed by Neville Galvan MD at [facility] ENDO    INJECTION, FACET JOINT, C1-C2 Medial Branch Left 2019    Performed by Samantha Willoughby MD at [facility]    Injection-steroid-epidural-cervical C7/T1 N/A 2019    Performed by Ronnell Echeverria MD at [facility] OR    REPAIR-HERNIA-UMBILICAL N/A 2014    Performed by Raheem Alcantar MD at [facility] OR    THUMB ARTHROSCOPY      both thumbs, trigger finger release.    TONSILLECTOMY      TUBAL LIGATION      UMBILICAL HERNIA REPAIR      VEIN LIGATION AND STRIPPING      Left       Review of patient's allergies indicates:  No Known Allergies    Current Outpatient Medications   Medication Sig Dispense Refill    aspirin (ECOTRIN) 81 MG EC tablet Take 81 mg by mouth once daily.        biotin 1 mg Cap Take by mouth.      cholecalciferol, vitamin D3, (VITAMIN D3) 1,000 unit capsule Take 1,000 Units by mouth once daily.       escitalopram oxalate (LEXAPRO) 10 MG tablet Take 1 tablet (10 mg total) by mouth once daily. 90 tablet 0    Lactobacillus rhamnosus GG (CULTURELLE) 10 billion cell capsule Take 1 capsule by mouth once daily.      lisinopril 10 MG tablet TAKE 1 TABLET BY MOUTH ONCE DAILY 90 tablet 0    LUTEIN-ZEAXANTHIN ORAL Take by mouth.      magnesium 250 mg Tab Take 250 mg by mouth once daily.      melatonin 5 mg Tab Take 5 mg by mouth as needed.       multivitamin capsule Take 1 capsule by mouth once daily.        RESTASIS 0.05 % ophthalmic emulsion INT 1 GTT IN OU BID  1    traZODone (DESYREL) 100 MG tablet TAKE 1 TABLET BY MOUTH NIGHTLY 90 tablet 1    ondansetron (ZOFRAN-ODT) 8 MG TbDL Take 1 tablet (8 mg total) by mouth every 8 (eight) hours as needed. 6 tablet 0     No current facility-administered medications for this visit.        Family History   Problem Relation Age of Onset    Cancer Mother     Cancer Father     Heart disease Father     Hypertension Father     Diabetes Maternal Grandmother     Heart disease Maternal Grandmother     Diabetes Maternal Grandfather     Heart disease Maternal Grandfather     Breast cancer Paternal Grandmother 42       Social History     Socioeconomic History    Marital status:      Spouse name: Not on file    Number of children: 3    Years of education: Not on file    Highest education level: Not on file   Occupational History    Occupation: Dental hygienist     Comment: Working full-time     Employer:  [employer]   Social Needs    Financial resource strain: Not on file    Food insecurity:     Worry: Not on file     Inability: Not on file    Transportation needs:     Medical: Not on file     Non-medical: Not on file   Tobacco Use    Smoking status: Former Smoker     Packs/day: 0.25     Years: 25.00     Pack years: 6.25     Last attempt to quit: 1993     Years since quittin.5    Smokeless tobacco: Never Used   Substance and Sexual  "Activity    Alcohol use: No     Alcohol/week: 0.0 oz    Drug use: No    Sexual activity: Yes     Partners: Male   Lifestyle    Physical activity:     Days per week: Not on file     Minutes per session: Not on file    Stress: Not on file   Relationships    Social connections:     Talks on phone: Not on file     Gets together: Not on file     Attends [employer] service: Not on file     Active member of club or organization: Not on file     Attends meetings of clubs or organizations: Not on file     Relationship status: Not on file   Other Topics Concern    Not on file   Social History Narrative    Her  has had a CVA. She is major support for him.        Walks regularly           ROS        Objective:   /76   Pulse 69   Temp 97 °F (36.1 °C)   Resp 18   Ht 5' 6" (1.676 m)   Wt 72.6 kg (160 lb)   BMI 25.82 kg/m²   Pain Disability Index Review:  Last 3 PDI Scores 9/4/2019 7/23/2019 6/20/2019   Pain Disability Index (PDI) 28 31 0     Normocephalic.  Atraumatic.  Affect appropriate.  Breathing unlabored.  Extra ocular muscles intact.           Ortho/SPM Exam      Assessment:       Encounter Diagnoses   Name Primary?    Spondylosis of cervical region without myelopathy or radiculopathy Yes    Osteoarthritis of spine with radiculopathy, lumbar region          Plan:   We discussed with the patient the assessment and recommendations. The following is the plan we agreed on:  1.  Return as needed regarding the neck.  If her symptomatology recurs consider C2, 3 and 3rd occipital nerve blocks bilaterally and twice followed by radiofrequency ablation if does work.  2.  Follow-up with Dr. Echeverria for lower back symptomatology.  Return as needed otherwise.      Ynes was seen today for low-back pain and neck pain.    Diagnoses and all orders for this visit:    Spondylosis of cervical region without myelopathy or radiculopathy    Osteoarthritis of spine with radiculopathy, lumbar region               "

## 2019-09-08 ENCOUNTER — PATIENT MESSAGE (OUTPATIENT)
Dept: FAMILY MEDICINE | Facility: CLINIC | Age: 64
End: 2019-09-08

## 2019-09-23 ENCOUNTER — PATIENT OUTREACH (OUTPATIENT)
Dept: ADMINISTRATIVE | Facility: HOSPITAL | Age: 64
End: 2019-09-23

## 2019-10-02 ENCOUNTER — TELEPHONE (OUTPATIENT)
Dept: VASCULAR SURGERY | Facility: CLINIC | Age: 64
End: 2019-10-02

## 2019-10-02 ENCOUNTER — OFFICE VISIT (OUTPATIENT)
Dept: FAMILY MEDICINE | Facility: CLINIC | Age: 64
End: 2019-10-02
Payer: MEDICARE

## 2019-10-02 VITALS
TEMPERATURE: 98 F | DIASTOLIC BLOOD PRESSURE: 60 MMHG | OXYGEN SATURATION: 97 % | BODY MASS INDEX: 26.22 KG/M2 | HEIGHT: 66 IN | WEIGHT: 163.13 LBS | HEART RATE: 78 BPM | SYSTOLIC BLOOD PRESSURE: 110 MMHG

## 2019-10-02 DIAGNOSIS — I72.8 SPLENIC ARTERY ANEURYSM: ICD-10-CM

## 2019-10-02 DIAGNOSIS — F32.0 MAJOR DEPRESSIVE DISORDER, SINGLE EPISODE, MILD: ICD-10-CM

## 2019-10-02 DIAGNOSIS — I49.9 IRREGULAR HEART RHYTHM: ICD-10-CM

## 2019-10-02 DIAGNOSIS — R71.8 RED BLOOD CELL ABNORMALITY: ICD-10-CM

## 2019-10-02 DIAGNOSIS — Z13.220 ENCOUNTER FOR LIPID SCREENING FOR CARDIOVASCULAR DISEASE: ICD-10-CM

## 2019-10-02 DIAGNOSIS — I10 ESSENTIAL HYPERTENSION: Chronic | ICD-10-CM

## 2019-10-02 DIAGNOSIS — Z00.00 PREVENTATIVE HEALTH CARE: Primary | ICD-10-CM

## 2019-10-02 DIAGNOSIS — Z13.6 ENCOUNTER FOR LIPID SCREENING FOR CARDIOVASCULAR DISEASE: ICD-10-CM

## 2019-10-02 PROCEDURE — 3078F DIAST BP <80 MM HG: CPT | Mod: CPTII,S$GLB,, | Performed by: PHYSICIAN ASSISTANT

## 2019-10-02 PROCEDURE — 93005 ELECTROCARDIOGRAM TRACING: CPT | Mod: S$GLB,,, | Performed by: PHYSICIAN ASSISTANT

## 2019-10-02 PROCEDURE — 99499 RISK ADDL DX/OHS AUDIT: ICD-10-PCS | Mod: S$GLB,,, | Performed by: PHYSICIAN ASSISTANT

## 2019-10-02 PROCEDURE — 93010 ELECTROCARDIOGRAM REPORT: CPT | Mod: S$GLB,,, | Performed by: INTERNAL MEDICINE

## 2019-10-02 PROCEDURE — 99499 UNLISTED E&M SERVICE: CPT | Mod: S$GLB,,, | Performed by: PHYSICIAN ASSISTANT

## 2019-10-02 PROCEDURE — 3074F PR MOST RECENT SYSTOLIC BLOOD PRESSURE < 130 MM HG: ICD-10-PCS | Mod: CPTII,S$GLB,, | Performed by: PHYSICIAN ASSISTANT

## 2019-10-02 PROCEDURE — 1101F PR PT FALLS ASSESS DOC 0-1 FALLS W/OUT INJ PAST YR: ICD-10-PCS | Mod: CPTII,S$GLB,, | Performed by: PHYSICIAN ASSISTANT

## 2019-10-02 PROCEDURE — 99214 OFFICE O/P EST MOD 30 MIN: CPT | Mod: S$GLB,,, | Performed by: PHYSICIAN ASSISTANT

## 2019-10-02 PROCEDURE — 3078F PR MOST RECENT DIASTOLIC BLOOD PRESSURE < 80 MM HG: ICD-10-PCS | Mod: CPTII,S$GLB,, | Performed by: PHYSICIAN ASSISTANT

## 2019-10-02 PROCEDURE — 93005 EKG 12-LEAD: ICD-10-PCS | Mod: S$GLB,,, | Performed by: PHYSICIAN ASSISTANT

## 2019-10-02 PROCEDURE — 99999 PR PBB SHADOW E&M-EST. PATIENT-LVL IV: ICD-10-PCS | Mod: PBBFAC,,, | Performed by: PHYSICIAN ASSISTANT

## 2019-10-02 PROCEDURE — 93010 EKG 12-LEAD: ICD-10-PCS | Mod: S$GLB,,, | Performed by: INTERNAL MEDICINE

## 2019-10-02 PROCEDURE — 99999 PR PBB SHADOW E&M-EST. PATIENT-LVL IV: CPT | Mod: PBBFAC,,, | Performed by: PHYSICIAN ASSISTANT

## 2019-10-02 PROCEDURE — 3074F SYST BP LT 130 MM HG: CPT | Mod: CPTII,S$GLB,, | Performed by: PHYSICIAN ASSISTANT

## 2019-10-02 PROCEDURE — 1101F PT FALLS ASSESS-DOCD LE1/YR: CPT | Mod: CPTII,S$GLB,, | Performed by: PHYSICIAN ASSISTANT

## 2019-10-02 PROCEDURE — 99214 PR OFFICE/OUTPT VISIT, EST, LEVL IV, 30-39 MIN: ICD-10-PCS | Mod: S$GLB,,, | Performed by: PHYSICIAN ASSISTANT

## 2019-10-02 NOTE — TELEPHONE ENCOUNTER
----- Message from Grant Hawkins Jr., MD sent at 10/2/2019  2:45 PM CDT -----  Tiffany,    This is the patient I mentioned to you this morning.  My question is frequency of screening, as this is been 5 year interval with no change in the aneurysm.  I am interested in what Dr. Saxena would recommend.    Thanks     Grant Hawkins MD

## 2019-10-02 NOTE — PROGRESS NOTES
"Subjective:      Patient ID: Ynes Talavera is a 64 y.o. female.    Chief Complaint: Annual Exam  Patient is new to me.    HPI   Patient is concerned about risks with taking Lisinopril for hypertension.  Patient has had PVCs in the past.  Over the last two weeks, she has been having palpitations frequently.     Patient also reports that neck and head pain have greatly improved, but back pain continues to be an issue.  Dr. Echeverria manages this.    Patient has a lump on right anterior lower leg.  She has hit it in the past.  No pain in this area, but it has been sore at times.  Patient choosing to monitor this for the future.    Patient filled out online and augmented by provider.  Review of Systems   Constitutional: Negative for activity change and unexpected weight change.   HENT: Negative for hearing loss, rhinorrhea and trouble swallowing.    Eyes: Negative for discharge and visual disturbance.   Respiratory: Negative for chest tightness, shortness of breath and wheezing.    Cardiovascular: Positive for palpitations. Negative for chest pain.   Gastrointestinal: Negative for blood in stool, constipation, diarrhea and vomiting.   Endocrine: Negative for polydipsia and polyuria.   Genitourinary: Negative for difficulty urinating, dysuria, hematuria and menstrual problem.   Musculoskeletal: Positive for arthralgias. Negative for joint swelling and neck pain.   Neurological: Negative for weakness and headaches.   Psychiatric/Behavioral: Negative for confusion and dysphoric mood.       Objective:   /60   Pulse 78   Temp 97.9 °F (36.6 °C)   Ht 5' 6" (1.676 m)   Wt 74 kg (163 lb 2.3 oz)   SpO2 97%   BMI 26.33 kg/m²      Physical Exam   Constitutional: She is oriented to person, place, and time. Vital signs are normal. She appears well-developed and well-nourished. She is active and cooperative. No distress.   HENT:   Head: Normocephalic and atraumatic.   Right Ear: Hearing, tympanic membrane, external ear and " ear canal normal.   Left Ear: Hearing, tympanic membrane, external ear and ear canal normal.   Nose: Nose normal. Right sinus exhibits no maxillary sinus tenderness and no frontal sinus tenderness. Left sinus exhibits no maxillary sinus tenderness and no frontal sinus tenderness.   Mouth/Throat: Uvula is midline, oropharynx is clear and moist and mucous membranes are normal. No oropharyngeal exudate. No tonsillar exudate.   Eyes: Conjunctivae and lids are normal.   Neck: Normal range of motion and phonation normal. Neck supple.   Cardiovascular: Normal rate and normal heart sounds. An irregular rhythm present. Exam reveals no gallop and no friction rub.   No murmur heard.  Pulmonary/Chest: Effort normal and breath sounds normal. No stridor. No respiratory distress. She has no decreased breath sounds. She has no wheezes. She has no rhonchi. She has no rales.   Abdominal: Soft. Bowel sounds are normal. There is no tenderness.   Musculoskeletal: Normal range of motion.   Lymphadenopathy:        Head (right side): No submental, no submandibular, no tonsillar, no preauricular, no posterior auricular and no occipital adenopathy present.        Head (left side): No submental, no submandibular, no tonsillar, no preauricular, no posterior auricular and no occipital adenopathy present.     She has no cervical adenopathy.   Neurological: She is alert and oriented to person, place, and time.   Skin: Skin is warm, dry and intact. No rash noted.   Psychiatric: She has a normal mood and affect. Her speech is normal and behavior is normal. Judgment and thought content normal. Cognition and memory are normal.   Vitals reviewed.     Assessment:      1. Preventative health care    2. Encounter for lipid screening for cardiovascular disease    3. Red blood cell abnormality    4. Irregular heart rhythm    5. Essential hypertension    6. Major depressive disorder, single episode, mild    7. Splenic artery aneurysm       Plan:   1.  Preventative health care  Bloodwork at your convenience.  Fast for 8 hours prior.  - CBC auto differential; Future  - Comprehensive metabolic panel; Future  - Lipid panel; Future    2. Encounter for lipid screening for cardiovascular disease  - Lipid panel; Future    3. Red blood cell abnormality  - CBC auto differential; Future    4. Irregular heart rhythm  Sent EKG to Dr. Anderson for comment.  - IN OFFICE EKG 12-LEAD (to [facility])-sinus rhythm with PVCs    5. Essential hypertension  Discontinue Lisinopril and take blood pressure once a day.  Goal is below 140/90.    6. Major depressive disorder, single episode, mild  Continue Lexapro daily.    7. Splenic artery aneurysm  Discussed this with Dr. Hawkins.  He will consult vascular surgery for any follow up instructions.  No symptoms currently.  *Vascular surgery's recommendation was for this to be followed up with imaging in five years.    Follow up in two weeks for nurse visit for blood pressure check.  Patient agreed with plan and expressed understanding.    Addendum: Received EKG back from Dr. Anderson's office with no comment and a signature.  Spoke with patient and gave recommendation that if symptoms continue, to follow up with Dr. Anderson.

## 2019-10-02 NOTE — Clinical Note
Here is my note to check on splenic artery aneurysm follow up with vascular surgery for this patient.Thanks,Sruthi Raya PA-C

## 2019-10-17 ENCOUNTER — CLINICAL SUPPORT (OUTPATIENT)
Dept: FAMILY MEDICINE | Facility: CLINIC | Age: 64
End: 2019-10-17
Payer: MEDICARE

## 2019-10-17 ENCOUNTER — LAB VISIT (OUTPATIENT)
Dept: LAB | Facility: HOSPITAL | Age: 64
End: 2019-10-17
Attending: PHYSICIAN ASSISTANT
Payer: MEDICARE

## 2019-10-17 VITALS — SYSTOLIC BLOOD PRESSURE: 162 MMHG | DIASTOLIC BLOOD PRESSURE: 100 MMHG

## 2019-10-17 DIAGNOSIS — Z00.00 PREVENTATIVE HEALTH CARE: ICD-10-CM

## 2019-10-17 DIAGNOSIS — R71.8 RED BLOOD CELL ABNORMALITY: ICD-10-CM

## 2019-10-17 DIAGNOSIS — Z13.220 ENCOUNTER FOR LIPID SCREENING FOR CARDIOVASCULAR DISEASE: ICD-10-CM

## 2019-10-17 DIAGNOSIS — Z13.6 ENCOUNTER FOR LIPID SCREENING FOR CARDIOVASCULAR DISEASE: ICD-10-CM

## 2019-10-17 DIAGNOSIS — Z01.30 BP CHECK: Primary | ICD-10-CM

## 2019-10-17 LAB
ALBUMIN SERPL BCP-MCNC: 3.9 G/DL (ref 3.5–5.2)
ALP SERPL-CCNC: 79 U/L (ref 55–135)
ALT SERPL W/O P-5'-P-CCNC: 16 U/L (ref 10–44)
ANION GAP SERPL CALC-SCNC: 6 MMOL/L (ref 8–16)
AST SERPL-CCNC: 22 U/L (ref 10–40)
BASOPHILS # BLD AUTO: 0.03 K/UL (ref 0–0.2)
BASOPHILS NFR BLD: 0.5 % (ref 0–1.9)
BILIRUB SERPL-MCNC: 0.3 MG/DL (ref 0.1–1)
BUN SERPL-MCNC: 21 MG/DL (ref 8–23)
CALCIUM SERPL-MCNC: 9.4 MG/DL (ref 8.7–10.5)
CHLORIDE SERPL-SCNC: 103 MMOL/L (ref 95–110)
CHOLEST SERPL-MCNC: 178 MG/DL (ref 120–199)
CHOLEST/HDLC SERPL: 3 {RATIO} (ref 2–5)
CO2 SERPL-SCNC: 30 MMOL/L (ref 23–29)
CREAT SERPL-MCNC: 0.7 MG/DL (ref 0.5–1.4)
DIFFERENTIAL METHOD: ABNORMAL
EOSINOPHIL # BLD AUTO: 0.1 K/UL (ref 0–0.5)
EOSINOPHIL NFR BLD: 2 % (ref 0–8)
ERYTHROCYTE [DISTWIDTH] IN BLOOD BY AUTOMATED COUNT: 12.3 % (ref 11.5–14.5)
EST. GFR  (AFRICAN AMERICAN): >60 ML/MIN/1.73 M^2
EST. GFR  (NON AFRICAN AMERICAN): >60 ML/MIN/1.73 M^2
GLUCOSE SERPL-MCNC: 104 MG/DL (ref 70–110)
HCT VFR BLD AUTO: 42.7 % (ref 37–48.5)
HDLC SERPL-MCNC: 60 MG/DL (ref 40–75)
HDLC SERPL: 33.7 % (ref 20–50)
HGB BLD-MCNC: 13.8 G/DL (ref 12–16)
IMM GRANULOCYTES # BLD AUTO: 0.01 K/UL (ref 0–0.04)
IMM GRANULOCYTES NFR BLD AUTO: 0.2 % (ref 0–0.5)
LDLC SERPL CALC-MCNC: 107.2 MG/DL (ref 63–159)
LYMPHOCYTES # BLD AUTO: 1.6 K/UL (ref 1–4.8)
LYMPHOCYTES NFR BLD: 25 % (ref 18–48)
MCH RBC QN AUTO: 32.5 PG (ref 27–31)
MCHC RBC AUTO-ENTMCNC: 32.3 G/DL (ref 32–36)
MCV RBC AUTO: 101 FL (ref 82–98)
MONOCYTES # BLD AUTO: 0.5 K/UL (ref 0.3–1)
MONOCYTES NFR BLD: 8.5 % (ref 4–15)
NEUTROPHILS # BLD AUTO: 4.1 K/UL (ref 1.8–7.7)
NEUTROPHILS NFR BLD: 63.8 % (ref 38–73)
NONHDLC SERPL-MCNC: 118 MG/DL
NRBC BLD-RTO: 0 /100 WBC
PLATELET # BLD AUTO: 195 K/UL (ref 150–350)
PMV BLD AUTO: 12.4 FL (ref 9.2–12.9)
POTASSIUM SERPL-SCNC: 4.4 MMOL/L (ref 3.5–5.1)
PROT SERPL-MCNC: 6.8 G/DL (ref 6–8.4)
RBC # BLD AUTO: 4.24 M/UL (ref 4–5.4)
SODIUM SERPL-SCNC: 139 MMOL/L (ref 136–145)
TRIGL SERPL-MCNC: 54 MG/DL (ref 30–150)
WBC # BLD AUTO: 6.35 K/UL (ref 3.9–12.7)

## 2019-10-17 PROCEDURE — 85025 COMPLETE CBC W/AUTO DIFF WBC: CPT

## 2019-10-17 PROCEDURE — 80061 LIPID PANEL: CPT

## 2019-10-17 PROCEDURE — 99999 PR PBB SHADOW E&M-EST. PATIENT-LVL I: CPT | Mod: PBBFAC,,,

## 2019-10-17 PROCEDURE — 99999 PR PBB SHADOW E&M-EST. PATIENT-LVL I: ICD-10-PCS | Mod: PBBFAC,,,

## 2019-10-17 PROCEDURE — 36415 COLL VENOUS BLD VENIPUNCTURE: CPT | Mod: PO

## 2019-10-17 PROCEDURE — 80053 COMPREHEN METABOLIC PANEL: CPT

## 2019-10-17 PROCEDURE — 99499 NO LOS: ICD-10-PCS | Mod: S$GLB,,, | Performed by: PHYSICIAN ASSISTANT

## 2019-10-17 PROCEDURE — 99499 UNLISTED E&M SERVICE: CPT | Mod: S$GLB,,, | Performed by: PHYSICIAN ASSISTANT

## 2019-10-17 NOTE — PROGRESS NOTES
Ynes Talavera 64 y.o. female is here today for Blood Pressure check.   History of HTN yes.    Review of patient's allergies indicates:  No Known Allergies  Creatinine   Date Value Ref Range Status   11/24/2017 0.7 0.5 - 1.4 mg/dL Final     Sodium   Date Value Ref Range Status   11/24/2017 142 136 - 145 mmol/L Final     Potassium   Date Value Ref Range Status   11/24/2017 4.1 3.5 - 5.1 mmol/L Final   ]  Patient denies taking blood pressure medications on a regular basis at the same time of the day.     Current Outpatient Medications:     aspirin (ECOTRIN) 81 MG EC tablet, Take 81 mg by mouth once daily.  , Disp: , Rfl:     biotin 1 mg Cap, Take by mouth., Disp: , Rfl:     cholecalciferol, vitamin D3, (VITAMIN D3) 1,000 unit capsule, Take 1,000 Units by mouth once daily., Disp: , Rfl:     escitalopram oxalate (LEXAPRO) 10 MG tablet, Take 1 tablet (10 mg total) by mouth once daily., Disp: 90 tablet, Rfl: 0    FLUZONE HIGH-DOSE 2019-20, PF, 180 mcg/0.5 mL Syrg, , Disp: , Rfl:     Lactobacillus rhamnosus GG (CULTURELLE) 10 billion cell capsule, Take 1 capsule by mouth once daily., Disp: , Rfl:     lisinopril 10 MG tablet, TAKE 1 TABLET BY MOUTH ONCE DAILY, Disp: 90 tablet, Rfl: 0    LUTEIN-ZEAXANTHIN ORAL, Take by mouth., Disp: , Rfl:     magnesium 250 mg Tab, Take 250 mg by mouth once daily., Disp: , Rfl:     melatonin 5 mg Tab, Take 5 mg by mouth as needed. , Disp: , Rfl:     multivitamin capsule, Take 1 capsule by mouth once daily.  , Disp: , Rfl:     ondansetron (ZOFRAN-ODT) 8 MG TbDL, Take 1 tablet (8 mg total) by mouth every 8 (eight) hours as needed. (Patient not taking: Reported on 10/2/2019), Disp: 6 tablet, Rfl: 0    RESTASIS 0.05 % ophthalmic emulsion, INT 1 GTT IN OU BID, Disp: , Rfl: 1    traZODone (DESYREL) 100 MG tablet, TAKE 1 TABLET BY MOUTH NIGHTLY, Disp: 90 tablet, Rfl: 1  Does patient have record of home blood pressure readings no. Readings have been averaging varies.   Last dose of  blood pressure medication was taken at 2 weeks.  Patient is symptomatic.   Complains of PVC's.    BP: (!) 162/100 ,   .    Blood pressure reading after 15 minutes was 160/100, Pulse na  Donna notified.

## 2019-10-17 NOTE — PROGRESS NOTES
Pt came in for bp check noted 132/90 rechecked after 15 minutes 160/90. Recheck left arm 160/100. Pt stated she has been having racing heart, no chest pain, no sob. Pt rescheduled tomorrow with Donna 10/18/2019 10;20 am Friday. Please advise

## 2019-10-18 ENCOUNTER — OFFICE VISIT (OUTPATIENT)
Dept: FAMILY MEDICINE | Facility: CLINIC | Age: 64
End: 2019-10-18
Payer: MEDICARE

## 2019-10-18 VITALS
OXYGEN SATURATION: 96 % | BODY MASS INDEX: 26.29 KG/M2 | WEIGHT: 163.56 LBS | DIASTOLIC BLOOD PRESSURE: 88 MMHG | HEIGHT: 66 IN | HEART RATE: 70 BPM | SYSTOLIC BLOOD PRESSURE: 138 MMHG

## 2019-10-18 DIAGNOSIS — I10 ESSENTIAL HYPERTENSION: Chronic | ICD-10-CM

## 2019-10-18 DIAGNOSIS — R06.09 DYSPNEA ON EXERTION: ICD-10-CM

## 2019-10-18 DIAGNOSIS — G47.9 SLEEP DISTURBANCE: Chronic | ICD-10-CM

## 2019-10-18 DIAGNOSIS — R00.2 PALPITATIONS: Primary | ICD-10-CM

## 2019-10-18 DIAGNOSIS — D35.00 ADRENAL ADENOMA, UNSPECIFIED LATERALITY: Chronic | ICD-10-CM

## 2019-10-18 PROCEDURE — 3079F DIAST BP 80-89 MM HG: CPT | Mod: CPTII,S$GLB,, | Performed by: PHYSICIAN ASSISTANT

## 2019-10-18 PROCEDURE — 3079F PR MOST RECENT DIASTOLIC BLOOD PRESSURE 80-89 MM HG: ICD-10-PCS | Mod: CPTII,S$GLB,, | Performed by: PHYSICIAN ASSISTANT

## 2019-10-18 PROCEDURE — 99214 PR OFFICE/OUTPT VISIT, EST, LEVL IV, 30-39 MIN: ICD-10-PCS | Mod: S$GLB,,, | Performed by: PHYSICIAN ASSISTANT

## 2019-10-18 PROCEDURE — 99999 PR PBB SHADOW E&M-EST. PATIENT-LVL III: ICD-10-PCS | Mod: PBBFAC,,, | Performed by: PHYSICIAN ASSISTANT

## 2019-10-18 PROCEDURE — 1101F PR PT FALLS ASSESS DOC 0-1 FALLS W/OUT INJ PAST YR: ICD-10-PCS | Mod: CPTII,S$GLB,, | Performed by: PHYSICIAN ASSISTANT

## 2019-10-18 PROCEDURE — 99999 PR PBB SHADOW E&M-EST. PATIENT-LVL III: CPT | Mod: PBBFAC,,, | Performed by: PHYSICIAN ASSISTANT

## 2019-10-18 PROCEDURE — 3075F PR MOST RECENT SYSTOLIC BLOOD PRESS GE 130-139MM HG: ICD-10-PCS | Mod: CPTII,S$GLB,, | Performed by: PHYSICIAN ASSISTANT

## 2019-10-18 PROCEDURE — 99214 OFFICE O/P EST MOD 30 MIN: CPT | Mod: S$GLB,,, | Performed by: PHYSICIAN ASSISTANT

## 2019-10-18 PROCEDURE — 3075F SYST BP GE 130 - 139MM HG: CPT | Mod: CPTII,S$GLB,, | Performed by: PHYSICIAN ASSISTANT

## 2019-10-18 PROCEDURE — 1101F PT FALLS ASSESS-DOCD LE1/YR: CPT | Mod: CPTII,S$GLB,, | Performed by: PHYSICIAN ASSISTANT

## 2019-10-18 NOTE — PROGRESS NOTES
"Subjective:      Patient ID: Ynes Talavera is a 64 y.o. female.    Chief Complaint: Hypertension (f/u on BP meds. Last two month had a lot of PVC's and yesterday could not get her pressure)    HPI   Patient continues to have PVCs at a higher rate for the last month.  Her blood pressure monitor cannot get pressures normally because of the rate of PVCs.  She reports new dyspnea on exertion for the last two months.  Did evaluate with EKG on 10/2/2019 with sinus rhythm with premature supraventricular complexes.    Review of Systems   Constitutional: Negative for chills and fever.   Eyes: Negative for visual disturbance.   Respiratory: Positive for shortness of breath (dyspnea on exertion).    Cardiovascular: Positive for palpitations. Negative for chest pain and leg swelling.   Gastrointestinal: Negative for abdominal pain, constipation, diarrhea, nausea and vomiting.   Neurological: Negative for dizziness, light-headedness and headaches.       Objective:   /88   Pulse 70   Ht 5' 6" (1.676 m)   Wt 74.2 kg (163 lb 9.3 oz)   SpO2 96%   BMI 26.40 kg/m²      Physical Exam   Constitutional: She is oriented to person, place, and time. Vital signs are normal. She appears well-developed and well-nourished. She is active and cooperative. No distress.   HENT:   Head: Normocephalic and atraumatic.   Right Ear: Hearing and external ear normal.   Left Ear: Hearing and external ear normal.   Nose: Nose normal.   Eyes: Conjunctivae and lids are normal.   Neck: Normal range of motion and phonation normal.   Cardiovascular: Normal rate. An irregularly irregular rhythm present. Exam reveals no gallop and no friction rub.   No murmur heard.  Pulmonary/Chest: Effort normal and breath sounds normal. No stridor. No respiratory distress. She has no decreased breath sounds. She has no wheezes. She has no rhonchi. She has no rales.   Musculoskeletal: Normal range of motion.   Neurological: She is alert and oriented to person, " place, and time.   Skin: Skin is warm, dry and intact. No rash noted.   Psychiatric: She has a normal mood and affect. Her speech is normal and behavior is normal. Judgment and thought content normal. Cognition and memory are normal.   Vitals reviewed.     Assessment:      1. Palpitations    2. Dyspnea on exertion    3. Essential hypertension    4. Adrenal adenoma, unspecified laterality    5. Sleep disturbance       Plan:   1. Palpitations  Continued at a higher rate for last month.  Please follow up with Dr. Anderson today.  Any further shortness of breath or chest pain, go to the emergency room.    2. Dyspnea on exertion  New symptom.    3. Essential hypertension  Not currently on medication.  Controlled in this visit.    4. Adrenal adenoma, unspecified laterality  Last time imaged with CT w/o contrast 2/10/2017 and found to be stable.    5. Sleep disturbance  Takes Trazodone nightly.    Follow up in the next two months with Dr. Hawkins.  Patient agreed with plan and expressed understanding.

## 2019-10-18 NOTE — PATIENT INSTRUCTIONS
Please follow up with Dr. Anderson because you continue to have palpitations.  Any further shortness of breath or chest pain, go to the emergency room.    Thanks for seeing me,  Sruthi Raya PA-C

## 2019-11-01 ENCOUNTER — DOCUMENTATION ONLY (OUTPATIENT)
Dept: FAMILY MEDICINE | Facility: CLINIC | Age: 64
End: 2019-11-01

## 2019-11-01 NOTE — PROGRESS NOTES
MARA Beasley Jr., MD Dr. Eckholdt said the splenic aneurysm will most likely stay the same size since it has been stable for this long and just recommends follow up studies at 5 year intervals.    Previous Messages      ----- Message -----   From: Grant Hawkins Jr., MD   Sent: 10/2/2019   2:45 PM CDT   To: MARA Beasley,     This is the patient I mentioned to you this morning.  My question is frequency of screening, as this is been 5 year interval with no change in the aneurysm.  I am interested in what Dr. Saxena would recommend.     Thanks

## 2019-12-04 DIAGNOSIS — I10 ESSENTIAL HYPERTENSION: ICD-10-CM

## 2019-12-05 ENCOUNTER — OFFICE VISIT (OUTPATIENT)
Dept: FAMILY MEDICINE | Facility: CLINIC | Age: 64
End: 2019-12-05
Payer: MEDICARE

## 2019-12-05 VITALS
DIASTOLIC BLOOD PRESSURE: 70 MMHG | OXYGEN SATURATION: 95 % | SYSTOLIC BLOOD PRESSURE: 116 MMHG | HEART RATE: 75 BPM | WEIGHT: 166.25 LBS | HEIGHT: 66 IN | RESPIRATION RATE: 17 BRPM | BODY MASS INDEX: 26.72 KG/M2

## 2019-12-05 DIAGNOSIS — I72.8 SPLENIC ARTERY ANEURYSM: Chronic | ICD-10-CM

## 2019-12-05 DIAGNOSIS — I10 ESSENTIAL HYPERTENSION: Primary | Chronic | ICD-10-CM

## 2019-12-05 DIAGNOSIS — D35.00 ADRENAL ADENOMA, UNSPECIFIED LATERALITY: Chronic | ICD-10-CM

## 2019-12-05 DIAGNOSIS — G47.9 SLEEP DISTURBANCE: Chronic | ICD-10-CM

## 2019-12-05 DIAGNOSIS — R00.2 PALPITATIONS WITH REGULAR CARDIAC RHYTHM: ICD-10-CM

## 2019-12-05 PROCEDURE — 99214 PR OFFICE/OUTPT VISIT, EST, LEVL IV, 30-39 MIN: ICD-10-PCS | Mod: S$GLB,,, | Performed by: EMERGENCY MEDICINE

## 2019-12-05 PROCEDURE — 99499 RISK ADDL DX/OHS AUDIT: ICD-10-PCS | Mod: S$GLB,,, | Performed by: EMERGENCY MEDICINE

## 2019-12-05 PROCEDURE — 3078F PR MOST RECENT DIASTOLIC BLOOD PRESSURE < 80 MM HG: ICD-10-PCS | Mod: CPTII,S$GLB,, | Performed by: EMERGENCY MEDICINE

## 2019-12-05 PROCEDURE — 1125F AMNT PAIN NOTED PAIN PRSNT: CPT | Mod: S$GLB,,, | Performed by: EMERGENCY MEDICINE

## 2019-12-05 PROCEDURE — 99214 OFFICE O/P EST MOD 30 MIN: CPT | Mod: S$GLB,,, | Performed by: EMERGENCY MEDICINE

## 2019-12-05 PROCEDURE — 3078F DIAST BP <80 MM HG: CPT | Mod: CPTII,S$GLB,, | Performed by: EMERGENCY MEDICINE

## 2019-12-05 PROCEDURE — 1159F MED LIST DOCD IN RCRD: CPT | Mod: S$GLB,,, | Performed by: EMERGENCY MEDICINE

## 2019-12-05 PROCEDURE — 1101F PR PT FALLS ASSESS DOC 0-1 FALLS W/OUT INJ PAST YR: ICD-10-PCS | Mod: CPTII,S$GLB,, | Performed by: EMERGENCY MEDICINE

## 2019-12-05 PROCEDURE — 3074F SYST BP LT 130 MM HG: CPT | Mod: CPTII,S$GLB,, | Performed by: EMERGENCY MEDICINE

## 2019-12-05 PROCEDURE — 1125F PR PAIN SEVERITY QUANTIFIED, PAIN PRESENT: ICD-10-PCS | Mod: S$GLB,,, | Performed by: EMERGENCY MEDICINE

## 2019-12-05 PROCEDURE — 3074F PR MOST RECENT SYSTOLIC BLOOD PRESSURE < 130 MM HG: ICD-10-PCS | Mod: CPTII,S$GLB,, | Performed by: EMERGENCY MEDICINE

## 2019-12-05 PROCEDURE — 1101F PT FALLS ASSESS-DOCD LE1/YR: CPT | Mod: CPTII,S$GLB,, | Performed by: EMERGENCY MEDICINE

## 2019-12-05 PROCEDURE — 99999 PR PBB SHADOW E&M-EST. PATIENT-LVL IV: ICD-10-PCS | Mod: PBBFAC,,, | Performed by: EMERGENCY MEDICINE

## 2019-12-05 PROCEDURE — 99499 UNLISTED E&M SERVICE: CPT | Mod: S$GLB,,, | Performed by: EMERGENCY MEDICINE

## 2019-12-05 PROCEDURE — 1159F PR MEDICATION LIST DOCUMENTED IN MEDICAL RECORD: ICD-10-PCS | Mod: S$GLB,,, | Performed by: EMERGENCY MEDICINE

## 2019-12-05 PROCEDURE — 99999 PR PBB SHADOW E&M-EST. PATIENT-LVL IV: CPT | Mod: PBBFAC,,, | Performed by: EMERGENCY MEDICINE

## 2019-12-05 RX ORDER — METOPROLOL TARTRATE 25 MG/1
TABLET, FILM COATED ORAL
COMMUNITY
End: 2022-03-07

## 2019-12-05 NOTE — PROGRESS NOTES
Subjective:   THIS NOTE IS DONE WITH VOICE RECOGNITION        Patient ID: Ynes Talavera is a 64 y.o. female.    Chief Complaint: Hypertension      HPI     Here to follow up on blood pressure.  Taking current medications.  She has reinstitute lisinopril.  As noted below, metoprolol has been added by her cardiologist.    BP Readings from Last 3 Encounters:   12/05/19 116/70   10/18/19 138/88   10/17/19 (!) 162/100     She has been experiencing palpitations and has seen her cardiologist, Dr. Carrera.  She  has restarted metoprolol tartrate, 25 mg twice a day.  Continues to have palpitations, but less frequently.  She is not experiencing chest pain or dropped inability to exercise.  She is able to work full-time.    She continues to work.  She works in a dental office.  She is thinking about what she wants to do in snf.  She has a desire to reach out to more people.    She mentions a lesion that she has on her left lower anterior leg.  She has noticed a bump that she would like us to check.    Cervical spine issues are much better after epidural steroid injection and facet injection.    Bladder urgency, even when sleeping.  This is not changing.  It is not enough of an issue for her to seek additional intervention her take additional medication.    She is planning to get a hearing aide later today.  Right ear only.    No change in social history, surgical history, medical history, or family history.  She continues to experience struggles with her  who has had a stroke, and has some issues with [place] reason regards to alcohol etc.    Immunization History   Administered Date(s) Administered    Hepatitis B 10/22/2000    Hepatitis B, Pediatric/Adolescent 10/22/2000    Influenza - High Dose - PF (65 years and older) 09/06/2019    Influenza - Quadrivalent - PF (6 months and older) 11/28/2014    Influenza Split 12/07/2012, 10/22/2013, 11/28/2014    Pneumococcal Conjugate - 13 Valent 04/30/2018     Pneumococcal Polysaccharide - 23 Valent 09/06/2019    Tdap 08/23/2018    Zoster 11/28/2014         Current Outpatient Medications   Medication Sig Dispense Refill    aspirin (ECOTRIN) 81 MG EC tablet Take 81 mg by mouth once daily.        biotin 1 mg Cap Take by mouth.      cholecalciferol, vitamin D3, (VITAMIN D3) 1,000 unit capsule Take 1,000 Units by mouth once daily.      escitalopram oxalate (LEXAPRO) 10 MG tablet Take 1 tablet (10 mg total) by mouth once daily. 90 tablet 0    Lactobacillus rhamnosus GG (CULTURELLE) 10 billion cell capsule Take 1 capsule by mouth once daily.      LUTEIN-ZEAXANTHIN ORAL Take by mouth.      magnesium 250 mg Tab Take 250 mg by mouth once daily.      melatonin 5 mg Tab Take 5 mg by mouth as needed.       metoprolol tartrate (LOPRESSOR) 25 MG tablet metoprolol tartrate 25 mg tablet      multivitamin capsule Take 1 capsule by mouth once daily.        ondansetron (ZOFRAN-ODT) 8 MG TbDL Take 1 tablet (8 mg total) by mouth every 8 (eight) hours as needed. 6 tablet 0    RESTASIS 0.05 % ophthalmic emulsion INT 1 GTT IN OU BID  1    traZODone (DESYREL) 100 MG tablet TAKE 1 TABLET BY MOUTH NIGHTLY 90 tablet 1    FLUZONE HIGH-DOSE 2019-20, PF, 180 mcg/0.5 mL Syrg       lisinopril 10 MG tablet TAKE 1 TABLET BY MOUTH ONCE DAILY (Patient not taking: Reported on 10/18/2019) 90 tablet 0     No current facility-administered medications for this visit.          Review of Systems   Constitutional: Negative for activity change, appetite change, chills, diaphoresis, fatigue, fever and unexpected weight change.   HENT: Negative for congestion, ear pain, hearing loss, rhinorrhea, trouble swallowing and voice change.    Eyes: Negative for pain and visual disturbance.   Respiratory: Negative for cough, chest tightness and shortness of breath.    Cardiovascular: Negative for chest pain, palpitations and leg swelling.   Gastrointestinal: Negative for abdominal distention, abdominal pain and  blood in stool.   Genitourinary: Negative for difficulty urinating, flank pain, frequency and urgency.        Overactive bladder symptoms, mild   Musculoskeletal: Negative for arthralgias, back pain, joint swelling, myalgias, neck pain and neck stiffness.   Skin: Negative for pallor and rash.   Neurological: Negative for dizziness, tremors, syncope, weakness and headaches.   Hematological: Negative for adenopathy.   Psychiatric/Behavioral: Negative for dysphoric mood and sleep disturbance. The patient is not nervous/anxious.        Objective:      Physical Exam   Constitutional: She is oriented to person, place, and time. She appears well-developed and well-nourished. No distress.   HENT:   Head: Normocephalic and atraumatic.   Nose: Nose normal.   Mouth/Throat: Oropharynx is clear and moist.   Eyes: Pupils are equal, round, and reactive to light. Conjunctivae and EOM are normal. No scleral icterus.   Neck: Normal range of motion. Neck supple. No JVD present. No thyromegaly present.   Cardiovascular: Normal rate, regular rhythm, normal heart sounds and intact distal pulses.  Occasional extrasystoles are present.   No murmur heard.  Pulses:       Carotid pulses are 2+ on the right side, and 2+ on the left side.       Radial pulses are 2+ on the right side, and 2+ on the left side.        Femoral pulses are 2+ on the right side, and 2+ on the left side.       Popliteal pulses are 2+ on the right side, and 2+ on the left side.        Dorsalis pedis pulses are 2+ on the right side, and 2+ on the left side.        Posterior tibial pulses are 2+ on the right side, and 2+ on the left side.   Pulmonary/Chest: Effort normal and breath sounds normal. She has no wheezes. She has no rales.   Abdominal: Soft. Bowel sounds are normal. She exhibits no distension. There is no tenderness.   Musculoskeletal: Normal range of motion. She exhibits no edema or tenderness.   Lymphadenopathy:     She has no cervical adenopathy.    Neurological: She is alert and oriented to person, place, and time. She has normal reflexes. No cranial nerve deficit. Coordination normal.   Skin: Skin is warm and dry. No rash noted. No erythema.   Psychiatric: She has a normal mood and affect. Her behavior is normal.   Vitals reviewed.      Assessment:       1. Essential hypertension    2. Palpitations with regular cardiac rhythm    3. Sleep disturbance    4. Splenic artery aneurysm    5. Adrenal adenoma, unspecified laterality        Plan:       1. Essential hypertension  Controlled.  Continue current medications  Continue to avoid excessive sodium  Continue home monitoring  Target blood pressure is 139/89 or less    2. Palpitations with regular cardiac rhythm  Addition of metoprolol has helped with decreasing frequency  Continue current medications  Lab Results   Component Value Date    TSH 1.216 04/30/2018     3. Sleep disturbance  Improved with trazo a done, not totally resolved  Continue trazodone a current level    4. Splenic artery aneurysm  2017 imaging:  A splenic artery aneurysm near the hilum of the spleen is noted a 12 mm unchanged.    Previously discussed with vascular surgery.  The fact this is been stable and is asymptomatic does not necessitate intervention.    A repeat CT a the abdomen to document interval change is recommended at 3 years, unless there are new symptoms.    5.  Adrenal adenoma, unspecified laterality    The serial CTs have demonstrated stability  No new intervention anticipated

## 2019-12-06 PROBLEM — M62.81 MUSCLE WEAKNESS: Status: RESOLVED | Noted: 2018-11-19 | Resolved: 2019-12-06

## 2019-12-06 PROBLEM — M54.2 NECK PAIN: Status: RESOLVED | Noted: 2018-11-19 | Resolved: 2019-12-06

## 2019-12-06 PROBLEM — F32.0 MAJOR DEPRESSIVE DISORDER, SINGLE EPISODE, MILD: Chronic | Status: ACTIVE | Noted: 2019-10-02

## 2019-12-06 PROBLEM — M79.605 LEFT LEG PAIN: Status: RESOLVED | Noted: 2018-11-19 | Resolved: 2019-12-06

## 2019-12-06 RX ORDER — LISINOPRIL 10 MG/1
TABLET ORAL
Qty: 90 TABLET | Refills: 3 | Status: SHIPPED | OUTPATIENT
Start: 2019-12-06 | End: 2020-02-10

## 2019-12-06 NOTE — PATIENT INSTRUCTIONS
Ynes,    Yessy to see you.  His very good to see you doing so well.    I would continue the addition of the metoprolol as per your cardiologist.  Your blood pressure today is at target.  If you're having additional palpitations or rhythm related issues we do need to know, otherwise I would not expect any problems. I would repeat your blood pressure check in 6 months.    I would recommend continue trazodone for your sleep-related issues.  While not perfect, it does appear to have helped and I would recommend continuing.    I did speak to vascular surgery about her splenic aneurysm.  The been stable over a 5 year..  Unless you have new symptoms, they recommended a follow-up CT angiogram of the abdomen to check on the aneurysm in 3 years.    Have a happy holiday season.    Grant Hawkins MD

## 2019-12-06 NOTE — PROGRESS NOTES
Refill Authorization Note     is requesting a refill authorization.    Brief assessment and rationale for refill: APPROVE: prr               Medication reconciliation completed: No                         Comments:   Requested Prescriptions   Pending Prescriptions Disp Refills    lisinopril 10 MG tablet [Pharmacy Med Name: LISINOPRIL 10MG TABLETS] 90 tablet 3     Sig: TAKE 1 TABLET BY MOUTH ONCE DAILY       Cardiovascular:  ACE Inhibitors Passed - 12/4/2019  5:03 AM        Passed - Patient is at least 18 years old        Passed - Last BP in normal range with 360 days     BP Readings from Last 3 Encounters:   12/05/19 116/70   10/18/19 138/88   10/17/19 (!) 162/100              Passed - Office visit in past 12 months or future 90 days     Recent Outpatient Visits            Yesterday Essential hypertension    [facility] Grant Hawkins Jr., MD    1 month ago Palpitations    [facility] Sruthi Raya PA-C    2 months ago Preventative health care    [facility] Sruthi Raya PA-C    3 months ago Spondylosis of cervical region without myelopathy or radiculopathy    [facility] [place] [address] Samantha Willoughby MD    4 months ago Spondylosis of cervical region without myelopathy or radiculopathy    [facility] Yamileth Pike,                     Passed - Cr is 1.4 or below and within 360 days     Creatinine   Date Value Ref Range Status   10/17/2019 0.7 0.5 - 1.4 mg/dL Final   11/24/2017 0.7 0.5 - 1.4 mg/dL Final   09/22/2016 0.8 0.5 - 1.4 mg/dL Final              Passed - K in normal range and within 360 days     Potassium   Date Value Ref Range Status   10/17/2019 4.4 3.5 - 5.1 mmol/L Final   11/24/2017 4.1 3.5 - 5.1 mmol/L Final   09/22/2016 5.2 (H) 3.5 - 5.1 mmol/L Final              Passed - eGFR within 360 days     eGFR if non    Date Value Ref Range Status   10/17/2019 >60.0 >60 mL/min/1.73 m^2 Final     Comment:      Calculation used to obtain the estimated glomerular filtration  rate (eGFR) is the CKD-EPI equation.      11/24/2017 >60.0 >60 mL/min/1.73 m^2 Final     Comment:     Calculation used to obtain the estimated glomerular filtration  rate (eGFR) is the CKD-EPI equation.      09/22/2016 >60.0 >60 mL/min/1.73 m^2 Final     Comment:     Calculation used to obtain the estimated glomerular filtration  rate (eGFR) is the CKD-EPI equation. Since race is unknown   in our information system, the eGFR values for   -American and Non--American patients are given   for each creatinine result.

## 2019-12-18 DIAGNOSIS — F32.9 REACTIVE DEPRESSION: ICD-10-CM

## 2019-12-18 RX ORDER — ESCITALOPRAM OXALATE 10 MG/1
TABLET ORAL
Qty: 90 TABLET | Refills: 1 | Status: SHIPPED | OUTPATIENT
Start: 2019-12-18 | End: 2020-07-01

## 2020-02-10 ENCOUNTER — OFFICE VISIT (OUTPATIENT)
Dept: FAMILY MEDICINE | Facility: CLINIC | Age: 65
End: 2020-02-10
Payer: MEDICARE

## 2020-02-10 ENCOUNTER — HOSPITAL ENCOUNTER (OUTPATIENT)
Dept: RADIOLOGY | Facility: HOSPITAL | Age: 65
Discharge: HOME | End: 2020-02-10
Attending: PHYSICIAN ASSISTANT
Payer: MEDICARE

## 2020-02-10 VITALS
RESPIRATION RATE: 16 BRPM | HEART RATE: 68 BPM | SYSTOLIC BLOOD PRESSURE: 144 MMHG | WEIGHT: 168.19 LBS | BODY MASS INDEX: 27.15 KG/M2 | DIASTOLIC BLOOD PRESSURE: 82 MMHG

## 2020-02-10 DIAGNOSIS — M54.2 NECK PAIN ON RIGHT SIDE: ICD-10-CM

## 2020-02-10 DIAGNOSIS — M25.511 ACUTE PAIN OF RIGHT SHOULDER: ICD-10-CM

## 2020-02-10 DIAGNOSIS — I10 ESSENTIAL HYPERTENSION: Chronic | ICD-10-CM
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
## 2020-02-10 PROCEDURE — 99999 PR PBB SHADOW E&M-EST. PATIENT-LVL III: ICD-10-PCS | Mod: PBBFAC,,, | Performed by: PHYSICIAN ASSISTANT

## 2020-02-10 PROCEDURE — 1159F MED LIST DOCD IN RCRD: CPT | Mod: S$GLB,,, | Performed by: PHYSICIAN ASSISTANT

## 2020-02-10 PROCEDURE — 73030 X-RAY EXAM OF SHOULDER: CPT | Mod: TC,FY,PO,RT

## 2020-02-10 PROCEDURE — 99214 PR OFFICE/OUTPT VISIT, EST, LEVL IV, 30-39 MIN: ICD-10-PCS | Mod: S$GLB,,, | Performed by: PHYSICIAN ASSISTANT

## 2020-02-10 PROCEDURE — 1101F PT FALLS ASSESS-DOCD LE1/YR: CPT | Mod: CPTII,S$GLB,, | Performed by: PHYSICIAN ASSISTANT

## 2020-02-10 PROCEDURE — 1101F PR PT FALLS ASSESS DOC 0-1 FALLS W/OUT INJ PAST YR: ICD-10-PCS | Mod: CPTII,S$GLB,, | Performed by: PHYSICIAN ASSISTANT

## 2020-02-10 PROCEDURE — 1159F PR MEDICATION LIST DOCUMENTED IN MEDICAL RECORD: ICD-10-PCS | Mod: S$GLB,,, | Performed by: PHYSICIAN ASSISTANT

## 2020-02-10 PROCEDURE — 1125F PR PAIN SEVERITY QUANTIFIED, PAIN PRESENT: ICD-10-PCS | Mod: S$GLB,,, | Performed by: PHYSICIAN ASSISTANT

## 2020-02-10 PROCEDURE — 99999 PR PBB SHADOW E&M-EST. PATIENT-LVL III: CPT | Mod: PBBFAC,,, | Performed by: PHYSICIAN ASSISTANT

## 2020-02-10 PROCEDURE — 3079F PR MOST RECENT DIASTOLIC BLOOD PRESSURE 80-89 MM HG: ICD-10-PCS | Mod: CPTII,S$GLB,, | Performed by: PHYSICIAN ASSISTANT

## 2020-02-10 PROCEDURE — 3077F SYST BP >= 140 MM HG: CPT | Mod: CPTII,S$GLB,, | Performed by: PHYSICIAN ASSISTANT

## 2020-02-10 PROCEDURE — 3079F DIAST BP 80-89 MM HG: CPT | Mod: CPTII,S$GLB,, | Performed by: PHYSICIAN ASSISTANT

## 2020-02-10 PROCEDURE — 73030 XR SHOULDER COMPLETE 2 OR MORE VIEWS RIGHT: ICD-10-PCS | Mod: 26,RT,, | Performed by: RADIOLOGY

## 2020-02-10 PROCEDURE — 3077F PR MOST RECENT SYSTOLIC BLOOD PRESSURE >= 140 MM HG: ICD-10-PCS | Mod: CPTII,S$GLB,, | Performed by: PHYSICIAN ASSISTANT

## 2020-02-10 PROCEDURE — 73030 X-RAY EXAM OF SHOULDER: CPT | Mod: 26,RT,, | Performed by: RADIOLOGY

## 2020-02-10 PROCEDURE — 99214 OFFICE O/P EST MOD 30 MIN: CPT | Mod: S$GLB,,, | Performed by: PHYSICIAN ASSISTANT

## 2020-02-10 PROCEDURE — 1125F AMNT PAIN NOTED PAIN PRSNT: CPT | Mod: S$GLB,,, | Performed by: PHYSICIAN ASSISTANT

## 2020-02-10 RX ORDER — TIZANIDINE 2 MG/1
2 TABLET ORAL NIGHTLY PRN
Qty: 10 TABLET | Refills: 0 | Status: SHIPPED | OUTPATIENT
Start: 2020-02-10 | End: 2020-02-20

## 2020-02-10 RX ORDER — AZITHROMYCIN 250 MG/1
250 TABLET, FILM COATED ORAL DAILY
COMMUNITY
End: 2020-07-28
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
## 2020-02-10 NOTE — PATIENT INSTRUCTIONS
Can take Zanaflex nightly as needed as a muscle relaxant.  Use heating pad on neck and shoulder.    Xray today.    CT scheduled.    Thanks for seeing me,  Sruthi Raya PA-C

## 2020-02-10 NOTE — PROGRESS NOTES
No acute fracture or dislocation seen to your right shoulder.  Some degenerative changes noted to your right shoulder joint.    Thanks,  Sruthi Raya PA-C

## 2020-02-11 ENCOUNTER — HOSPITAL ENCOUNTER (OUTPATIENT)
Dept: RADIOLOGY | Facility: HOSPITAL | Age: 65
Discharge: HOME | End: 2020-02-11
Attending: PHYSICIAN ASSISTANT
Payer: MEDICARE

## 2020-02-11 DIAGNOSIS — M54.2 NECK PAIN ON RIGHT SIDE: ICD-10-CM
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
## 2020-02-11 PROCEDURE — 72125 CT CERVICAL SPINE WITHOUT CONTRAST: ICD-10-PCS | Mod: 26,,, | Performed by: RADIOLOGY

## 2020-02-11 PROCEDURE — 72125 CT NECK SPINE W/O DYE: CPT | Mod: 26,,, | Performed by: RADIOLOGY

## 2020-02-11 PROCEDURE — 72125 CT NECK SPINE W/O DYE: CPT | Mod: TC,PO

## 2020-02-11 NOTE — PROGRESS NOTES
The CT shows that there is no obvious acute abnormality or definite change in your spine from the previous CT on 4/12/2019.  Please let me know if the pain does not continue to improve.    Thanks,  Sruthi Raya PA-C

## 2020-02-19 ENCOUNTER — TELEPHONE (OUTPATIENT)
Dept: FAMILY MEDICINE | Facility: CLINIC | Age: 65
End: 2020-02-19
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
## 2020-03-02 ENCOUNTER — OFFICE VISIT (OUTPATIENT)
Dept: URGENT CARE | Facility: CLINIC | Age: 65
End: 2020-03-02
Payer: MEDICARE

## 2020-03-02 VITALS
HEART RATE: 80 BPM | OXYGEN SATURATION: 96 % | WEIGHT: 168 LBS | SYSTOLIC BLOOD PRESSURE: 113 MMHG | RESPIRATION RATE: 18 BRPM | HEIGHT: 66 IN | DIASTOLIC BLOOD PRESSURE: 74 MMHG | TEMPERATURE: 99 F | BODY MASS INDEX: 27 KG/M2

## 2020-03-02 DIAGNOSIS — J10.1 INFLUENZA A: Primary | ICD-10-CM

## 2020-03-02 DIAGNOSIS — R68.89 FLU-LIKE SYMPTOMS: ICD-10-CM

## 2020-03-02 LAB
CTP QC/QA: YES
FLUAV AG NPH QL: POSITIVE
FLUBV AG NPH QL: NEGATIVE

## 2020-03-02 PROCEDURE — 87804 POCT INFLUENZA A/B: ICD-10-PCS | Mod: QW,S$GLB,, | Performed by: PHYSICIAN ASSISTANT

## 2020-03-02 PROCEDURE — 87804 INFLUENZA ASSAY W/OPTIC: CPT | Mod: QW,S$GLB,, | Performed by: PHYSICIAN ASSISTANT

## 2020-03-02 PROCEDURE — 99214 PR OFFICE/OUTPT VISIT, EST, LEVL IV, 30-39 MIN: ICD-10-PCS | Mod: 25,S$GLB,, | Performed by: PHYSICIAN ASSISTANT

## 2020-03-02 PROCEDURE — 99214 OFFICE O/P EST MOD 30 MIN: CPT | Mod: 25,S$GLB,, | Performed by: PHYSICIAN ASSISTANT

## 2020-03-02 RX ORDER — FLUTICASONE PROPIONATE 50 MCG
1 SPRAY, SUSPENSION (ML) NASAL 2 TIMES DAILY
Qty: 16 G | Refills: 0 | Status: SHIPPED | OUTPATIENT
Start: 2020-03-02 | End: 2020-10-08

## 2020-03-02 RX ORDER — BENZONATATE 100 MG/1
100 CAPSULE ORAL EVERY 6 HOURS PRN
Qty: 60 CAPSULE | Refills: 1 | Status: SHIPPED | OUTPATIENT
Start: 2020-03-02 | End: 2020-07-28

## 2020-03-02 NOTE — PROGRESS NOTES
"Subjective:       Patient ID: Ynes Talavera is a 64 y.o. female.    Vitals:  height is 5' 6" (1.676 m) and weight is 76.2 kg (168 lb). Her tympanic temperature is 99 °F (37.2 °C). Her blood pressure is 113/74 and her pulse is 80. Her respiration is 18 and oxygen saturation is 96%.     Chief Complaint: Chills    Pt began feeling bad 2 days. Pt states she is dizzy, headaches, chills, fever, body aches, sore throat. Pt has taken ibuprofen for fever at 5AM.     Sore Throat    This is a new problem. The current episode started yesterday. The problem has been gradually worsening. There has been no fever. Associated symptoms include coughing and headaches. Pertinent negatives include no congestion, ear pain, shortness of breath, stridor or vomiting. Treatments tried: ibuprofen. The treatment provided no relief.       Constitution: Positive for fever. Negative for chills, sweating and fatigue.   HENT: Positive for postnasal drip and sore throat (crowded feeling). Negative for ear pain, congestion, sinus pain and voice change.    Neck: Negative for painful lymph nodes.   Eyes: Negative for eye redness.   Respiratory: Positive for cough. Negative for chest tightness, sputum production, bloody sputum, COPD, shortness of breath, stridor, wheezing and asthma.    Gastrointestinal: Negative for nausea and vomiting.   Musculoskeletal: Positive for muscle ache.   Skin: Negative for rash.   Allergic/Immunologic: Positive for flu shot. Negative for seasonal allergies and asthma.   Neurological: Positive for dizziness and headaches.   Hematologic/Lymphatic: Negative for swollen lymph nodes.       Objective:      Physical Exam   Constitutional: She is oriented to person, place, and time. She appears well-developed and well-nourished. She is cooperative.  Non-toxic appearance. She does not have a sickly appearance. She does not appear ill. No distress.   HENT:   Head: Normocephalic and atraumatic.   Right Ear: Hearing, tympanic " membrane, external ear and ear canal normal.   Left Ear: Hearing, tympanic membrane, external ear and ear canal normal.   Nose: Nose normal. No mucosal edema, rhinorrhea or nasal deformity. No epistaxis. Right sinus exhibits no maxillary sinus tenderness and no frontal sinus tenderness. Left sinus exhibits no maxillary sinus tenderness and no frontal sinus tenderness.   Mouth/Throat: Uvula is midline, oropharynx is clear and moist and mucous membranes are normal. No trismus in the jaw. Normal dentition. No uvula swelling. No oropharyngeal exudate, posterior oropharyngeal edema or posterior oropharyngeal erythema.   Eyes: Conjunctivae and lids are normal. No scleral icterus.   Neck: Trachea normal, full passive range of motion without pain and phonation normal. Neck supple. No neck rigidity. No edema and no erythema present.   Cardiovascular: Normal rate, regular rhythm, normal heart sounds, intact distal pulses and normal pulses.   Pulmonary/Chest: Effort normal and breath sounds normal. No respiratory distress. She has no decreased breath sounds. She has no rhonchi.   Abdominal: Normal appearance.   Musculoskeletal: Normal range of motion. She exhibits no edema or deformity.   Neurological: She is alert and oriented to person, place, and time. She exhibits normal muscle tone. Coordination normal.   Skin: Skin is warm, dry, intact, not diaphoretic and not pale.   Psychiatric: She has a normal mood and affect. Her speech is normal and behavior is normal. Judgment and thought content normal. Cognition and memory are normal.   Nursing note and vitals reviewed.        Assessment:       1. Influenza A    2. Flu-like symptoms        Plan:         Influenza A    Flu-like symptoms  -     POCT Influenza A/B    Other orders  -     baloxavir marboxiL (XOFLUZA) 20 mg tablet; Take 2 tablets (40 mg total) by mouth once. for 1 dose  Dispense: 2 tablet; Refill: 0  -     benzonatate (TESSALON PERLES) 100 MG capsule; Take 1 capsule  (100 mg total) by mouth every 6 (six) hours as needed.  Dispense: 60 capsule; Refill: 1  -     fluticasone propionate (FLONASE ALLERGY RELIEF) 50 mcg/actuation nasal spray; 1 spray (50 mcg total) by Each Nostril route 2 (two) times daily.  Dispense: 16 g; Refill: 0      Patient Instructions       The Flu (Influenza)     The virus that causes the flu spreads through the air in droplets when someone who has the flu coughs, sneezes, laughs, or talks.   The flu (influenza) is an infection that affects your respiratory tract. This tract is made up of your mouth, nose, and lungs, and the passages between them. Unlike a cold, the flu can make you very ill. And it can lead to pneumonia, a serious lung infection. The flu can have serious complications and even cause death.  Who is at risk for the flu?  Anyone can get the flu. But you are more likely to become infected if you:  · Have a weakened immune system  · Work in a healthcare setting where you may be exposed to flu germs  · Live or work with someone who has the flu  · Havent had an annual flu shot  How does the flu spread?  The flu is caused by a virus. The virus spreads through the air in droplets when someone who has the flu coughs, sneezes, laughs, or talks. You can become infected when you inhale these viruses directly. You can also become infected when you touch a surface on which the droplets have landed and then transfer the germs to your eyes, nose, or mouth. Touching used tissues, or sharing utensils, drinking glasses, or a toothbrush from an infected person can expose you to flu viruses, too.  What are the symptoms of the flu?  Flu symptoms tend to come on quickly and may last a few days to a few weeks. They include:  · Fever usually higher than 100.4°F  (38°C) and chills  · Sore throat and headache  · Dry cough  · Runny nose  · Tiredness and weakness  · Muscle aches  Who is at risk for flu complications?  For some people, the flu can be very serious. The  risk for complications is greater for:  · Children younger than age 5  · Adults ages 65 and older  · People with a chronic illness such as diabetes or heart, kidney, or lung disease  · People who live in a nursing home or long-term care facility   How is the flu treated?  The flu usually gets better after 7 days or so. In some cases, your healthcare provider may prescribe an antiviral medicine. This may help you get well a little sooner. For the medicine to help, you need to take it as soon as possible (ideally within 48 hours) after your symptoms start. If you develop pneumonia or other serious illness, you may need to stay in the hospital.  Easing flu symptoms  · Drink lots of fluids such as water, juice, and warm soup. A good rule is to drink enough so that you urinate your normal amount.  · Get plenty of rest.  · Ask your healthcare provider what to take for fever and pain.  · Call your provider if your fever is 100.4°F (38°C) or higher, or you become dizzy, lightheaded, or short of breath.  Taking steps to protect others  · Wash your hands often, especially after coughing or sneezing. Or clean your hands with an alcohol-based hand  containing at least 60% alcohol.  · Cough or sneeze into a tissue. Then throw the tissue away and wash your hands. If you dont have a tissue, cough and sneeze into your elbow.  · Stay home until at least 24 hours after you no longer have a fever or chills. Be sure the fever isnt being hidden by fever-reducing medicine.  · Dont share food, utensils, drinking glasses, or a toothbrush with others.  · Ask your healthcare provider if others in your household should get antiviral medicine to help them avoid infection.  How can the flu be prevented?  · One of the best ways to avoid the flu is to get a flu vaccine each year. The virus that causes the flu changes from year to year. For that reason, healthcare providers recommend getting the flu vaccine each year, as soon as it's  available in your area. The vaccine is given as a shot. Your healthcare provider can tell you which vaccine is right for you. A nasal spray is also available but is not recommended for the 3564-1097 flu season. The CDC says this is because the nasal spray did not seem to protect against the flu over the last several flu seasons. In the past, it was meant for people ages 2 to 49.  · Wash your hands often. Frequent handwashing is a proven way to help prevent infection.  · Carry an alcohol-based hand gel containing at least 60% alcohol. Use it when you can't use soap and water. Then wash your hands as soon as you can.  · Avoid touching your eyes, nose, and mouth.  · At home and work, clean phones, computer keyboards, and toys often with disinfectant wipes.  · If possible, avoid close contact with others who have the flu or symptoms of the flu.  Handwashing tips  Handwashing is one of the best ways to prevent many common infections. If you are caring for or visiting someone with the flu, wash your hands each time you enter and leave the room. Follow these steps:  · Use warm water and plenty of soap. Rub your hands together well.  · Clean the whole hand, including under your nails, between your fingers, and up the wrists.  · Wash for at least 15 seconds.  · Rinse, letting the water run down your fingers, not up your wrists.  · Dry your hands well. Use a paper towel to turn off the faucet and open the door.  Using alcohol-based hand   Alcohol-based hand  are also a good choice. Use them when you can't use soap and water. Follow these steps:  · Squeeze about a tablespoon of gel into the palm of one hand.  · Rub your hands together briskly, cleaning the backs of your hands, the palms, between your fingers, and up the wrists.  · Rub until the gel is gone and your hands are completely dry.  Preventing the flu in healthcare settings  The flu is a special concern for people in hospitals and long-term care  facilities. To help prevent the spread of flu, many hospitals and nursing homes take these steps:  · Healthcare providers wash their hands or use an alcohol-based hand  before and after treating each patient.  · People with the flu have private rooms and bathrooms or share a room with someone with the same infection.  · People who are at high risk for the flu but don't have it are encouraged to get the flu and pneumonia vaccines.  · All healthcare workers are encouraged or required to get flu shots.   Date Last Reviewed: 12/1/2016  © 4884-0134 [employer]. [address] [place] [address]. All rights reserved. This information is not intended as a substitute for professional medical care. Always follow your healthcare professional's instructions.

## 2020-03-02 NOTE — PATIENT INSTRUCTIONS
The Flu (Influenza)     The virus that causes the flu spreads through the air in droplets when someone who has the flu coughs, sneezes, laughs, or talks.   The flu (influenza) is an infection that affects your respiratory tract. This tract is made up of your mouth, nose, and lungs, and the passages between them. Unlike a cold, the flu can make you very ill. And it can lead to pneumonia, a serious lung infection. The flu can have serious complications and even cause death.  Who is at risk for the flu?  Anyone can get the flu. But you are more likely to become infected if you:  · Have a weakened immune system  · Work in a healthcare setting where you may be exposed to flu germs  · Live or work with someone who has the flu  · Havent had an annual flu shot  How does the flu spread?  The flu is caused by a virus. The virus spreads through the air in droplets when someone who has the flu coughs, sneezes, laughs, or talks. You can become infected when you inhale these viruses directly. You can also become infected when you touch a surface on which the droplets have landed and then transfer the germs to your eyes, nose, or mouth. Touching used tissues, or sharing utensils, drinking glasses, or a toothbrush from an infected person can expose you to flu viruses, too.  What are the symptoms of the flu?  Flu symptoms tend to come on quickly and may last a few days to a few weeks. They include:  · Fever usually higher than 100.4°F  (38°C) and chills  · Sore throat and headache  · Dry cough  · Runny nose  · Tiredness and weakness  · Muscle aches  Who is at risk for flu complications?  For some people, the flu can be very serious. The risk for complications is greater for:  · Children younger than age 5  · Adults ages 65 and older  · People with a chronic illness such as diabetes or heart, kidney, or lung disease  · People who live in a nursing home or long-term care facility   How is the flu treated?  The flu usually gets  better after 7 days or so. In some cases, your healthcare provider may prescribe an antiviral medicine. This may help you get well a little sooner. For the medicine to help, you need to take it as soon as possible (ideally within 48 hours) after your symptoms start. If you develop pneumonia or other serious illness, you may need to stay in the hospital.  Easing flu symptoms  · Drink lots of fluids such as water, juice, and warm soup. A good rule is to drink enough so that you urinate your normal amount.  · Get plenty of rest.  · Ask your healthcare provider what to take for fever and pain.  · Call your provider if your fever is 100.4°F (38°C) or higher, or you become dizzy, lightheaded, or short of breath.  Taking steps to protect others  · Wash your hands often, especially after coughing or sneezing. Or clean your hands with an alcohol-based hand  containing at least 60% alcohol.  · Cough or sneeze into a tissue. Then throw the tissue away and wash your hands. If you dont have a tissue, cough and sneeze into your elbow.  · Stay home until at least 24 hours after you no longer have a fever or chills. Be sure the fever isnt being hidden by fever-reducing medicine.  · Dont share food, utensils, drinking glasses, or a toothbrush with others.  · Ask your healthcare provider if others in your household should get antiviral medicine to help them avoid infection.  How can the flu be prevented?  · One of the best ways to avoid the flu is to get a flu vaccine each year. The virus that causes the flu changes from year to year. For that reason, healthcare providers recommend getting the flu vaccine each year, as soon as it's available in your area. The vaccine is given as a shot. Your healthcare provider can tell you which vaccine is right for you. A nasal spray is also available but is not recommended for the 5040-5688 flu season. The CDC says this is because the nasal spray did not seem to protect against the flu  over the last several flu seasons. In the past, it was meant for people ages 2 to 49.  · Wash your hands often. Frequent handwashing is a proven way to help prevent infection.  · Carry an alcohol-based hand gel containing at least 60% alcohol. Use it when you can't use soap and water. Then wash your hands as soon as you can.  · Avoid touching your eyes, nose, and mouth.  · At home and work, clean phones, computer keyboards, and toys often with disinfectant wipes.  · If possible, avoid close contact with others who have the flu or symptoms of the flu.  Handwashing tips  Handwashing is one of the best ways to prevent many common infections. If you are caring for or visiting someone with the flu, wash your hands each time you enter and leave the room. Follow these steps:  · Use warm water and plenty of soap. Rub your hands together well.  · Clean the whole hand, including under your nails, between your fingers, and up the wrists.  · Wash for at least 15 seconds.  · Rinse, letting the water run down your fingers, not up your wrists.  · Dry your hands well. Use a paper towel to turn off the faucet and open the door.  Using alcohol-based hand   Alcohol-based hand  are also a good choice. Use them when you can't use soap and water. Follow these steps:  · Squeeze about a tablespoon of gel into the palm of one hand.  · Rub your hands together briskly, cleaning the backs of your hands, the palms, between your fingers, and up the wrists.  · Rub until the gel is gone and your hands are completely dry.  Preventing the flu in healthcare settings  The flu is a special concern for people in hospitals and long-term care facilities. To help prevent the spread of flu, many hospitals and nursing homes take these steps:  · Healthcare providers wash their hands or use an alcohol-based hand  before and after treating each patient.  · People with the flu have private rooms and bathrooms or share a room with someone  with the same infection.  · People who are at high risk for the flu but don't have it are encouraged to get the flu and pneumonia vaccines.  · All healthcare workers are encouraged or required to get flu shots.   Date Last Reviewed: 12/1/2016  © 9663-4269 [employer]. [address] [place] [address]. All rights reserved. This information is not intended as a substitute for professional medical care. Always follow your healthcare professional's instructions.

## 2020-03-09 ENCOUNTER — NURSE TRIAGE (OUTPATIENT)
Dept: ADMINISTRATIVE | Facility: CLINIC | Age: 65
End: 2020-03-09

## 2020-03-09 NOTE — TELEPHONE ENCOUNTER
Pt was seen per [facility] on Monday, 3/2/2020 and diagnosed with flu A. Pt was prescribed medication. Pt states she felt better Thursday. Pt c/o sensitive skin everywhere with joint pain to lower body that began Saturday. Pt denies redness or skin discoloration. Pt advised per protocol, pt verbalizes understanding, and pt verbalizes understanding.     Reason for Disposition   [1] MODERATE pain (e.g., interferes with normal activities) AND [2] present > 3 days    Additional Information   Negative: Shock suspected (e.g., cold/pale/clammy skin, too weak to stand, low BP, rapid pulse)   Negative: Difficult to awaken or acting confused (e.g., disoriented, slurred speech)   Negative: Sounds like a life-threatening emergency to the triager   Negative: Dark (cola colored) or red-colored urine   Negative: [1] Drinking very little AND [2] dehydration suspected (e.g., no urine > 12 hours, very dry mouth, very lightheaded)   Negative: Patient sounds very sick or weak to the triager   Negative: Fever > 104 F (40 C)   Negative: [1] Fever > 101 F (38.3 C) AND [2] age > 60   Negative: [1] Fever > 100.0 F (37.8 C) AND [2] bedridden (e.g., nursing home patient, CVA, chronic illness, recovering from surgery)   Negative: [1] Fever > 100.0 F (37.8 C) AND [2] indwelling urinary catheter (e.g., Hickman, Coude)   Negative: [1] Fever > 100.0 F (37.8 C) AND [2] diabetes mellitus or weak immune system (e.g., HIV positive, cancer chemo, splenectomy, chronic steroids)   Negative: [1] SEVERE pain AND [2] taking a statin medicine (a lipid or cholesterol lowering drug)   Negative: [1] SEVERE pain (e.g., excruciating, unable to do any normal activities) AND [2] not improved 2 hours after pain medicine   Negative: Fever present > 3 days (72 hours)   Negative: [1] Muscle aches are unexplained AND [2] occur within 1 month of a tick bite   Negative: Diabetes mellitus or weak immune system (e.g., HIV positive, cancer chemo, splenectomy, chronic  steroids)    Protocols used: MUSCLE ACHES AND BODY PAIN-A-AH

## 2020-05-02 DIAGNOSIS — G47.9 SLEEP DISTURBANCE: Chronic | ICD-10-CM

## 2020-05-04 DIAGNOSIS — G47.9 SLEEP DISTURBANCE: Chronic | ICD-10-CM

## 2020-05-04 RX ORDER — TRAZODONE HYDROCHLORIDE 100 MG/1
TABLET ORAL
Qty: 90 TABLET | Refills: 1 | Status: SHIPPED | OUTPATIENT
Start: 2020-05-04 | End: 2020-11-03 | Stop reason: SDUPTHER

## 2020-05-04 RX ORDER — TRAZODONE HYDROCHLORIDE 100 MG/1
TABLET ORAL
Qty: 30 TABLET | OUTPATIENT
Start: 2020-05-04
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
## 2020-05-05 NOTE — TELEPHONE ENCOUNTER
Refill Authorization Note     is requesting a refill authorization.    Brief assessment and rationale for refill: QUICK DC: duplicate request               Medication reconciliation completed: No                         Comments:     Pended Medication(s)   Requested Prescriptions     Refused Prescriptions Disp Refills    traZODone (DESYREL) 100 MG tablet [Pharmacy Med Name: TRAZODONE 100MG TABLETS] 30 tablet      Sig: TAKE 1 TABLET BY MOUTH NIGHTLY     Refused By: ARMANDO PRADO     Reason for Refusal: Request already responded to by other means (e.g. phone or fax)          Duplicate Pended Encounter(s)/ Last Prescribed Details:    Authorizing Provider: Grant Hawkins Jr., MD SERGIO #:  [id number] NPI:  [id number]    Ordering User:  ARMANDO PRADO            Diagnosis Association: Sleep disturbance (G47.9)      Original Order:  traZODone (DESYREL) 100 MG tablet [[id number]]      Pharmacy:  [id number] [place] [id number] AT [place] [address] [address] SERGIO #:  [id number]     Pharmacy Comments:  --          Fill quantity remaining:  -- Fill quantity used:  -- Next fill due: --       Outpatient Medication Detail      Disp Refills Start End    traZODone (DESYREL) 100 MG tablet 90 tablet 1 5/4/2020     Sig: TAKE 1 TABLET BY MOUTH NIGHTLY    Sent to pharmacy as: traZODone (DESYREL) 100 MG tablet    Notes to Pharmacy: Please inactivate all prior scripts with same name and strength including on holds.    E-Prescribing Status: Receipt confirmed by pharmacy (5/4/2020 11:52 PM CDT)    Ordering Encounter Report     Associated Reports   View Encounter                 no

## 2020-05-06 ENCOUNTER — PATIENT MESSAGE (OUTPATIENT)
Dept: ADMINISTRATIVE | Facility: HOSPITAL | Age: 65
End: 2020-05-06

## 2020-07-01 DIAGNOSIS — F32.9 REACTIVE DEPRESSION: ICD-10-CM

## 2020-07-01 RX ORDER — ESCITALOPRAM OXALATE 10 MG/1
TABLET ORAL
Qty: 90 TABLET | Refills: 1 | Status: SHIPPED | OUTPATIENT
Start: 2020-07-01 | End: 2020-12-29
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
## 2020-07-24 ENCOUNTER — PATIENT OUTREACH (OUTPATIENT)
Dept: ADMINISTRATIVE | Facility: HOSPITAL | Age: 65
End: 2020-07-24

## 2020-07-24 NOTE — PROGRESS NOTES
Chart review completed 2020.  Care Everywhere updates requested and reviewed.  Immunizations reconciled. Media reports reviewed.  Duplicate HM overrides and  orders removed.  Overdue HM topic chart audit and/or requested.  Overdue lab testing linked to upcoming lab appointments if applies.        Health Maintenance Due   Topic Date Due    Shingles Vaccine (2 of 3) 2015

## 2020-07-28 ENCOUNTER — OFFICE VISIT (OUTPATIENT)
Dept: FAMILY MEDICINE | Facility: CLINIC | Age: 65
End: 2020-07-28
Payer: MEDICARE

## 2020-07-28 VITALS
OXYGEN SATURATION: 98 % | TEMPERATURE: 98 F | HEIGHT: 66 IN | DIASTOLIC BLOOD PRESSURE: 82 MMHG | SYSTOLIC BLOOD PRESSURE: 136 MMHG | BODY MASS INDEX: 27.6 KG/M2 | WEIGHT: 171.75 LBS | HEART RATE: 69 BPM

## 2020-07-28 DIAGNOSIS — R13.10 DYSPHAGIA, UNSPECIFIED TYPE: ICD-10-CM

## 2020-07-28 DIAGNOSIS — R53.83 FATIGUE, UNSPECIFIED TYPE: Primary | ICD-10-CM

## 2020-07-28 DIAGNOSIS — R10.13 EPIGASTRIC PAIN: ICD-10-CM

## 2020-07-28 PROCEDURE — 99999 PR PBB SHADOW E&M-EST. PATIENT-LVL V: ICD-10-PCS | Mod: PBBFAC,,, | Performed by: NURSE PRACTITIONER

## 2020-07-28 PROCEDURE — 1126F AMNT PAIN NOTED NONE PRSNT: CPT | Mod: S$GLB,,, | Performed by: NURSE PRACTITIONER

## 2020-07-28 PROCEDURE — 3075F PR MOST RECENT SYSTOLIC BLOOD PRESS GE 130-139MM HG: ICD-10-PCS | Mod: CPTII,S$GLB,, | Performed by: NURSE PRACTITIONER

## 2020-07-28 PROCEDURE — 1159F PR MEDICATION LIST DOCUMENTED IN MEDICAL RECORD: ICD-10-PCS | Mod: S$GLB,,, | Performed by: NURSE PRACTITIONER

## 2020-07-28 PROCEDURE — 1101F PR PT FALLS ASSESS DOC 0-1 FALLS W/OUT INJ PAST YR: ICD-10-PCS | Mod: CPTII,S$GLB,, | Performed by: NURSE PRACTITIONER

## 2020-07-28 PROCEDURE — 99214 PR OFFICE/OUTPT VISIT, EST, LEVL IV, 30-39 MIN: ICD-10-PCS | Mod: S$GLB,,, | Performed by: NURSE PRACTITIONER

## 2020-07-28 PROCEDURE — 3008F BODY MASS INDEX DOCD: CPT | Mod: CPTII,S$GLB,, | Performed by: NURSE PRACTITIONER

## 2020-07-28 PROCEDURE — 3008F PR BODY MASS INDEX (BMI) DOCUMENTED: ICD-10-PCS | Mod: CPTII,S$GLB,, | Performed by: NURSE PRACTITIONER

## 2020-07-28 PROCEDURE — 3079F DIAST BP 80-89 MM HG: CPT | Mod: CPTII,S$GLB,, | Performed by: NURSE PRACTITIONER

## 2020-07-28 PROCEDURE — 1126F PR PAIN SEVERITY QUANTIFIED, NO PAIN PRESENT: ICD-10-PCS | Mod: S$GLB,,, | Performed by: NURSE PRACTITIONER

## 2020-07-28 PROCEDURE — 1159F MED LIST DOCD IN RCRD: CPT | Mod: S$GLB,,, | Performed by: NURSE PRACTITIONER

## 2020-07-28 PROCEDURE — 99999 PR PBB SHADOW E&M-EST. PATIENT-LVL V: CPT | Mod: PBBFAC,,, | Performed by: NURSE PRACTITIONER

## 2020-07-28 PROCEDURE — 99214 OFFICE O/P EST MOD 30 MIN: CPT | Mod: S$GLB,,, | Performed by: NURSE PRACTITIONER

## 2020-07-28 PROCEDURE — 1101F PT FALLS ASSESS-DOCD LE1/YR: CPT | Mod: CPTII,S$GLB,, | Performed by: NURSE PRACTITIONER

## 2020-07-28 PROCEDURE — 3079F PR MOST RECENT DIASTOLIC BLOOD PRESSURE 80-89 MM HG: ICD-10-PCS | Mod: CPTII,S$GLB,, | Performed by: NURSE PRACTITIONER

## 2020-07-28 PROCEDURE — 3075F SYST BP GE 130 - 139MM HG: CPT | Mod: CPTII,S$GLB,, | Performed by: NURSE PRACTITIONER

## 2020-07-28 RX ORDER — BISACODYL 5 MG
5 TABLET, DELAYED RELEASE (ENTERIC COATED) ORAL DAILY PRN
COMMUNITY
End: 2021-08-26

## 2020-07-28 RX ORDER — PANTOPRAZOLE SODIUM 40 MG/1
40 TABLET, DELAYED RELEASE ORAL DAILY
Qty: 30 TABLET | Refills: 11 | Status: SHIPPED | OUTPATIENT
Start: 2020-07-28 | End: 2020-10-08

## 2020-07-28 NOTE — PROGRESS NOTES
Subjective:       Patient ID: Ynes Talavera is a 65 y.o. female.    Chief Complaint: GI Problem    Patient has noticed increased belching, increased gas, constipation, feels as if her food is not going down, increased gerd symptoms for about a year, gradually worsneing. Last week woke up with bile in her mouth during the night. Has a history of diverticulitis, no flare in several years. Notices a stinging type pain in her LLQ throughout the day intermittently. She does have a history of a hiatal hernia.   She has tried OTC medications with little relief.  No new medications or supplements. Last colonoscopy was .   Past Medical History:  No date: Adrenal adenoma  No date: Diverticulosis of the colon  No date: Hypertension  2014: Venous insufficiency      Comment:  right leg, denies Hx clot    Past Surgical History:  No date: APPENDECTOMY  2002: BREAST BIOPSY; Right      Comment:  benign needle biopsy  2006: BREAST BIOPSY; Left      Comment:  benign needle biopsy  No date:  SECTION, LOW TRANSVERSE  No date: CHOLECYSTECTOMY  2019: EPIDURAL STEROID INJECTION INTO CERVICAL SPINE; N/A      Comment:  Procedure: Injection-steroid-epidural-cervical C7/T1;                 Surgeon: Ronnell Echeverria MD;  Location: [facility] OR;                 Service: Pain Management;  Laterality: N/A;  2019: INJECTION OF FACET JOINT; Left      Comment:  Procedure: INJECTION, FACET JOINT, C1-C2 Medial Branch;                Surgeon: Samantha Willoughby MD;  [facility];                 Service: Pain Management;  Laterality: Left;  No date: THUMB ARTHROSCOPY      Comment:  both thumbs, trigger finger release.  No date: TONSILLECTOMY  No date: TUBAL LIGATION  : UMBILICAL HERNIA REPAIR  No date: VEIN LIGATION AND STRIPPING      Comment:  Left    Review of patient's family history indicates:  Problem: Cancer      Relation: Mother          Age of Onset: (Not Specified)  Problem: Cancer      Relation: Father           Age of Onset: (Not Specified)  Problem: Heart disease      Relation: Father          Age of Onset: (Not Specified)  Problem: Hypertension      Relation: Father          Age of Onset: (Not Specified)  Problem: Diabetes      Relation: Maternal Grandmother          Age of Onset: (Not Specified)  Problem: Heart disease      Relation: Maternal Grandmother          Age of Onset: (Not Specified)  Problem: Diabetes      Relation: Maternal Grandfather          Age of Onset: (Not Specified)  Problem: Heart disease      Relation: Maternal Grandfather          Age of Onset: (Not Specified)  Problem: Breast cancer      Relation: Paternal Grandmother          Age of Onset: 42      Social History    Socioeconomic History      Marital status:       Spouse name: Not on file      Number of children: 3      Years of education: Not on file      Highest education level: Not on file    Occupational History      Occupation: Dental hygienist        Comment: Working full-time        Employer:  [employer]    Social Needs      Financial resource strain: Not hard at all      Food insecurity        Worry: Never true        Inability: Never true      Transportation needs        Medical: No        Non-medical: No    Tobacco Use      Smoking status: Former Smoker        Packs/day: 0.25        Years: 25.00        Pack years: 6.25        Quit date: 1993        Years since quittin.4      Smokeless tobacco: Never Used    Substance and Sexual Activity      Alcohol use: No        Alcohol/week: 0.0 standard drinks        Frequency: Never        Binge frequency: Never      Drug use: No      Sexual activity: Yes        Partners: Male    Lifestyle      Physical activity        Days per week: 2 days        Minutes per session: 30 min      Stress: Only a little    Relationships      Social connections        Talks on phone: More than three times a week        Gets together: More than three times a week        Attends [employer]  service: Not on file        Active member of club or organization: Yes        Attends meetings of clubs or organizations: More than 4 times per year        Relationship status:     Other Topics      Concerns:        Not on file    Social History Narrative      Her  has had a CVA. She is major support for him.            Walks regularly      Current Outpatient Medications:  aspirin (ECOTRIN) 81 MG EC tablet, Take 81 mg by mouth once daily.  , Disp: , Rfl:   biotin 1 mg Cap, Take by mouth., Disp: , Rfl:   bisacodyL (DULCOLAX) 5 mg EC tablet, Take 5 mg by mouth daily as needed for Constipation., Disp: , Rfl:   cholecalciferol, vitamin D3, (VITAMIN D3) 1,000 unit capsule, Take 1,000 Units by mouth once daily., Disp: , Rfl:   escitalopram oxalate (LEXAPRO) 10 MG tablet, TAKE 1 TABLET(10 MG) BY MOUTH EVERY DAY, Disp: 90 tablet, Rfl: 1  fluticasone propionate (FLONASE ALLERGY RELIEF) 50 mcg/actuation nasal spray, 1 spray (50 mcg total) by Each Nostril route 2 (two) times daily., Disp: 16 g, Rfl: 0  Lactobacillus rhamnosus GG (CULTURELLE) 10 billion cell capsule, Take 1 capsule by mouth once daily., Disp: , Rfl:   LUTEIN-ZEAXANTHIN ORAL, Take by mouth., Disp: , Rfl:   magnesium 250 mg Tab, Take 250 mg by mouth once daily., Disp: , Rfl:   melatonin 5 mg Tab, Take 5 mg by mouth as needed. , Disp: , Rfl:   metoprolol tartrate (LOPRESSOR) 25 MG tablet, metoprolol tartrate 25 mg tablet, Disp: , Rfl:   multivitamin capsule, Take 1 capsule by mouth once daily.  , Disp: , Rfl:   RESTASIS 0.05 % ophthalmic emulsion, INT 1 GTT IN OU BID, Disp: , Rfl: 1  traZODone (DESYREL) 100 MG tablet, TAKE 1 TABLET BY MOUTH NIGHTLY, Disp: 90 tablet, Rfl: 1    No current facility-administered medications for this visit.       Review of patient's allergies indicates:  No Known Allergies    Review of Systems   Constitutional: Positive for unexpected weight change.   HENT: Positive for trouble swallowing. Negative for nasal congestion,  nosebleeds and sore throat.    Respiratory: Negative.  Negative for cough and shortness of breath.    Cardiovascular: Negative.  Negative for chest pain and claudication.   Gastrointestinal: Positive for constipation and reflux.        Belching, flatulence   Genitourinary: Negative.    Musculoskeletal: Negative for back pain.   Neurological: Negative.          Objective:      Physical Exam  Constitutional:       General: She is not in acute distress.     Appearance: She is not ill-appearing.   HENT:      Head: Normocephalic and atraumatic.      Mouth/Throat:      Mouth: Mucous membranes are moist.   Eyes:      Pupils: Pupils are equal, round, and reactive to light.   Cardiovascular:      Rate and Rhythm: Normal rate and regular rhythm.   Pulmonary:      Effort: Pulmonary effort is normal. No respiratory distress.   Abdominal:      General: Bowel sounds are normal.      Palpations: Abdomen is soft.      Tenderness: There is abdominal tenderness.   Musculoskeletal:         General: No swelling.   Skin:     Findings: No rash.   Psychiatric:         Mood and Affect: Mood normal.         Behavior: Behavior normal.         Assessment:       1. Fatigue, unspecified type    2. Epigastric pain    3. Dysphagia, unspecified type        Plan:       1. Epigastric pain  Protonix daily. Testing scheduled. PCP follow up 3 months. GI follow up based on results of testing and response to treatment.   - CT Abdomen With Contrast; Future  - Case request GI: COLONOSCOPY  - TSH; Future  - Comprehensive metabolic panel; Future  - Case request GI: ESOPHAGOGASTRODUODENOSCOPY (EGD)  - CBC auto differential; Future    2. Fatigue, unspecified type    - TSH; Future  - CBC auto differential; Future    3. Dysphagia, unspecified type    - Case request GI: ESOPHAGOGASTRODUODENOSCOPY (EGD)

## 2020-07-29 ENCOUNTER — TELEPHONE (OUTPATIENT)
Dept: GASTROENTEROLOGY | Facility: CLINIC | Age: 65
End: 2020-07-29

## 2020-07-30 ENCOUNTER — PATIENT MESSAGE (OUTPATIENT)
Dept: GASTROENTEROLOGY | Facility: CLINIC | Age: 65
End: 2020-07-30

## 2020-07-30 ENCOUNTER — HOSPITAL ENCOUNTER (OUTPATIENT)
Dept: RADIOLOGY | Facility: HOSPITAL | Age: 65
Discharge: HOME | End: 2020-07-30
Attending: NURSE PRACTITIONER
Payer: MEDICARE

## 2020-07-30 DIAGNOSIS — R10.13 EPIGASTRIC PAIN: ICD-10-CM

## 2020-07-30 DIAGNOSIS — Z01.812 PRE-PROCEDURE LAB EXAM: ICD-10-CM

## 2020-07-30 PROCEDURE — A9698 NON-RAD CONTRAST MATERIALNOC: HCPCS | Mod: PO | Performed by: NURSE PRACTITIONER

## 2020-07-30 PROCEDURE — 74160 CT ABDOMEN WITH CONTRAST: ICD-10-PCS | Mod: 26,,, | Performed by: RADIOLOGY

## 2020-07-30 PROCEDURE — 74160 CT ABDOMEN W/CONTRAST: CPT | Mod: 26,,, | Performed by: RADIOLOGY

## 2020-07-30 PROCEDURE — 74160 CT ABDOMEN W/CONTRAST: CPT | Mod: TC,PO

## 2020-07-30 PROCEDURE — 25500020 PHARM REV CODE 255: Mod: PO | Performed by: NURSE PRACTITIONER

## 2020-07-30 RX ADMIN — IOHEXOL 75 ML: 350 INJECTION, SOLUTION INTRAVENOUS at 10:07

## 2020-07-30 RX ADMIN — IOHEXOL 500 ML: 9 SOLUTION ORAL at 10:07

## 2020-07-31 ENCOUNTER — PATIENT MESSAGE (OUTPATIENT)
Dept: GASTROENTEROLOGY | Facility: CLINIC | Age: 65
End: 2020-07-31

## 2020-08-03 ENCOUNTER — TELEPHONE (OUTPATIENT)
Dept: FAMILY MEDICINE | Facility: CLINIC | Age: 65
End: 2020-08-03

## 2020-08-03 NOTE — TELEPHONE ENCOUNTER
----- Message from Olivia Reyes sent at 8/3/2020  1:31 PM CDT -----  Type:  Patient Returning Call    Who Called:  Ynes  Who Left Message for Patient:  Steph  Does the patient know what this is regarding?:  results of CT and labs  Best Call Back Number:  [phone] She asks that you call her around 5:10 today.  She cannot answer the phone while at work  Additional Information:  thank you!

## 2020-08-07 ENCOUNTER — TELEPHONE (OUTPATIENT)
Dept: GASTROENTEROLOGY | Facility: CLINIC | Age: 65
End: 2020-08-07

## 2020-08-07 DIAGNOSIS — Z01.812 PRE-PROCEDURE LAB EXAM: ICD-10-CM

## 2020-08-17 ENCOUNTER — LAB VISIT (OUTPATIENT)
Dept: FAMILY MEDICINE | Facility: CLINIC | Age: 65
End: 2020-08-17
Payer: MEDICARE

## 2020-08-17 DIAGNOSIS — Z01.812 PRE-PROCEDURE LAB EXAM: ICD-10-CM

## 2020-08-17 PROCEDURE — U0003 INFECTIOUS AGENT DETECTION BY NUCLEIC ACID (DNA OR RNA); SEVERE ACUTE RESPIRATORY SYNDROME CORONAVIRUS 2 (SARS-COV-2) (CORONAVIRUS DISEASE [COVID-19]), AMPLIFIED PROBE TECHNIQUE, MAKING USE OF HIGH THROUGHPUT TECHNOLOGIES AS DESCRIBED BY CMS-2020-01-R: HCPCS

## 2020-08-18 LAB — SARS-COV-2 RNA RESP QL NAA+PROBE: NOT DETECTED

## 2020-08-20 ENCOUNTER — HOSPITAL ENCOUNTER (OUTPATIENT)
Facility: HOSPITAL | Age: 65
Discharge: HOME | End: 2020-08-20
Attending: INTERNAL MEDICINE | Admitting: INTERNAL MEDICINE
Payer: MEDICARE

## 2020-08-20 ENCOUNTER — ANESTHESIA (OUTPATIENT)
Dept: ENDOSCOPY | Facility: HOSPITAL | Age: 65
End: 2020-08-20
Payer: MEDICARE

## 2020-08-20 ENCOUNTER — ANESTHESIA EVENT (OUTPATIENT)
Dept: ENDOSCOPY | Facility: HOSPITAL | Age: 65
End: 2020-08-20
Payer: MEDICARE

## 2020-08-20 VITALS
DIASTOLIC BLOOD PRESSURE: 74 MMHG | RESPIRATION RATE: 16 BRPM | TEMPERATURE: 98 F | HEART RATE: 68 BPM | OXYGEN SATURATION: 98 % | SYSTOLIC BLOOD PRESSURE: 140 MMHG

## 2020-08-20 DIAGNOSIS — R13.10 DYSPHAGIA: ICD-10-CM

## 2020-08-20 PROCEDURE — 25000003 PHARM REV CODE 250: Mod: PO | Performed by: NURSE ANESTHETIST, CERTIFIED REGISTERED

## 2020-08-20 PROCEDURE — 43248 PR EGD, FLEX, W/DILATION OVER GUIDEWIRE: ICD-10-PCS | Mod: ,,, | Performed by: INTERNAL MEDICINE

## 2020-08-20 PROCEDURE — 43248 EGD GUIDE WIRE INSERTION: CPT | Mod: ,,, | Performed by: INTERNAL MEDICINE

## 2020-08-20 PROCEDURE — 43239 PR EGD, FLEX, W/BIOPSY, SGL/MULTI: ICD-10-PCS | Mod: 59,,, | Performed by: INTERNAL MEDICINE

## 2020-08-20 PROCEDURE — 88305 TISSUE EXAM BY PATHOLOGIST: ICD-10-PCS | Mod: 26,,, | Performed by: PATHOLOGY

## 2020-08-20 PROCEDURE — D9220A PRA ANESTHESIA: Mod: ANES,,, | Performed by: ANESTHESIOLOGY

## 2020-08-20 PROCEDURE — 27201012 HC FORCEPS, HOT/COLD, DISP: Mod: PO | Performed by: INTERNAL MEDICINE

## 2020-08-20 PROCEDURE — 63600175 PHARM REV CODE 636 W HCPCS: Mod: PO | Performed by: INTERNAL MEDICINE

## 2020-08-20 PROCEDURE — D9220A PRA ANESTHESIA: ICD-10-PCS | Mod: ANES,,, | Performed by: ANESTHESIOLOGY

## 2020-08-20 PROCEDURE — 43239 EGD BIOPSY SINGLE/MULTIPLE: CPT | Mod: 59,,, | Performed by: INTERNAL MEDICINE

## 2020-08-20 PROCEDURE — 63600175 PHARM REV CODE 636 W HCPCS: Mod: PO | Performed by: NURSE ANESTHETIST, CERTIFIED REGISTERED

## 2020-08-20 PROCEDURE — 88305 TISSUE EXAM BY PATHOLOGIST: CPT | Performed by: PATHOLOGY

## 2020-08-20 PROCEDURE — 88305 TISSUE EXAM BY PATHOLOGIST: CPT | Mod: 26,,, | Performed by: PATHOLOGY

## 2020-08-20 PROCEDURE — 37000009 HC ANESTHESIA EA ADD 15 MINS: Mod: PO | Performed by: INTERNAL MEDICINE

## 2020-08-20 PROCEDURE — D9220A PRA ANESTHESIA: Mod: CRNA,,, | Performed by: NURSE ANESTHETIST, CERTIFIED REGISTERED

## 2020-08-20 PROCEDURE — 43239 EGD BIOPSY SINGLE/MULTIPLE: CPT | Mod: PO | Performed by: INTERNAL MEDICINE

## 2020-08-20 PROCEDURE — D9220A PRA ANESTHESIA: ICD-10-PCS | Mod: CRNA,,, | Performed by: NURSE ANESTHETIST, CERTIFIED REGISTERED

## 2020-08-20 PROCEDURE — 37000008 HC ANESTHESIA 1ST 15 MINUTES: Mod: PO | Performed by: INTERNAL MEDICINE

## 2020-08-20 PROCEDURE — 43248 EGD GUIDE WIRE INSERTION: CPT | Mod: PO | Performed by: INTERNAL MEDICINE

## 2020-08-20 RX ORDER — PROPOFOL 10 MG/ML
VIAL (ML) INTRAVENOUS
Status: DISCONTINUED | OUTPATIENT
Start: 2020-08-20 | End: 2020-08-20

## 2020-08-20 RX ORDER — SODIUM CHLORIDE 0.9 % (FLUSH) 0.9 %
10 SYRINGE (ML) INJECTION
Status: DISCONTINUED | OUTPATIENT
Start: 2020-08-20 | End: 2020-08-20 | Stop reason: HOSPADM

## 2020-08-20 RX ORDER — LIDOCAINE HCL/PF 100 MG/5ML
SYRINGE (ML) INTRAVENOUS
Status: DISCONTINUED | OUTPATIENT
Start: 2020-08-20 | End: 2020-08-20

## 2020-08-20 RX ORDER — SODIUM CHLORIDE, SODIUM LACTATE, POTASSIUM CHLORIDE, CALCIUM CHLORIDE 600; 310; 30; 20 MG/100ML; MG/100ML; MG/100ML; MG/100ML
INJECTION, SOLUTION INTRAVENOUS CONTINUOUS
Status: DISCONTINUED | OUTPATIENT
Start: 2020-08-20 | End: 2020-08-20 | Stop reason: HOSPADM

## 2020-08-20 RX ORDER — FAMOTIDINE 40 MG/1
40 TABLET, FILM COATED ORAL DAILY
Qty: 30 TABLET | Refills: 2 | Status: SHIPPED | OUTPATIENT
Start: 2020-08-20 | End: 2021-08-26

## 2020-08-20 RX ADMIN — PROPOFOL 25 MG: 10 INJECTION, EMULSION INTRAVENOUS at 08:08

## 2020-08-20 RX ADMIN — PROPOFOL 25 MG: 10 INJECTION, EMULSION INTRAVENOUS at 07:08

## 2020-08-20 RX ADMIN — PROPOFOL 100 MG: 10 INJECTION, EMULSION INTRAVENOUS at 07:08

## 2020-08-20 RX ADMIN — PROPOFOL 50 MG: 10 INJECTION, EMULSION INTRAVENOUS at 08:08

## 2020-08-20 RX ADMIN — SODIUM CHLORIDE, SODIUM LACTATE, POTASSIUM CHLORIDE, AND CALCIUM CHLORIDE: .6; .31; .03; .02 INJECTION, SOLUTION INTRAVENOUS at 07:08

## 2020-08-20 RX ADMIN — PROPOFOL 50 MG: 10 INJECTION, EMULSION INTRAVENOUS at 07:08

## 2020-08-20 RX ADMIN — LIDOCAINE HYDROCHLORIDE 100 MG: 20 INJECTION, SOLUTION INTRAVENOUS at 07:08

## 2020-08-20 NOTE — ANESTHESIA POSTPROCEDURE EVALUATION
Anesthesia Post Evaluation    Patient: Ynes Talavera    Procedure(s) Performed: Procedure(s) (LRB):  ESOPHAGOGASTRODUODENOSCOPY (EGD) (N/A)    Final Anesthesia Type: general    Patient location during evaluation: PACU  Patient participation: Yes- Able to Participate  Level of consciousness: sedated and awake  Post-procedure vital signs: reviewed and stable  Pain management: adequate  Airway patency: patent    PONV status at discharge: No PONV  Anesthetic complications: no      Cardiovascular status: blood pressure returned to baseline and hypertensive  Respiratory status: spontaneous ventilation  Hydration status: euvolemic  Follow-up not needed.          Vitals Value Taken Time   /74 08/20/20 0830   Temp 36.4 °C (97.5 °F) 08/20/20 0810   Pulse 68 08/20/20 0830   Resp 16 08/20/20 0830   SpO2 98 % 08/20/20 0830         Event Time   Out of Recovery 08:46:32         Pain/Kirill Score: Kirill Score: 10 (8/20/2020  8:40 AM)

## 2020-08-20 NOTE — [employer] PATIENT INSTRUCTIONS
Discharge Summary/Instructions after an Endoscopic Procedure  Patient Name: Ynes Talavera  Patient MRN: [id number]  Patient YOB: 1955  Thursday, August 20, 2020  Neville Galvan MD  RESTRICTIONS:  During your procedure today, you received medications for sedation.  These   medications may affect your judgment, balance and coordination.  Therefore,   for 24 hours, you have the following restrictions:   - DO NOT drive a car, operate machinery, make legal/financial decisions,   sign important papers or drink alcohol.    ACTIVITY:  Today: no heavy lifting, straining or running due to procedural   sedation/anesthesia.  The following day: return to full activity including work.  DIET:  Eat and drink normally unless instructed otherwise.     TREATMENT FOR COMMON SIDE EFFECTS:  - Mild abdominal pain, nausea, belching, bloating or excessive gas:  rest,   eat lightly and use a heating pad.  - Sore Throat: treat with throat lozenges and/or gargle with warm salt   water.  - Because air was used during the procedure, expelling large amounts of air   from your rectum or belching is normal.  - If a bowel prep was taken, you may not have a bowel movement for 1-3 days.    This is normal.  SYMPTOMS TO WATCH FOR AND REPORT TO YOUR PHYSICIAN:  1. Abdominal pain or bloating, other than gas cramps.  2. Chest pain.  3. Back pain.  4. Signs of infection such as: chills or fever occurring within 24 hours   after the procedure.  5. Rectal bleeding, which would show as bright red, maroon, or black stools.   (A tablespoon of blood from the rectum is not serious, especially if   hemorrhoids are present.)  6. Vomiting.  7. Weakness or dizziness.  GO DIRECTLY TO THE NEAREST EMERGENCY ROOM IF YOU HAVE ANY OF THE FOLLOWING:      Difficulty breathing              Chills and/or fever over 101 F   Persistent vomiting and/or vomiting blood   Severe abdominal pain   Severe chest pain   Black, tarry stools   Bleeding- more than one  tablespoon   Any other symptom or condition that you feel may need urgent attention  Your doctor recommends these additional instructions:  If any biopsies were taken, your doctors clinic will contact you in 1 to 2   weeks with any results.  We are waiting for your pathology results.   Continue your present medications.   You are being discharged to home.  For questions, problems or results please call your physician - Neville Galvan MD at Work:  [phone].  EMERGENCY PHONE NUMBER: [phone], LAB RESULTS: [phone]  IF A COMPLICATION OR EMERGENCY SITUATION ARISES AND YOU ARE UNABLE TO REACH   YOUR PHYSICIAN - GO DIRECTLY TO THE EMERGENCY ROOM.  ___________________________________________  Nurse Signature  ___________________________________________  Patient/Designated Responsible Party Signature  Neville Galvan MD  8/20/2020 8:08:50 AM  This report has been verified and signed electronically.  [employer]

## 2020-08-20 NOTE — DISCHARGE SUMMARY
Discharge Note  Short Stay      SUMMARY     Admit Date: 8/20/2020    Attending Physician: Neville Galvan MD     Discharge Physician: Neville Galvan MD    Discharge Date: 8/20/2020 8:09 AM    Final Diagnosis: Epigastric pain [R10.13]  Dysphagia, unspecified type [R13.10]    Disposition: HOME OR SELF CARE    Patient Instructions:   Current Discharge Medication List      START taking these medications    Details   famotidine (PEPCID) 40 MG tablet Take 1 tablet (40 mg total) by mouth once daily.  Qty: 30 tablet, Refills: 2         CONTINUE these medications which have NOT CHANGED    Details   aspirin (ECOTRIN) 81 MG EC tablet Take 81 mg by mouth once daily.        biotin 1 mg Cap Take by mouth.      bisacodyL (DULCOLAX) 5 mg EC tablet Take 5 mg by mouth daily as needed for Constipation.      cholecalciferol, vitamin D3, (VITAMIN D3) 1,000 unit capsule Take 1,000 Units by mouth once daily.      escitalopram oxalate (LEXAPRO) 10 MG tablet TAKE 1 TABLET(10 MG) BY MOUTH EVERY DAY  Qty: 90 tablet, Refills: 1    Associated Diagnoses: Reactive depression      fluticasone propionate (FLONASE ALLERGY RELIEF) 50 mcg/actuation nasal spray 1 spray (50 mcg total) by Each Nostril route 2 (two) times daily.  Qty: 16 g, Refills: 0      Lactobacillus rhamnosus GG (CULTURELLE) 10 billion cell capsule Take 1 capsule by mouth once daily.      LUTEIN-ZEAXANTHIN ORAL Take by mouth.      magnesium 250 mg Tab Take 250 mg by mouth once daily.      melatonin 5 mg Tab Take 5 mg by mouth as needed.       metoprolol tartrate (LOPRESSOR) 25 MG tablet metoprolol tartrate 25 mg tablet      multivitamin capsule Take 1 capsule by mouth once daily.        pantoprazole (PROTONIX) 40 MG tablet Take 1 tablet (40 mg total) by mouth once daily.  Qty: 30 tablet, Refills: 11    Associated Diagnoses: Epigastric pain      RESTASIS 0.05 % ophthalmic emulsion INT 1 GTT IN OU BID  Refills: 1      traZODone (DESYREL) 100 MG tablet TAKE 1 TABLET BY MOUTH  NIGHTLY  Qty: 90 tablet, Refills: 1    Comments: Please inactivate all prior scripts with same name and strength including on holds.  Associated Diagnoses: Sleep disturbance             Discharge Procedure Orders (must include Diet, Follow-up, Activity)    Follow Up:  Follow up with PCP as previously scheduled  Resume routine diet.  Activity as tolerated.    No driving day of procedure.

## 2020-08-20 NOTE — ANESTHESIA PREPROCEDURE EVALUATION
08/20/2020  Ynes Talavera is a 65 y.o., female.    Anesthesia Evaluation    I have reviewed the Patient Summary Reports.    I have reviewed the Nursing Notes. I have reviewed the NPO Status.   I have reviewed the Medications.     Review of Systems  Cardiovascular:   Hypertension, well controlled    Pulmonary:   Sleep Apnea, CPAP Uses oral appliance   Hepatic/GI:   GERD    Musculoskeletal:   Arthritis         Physical Exam  General:  Well nourished    Airway/Jaw/Neck:  Airway Findings: Mouth Opening: Normal Tongue: Normal  General Airway Assessment: Adult  Mallampati: III  Improves to II with phonation.      Dental:  Dental Findings: In tact   Chest/Lungs:  Chest/Lungs Findings: Clear to auscultation, Normal Respiratory Rate         Mental Status:  Mental Status Findings:  Cooperative, Alert and Oriented         Anesthesia Plan  Type of Anesthesia, risks & benefits discussed:  Anesthesia Type:  general  Patient's Preference:   Intra-op Monitoring Plan: standard ASA monitors  Intra-op Monitoring Plan Comments:   Post Op Pain Control Plan:   Post Op Pain Control Plan Comments:   Induction:   IV  Beta Blocker:  Patient is on a Beta-Blocker and has received one dose within the past 24 hours (No further documentation required).       Informed Consent: Patient understands risks and agrees with Anesthesia plan.  Questions answered.   ASA Score: 2     Day of Surgery Review of History & Physical:            Ready For Surgery From Anesthesia Perspective.

## 2020-08-20 NOTE — H&P
History & Physical - Short Stay  Gastroenterology      SUBJECTIVE:     Procedure: EGD    Chief Complaint/Indication for Procedure: Dysphagia, Epigastric Pain and Reflux    PTA Medications   Medication Sig    aspirin (ECOTRIN) 81 MG EC tablet Take 81 mg by mouth once daily.      biotin 1 mg Cap Take by mouth.    bisacodyL (DULCOLAX) 5 mg EC tablet Take 5 mg by mouth daily as needed for Constipation.    cholecalciferol, vitamin D3, (VITAMIN D3) 1,000 unit capsule Take 1,000 Units by mouth once daily.    escitalopram oxalate (LEXAPRO) 10 MG tablet TAKE 1 TABLET(10 MG) BY MOUTH EVERY DAY    fluticasone propionate (FLONASE ALLERGY RELIEF) 50 mcg/actuation nasal spray 1 spray (50 mcg total) by Each Nostril route 2 (two) times daily.    Lactobacillus rhamnosus GG (CULTURELLE) 10 billion cell capsule Take 1 capsule by mouth once daily.    LUTEIN-ZEAXANTHIN ORAL Take by mouth.    magnesium 250 mg Tab Take 250 mg by mouth once daily.    melatonin 5 mg Tab Take 5 mg by mouth as needed.     metoprolol tartrate (LOPRESSOR) 25 MG tablet metoprolol tartrate 25 mg tablet    multivitamin capsule Take 1 capsule by mouth once daily.      pantoprazole (PROTONIX) 40 MG tablet Take 1 tablet (40 mg total) by mouth once daily.    RESTASIS 0.05 % ophthalmic emulsion INT 1 GTT IN OU BID    traZODone (DESYREL) 100 MG tablet TAKE 1 TABLET BY MOUTH NIGHTLY       Review of patient's allergies indicates:  No Known Allergies     Past Medical History:   Diagnosis Date    Adrenal adenoma     Diverticulosis of the colon     Hypertension     REJI (obstructive sleep apnea)     uses cpap    Venous insufficiency 2014    right leg, denies Hx clot     Past Surgical History:   Procedure Laterality Date    APPENDECTOMY      BREAST BIOPSY Right     benign needle biopsy    BREAST BIOPSY Left     benign needle biopsy     SECTION, LOW TRANSVERSE      CHOLECYSTECTOMY      EPIDURAL STEROID INJECTION INTO CERVICAL SPINE  N/A 2019    Procedure: Injection-steroid-epidural-cervical C7/T1;  Surgeon: Ronnell Echeverria MD;  Location: [facility] OR;  Service: Pain Management;  Laterality: N/A;    INJECTION OF FACET JOINT Left 2019    Procedure: INJECTION, FACET JOINT, C1-C2 Medial Branch;  Surgeon: Samantha Willoughby MD;  [facility];  Service: Pain Management;  Laterality: Left;    THUMB ARTHROSCOPY      both thumbs, trigger finger release.    TONSILLECTOMY      TUBAL LIGATION      UMBILICAL HERNIA REPAIR      VEIN LIGATION AND STRIPPING      Left     Family History   Problem Relation Age of Onset    Cancer Mother     Cancer Father     Heart disease Father     Hypertension Father     Diabetes Maternal Grandmother     Heart disease Maternal Grandmother     Diabetes Maternal Grandfather     Heart disease Maternal Grandfather     Breast cancer Paternal Grandmother 42     Social History     Tobacco Use    Smoking status: Former Smoker     Packs/day: 0.25     Years: 25.00     Pack years: 6.25     Quit date: 1993     Years since quittin.5    Smokeless tobacco: Never Used   Substance Use Topics    Alcohol use: No     Alcohol/week: 0.0 standard drinks     Frequency: Never     Binge frequency: Never    Drug use: No         OBJECTIVE:     Vital Signs (Most Recent)  Temp: 97.7 °F (36.5 °C) (20)  Pulse: 66 (20)  Resp: 16 (20)  BP: (!) 159/68 (20)  SpO2: 98 % (20)    Physical Exam:                                                       GENERAL:  Comfortable, in no acute distress.                                 HEENT EXAM:  Nonicteric.  No adenopathy.  Oropharynx is clear.               NECK:  Supple.                                                               LUNGS:  Clear.                                                               CARDIAC:  Regular rate and rhythm.  S1, S2.  No murmur.                      ABDOMEN:  Soft, positive bowel sounds,  nontender.  No hepatosplenomegaly or masses.  No rebound or guarding.                                             EXTREMITIES:  No edema.     MENTAL STATUS:  Normal, alert and oriented.      ASSESSMENT/PLAN:     Assessment: Dysphagia, Epigastric Pain and Reflux    Plan: EGD    Anesthesia Plan: General    ASA Grade: ASA 2 - Patient with mild systemic disease with no functional limitations    MALLAMPATI SCORE:  I (soft palate, uvula, fauces, and tonsillar pillars visible)     In my medical opinion and judgment, this medical or surgical procedure was not able to be safely postponed in accordance with [place] [facility], [facility] Notice #2020-COVID19-ALL-007.

## 2020-08-20 NOTE — TRANSFER OF CARE
Anesthesia Transfer of Care Note    Patient: Ynes Talavera    Procedure(s) Performed: Procedure(s) (LRB):  ESOPHAGOGASTRODUODENOSCOPY (EGD) (N/A)    Patient location: PACU    Anesthesia Type: general    Transport from OR: Transported from OR on room air with adequate spontaneous ventilation    Post pain: adequate analgesia    Post assessment: no apparent anesthetic complications and tolerated procedure well    Post vital signs: stable    Level of consciousness: sedated and responds to stimulation    Nausea/Vomiting: no nausea/vomiting    Complications: none    Transfer of care protocol was followed      Last vitals:   Visit Vitals  /74 (BP Location: Left arm, Patient Position: Lying)   Pulse 64   Temp 36.4 °C (97.5 °F) (Skin)   Resp 16   SpO2 98%   Breastfeeding No

## 2020-08-20 NOTE — DISCHARGE INSTRUCTIONS
Esophageal Dilation     A balloon dilator may be used to widen a stricture in the esophagus.   An esophageal dilation is a procedure used to widen a narrowed section of your esophagus. This is the tube that leads from your throat to your stomach. Narrowing (stricture) of the esophagus can cause problems. These include trouble swallowing. This sheet explains what to expect with esophageal dilation.  Why esophageal dilation is needed  Several problems can be treated with esophageal dilation. They include:  · Peptic stricture. This is caused by reflux esophagitis. With this problem, the esophagus is irritated by acid reflux (heartburn). This occurs when acid from your stomach flows back up into the esophagus. Stomach acid damages the lining of the esophagus. This leads to a buildup of scar tissue. As a result, the esophagus is narrowed.  · Schatzkis ring. This is an abnormal ring of tissue. It forms where the esophagus meets the stomach. It can cause trouble swallowing. It can also cause food to get stuck in the esophagus. The cause of this condition is not known.  · Achalasia. This condition stops food and liquids from moving into your stomach from the esophagus. It affects the lower esophageal sphincter (LES). The LES is a muscular ring that opens (relaxes) when you swallow. With achalasia, the LES does not relax. This condition can also cause problems with peristalsis. This is the normal muscular action of the esophagus that moves food into the stomach.  · Eosinophilic esophagitis. This is a redness and swelling (inflammation) in the esophagus. It is caused by an environmental trigger such as a food allergy. It can lead to pain, trouble swallowing, and strictures.  · Less common causes of stricture. Other causes of stricture include radiation treatment and cancer.  Before you have esophageal dilation  · Tell your provider about any medicines you take. This includes prescription medicines, over-the-counter  medicines, herbs, vitamins, and other supplements. Be sure to mention aspirin or any blood thinners youre taking.  · Let your provider know if you need to take antibiotics before dental procedures. You may need to take them before esophageal dilation as well.  · Tell your provider about any health conditions you have, such as heart or lung disease. Also mention any allergies to medicines.  · Youll need to have an empty stomach for the procedure. Follow your providers instructions for not eating and drinking before the procedure.  · Arrange to have a family member or friend drive you home after the procedure.  During the procedure  · You may be given local anesthesia to numb your throat. Youll also likely be given medicine to relax you. The procedure takes about 15 minutes. It does not cause trouble breathing.  · A tube called an endoscope (scope) is used. This is a narrow tube with a tiny light and camera at the end. The scope is inserted through your mouth and into your esophagus. It lets your provider see inside your esophagus. To help guide your provider, an imaging method called fluoroscopy may also be used. This creates a moving X-ray image on a computer screen.   · Next, special tiny tools are carefully guided through your mouth and down into the esophagus. They widen the stricture and are then removed. Different types of instruments are used. The type used depends on the size and cause of the stricture. Types include:  ¨ Balloon dilator. A tiny empty balloon is put into the stricture using an endoscope. The balloon is slowly filled with air. The air is removed from the balloon when the stricture is widened to the right size. Balloon dilators are used to treat many types of strictures.  ¨ Guided wire dilator. A thin wire is eased down the esophagus. A small tube thats wider on one end is guided down the wire. It is put into the stricture to stretch it. These dilators are used to treat all kinds of  strictures.  ¨ Bougies. These are weighted, cone-shaped tubes. Starting with smaller cones, your provider uses increasingly larger cones until the stricture is stretched the right amount. Bougies are often used to treat strictures that are simple (short, straight, and not very narrow).  After the procedure  · Youll be watched closely until your provider says youre ready to go home. Youll need to have a friend or family member drive you home.  · You may have a sore throat for the rest of the day.  · You may have pain behind your breastbone for a short time afterwards.  · You can start drinking fluids again after the numbness in your throat goes away. You can resume eating the same day or the next day.  Risks and possible complications  Risks and possible complications for esophageal dilation include:  · Infection  · A tear or hole in the esophagus lining, causing bleeding and possibly needing surgery to fix  · Risks of anesthesia  Follow-up  You may need to have the procedure repeated one or more times. This depends on the cause and extent of the narrowing. Repeat procedures can allow the dilation to take place more slowly. This reduces the risks of the procedure.  If your stricture was caused by reflux esophagitis, youll likely need to take medicine to treat that condition. Your provider will tell you more.  When to call your provider  Call your healthcare provider right away if you have any of the following after the procedure:  · Fever of 100.4°F (38.0°C)  · Chest pain  · Trouble swallowing  · Vomiting blood or material that looks like coffee grounds  · Bleeding  · Black, tarry, or bloody stools   Date Last Reviewed: 7/1/2016 © 2000-2017 [employer]. [address] [place] [address], [place] [address]. All rights reserved. This information is not intended as a substitute for professional medical care. Always follow your healthcare professional's instructions.

## 2020-08-24 LAB
FINAL PATHOLOGIC DIAGNOSIS: NORMAL
GROSS: NORMAL

## 2020-08-25 ENCOUNTER — PATIENT MESSAGE (OUTPATIENT)
Dept: ENDOSCOPY | Facility: HOSPITAL | Age: 65
End: 2020-08-25

## 2020-08-27 ENCOUNTER — PATIENT MESSAGE (OUTPATIENT)
Dept: FAMILY MEDICINE | Facility: CLINIC | Age: 65
End: 2020-08-27

## 2020-08-31 ENCOUNTER — LAB VISIT (OUTPATIENT)
Dept: FAMILY MEDICINE | Facility: CLINIC | Age: 65
End: 2020-08-31
Payer: MEDICARE

## 2020-08-31 DIAGNOSIS — Z01.812 PRE-PROCEDURE LAB EXAM: ICD-10-CM

## 2020-08-31 PROCEDURE — U0003 INFECTIOUS AGENT DETECTION BY NUCLEIC ACID (DNA OR RNA); SEVERE ACUTE RESPIRATORY SYNDROME CORONAVIRUS 2 (SARS-COV-2) (CORONAVIRUS DISEASE [COVID-19]), AMPLIFIED PROBE TECHNIQUE, MAKING USE OF HIGH THROUGHPUT TECHNOLOGIES AS DESCRIBED BY CMS-2020-01-R: HCPCS

## 2020-09-01 LAB — SARS-COV-2 RNA RESP QL NAA+PROBE: NOT DETECTED

## 2020-09-03 ENCOUNTER — ANESTHESIA EVENT (OUTPATIENT)
Dept: ENDOSCOPY | Facility: HOSPITAL | Age: 65
End: 2020-09-03
Payer: MEDICARE

## 2020-09-03 ENCOUNTER — ANESTHESIA (OUTPATIENT)
Dept: ENDOSCOPY | Facility: HOSPITAL | Age: 65
End: 2020-09-03
Payer: MEDICARE

## 2020-09-03 ENCOUNTER — HOSPITAL ENCOUNTER (OUTPATIENT)
Facility: HOSPITAL | Age: 65
Discharge: HOME | End: 2020-09-03
Attending: INTERNAL MEDICINE | Admitting: INTERNAL MEDICINE
Payer: MEDICARE

## 2020-09-03 VITALS
SYSTOLIC BLOOD PRESSURE: 126 MMHG | BODY MASS INDEX: 27 KG/M2 | WEIGHT: 168 LBS | RESPIRATION RATE: 13 BRPM | HEART RATE: 67 BPM | TEMPERATURE: 98 F | DIASTOLIC BLOOD PRESSURE: 78 MMHG | OXYGEN SATURATION: 96 % | HEIGHT: 66 IN

## 2020-09-03 DIAGNOSIS — R19.4 CHANGE IN BOWEL HABITS: ICD-10-CM

## 2020-09-03 PROCEDURE — 88305 TISSUE EXAM BY PATHOLOGIST: CPT | Performed by: PATHOLOGY

## 2020-09-03 PROCEDURE — D9220A PRA ANESTHESIA: Mod: CRNA,,, | Performed by: NURSE ANESTHETIST, CERTIFIED REGISTERED

## 2020-09-03 PROCEDURE — 88305 TISSUE EXAM BY PATHOLOGIST: ICD-10-PCS | Mod: 26,,, | Performed by: PATHOLOGY

## 2020-09-03 PROCEDURE — 37000009 HC ANESTHESIA EA ADD 15 MINS: Mod: PO | Performed by: INTERNAL MEDICINE

## 2020-09-03 PROCEDURE — 88305 TISSUE EXAM BY PATHOLOGIST: CPT | Mod: 26,,, | Performed by: PATHOLOGY

## 2020-09-03 PROCEDURE — 27201089 HC SNARE, DISP (ANY): Mod: PO | Performed by: INTERNAL MEDICINE

## 2020-09-03 PROCEDURE — D9220A PRA ANESTHESIA: ICD-10-PCS | Mod: CRNA,,, | Performed by: NURSE ANESTHETIST, CERTIFIED REGISTERED

## 2020-09-03 PROCEDURE — 63600175 PHARM REV CODE 636 W HCPCS: Mod: PO | Performed by: NURSE ANESTHETIST, CERTIFIED REGISTERED

## 2020-09-03 PROCEDURE — 45385 COLONOSCOPY W/LESION REMOVAL: CPT | Mod: PO | Performed by: INTERNAL MEDICINE

## 2020-09-03 PROCEDURE — 45385 COLONOSCOPY W/LESION REMOVAL: CPT | Mod: ,,, | Performed by: INTERNAL MEDICINE

## 2020-09-03 PROCEDURE — 63600175 PHARM REV CODE 636 W HCPCS: Mod: PO | Performed by: INTERNAL MEDICINE

## 2020-09-03 PROCEDURE — 25000003 PHARM REV CODE 250: Mod: PO | Performed by: NURSE ANESTHETIST, CERTIFIED REGISTERED

## 2020-09-03 PROCEDURE — D9220A PRA ANESTHESIA: ICD-10-PCS | Mod: ANES,,, | Performed by: ANESTHESIOLOGY

## 2020-09-03 PROCEDURE — 45385 PR COLONOSCOPY,REMV LESN,SNARE: ICD-10-PCS | Mod: ,,, | Performed by: INTERNAL MEDICINE

## 2020-09-03 PROCEDURE — 37000008 HC ANESTHESIA 1ST 15 MINUTES: Mod: PO | Performed by: INTERNAL MEDICINE

## 2020-09-03 PROCEDURE — D9220A PRA ANESTHESIA: Mod: ANES,,, | Performed by: ANESTHESIOLOGY

## 2020-09-03 RX ORDER — PROPOFOL 10 MG/ML
VIAL (ML) INTRAVENOUS
Status: DISCONTINUED | OUTPATIENT
Start: 2020-09-03 | End: 2020-09-03

## 2020-09-03 RX ORDER — SODIUM CHLORIDE, SODIUM LACTATE, POTASSIUM CHLORIDE, CALCIUM CHLORIDE 600; 310; 30; 20 MG/100ML; MG/100ML; MG/100ML; MG/100ML
INJECTION, SOLUTION INTRAVENOUS CONTINUOUS
Status: DISCONTINUED | OUTPATIENT
Start: 2020-09-03 | End: 2020-09-03 | Stop reason: HOSPADM

## 2020-09-03 RX ORDER — LIDOCAINE HCL/PF 100 MG/5ML
SYRINGE (ML) INTRAVENOUS
Status: DISCONTINUED | OUTPATIENT
Start: 2020-09-03 | End: 2020-09-03

## 2020-09-03 RX ORDER — SODIUM CHLORIDE 0.9 % (FLUSH) 0.9 %
10 SYRINGE (ML) INJECTION
Status: DISCONTINUED | OUTPATIENT
Start: 2020-09-03 | End: 2020-09-03 | Stop reason: HOSPADM

## 2020-09-03 RX ADMIN — PROPOFOL 30 MG: 10 INJECTION, EMULSION INTRAVENOUS at 08:09

## 2020-09-03 RX ADMIN — PROPOFOL 30 MG: 10 INJECTION, EMULSION INTRAVENOUS at 07:09

## 2020-09-03 RX ADMIN — SODIUM CHLORIDE, SODIUM LACTATE, POTASSIUM CHLORIDE, AND CALCIUM CHLORIDE: .6; .31; .03; .02 INJECTION, SOLUTION INTRAVENOUS at 07:09

## 2020-09-03 RX ADMIN — LIDOCAINE HYDROCHLORIDE 100 MG: 20 INJECTION, SOLUTION INTRAVENOUS at 07:09

## 2020-09-03 NOTE — ANESTHESIA PREPROCEDURE EVALUATION
09/03/2020  Ynes Talavera is a 65 y.o., female.    Pre-op Assessment    I have reviewed the Patient Summary Reports.     I have reviewed the Nursing Notes. I have reviewed the NPO Status.   I have reviewed the Medications.     Review of Systems  Social:  Non-Smoker    Cardiovascular:   Exercise tolerance: good Hypertension, well controlled    Pulmonary:   Sleep Apnea, CPAP Uses oral appliance   Hepatic/GI:   Bowel Prep. GERD, well controlled    Musculoskeletal:   Arthritis   Spine Disorders: lumbar    Neurological:   Neuromuscular Disease,   Peripheral Neuropathy    Endocrine:  Endocrine Normal        Physical Exam  General:  Well nourished    Airway/Jaw/Neck:  Airway Findings: Mouth Opening: Normal Tongue: Normal  General Airway Assessment: Adult  Mallampati: III  Improves to II with phonation.      Dental:  Dental Findings: In tact   Chest/Lungs:  Chest/Lungs Findings: Clear to auscultation, Normal Respiratory Rate         Mental Status:  Mental Status Findings:  Cooperative, Alert and Oriented         Anesthesia Plan  Type of Anesthesia, risks & benefits discussed:  Anesthesia Type:  general  Patient's Preference:   Intra-op Monitoring Plan: standard ASA monitors  Intra-op Monitoring Plan Comments:   Post Op Pain Control Plan:   Post Op Pain Control Plan Comments:   Induction:   IV  Beta Blocker:  Patient is on a Beta-Blocker and has received one dose within the past 24 hours (No further documentation required).       Informed Consent: Patient understands risks and agrees with Anesthesia plan.  Questions answered.   ASA Score: 2     Day of Surgery Review of History & Physical:            Ready For Surgery From Anesthesia Perspective.

## 2020-09-03 NOTE — [employer] PATIENT INSTRUCTIONS
Discharge Summary/Instructions after an Endoscopic Procedure  Patient Name: Ynes Talavera  Patient MRN: [id number]  Patient YOB: 1955  Thursday, September 3, 2020  Neville Galvan MD  RESTRICTIONS:  During your procedure today, you received medications for sedation.  These   medications may affect your judgment, balance and coordination.  Therefore,   for 24 hours, you have the following restrictions:   - DO NOT drive a car, operate machinery, make legal/financial decisions,   sign important papers or drink alcohol.    ACTIVITY:  Today: no heavy lifting, straining or running due to procedural   sedation/anesthesia.  The following day: return to full activity including work.  DIET:  Eat and drink normally unless instructed otherwise.     TREATMENT FOR COMMON SIDE EFFECTS:  - Mild abdominal pain, nausea, belching, bloating or excessive gas:  rest,   eat lightly and use a heating pad.  - Sore Throat: treat with throat lozenges and/or gargle with warm salt   water.  - Because air was used during the procedure, expelling large amounts of air   from your rectum or belching is normal.  - If a bowel prep was taken, you may not have a bowel movement for 1-3 days.    This is normal.  SYMPTOMS TO WATCH FOR AND REPORT TO YOUR PHYSICIAN:  1. Abdominal pain or bloating, other than gas cramps.  2. Chest pain.  3. Back pain.  4. Signs of infection such as: chills or fever occurring within 24 hours   after the procedure.  5. Rectal bleeding, which would show as bright red, maroon, or black stools.   (A tablespoon of blood from the rectum is not serious, especially if   hemorrhoids are present.)  6. Vomiting.  7. Weakness or dizziness.  GO DIRECTLY TO THE NEAREST EMERGENCY ROOM IF YOU HAVE ANY OF THE FOLLOWING:      Difficulty breathing              Chills and/or fever over 101 F   Persistent vomiting and/or vomiting blood   Severe abdominal pain   Severe chest pain   Black, tarry stools   Bleeding- more than one  tablespoon   Any other symptom or condition that you feel may need urgent attention  Your doctor recommends these additional instructions:  If any biopsies were taken, your doctors clinic will contact you in 1 to 2   weeks with any results.  We are waiting for your pathology results.   Your physician has recommended a repeat colonoscopy in five years for   surveillance based on pathology results.   You are being discharged to home.  For questions, problems or results please call your physician - Neville Galvan MD at Work:  [phone].  EMERGENCY PHONE NUMBER: [phone], LAB RESULTS: [phone]  IF A COMPLICATION OR EMERGENCY SITUATION ARISES AND YOU ARE UNABLE TO REACH   YOUR PHYSICIAN - GO DIRECTLY TO THE EMERGENCY ROOM.  ___________________________________________  Nurse Signature  ___________________________________________  Patient/Designated Responsible Party Signature  Neville Galvan MD  9/3/2020 8:19:06 AM  This report has been verified and signed electronically.  [employer]

## 2020-09-03 NOTE — ANESTHESIA POSTPROCEDURE EVALUATION
Anesthesia Post Evaluation    Patient: Ynes Talavera    Procedure(s) Performed: Procedure(s) (LRB):  COLONOSCOPY (N/A)    Final Anesthesia Type: general    Patient location during evaluation: PACU  Patient participation: Yes- Able to Participate  Level of consciousness: awake and alert and oriented  Post-procedure vital signs: reviewed and stable  Pain management: adequate  Airway patency: patent    PONV status at discharge: No PONV  Anesthetic complications: no      Cardiovascular status: blood pressure returned to baseline  Respiratory status: unassisted, spontaneous ventilation and room air  Hydration status: euvolemic  Follow-up not needed.          Vitals Value Taken Time   /70 09/03/20 0834   Temp 36.4 °C (97.5 °F) 09/03/20 0820   Pulse 68 09/03/20 0834   Resp 13 09/03/20 0834   SpO2 98 % 09/03/20 0834         No case tracking events are documented in the log.      Pain/Kirill Score: Kirill Score: 6 (9/3/2020  8:20 AM)

## 2020-09-03 NOTE — DISCHARGE SUMMARY
Discharge Note  Short Stay      SUMMARY     Admit Date: 9/3/2020    Attending Physician: Neville Galvan MD     Discharge Physician: Neville Galvan MD    Discharge Date: 9/3/2020 8:20 AM    Final Diagnosis: Epigastric pain [R10.13]    Disposition: HOME OR SELF CARE    Patient Instructions:   Current Discharge Medication List      CONTINUE these medications which have NOT CHANGED    Details   aspirin (ECOTRIN) 81 MG EC tablet Take 81 mg by mouth once daily.        biotin 1 mg Cap Take by mouth.      bisacodyL (DULCOLAX) 5 mg EC tablet Take 5 mg by mouth daily as needed for Constipation.      cholecalciferol, vitamin D3, (VITAMIN D3) 1,000 unit capsule Take 1,000 Units by mouth once daily.      escitalopram oxalate (LEXAPRO) 10 MG tablet TAKE 1 TABLET(10 MG) BY MOUTH EVERY DAY  Qty: 90 tablet, Refills: 1    Associated Diagnoses: Reactive depression      famotidine (PEPCID) 40 MG tablet Take 1 tablet (40 mg total) by mouth once daily.  Qty: 30 tablet, Refills: 2      Lactobacillus rhamnosus GG (CULTURELLE) 10 billion cell capsule Take 1 capsule by mouth once daily.      LUTEIN-ZEAXANTHIN ORAL Take by mouth.      magnesium 250 mg Tab Take 250 mg by mouth once daily.      melatonin 5 mg Tab Take 5 mg by mouth as needed.       metoprolol tartrate (LOPRESSOR) 25 MG tablet metoprolol tartrate 25 mg tablet      multivitamin capsule Take 1 capsule by mouth once daily.        pantoprazole (PROTONIX) 40 MG tablet Take 1 tablet (40 mg total) by mouth once daily.  Qty: 30 tablet, Refills: 11    Associated Diagnoses: Epigastric pain      RESTASIS 0.05 % ophthalmic emulsion INT 1 GTT IN OU BID  Refills: 1      traZODone (DESYREL) 100 MG tablet TAKE 1 TABLET BY MOUTH NIGHTLY  Qty: 90 tablet, Refills: 1    Comments: Please inactivate all prior scripts with same name and strength including on holds.  Associated Diagnoses: Sleep disturbance      fluticasone propionate (FLONASE ALLERGY RELIEF) 50 mcg/actuation nasal spray 1  spray (50 mcg total) by Each Nostril route 2 (two) times daily.  Qty: 16 g, Refills: 0             Discharge Procedure Orders (must include Diet, Follow-up, Activity)    Follow Up:  Follow up with PCP as previously scheduled  Resume routine diet.  Activity as tolerated.    No driving day of procedure.

## 2020-09-03 NOTE — H&P
History & Physical - Short Stay  Gastroenterology      SUBJECTIVE:     Procedure: Colonoscopy    Chief Complaint/Indication for Procedure: Abdominal Pain and Change in Bowel Habits    PTA Medications   Medication Sig    aspirin (ECOTRIN) 81 MG EC tablet Take 81 mg by mouth once daily.      biotin 1 mg Cap Take by mouth.    bisacodyL (DULCOLAX) 5 mg EC tablet Take 5 mg by mouth daily as needed for Constipation.    cholecalciferol, vitamin D3, (VITAMIN D3) 1,000 unit capsule Take 1,000 Units by mouth once daily.    escitalopram oxalate (LEXAPRO) 10 MG tablet TAKE 1 TABLET(10 MG) BY MOUTH EVERY DAY    famotidine (PEPCID) 40 MG tablet Take 1 tablet (40 mg total) by mouth once daily.    Lactobacillus rhamnosus GG (CULTURELLE) 10 billion cell capsule Take 1 capsule by mouth once daily.    LUTEIN-ZEAXANTHIN ORAL Take by mouth.    magnesium 250 mg Tab Take 250 mg by mouth once daily.    melatonin 5 mg Tab Take 5 mg by mouth as needed.     metoprolol tartrate (LOPRESSOR) 25 MG tablet metoprolol tartrate 25 mg tablet    multivitamin capsule Take 1 capsule by mouth once daily.      pantoprazole (PROTONIX) 40 MG tablet Take 1 tablet (40 mg total) by mouth once daily.    RESTASIS 0.05 % ophthalmic emulsion INT 1 GTT IN OU BID    traZODone (DESYREL) 100 MG tablet TAKE 1 TABLET BY MOUTH NIGHTLY    fluticasone propionate (FLONASE ALLERGY RELIEF) 50 mcg/actuation nasal spray 1 spray (50 mcg total) by Each Nostril route 2 (two) times daily.       Review of patient's allergies indicates:  No Known Allergies     Past Medical History:   Diagnosis Date    Adrenal adenoma     Diverticulosis of the colon     Hypertension     REJI (obstructive sleep apnea)     uses cpap    Venous insufficiency 2014    right leg, denies Hx clot     Past Surgical History:   Procedure Laterality Date    APPENDECTOMY      BREAST BIOPSY Right 2002    benign needle biopsy    BREAST BIOPSY Left 2006    benign needle biopsy      SECTION, LOW TRANSVERSE      CHOLECYSTECTOMY      EPIDURAL STEROID INJECTION INTO CERVICAL SPINE N/A 2019    Procedure: Injection-steroid-epidural-cervical C7/T1;  Surgeon: Ronnell Echeverria MD;  [facility];  Service: Pain Management;  Laterality: N/A;    ESOPHAGOGASTRODUODENOSCOPY N/A 2020    Procedure: ESOPHAGOGASTRODUODENOSCOPY (EGD);  Surgeon: Neville Galvan MD;  Location: [facility] ENDO;  Service: Endoscopy;  Laterality: N/A;    INJECTION OF FACET JOINT Left 2019    Procedure: INJECTION, FACET JOINT, C1-C2 Medial Branch;  Surgeon: Samantha Willoughby MD;  Location: [facility] PAIN MGT;  Service: Pain Management;  Laterality: Left;    THUMB ARTHROSCOPY      both thumbs, trigger finger release.    TONSILLECTOMY      TUBAL LIGATION      UMBILICAL HERNIA REPAIR      VEIN LIGATION AND STRIPPING      Left     Family History   Problem Relation Age of Onset    Cancer Mother     Cancer Father     Heart disease Father     Hypertension Father     Diabetes Maternal Grandmother     Heart disease Maternal Grandmother     Diabetes Maternal Grandfather     Heart disease Maternal Grandfather     Breast cancer Paternal Grandmother 42     Social History     Tobacco Use    Smoking status: Former Smoker     Packs/day: 0.25     Years: 25.00     Pack years: 6.25     Quit date: 1993     Years since quittin.5    Smokeless tobacco: Never Used   Substance Use Topics    Alcohol use: No     Alcohol/week: 0.0 standard drinks     Frequency: Never     Binge frequency: Never    Drug use: No         OBJECTIVE:     Vital Signs (Most Recent)  Temp: 97.7 °F (36.5 °C) (20)  Resp: 16 (20)  BP: (!) 148/73 (20)  SpO2: 98 % (20)    Physical Exam:                                                       GENERAL:  Comfortable, in no acute distress.                                 HEENT EXAM:  Nonicteric.  No adenopathy.  Oropharynx is clear.               NECK:  Supple.                                                                LUNGS:  Clear.                                                               CARDIAC:  Regular rate and rhythm.  S1, S2.  No murmur.                      ABDOMEN:  Soft, positive bowel sounds, nontender.  No hepatosplenomegaly or masses.  No rebound or guarding.                                             EXTREMITIES:  No edema.     MENTAL STATUS:  Normal, alert and oriented.      ASSESSMENT/PLAN:     Assessment: Abdominal Pain and Change in Bowel Habits    Plan: Colonoscopy    Anesthesia Plan: General    ASA Grade: ASA 2 - Patient with mild systemic disease with no functional limitations    MALLAMPATI SCORE:  I (soft palate, uvula, fauces, and tonsillar pillars visible)     In my medical opinion and judgment, this medical or surgical procedure was not able to be safely postponed in accordance with [place] [facility], [facility] Notice #[id number].

## 2020-09-10 LAB
FINAL PATHOLOGIC DIAGNOSIS: NORMAL
GROSS: NORMAL

## 2020-09-15 ENCOUNTER — PATIENT MESSAGE (OUTPATIENT)
Dept: GASTROENTEROLOGY | Facility: CLINIC | Age: 65
End: 2020-09-15

## 2020-10-05 ENCOUNTER — OFFICE VISIT (OUTPATIENT)
Dept: URGENT CARE | Facility: CLINIC | Age: 65
End: 2020-10-05
Payer: MEDICARE

## 2020-10-05 VITALS
TEMPERATURE: 98 F | RESPIRATION RATE: 20 BRPM | HEART RATE: 75 BPM | HEIGHT: 66 IN | SYSTOLIC BLOOD PRESSURE: 140 MMHG | BODY MASS INDEX: 27 KG/M2 | OXYGEN SATURATION: 97 % | DIASTOLIC BLOOD PRESSURE: 77 MMHG | WEIGHT: 168 LBS

## 2020-10-05 DIAGNOSIS — R93.89 ABNORMAL X-RAY: ICD-10-CM

## 2020-10-05 DIAGNOSIS — S20.212A CONTUSION OF RIB ON LEFT SIDE, INITIAL ENCOUNTER: Primary | ICD-10-CM

## 2020-10-05 DIAGNOSIS — R52 PAIN: ICD-10-CM

## 2020-10-05 PROCEDURE — 71100 X-RAY EXAM RIBS UNI 2 VIEWS: CPT | Mod: LT,S$GLB,, | Performed by: RADIOLOGY

## 2020-10-05 PROCEDURE — 99213 OFFICE O/P EST LOW 20 MIN: CPT | Mod: S$GLB,,, | Performed by: PHYSICIAN ASSISTANT

## 2020-10-05 PROCEDURE — 99213 PR OFFICE/OUTPT VISIT, EST, LEVL III, 20-29 MIN: ICD-10-PCS | Mod: S$GLB,,, | Performed by: PHYSICIAN ASSISTANT

## 2020-10-05 PROCEDURE — 71100 XR RIBS 2 VIEW LEFT: ICD-10-PCS | Mod: LT,S$GLB,, | Performed by: RADIOLOGY

## 2020-10-05 NOTE — PROGRESS NOTES
"Subjective:       Patient ID: Ynes Talavera is a 65 y.o. female.    Vitals:  height is 5' 6" (1.676 m) and weight is 76.2 kg (168 lb). Her temperature is 97.9 °F (36.6 °C). Her blood pressure is 140/77 (abnormal) and her pulse is 75. Her respiration is 20 and oxygen saturation is 97%.     Chief Complaint: Pain    Patient presents to clinic with complaints of pain and soreness in her ribs on the right side and sore on her left elbow. She states that she fell on Friday and when she fell her necklace pendant pressed deeply into her ribs and recently when she felt her ribs on that side she states that she felt something move.     Pain  This is a new problem. The current episode started in the past 7 days. The problem occurs constantly. The problem has been gradually worsening. Pertinent negatives include no abdominal pain, anorexia, arthralgias, change in bowel habit, chest pain, chills, congestion, coughing, diaphoresis, fatigue, fever, headaches, joint swelling, myalgias, nausea, neck pain, numbness, rash, sore throat, swollen glands, urinary symptoms, vertigo, visual change, vomiting or weakness. Nothing aggravates the symptoms. She has tried nothing for the symptoms.       Constitution: Negative for chills, sweating, fatigue and fever.   HENT: Negative for facial swelling, facial trauma, congestion and sore throat.    Neck: Negative for neck pain and neck stiffness.   Cardiovascular: Negative for chest trauma and chest pain.   Eyes: Negative for eye trauma, double vision and blurred vision.   Respiratory: Negative for cough.    Gastrointestinal: Negative for abdominal trauma, abdominal pain, nausea, vomiting and rectal bleeding.   Genitourinary: Negative for hematuria, missed menses, genital trauma and pelvic pain.   Musculoskeletal: Positive for pain and trauma. Negative for joint pain, joint swelling, abnormal ROM of joint and muscle ache.   Skin: Negative for color change, rash, wound, abrasion, laceration, " erythema and bruising.   Neurological: Negative for dizziness, history of vertigo, light-headedness, coordination disturbances, headaches, altered mental status, loss of consciousness and numbness.   Hematologic/Lymphatic: Negative for history of bleeding disorder.   Psychiatric/Behavioral: Negative for altered mental status.       Objective:      Physical Exam   Constitutional: She is oriented to person, place, and time. She appears well-developed. She does not appear ill. No distress.   HENT:   Head: Normocephalic and atraumatic.   Ears:   Right Ear: External ear normal.   Left Ear: External ear normal.   Mouth/Throat: Oropharynx is clear and moist.   Eyes: Conjunctivae, EOM and lids are normal. Right eye exhibits no discharge. Left eye exhibits no discharge. extraocular movement intact  Neck: Trachea normal, full passive range of motion without pain and phonation normal. Neck supple.   Pulmonary/Chest: Effort normal and breath sounds normal. No respiratory distress. She has no wheezes. She has no rales. She exhibits tenderness (left lower rib pain, no ecchymosis or deformity noted).   Abdominal: Normal appearance.   Musculoskeletal: Normal range of motion.   Neurological: She is alert and oriented to person, place, and time.   Skin: Skin is warm, dry, intact and no rash. erythemaPsychiatric: Her speech is normal and behavior is normal. Mood, judgment and thought content normal.   Nursing note and vitals reviewed.        Assessment:       1. Contusion of rib on left side, initial encounter    2. Abnormal x-ray    3. Pain        Plan:         Contusion of rib on left side, initial encounter    Abnormal x-ray    Pain  -     X-Ray Ribs 2 View Left; Future; Expected date: 10/05/2020    X-ray Ribs 2 View Left    Result Date: 10/5/2020  EXAMINATION: XR RIBS 2 VIEW LEFT CLINICAL HISTORY: Pain, unspecified TECHNIQUE: Two views of the left ribs were performed. COMPARISON: 08/25/2020 FINDINGS: Four views left ribs. There is  osteopenia.  There are remote appearing injuries of right posterolateral ribs 6, 7, and 8.  There is a possible acute injury involving the distal aspect of right rib 9 although not confirmed.  Correlation with focal tenderness is recommended.  Degenerative changes are noted of the spine.  The visualized lung zones are grossly clear.     1. Possible acute fracture of distal left posterolateral rib 9 noting remote appearing rib injuries involving posterolateral right ribs 6, 7, and 8.  Correlation is recommended. Electronically signed by: Torres Dolan MD Date:    10/05/2020 Time:    18:54     X-ray read after patient left. Possible 9th rib fracture. Attempted to call patient with results, no answer.    Patient Instructions     Rib Contusion     A rib contusion is a bruise to one or more rib bones. It may cause pain, tenderness, swelling and a purplish discoloration. There may be a sharp pain while breathing.  You will be assessed for other injuries. You will likely be given pain medicine. Rib contusions heal on their own, without further treatment. However, pain may take weeks to months to go away.   Note that a small crack (fracture) in the rib may cause the same symptoms as a rib contusion. The small crack may not be seen on a chest X-ray. However, the conditions are managed in the same way.  Home care  · Rest. Avoid heavy lifting, strenuous exertion, or any activity that causes pain.  · Ice the area to reduce pain and swelling. Put ice cubes in a plastic bag or use a cold pack. (Wrap the cold source in a thin towel. Do not place it directly on your skin.) Ice the injured area for 20 minutes every 1 to 2 hours the first day. Continue with ice packs 3 to 4 times a day for the next 2 days, then as needed for the relief of pain and swelling.  · Take any prescribed pain medicine as directed by your healthcare provider. If none was prescribed, take acetaminophen, ibuprofen, or naproxen to control pain.  · If you have  a significant injury, you may be given a device called an incentive spirometer to keep your lungs healthy. Use as directed.  Follow-up care  Follow up with your healthcare provider during the next week or as directed.  When to seek medical advice  Call your healthcare provider for any of the following:  · Shortness of breath or trouble breathing  · Increasing chest pain with breathing  · Coughing  · Dizziness, weakness, or fainting  · New or worsening pain  · Fever of 100.4°F (38ºC) or higher, or as directed by your healthcare provider  Date Last Reviewed: 2/1/2017  © 5108-2117 [employer]. [address] [place] [id number]. All rights reserved. This information is not intended as a substitute for professional medical care. Always follow your healthcare professional's instructions.

## 2020-10-05 NOTE — PATIENT INSTRUCTIONS
Rib Contusion     A rib contusion is a bruise to one or more rib bones. It may cause pain, tenderness, swelling and a purplish discoloration. There may be a sharp pain while breathing.  You will be assessed for other injuries. You will likely be given pain medicine. Rib contusions heal on their own, without further treatment. However, pain may take weeks to months to go away.   Note that a small crack (fracture) in the rib may cause the same symptoms as a rib contusion. The small crack may not be seen on a chest X-ray. However, the conditions are managed in the same way.  Home care  · Rest. Avoid heavy lifting, strenuous exertion, or any activity that causes pain.  · Ice the area to reduce pain and swelling. Put ice cubes in a plastic bag or use a cold pack. (Wrap the cold source in a thin towel. Do not place it directly on your skin.) Ice the injured area for 20 minutes every 1 to 2 hours the first day. Continue with ice packs 3 to 4 times a day for the next 2 days, then as needed for the relief of pain and swelling.  · Take any prescribed pain medicine as directed by your healthcare provider. If none was prescribed, take acetaminophen, ibuprofen, or naproxen to control pain.  · If you have a significant injury, you may be given a device called an incentive spirometer to keep your lungs healthy. Use as directed.  Follow-up care  Follow up with your healthcare provider during the next week or as directed.  When to seek medical advice  Call your healthcare provider for any of the following:  · Shortness of breath or trouble breathing  · Increasing chest pain with breathing  · Coughing  · Dizziness, weakness, or fainting  · New or worsening pain  · Fever of 100.4°F (38ºC) or higher, or as directed by your healthcare provider  Date Last Reviewed: 2/1/2017  © 0940-9749 [employer]. [address] [place] [address], [place] [address]. All rights reserved. This information is not intended as a substitute for  professional medical care. Always follow your healthcare professional's instructions.

## 2020-10-06 ENCOUNTER — PATIENT MESSAGE (OUTPATIENT)
Dept: FAMILY MEDICINE | Facility: CLINIC | Age: 65
End: 2020-10-06

## 2020-10-08 ENCOUNTER — OFFICE VISIT (OUTPATIENT)
Dept: FAMILY MEDICINE | Facility: CLINIC | Age: 65
End: 2020-10-08
Payer: MEDICARE

## 2020-10-08 ENCOUNTER — LAB VISIT (OUTPATIENT)
Dept: LAB | Facility: HOSPITAL | Age: 65
End: 2020-10-08
Attending: NURSE PRACTITIONER
Payer: MEDICARE

## 2020-10-08 VITALS
SYSTOLIC BLOOD PRESSURE: 138 MMHG | OXYGEN SATURATION: 98 % | WEIGHT: 169.31 LBS | DIASTOLIC BLOOD PRESSURE: 80 MMHG | HEART RATE: 80 BPM | TEMPERATURE: 98 F | BODY MASS INDEX: 27.21 KG/M2 | HEIGHT: 66 IN

## 2020-10-08 DIAGNOSIS — S22.42XD CLOSED FRACTURE OF MULTIPLE RIBS OF LEFT SIDE WITH ROUTINE HEALING, SUBSEQUENT ENCOUNTER: ICD-10-CM

## 2020-10-08 DIAGNOSIS — S22.42XD CLOSED FRACTURE OF MULTIPLE RIBS OF LEFT SIDE WITH ROUTINE HEALING, SUBSEQUENT ENCOUNTER: Primary | ICD-10-CM

## 2020-10-08 LAB
ALBUMIN SERPL BCP-MCNC: 3.9 G/DL (ref 3.5–5.2)
ALP SERPL-CCNC: 81 U/L (ref 55–135)
ALT SERPL W/O P-5'-P-CCNC: 14 U/L (ref 10–44)
ANION GAP SERPL CALC-SCNC: 8 MMOL/L (ref 8–16)
AST SERPL-CCNC: 20 U/L (ref 10–40)
BILIRUB SERPL-MCNC: 0.5 MG/DL (ref 0.1–1)
BILIRUB SERPL-MCNC: ABNORMAL MG/DL
BILIRUB UR QL STRIP: NEGATIVE
BLOOD, POC UA: ABNORMAL
BUN SERPL-MCNC: 19 MG/DL (ref 8–23)
CALCIUM SERPL-MCNC: 9.5 MG/DL (ref 8.7–10.5)
CHLORIDE SERPL-SCNC: 106 MMOL/L (ref 95–110)
CLARITY UR: CLEAR
CO2 SERPL-SCNC: 27 MMOL/L (ref 23–29)
COLOR UR: YELLOW
CREAT SERPL-MCNC: 0.8 MG/DL (ref 0.5–1.4)
EST. GFR  (AFRICAN AMERICAN): >60 ML/MIN/1.73 M^2
EST. GFR  (NON AFRICAN AMERICAN): >60 ML/MIN/1.73 M^2
GLUCOSE SERPL-MCNC: 105 MG/DL (ref 70–110)
GLUCOSE UR QL STRIP: 50
GLUCOSE UR QL STRIP: NEGATIVE
HGB UR QL STRIP: ABNORMAL
KETONES UR QL STRIP: ABNORMAL
KETONES UR QL STRIP: NEGATIVE
LEUKOCYTE ESTERASE UR QL STRIP: NEGATIVE
LEUKOCYTE ESTERASE URINE, POC: ABNORMAL
NITRITE UR QL STRIP: NEGATIVE
NITRITE, POC UA: ABNORMAL
PH UR STRIP: 6 [PH] (ref 5–8)
PH, POC UA: 5
POTASSIUM SERPL-SCNC: 4.4 MMOL/L (ref 3.5–5.1)
PROT SERPL-MCNC: 6.8 G/DL (ref 6–8.4)
PROT UR QL STRIP: NEGATIVE
PROTEIN, POC: ABNORMAL
SODIUM SERPL-SCNC: 141 MMOL/L (ref 136–145)
SP GR UR STRIP: >=1.03 (ref 1–1.03)
SPECIFIC GRAVITY, POC UA: 1.03
URN SPEC COLLECT METH UR: ABNORMAL
UROBILINOGEN, POC UA: NORMAL

## 2020-10-08 PROCEDURE — 3079F DIAST BP 80-89 MM HG: CPT | Mod: CPTII,S$GLB,, | Performed by: NURSE PRACTITIONER

## 2020-10-08 PROCEDURE — 1125F AMNT PAIN NOTED PAIN PRSNT: CPT | Mod: S$GLB,,, | Performed by: NURSE PRACTITIONER

## 2020-10-08 PROCEDURE — 1125F PR PAIN SEVERITY QUANTIFIED, PAIN PRESENT: ICD-10-PCS | Mod: S$GLB,,, | Performed by: NURSE PRACTITIONER

## 2020-10-08 PROCEDURE — 99213 PR OFFICE/OUTPT VISIT, EST, LEVL III, 20-29 MIN: ICD-10-PCS | Mod: 25,S$GLB,, | Performed by: NURSE PRACTITIONER

## 2020-10-08 PROCEDURE — 99213 OFFICE O/P EST LOW 20 MIN: CPT | Mod: 25,S$GLB,, | Performed by: NURSE PRACTITIONER

## 2020-10-08 PROCEDURE — 1101F PR PT FALLS ASSESS DOC 0-1 FALLS W/OUT INJ PAST YR: ICD-10-PCS | Mod: CPTII,S$GLB,, | Performed by: NURSE PRACTITIONER

## 2020-10-08 PROCEDURE — 36415 COLL VENOUS BLD VENIPUNCTURE: CPT | Mod: PO

## 2020-10-08 PROCEDURE — 99999 PR PBB SHADOW E&M-EST. PATIENT-LVL V: CPT | Mod: PBBFAC,,, | Performed by: NURSE PRACTITIONER

## 2020-10-08 PROCEDURE — 81003 POCT URINALYSIS: ICD-10-PCS | Mod: QW,S$GLB,, | Performed by: NURSE PRACTITIONER

## 2020-10-08 PROCEDURE — 80053 COMPREHEN METABOLIC PANEL: CPT

## 2020-10-08 PROCEDURE — 3008F BODY MASS INDEX DOCD: CPT | Mod: CPTII,S$GLB,, | Performed by: NURSE PRACTITIONER

## 2020-10-08 PROCEDURE — 3079F PR MOST RECENT DIASTOLIC BLOOD PRESSURE 80-89 MM HG: ICD-10-PCS | Mod: CPTII,S$GLB,, | Performed by: NURSE PRACTITIONER

## 2020-10-08 PROCEDURE — 3008F PR BODY MASS INDEX (BMI) DOCUMENTED: ICD-10-PCS | Mod: CPTII,S$GLB,, | Performed by: NURSE PRACTITIONER

## 2020-10-08 PROCEDURE — 1101F PT FALLS ASSESS-DOCD LE1/YR: CPT | Mod: CPTII,S$GLB,, | Performed by: NURSE PRACTITIONER

## 2020-10-08 PROCEDURE — 3075F SYST BP GE 130 - 139MM HG: CPT | Mod: CPTII,S$GLB,, | Performed by: NURSE PRACTITIONER

## 2020-10-08 PROCEDURE — 3075F PR MOST RECENT SYSTOLIC BLOOD PRESS GE 130-139MM HG: ICD-10-PCS | Mod: CPTII,S$GLB,, | Performed by: NURSE PRACTITIONER

## 2020-10-08 PROCEDURE — 1159F PR MEDICATION LIST DOCUMENTED IN MEDICAL RECORD: ICD-10-PCS | Mod: S$GLB,,, | Performed by: NURSE PRACTITIONER

## 2020-10-08 PROCEDURE — 1159F MED LIST DOCD IN RCRD: CPT | Mod: S$GLB,,, | Performed by: NURSE PRACTITIONER

## 2020-10-08 PROCEDURE — 99999 PR PBB SHADOW E&M-EST. PATIENT-LVL V: ICD-10-PCS | Mod: PBBFAC,,, | Performed by: NURSE PRACTITIONER

## 2020-10-08 PROCEDURE — 81003 URINALYSIS AUTO W/O SCOPE: CPT | Mod: QW,S$GLB,, | Performed by: NURSE PRACTITIONER

## 2020-10-08 PROCEDURE — 81003 URINALYSIS AUTO W/O SCOPE: CPT | Mod: PO

## 2020-10-08 RX ORDER — TRAMADOL HYDROCHLORIDE 50 MG/1
50 TABLET ORAL EVERY 8 HOURS PRN
Qty: 24 EACH | Refills: 0 | Status: SHIPPED | OUTPATIENT
Start: 2020-10-08 | End: 2021-08-26

## 2020-10-08 NOTE — PROGRESS NOTES
Subjective:       Patient ID: Ynes Talavera is a 65 y.o. female.    Chief Complaint: Fall    Patient had a fall Friday evening. Seen at Urgent Care, xray shows possible acute fracture of left 9th posteriolateral rib. She is having pain in the area, worsened 48 hours after the fall. Has noticed her urine looked a little dark this morning. She also bruised her left elbow in the fall, this is improving.  Otherwise no abnormal symptoms.     Past Medical History:  No date: Adrenal adenoma  No date: Diverticulosis of the colon  No date: Hypertension  No date: REJI (obstructive sleep apnea)      Comment:  uses cpap  2014: Venous insufficiency      Comment:  right leg, denies Hx clot    Past Surgical History:  No date: APPENDECTOMY  2002: BREAST BIOPSY; Right      Comment:  benign needle biopsy  : BREAST BIOPSY; Left      Comment:  benign needle biopsy  No date:  SECTION, LOW TRANSVERSE  No date: CHOLECYSTECTOMY  9/3/2020: COLONOSCOPY; N/A      Comment:  Procedure: COLONOSCOPY;  Surgeon: Neville Galvan MD;  Location: [facility] ENDO;  Service: Endoscopy;                 Laterality: N/A;  2019: EPIDURAL STEROID INJECTION INTO CERVICAL SPINE; N/A      Comment:  Procedure: Injection-steroid-epidural-cervical C7/T1;                 Surgeon: Ronnell Echeverria MD;  Location: [facility] OR;                 Service: Pain Management;  Laterality: N/A;  2020: ESOPHAGOGASTRODUODENOSCOPY; N/A      Comment:  Procedure: ESOPHAGOGASTRODUODENOSCOPY (EGD);  Surgeon:                Neville Galvan MD;  Location: [facility] ENDO;  Service:                Endoscopy;  Laterality: N/A;  2019: INJECTION OF FACET JOINT; Left      Comment:  Procedure: INJECTION, FACET JOINT, C1-C2 Medial Branch;                Surgeon: Samantha Willoughby MD;  Location: [facility] PAIN MGT;                 Service: Pain Management;  Laterality: Left;  No date: THUMB ARTHROSCOPY      Comment:  both thumbs, trigger finger release.  No date:  TONSILLECTOMY  No date: TUBAL LIGATION  2014: UMBILICAL HERNIA REPAIR  No date: VEIN LIGATION AND STRIPPING      Comment:  Left    Review of patient's family history indicates:  Problem: Cancer      Relation: Mother          Age of Onset: (Not Specified)  Problem: Cancer      Relation: Father          Age of Onset: (Not Specified)  Problem: Heart disease      Relation: Father          Age of Onset: (Not Specified)  Problem: Hypertension      Relation: Father          Age of Onset: (Not Specified)  Problem: Diabetes      Relation: Maternal Grandmother          Age of Onset: (Not Specified)  Problem: Heart disease      Relation: Maternal Grandmother          Age of Onset: (Not Specified)  Problem: Diabetes      Relation: Maternal Grandfather          Age of Onset: (Not Specified)  Problem: Heart disease      Relation: Maternal Grandfather          Age of Onset: (Not Specified)  Problem: Breast cancer      Relation: Paternal Grandmother          Age of Onset: 42      Social History    Socioeconomic History      Marital status:       Spouse name: Not on file      Number of children: 3      Years of education: Not on file      Highest education level: Not on file    Occupational History      Occupation: Dental hygienist        Comment: Working full-time        Employer:  [employer]    Social Needs      Financial resource strain: Not hard at all      Food insecurity        Worry: Never true        Inability: Never true      Transportation needs        Medical: No        Non-medical: No    Tobacco Use      Smoking status: Former Smoker        Packs/day: 0.25        Years: 25.00        Pack years: 6.25        Quit date: 1993        Years since quittin.6      Smokeless tobacco: Never Used    Substance and Sexual Activity      Alcohol use: No        Alcohol/week: 0.0 standard drinks        Frequency: Never        Binge frequency: Never      Drug use: No      Sexual activity: Yes         Partners: Male    Lifestyle      Physical activity        Days per week: 2 days        Minutes per session: 30 min      Stress: Only a little    Relationships      Social connections        Talks on phone: More than three times a week        Gets together: More than three times a week        Attends Mosque service: Not on file        Active member of club or organization: Yes        Attends meetings of clubs or organizations: More than 4 times per year        Relationship status:     Other Topics      Concerns:        Not on file    Social History Narrative      Her  has had a CVA. She is major support for him.            Walks regularly      Current Outpatient Medications:  aspirin (ECOTRIN) 81 MG EC tablet, Take 81 mg by mouth once daily.  , Disp: , Rfl:   biotin 1 mg Cap, Take by mouth., Disp: , Rfl:   bisacodyL (DULCOLAX) 5 mg EC tablet, Take 5 mg by mouth daily as needed for Constipation., Disp: , Rfl:   cholecalciferol, vitamin D3, (VITAMIN D3) 1,000 unit capsule, Take 1,000 Units by mouth once daily., Disp: , Rfl:   escitalopram oxalate (LEXAPRO) 10 MG tablet, TAKE 1 TABLET(10 MG) BY MOUTH EVERY DAY, Disp: 90 tablet, Rfl: 1  famotidine (PEPCID) 40 MG tablet, Take 1 tablet (40 mg total) by mouth once daily., Disp: 30 tablet, Rfl: 2  Lactobacillus rhamnosus GG (CULTURELLE) 10 billion cell capsule, Take 1 capsule by mouth once daily., Disp: , Rfl:   LUTEIN-ZEAXANTHIN ORAL, Take by mouth., Disp: , Rfl:   magnesium 250 mg Tab, Take 250 mg by mouth once daily., Disp: , Rfl:   metoprolol tartrate (LOPRESSOR) 25 MG tablet, metoprolol tartrate 25 mg tablet, Disp: , Rfl:   multivitamin capsule, Take 1 capsule by mouth once daily.  , Disp: , Rfl:   RESTASIS 0.05 % ophthalmic emulsion, INT 1 GTT IN OU BID, Disp: , Rfl: 1  traZODone (DESYREL) 100 MG tablet, TAKE 1 TABLET BY MOUTH NIGHTLY, Disp: 90 tablet, Rfl: 1  traMADoL (ULTRAM) 50 mg tablet, Take 1 tablet (50 mg total) by mouth every 8 (eight) hours as  needed for Pain., Disp: 24 each, Rfl: 0    No current facility-administered medications for this visit.       Review of patient's allergies indicates:  No Known Allergies    Review of Systems   Constitutional: Negative for chills and fever.   HENT: Negative.    Respiratory: Negative for cough, shortness of breath and wheezing.    Cardiovascular: Negative for chest pain, palpitations and leg swelling.   Gastrointestinal: Negative.  Negative for abdominal pain, change in bowel habit, constipation, nausea, vomiting and change in bowel habit.   Genitourinary: Negative for difficulty urinating, dysuria, frequency and urgency.   Musculoskeletal: Positive for back pain.   Integumentary:  Positive for color change.   Neurological: Negative.          Objective:      Physical Exam  Constitutional:       Appearance: Normal appearance.   HENT:      Head: Normocephalic and atraumatic.   Cardiovascular:      Rate and Rhythm: Normal rate and regular rhythm.      Heart sounds: No murmur.   Pulmonary:      Effort: Pulmonary effort is normal. No respiratory distress.      Breath sounds: Normal breath sounds.   Chest:      Chest wall: Tenderness present. No crepitus.       Musculoskeletal:        Back:    Neurological:      Mental Status: She is alert.         Assessment:       1. Closed fracture of multiple ribs of left side with routine healing, subsequent encounter        Plan:       1. Closed fracture of multiple ribs of left side with routine healing, subsequent encounter  Handout on after care for rib fractures discussed and given to patient. Follow up if not resolving, immediately for new or worsening symptoms.   - URINALYSIS  - Comprehensive Metabolic Panel; Future  - traMADoL (ULTRAM) 50 mg tablet; Take 1 tablet (50 mg total) by mouth every 8 (eight) hours as needed for Pain.  Dispense: 24 each; Refill: 0  - POCT URINALYSIS

## 2020-10-08 NOTE — PATIENT INSTRUCTIONS
Rib fracture - aftercare  Broken rib - aftercare    More About Your Injury  A rib fracture can be very painful because your ribs move when you breathe, cough, and move your upper body.    The ribs in the middle of the chest are the ones that break most often.    Rib fractures often occur with other chest and organ injuries. So, your health care providers will also check to see if you have any other injuries.    What to Expect  Healing takes at least 6 weeks.    If you injure other body organs, you may need to stay in the hospital. Otherwise, you can heal at home. Most people with broken ribs do not need surgery.    In the emergency room, you may have received a strong medicine (such as a nerve block or narcotics) if you were in severe pain.    You will not have a belt or a bandage around your chest because these would keep your ribs from moving when you breathe or cough. This may lead to a lung infection (pneumonia).    Pain Relief  Apply an ice pack 20 minutes of every hour you are awake for the first 2 days, then 10 to 20 minutes 3 times daily as needed to reduce pain and swelling. Wrap the ice pack in a cloth before applying to the injured area.    You may need prescription pain medicines (narcotics) to keep your pain under control while your bones heal.    Take these medicines on the schedule your provider prescribed.  DO NOT drink alcohol, drive, or operate heavy machinery while you are taking these medicines.  To avoid becoming constipated, drink more fluids, eat high-fiber foods, and use stool softeners.  To avoid nausea or vomiting, try taking your pain medicines with food.  If your pain is not severe, you can use ibuprofen (Advil, Motrin) or naproxen (Aleve, Naprosyn). You can buy these pain medicines at the store.    These should be avoided for the first 24 hours after your injury since they may lead to bleeding.  Talk with your provider before using these medicines if you have heart disease, high blood  pressure, kidney disease, liver disease, or have had stomach ulcers or internal bleeding in the past.  DO NOT take more than the amount recommended on the bottle or by your provider.  Acetaminophen (Tylenol) may also be used for pain by most people.    Tell your provider about any other medicines you are taking as drug interactions may occur.    Activity  To help prevent a collapsed lung or lung infection, do slow deep-breathing and gentle coughing exercises every 2 hours. Holding a pillow or blanket against your injured rib can make these less painful. You may need to take your pain medicine first. Your provider may tell you to use a device called a spirometer to help with the breathing exercises. These exercises help prevent a partial lung collapse and pneumonia.    It is important to stay active. DO NOT rest in bed all day. Your provider will talk with you about when you can return to:    Your everyday activities  Work, which will depend on the type of job you have  Sports or other high impact activity  While you heal, avoid movements that put painful pressure on your ribs. These include doing crunches and pushing, pulling, or lifting heavy objects.    Follow-up  Your provider will make sure you are doing your exercises and that your pain is under control so that you can be active.    There is usually no need for taking x-rays as you heal, unless you develop fever, cough, increasing pain or difficulty breathing.    When to Call the Doctor  Call your doctor if you have:    Pain that does not allow deep breathing or coughing despite using pain relievers  Fever  Cough or increase in mucus that you cough up  Shortness of breath  Side effects of pain medicine such as nausea, vomiting, or constipation, or allergic reactions, such as skin rashes, facial swelling, or difficulty breathing  People with asthma or emphysema are at increased risk of developing complications from a rib fracture, such as breathing problems or  infections.

## 2020-10-14 ENCOUNTER — PATIENT MESSAGE (OUTPATIENT)
Dept: FAMILY MEDICINE | Facility: CLINIC | Age: 65
End: 2020-10-14

## 2020-11-03 DIAGNOSIS — G47.9 SLEEP DISTURBANCE: Chronic | ICD-10-CM

## 2020-11-03 RX ORDER — TRAZODONE HYDROCHLORIDE 100 MG/1
100 TABLET ORAL NIGHTLY
Qty: 90 TABLET | Refills: 1 | Status: SHIPPED | OUTPATIENT
Start: 2020-11-03 | End: 2021-04-28 | Stop reason: SDUPTHER

## 2020-11-03 NOTE — TELEPHONE ENCOUNTER
Care Due:                  Date            Visit Type   Department     Provider  --------------------------------------------------------------------------------                                ESTABLISHED                              PATIENT      [facility] FAMILY  Last Visit: 12-      [facility]     MEDICINE       NICOLE LEMUS  Next Visit: None Scheduled  None         None Found                                                            Last  Test          Frequency    Reason                     Performed    Due Date  --------------------------------------------------------------------------------    Office Visit  12 months..  escitalopram, traZODone..  12- 11-    Powered by [employer]. Reference number: [id number]. 11/03/2020 1:48:25 PM   CST

## 2020-12-28 DIAGNOSIS — F32.9 REACTIVE DEPRESSION: ICD-10-CM

## 2020-12-29 RX ORDER — ESCITALOPRAM OXALATE 10 MG/1
TABLET ORAL
Qty: 90 TABLET | Refills: 1 | Status: SHIPPED | OUTPATIENT
Start: 2020-12-29 | End: 2021-06-28

## 2020-12-29 NOTE — TELEPHONE ENCOUNTER
Encounter details (provider/department) have been updated by [facility] staff  As of this time CDM: Does not populate or display   Updated: n/a information is pended correctly  Of note. CDM should display. medication Is delegated and encounter details have been updated  Will resend refill request encounter to P Centralized Refill Staff Pool.   [facility]   Note composed:10:48 AM 12/29/2020

## 2020-12-29 NOTE — TELEPHONE ENCOUNTER
No new care gaps identified.  Powered by [employer]. Reference number: [id number]. 12/29/2020 10:49:44 AM   CST
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
## 2020-12-29 NOTE — TELEPHONE ENCOUNTER
Provider Staff:     Action is required for this patient.     Please schedule patient for Annual/ED follow up    Thanks!  [facility]     Appointments  past 12m or future 3m with PCP    Date Provider   Last Visit   12/5/2019 Grant Hawkins Jr., MD   Next Visit   Visit date not found Grant Hawkins Jr., MD     Note composed:3:22 PM 12/29/2020

## 2021-03-22 ENCOUNTER — OFFICE VISIT (OUTPATIENT)
Dept: URGENT CARE | Facility: CLINIC | Age: 66
End: 2021-03-22
Payer: MEDICARE

## 2021-03-22 VITALS
HEIGHT: 66 IN | OXYGEN SATURATION: 97 % | HEART RATE: 74 BPM | DIASTOLIC BLOOD PRESSURE: 91 MMHG | BODY MASS INDEX: 27.21 KG/M2 | RESPIRATION RATE: 18 BRPM | SYSTOLIC BLOOD PRESSURE: 153 MMHG | WEIGHT: 169.31 LBS | TEMPERATURE: 99 F

## 2021-03-22 DIAGNOSIS — J02.9 SORE THROAT: ICD-10-CM

## 2021-03-22 DIAGNOSIS — J06.9 VIRAL URI WITH COUGH: Primary | ICD-10-CM

## 2021-03-22 LAB
CTP QC/QA: YES
SARS-COV-2 RDRP RESP QL NAA+PROBE: NEGATIVE

## 2021-03-22 PROCEDURE — 3008F PR BODY MASS INDEX (BMI) DOCUMENTED: ICD-10-PCS | Mod: CPTII,S$GLB,, | Performed by: PHYSICIAN ASSISTANT

## 2021-03-22 PROCEDURE — 99214 OFFICE O/P EST MOD 30 MIN: CPT | Mod: S$GLB,,, | Performed by: PHYSICIAN ASSISTANT

## 2021-03-22 PROCEDURE — 99214 PR OFFICE/OUTPT VISIT, EST, LEVL IV, 30-39 MIN: ICD-10-PCS | Mod: S$GLB,,, | Performed by: PHYSICIAN ASSISTANT

## 2021-03-22 PROCEDURE — U0002: ICD-10-PCS | Mod: QW,S$GLB,, | Performed by: PHYSICIAN ASSISTANT

## 2021-03-22 PROCEDURE — 3008F BODY MASS INDEX DOCD: CPT | Mod: CPTII,S$GLB,, | Performed by: PHYSICIAN ASSISTANT

## 2021-03-22 PROCEDURE — U0002 COVID-19 LAB TEST NON-CDC: HCPCS | Mod: QW,S$GLB,, | Performed by: PHYSICIAN ASSISTANT

## 2021-03-22 RX ORDER — FLUTICASONE PROPIONATE 50 MCG
1 SPRAY, SUSPENSION (ML) NASAL 2 TIMES DAILY
Qty: 16 G | Refills: 0 | Status: SHIPPED | OUTPATIENT
Start: 2021-03-22 | End: 2021-08-26

## 2021-03-22 RX ORDER — AZELASTINE 1 MG/ML
1 SPRAY, METERED NASAL 2 TIMES DAILY
Qty: 30 ML | Refills: 0 | Status: SHIPPED | OUTPATIENT
Start: 2021-03-22 | End: 2021-08-26

## 2021-03-22 RX ORDER — PREDNISONE 10 MG/1
TABLET ORAL
Qty: 11 TABLET | Refills: 0 | Status: SHIPPED | OUTPATIENT
Start: 2021-03-22 | End: 2021-08-26

## 2021-04-27 DIAGNOSIS — G47.9 SLEEP DISTURBANCE: Chronic | ICD-10-CM

## 2021-04-28 RX ORDER — TRAZODONE HYDROCHLORIDE 100 MG/1
TABLET ORAL
Qty: 90 TABLET | Refills: 0 | Status: SHIPPED | OUTPATIENT
Start: 2021-04-28 | End: 2021-07-26

## 2021-05-06 ENCOUNTER — PATIENT MESSAGE (OUTPATIENT)
Dept: RESEARCH | Facility: HOSPITAL | Age: 66
End: 2021-05-06

## 2021-06-26 DIAGNOSIS — F32.9 REACTIVE DEPRESSION: ICD-10-CM

## 2021-06-28 RX ORDER — ESCITALOPRAM OXALATE 10 MG/1
10 TABLET ORAL DAILY
Qty: 30 TABLET | Refills: 1 | Status: SHIPPED | OUTPATIENT
Start: 2021-06-28 | End: 2021-08-26 | Stop reason: SDUPTHER

## 2021-08-25 DIAGNOSIS — F32.9 REACTIVE DEPRESSION: ICD-10-CM

## 2021-08-26 ENCOUNTER — LAB VISIT (OUTPATIENT)
Dept: LAB | Facility: HOSPITAL | Age: 66
End: 2021-08-26
Attending: NURSE PRACTITIONER
Payer: MEDICARE

## 2021-08-26 ENCOUNTER — OFFICE VISIT (OUTPATIENT)
Dept: FAMILY MEDICINE | Facility: CLINIC | Age: 66
End: 2021-08-26
Payer: MEDICARE

## 2021-08-26 VITALS
SYSTOLIC BLOOD PRESSURE: 130 MMHG | HEIGHT: 66 IN | DIASTOLIC BLOOD PRESSURE: 78 MMHG | BODY MASS INDEX: 26.14 KG/M2 | OXYGEN SATURATION: 98 % | TEMPERATURE: 98 F | HEART RATE: 71 BPM | WEIGHT: 162.69 LBS

## 2021-08-26 DIAGNOSIS — I10 ESSENTIAL HYPERTENSION: Primary | ICD-10-CM

## 2021-08-26 DIAGNOSIS — I10 ESSENTIAL HYPERTENSION: ICD-10-CM

## 2021-08-26 DIAGNOSIS — F32.9 REACTIVE DEPRESSION: ICD-10-CM

## 2021-08-26 LAB
ALBUMIN SERPL BCP-MCNC: 3.6 G/DL (ref 3.5–5.2)
ALP SERPL-CCNC: 78 U/L (ref 55–135)
ALT SERPL W/O P-5'-P-CCNC: 16 U/L (ref 10–44)
ANION GAP SERPL CALC-SCNC: 5 MMOL/L (ref 8–16)
AST SERPL-CCNC: 19 U/L (ref 10–40)
BILIRUB SERPL-MCNC: 0.4 MG/DL (ref 0.1–1)
BUN SERPL-MCNC: 18 MG/DL (ref 8–23)
CALCIUM SERPL-MCNC: 9.7 MG/DL (ref 8.7–10.5)
CHLORIDE SERPL-SCNC: 107 MMOL/L (ref 95–110)
CHOLEST SERPL-MCNC: 179 MG/DL (ref 120–199)
CHOLEST/HDLC SERPL: 3.3 {RATIO} (ref 2–5)
CO2 SERPL-SCNC: 30 MMOL/L (ref 23–29)
CREAT SERPL-MCNC: 0.7 MG/DL (ref 0.5–1.4)
EST. GFR  (AFRICAN AMERICAN): >60 ML/MIN/1.73 M^2
EST. GFR  (NON AFRICAN AMERICAN): >60 ML/MIN/1.73 M^2
GLUCOSE SERPL-MCNC: 104 MG/DL (ref 70–110)
HDLC SERPL-MCNC: 55 MG/DL (ref 40–75)
HDLC SERPL: 30.7 % (ref 20–50)
LDLC SERPL CALC-MCNC: 106.2 MG/DL (ref 63–159)
NONHDLC SERPL-MCNC: 124 MG/DL
POTASSIUM SERPL-SCNC: 4.7 MMOL/L (ref 3.5–5.1)
PROT SERPL-MCNC: 6.3 G/DL (ref 6–8.4)
SODIUM SERPL-SCNC: 142 MMOL/L (ref 136–145)
TRIGL SERPL-MCNC: 89 MG/DL (ref 30–150)
TSH SERPL DL<=0.005 MIU/L-ACNC: 1.02 UIU/ML (ref 0.4–4)

## 2021-08-26 PROCEDURE — 3078F PR MOST RECENT DIASTOLIC BLOOD PRESSURE < 80 MM HG: ICD-10-PCS | Mod: CPTII,S$GLB,, | Performed by: NURSE PRACTITIONER

## 2021-08-26 PROCEDURE — 1126F PR PAIN SEVERITY QUANTIFIED, NO PAIN PRESENT: ICD-10-PCS | Mod: CPTII,S$GLB,, | Performed by: NURSE PRACTITIONER

## 2021-08-26 PROCEDURE — 80061 LIPID PANEL: CPT | Performed by: NURSE PRACTITIONER

## 2021-08-26 PROCEDURE — 3008F BODY MASS INDEX DOCD: CPT | Mod: CPTII,S$GLB,, | Performed by: NURSE PRACTITIONER

## 2021-08-26 PROCEDURE — 3075F SYST BP GE 130 - 139MM HG: CPT | Mod: CPTII,S$GLB,, | Performed by: NURSE PRACTITIONER

## 2021-08-26 PROCEDURE — 84443 ASSAY THYROID STIM HORMONE: CPT | Performed by: NURSE PRACTITIONER

## 2021-08-26 PROCEDURE — 99214 PR OFFICE/OUTPT VISIT, EST, LEVL IV, 30-39 MIN: ICD-10-PCS | Mod: S$GLB,,, | Performed by: NURSE PRACTITIONER

## 2021-08-26 PROCEDURE — 1160F RVW MEDS BY RX/DR IN RCRD: CPT | Mod: CPTII,S$GLB,, | Performed by: NURSE PRACTITIONER

## 2021-08-26 PROCEDURE — 3075F PR MOST RECENT SYSTOLIC BLOOD PRESS GE 130-139MM HG: ICD-10-PCS | Mod: CPTII,S$GLB,, | Performed by: NURSE PRACTITIONER

## 2021-08-26 PROCEDURE — 99999 PR PBB SHADOW E&M-EST. PATIENT-LVL IV: ICD-10-PCS | Mod: PBBFAC,,, | Performed by: NURSE PRACTITIONER

## 2021-08-26 PROCEDURE — 3078F DIAST BP <80 MM HG: CPT | Mod: CPTII,S$GLB,, | Performed by: NURSE PRACTITIONER

## 2021-08-26 PROCEDURE — 36415 COLL VENOUS BLD VENIPUNCTURE: CPT | Mod: PO | Performed by: NURSE PRACTITIONER

## 2021-08-26 PROCEDURE — 1159F PR MEDICATION LIST DOCUMENTED IN MEDICAL RECORD: ICD-10-PCS | Mod: CPTII,S$GLB,, | Performed by: NURSE PRACTITIONER

## 2021-08-26 PROCEDURE — 1159F MED LIST DOCD IN RCRD: CPT | Mod: CPTII,S$GLB,, | Performed by: NURSE PRACTITIONER

## 2021-08-26 PROCEDURE — 80053 COMPREHEN METABOLIC PANEL: CPT | Performed by: NURSE PRACTITIONER

## 2021-08-26 PROCEDURE — 3008F PR BODY MASS INDEX (BMI) DOCUMENTED: ICD-10-PCS | Mod: CPTII,S$GLB,, | Performed by: NURSE PRACTITIONER

## 2021-08-26 PROCEDURE — 1126F AMNT PAIN NOTED NONE PRSNT: CPT | Mod: CPTII,S$GLB,, | Performed by: NURSE PRACTITIONER

## 2021-08-26 PROCEDURE — 99214 OFFICE O/P EST MOD 30 MIN: CPT | Mod: S$GLB,,, | Performed by: NURSE PRACTITIONER

## 2021-08-26 PROCEDURE — 99999 PR PBB SHADOW E&M-EST. PATIENT-LVL IV: CPT | Mod: PBBFAC,,, | Performed by: NURSE PRACTITIONER

## 2021-08-26 PROCEDURE — 1160F PR REVIEW ALL MEDS BY PRESCRIBER/CLIN PHARMACIST DOCUMENTED: ICD-10-PCS | Mod: CPTII,S$GLB,, | Performed by: NURSE PRACTITIONER

## 2021-08-26 PROCEDURE — 99499 RISK ADDL DX/OHS AUDIT: ICD-10-PCS | Mod: S$GLB,,, | Performed by: NURSE PRACTITIONER

## 2021-08-26 PROCEDURE — 99499 UNLISTED E&M SERVICE: CPT | Mod: S$GLB,,, | Performed by: NURSE PRACTITIONER

## 2021-08-26 RX ORDER — ESCITALOPRAM OXALATE 10 MG/1
10 TABLET ORAL DAILY
Qty: 30 TABLET | Refills: 11 | Status: SHIPPED | OUTPATIENT
Start: 2021-08-26 | End: 2022-08-22

## 2021-08-26 RX ORDER — ESCITALOPRAM OXALATE 10 MG/1
TABLET ORAL
Qty: 30 TABLET | Refills: 1 | OUTPATIENT
Start: 2021-08-26

## 2021-09-19 ENCOUNTER — PATIENT MESSAGE (OUTPATIENT)
Dept: FAMILY MEDICINE | Facility: CLINIC | Age: 66
End: 2021-09-19

## 2021-09-20 ENCOUNTER — PATIENT MESSAGE (OUTPATIENT)
Dept: FAMILY MEDICINE | Facility: CLINIC | Age: 66
End: 2021-09-20

## 2021-09-20 RX ORDER — METOPROLOL TARTRATE 25 MG/1
25 TABLET, FILM COATED ORAL DAILY
Qty: 90 TABLET | Refills: 3 | Status: SHIPPED | OUTPATIENT
Start: 2021-09-20 | End: 2022-09-01 | Stop reason: SDUPTHER

## 2021-11-02 ENCOUNTER — IMMUNIZATION (OUTPATIENT)
Dept: FAMILY MEDICINE | Facility: CLINIC | Age: 66
End: 2021-11-02
Payer: MEDICARE

## 2021-11-02 PROCEDURE — 90694 FLU VACCINE - QUADRIVALENT - ADJUVANTED: ICD-10-PCS | Mod: S$GLB,,, | Performed by: EMERGENCY MEDICINE

## 2021-11-02 PROCEDURE — G0008 FLU VACCINE - QUADRIVALENT - ADJUVANTED: ICD-10-PCS | Mod: S$GLB,,, | Performed by: EMERGENCY MEDICINE

## 2021-11-02 PROCEDURE — G0008 ADMIN INFLUENZA VIRUS VAC: HCPCS | Mod: S$GLB,,, | Performed by: EMERGENCY MEDICINE

## 2021-11-02 PROCEDURE — 90694 VACC AIIV4 NO PRSRV 0.5ML IM: CPT | Mod: S$GLB,,, | Performed by: EMERGENCY MEDICINE

## 2022-01-26 ENCOUNTER — OFFICE VISIT (OUTPATIENT)
Dept: URGENT CARE | Facility: CLINIC | Age: 67
End: 2022-01-26
Payer: MEDICARE

## 2022-01-26 VITALS
TEMPERATURE: 98 F | HEART RATE: 74 BPM | RESPIRATION RATE: 18 BRPM | WEIGHT: 162 LBS | HEIGHT: 66 IN | SYSTOLIC BLOOD PRESSURE: 120 MMHG | OXYGEN SATURATION: 96 % | BODY MASS INDEX: 26.03 KG/M2 | DIASTOLIC BLOOD PRESSURE: 72 MMHG

## 2022-01-26 DIAGNOSIS — U07.1 COVID-19 VIRUS DETECTED: ICD-10-CM

## 2022-01-26 DIAGNOSIS — U07.1 COVID-19: ICD-10-CM

## 2022-01-26 DIAGNOSIS — R05.9 COUGH: Primary | ICD-10-CM

## 2022-01-26 LAB
CTP QC/QA: YES
SARS-COV-2 RDRP RESP QL NAA+PROBE: POSITIVE

## 2022-01-26 PROCEDURE — 99213 PR OFFICE/OUTPT VISIT, EST, LEVL III, 20-29 MIN: ICD-10-PCS | Mod: S$GLB,,, | Performed by: INTERNAL MEDICINE

## 2022-01-26 PROCEDURE — 1126F PR PAIN SEVERITY QUANTIFIED, NO PAIN PRESENT: ICD-10-PCS | Mod: CPTII,S$GLB,, | Performed by: INTERNAL MEDICINE

## 2022-01-26 PROCEDURE — 1159F MED LIST DOCD IN RCRD: CPT | Mod: CPTII,S$GLB,, | Performed by: INTERNAL MEDICINE

## 2022-01-26 PROCEDURE — 1126F AMNT PAIN NOTED NONE PRSNT: CPT | Mod: CPTII,S$GLB,, | Performed by: INTERNAL MEDICINE

## 2022-01-26 PROCEDURE — 3008F PR BODY MASS INDEX (BMI) DOCUMENTED: ICD-10-PCS | Mod: CPTII,S$GLB,, | Performed by: INTERNAL MEDICINE

## 2022-01-26 PROCEDURE — U0002: ICD-10-PCS | Mod: QW,CR,S$GLB, | Performed by: INTERNAL MEDICINE

## 2022-01-26 PROCEDURE — 3078F PR MOST RECENT DIASTOLIC BLOOD PRESSURE < 80 MM HG: ICD-10-PCS | Mod: CPTII,S$GLB,, | Performed by: INTERNAL MEDICINE

## 2022-01-26 PROCEDURE — 1160F RVW MEDS BY RX/DR IN RCRD: CPT | Mod: CPTII,S$GLB,, | Performed by: INTERNAL MEDICINE

## 2022-01-26 PROCEDURE — 3078F DIAST BP <80 MM HG: CPT | Mod: CPTII,S$GLB,, | Performed by: INTERNAL MEDICINE

## 2022-01-26 PROCEDURE — U0002 COVID-19 LAB TEST NON-CDC: HCPCS | Mod: QW,CR,S$GLB, | Performed by: INTERNAL MEDICINE

## 2022-01-26 PROCEDURE — 1159F PR MEDICATION LIST DOCUMENTED IN MEDICAL RECORD: ICD-10-PCS | Mod: CPTII,S$GLB,, | Performed by: INTERNAL MEDICINE

## 2022-01-26 PROCEDURE — 3074F SYST BP LT 130 MM HG: CPT | Mod: CPTII,S$GLB,, | Performed by: INTERNAL MEDICINE

## 2022-01-26 PROCEDURE — 1160F PR REVIEW ALL MEDS BY PRESCRIBER/CLIN PHARMACIST DOCUMENTED: ICD-10-PCS | Mod: CPTII,S$GLB,, | Performed by: INTERNAL MEDICINE

## 2022-01-26 PROCEDURE — 3074F PR MOST RECENT SYSTOLIC BLOOD PRESSURE < 130 MM HG: ICD-10-PCS | Mod: CPTII,S$GLB,, | Performed by: INTERNAL MEDICINE

## 2022-01-26 PROCEDURE — 3008F BODY MASS INDEX DOCD: CPT | Mod: CPTII,S$GLB,, | Performed by: INTERNAL MEDICINE

## 2022-01-26 PROCEDURE — 99213 OFFICE O/P EST LOW 20 MIN: CPT | Mod: S$GLB,,, | Performed by: INTERNAL MEDICINE

## 2022-01-27 NOTE — PROGRESS NOTES
"Subjective:       Patient ID: Ynes Talavera is a 66 y.o. female.    Vitals:  height is 5' 6" (1.676 m) and weight is 73.5 kg (162 lb). Her temperature is 98.2 °F (36.8 °C). Her blood pressure is 120/72 and her pulse is 74. Her respiration is 18 and oxygen saturation is 96%.     Chief Complaint: URI    URI symptoms began 1/12/22. Nasal congestion, feels fluid in her ears, cough and post nasal drip. 2/3 vaccines.    URI   This is a new problem. The current episode started 1 to 4 weeks ago. The problem has been unchanged. There has been no fever. Associated symptoms include congestion and coughing. She has tried nothing for the symptoms.       HENT: Positive for congestion.    Respiratory: Positive for cough.        Objective:      Physical Exam   Constitutional: She appears well-developed and well-nourished.   HENT:   Head: Normocephalic.   Mouth/Throat: Posterior oropharyngeal erythema present.   Eyes: Conjunctivae are normal. Pupils are equal, round, and reactive to light.   Neck: No tracheal deviation present. No thyromegaly present.   Cardiovascular: Normal rate, regular rhythm and normal heart sounds.   Pulmonary/Chest: Effort normal and breath sounds normal. No respiratory distress. She has no wheezes.   Musculoskeletal: Normal range of motion.         General: Normal range of motion.   Neurological: She is alert. No cranial nerve deficit.   Skin: Skin is warm and not diaphoretic. Capillary refill takes less than 2 seconds.   Psychiatric: She has a normal mood and affect.         Assessment:       1. Cough    2. COVID-19          Plan:       Patient requesting antibiotic.  I told the patient she is  diagnosed with COVID-19 and for COVID-19 antibiotics are not recommended.  Appropriate recommendations given to patient.  Tylenol as needed for aches and pains.  Mucinex for sinus congestion.    Cough  -     POCT COVID-19 Rapid Screening    COVID-19      Patient Instructions   You have tested positive for COVID-19 " today.      ISOLATION  If you tested positive and do not have symptoms, you must isolate for 5 days starting on the day of the positive test. I    If you tested positive and have symptoms, you must isolate for 5 days starting on the day of the first symptoms,  not the day of the positive test.     This is the most important part, both the CDC and the [facility] emphasize that you do not test out of isolation.     After 5 days, if your symptoms have improved and you have not had fever on day 5, you can return to the community on day 6- NO TESTING REQUIRED!      In fact, we do not retest if you were positive in the last 90 days.    After your 5 days of isolation are completed, the CDC recommends strict mask use for the first 5 days that you come out of isolation.          My notes were dictated with [employer] Flu[employer]. Any misspellings or nonsensical grammar should be attributed to its use and allowances made for errors and typographic syntactical error(s).

## 2022-03-04 ENCOUNTER — TELEPHONE (OUTPATIENT)
Dept: FAMILY MEDICINE | Facility: CLINIC | Age: 67
End: 2022-03-04
Payer: MEDICARE

## 2022-03-04 NOTE — TELEPHONE ENCOUNTER
Call placed to patient. Scheduled virtual for Monday.     Also sent link to reactivate her patient portal as she advises he links no longer work

## 2022-03-04 NOTE — TELEPHONE ENCOUNTER
----- Message from Ting Morales sent at 3/4/2022 11:15 AM CST -----  Type:  Same Day Appointment Request    Caller is requesting a same day appointment.  Caller declined first available appointment listed below.      Name of Caller:  patient   When is the first available appointment?  3/7   Symptoms:  anxiety   Best Call Back Number:  [phone]   Additional Information: per patient requesting a call back-please advise-thank you

## 2022-03-07 ENCOUNTER — OFFICE VISIT (OUTPATIENT)
Dept: FAMILY MEDICINE | Facility: CLINIC | Age: 67
End: 2022-03-07
Payer: MEDICARE

## 2022-03-07 DIAGNOSIS — F41.9 ANXIETY: Primary | ICD-10-CM

## 2022-03-07 PROCEDURE — 99442 PR PHYSICIAN TELEPHONE EVALUATION 11-20 MIN: CPT | Mod: 95,,, | Performed by: PHYSICIAN ASSISTANT

## 2022-03-07 PROCEDURE — 99442 PR PHYSICIAN TELEPHONE EVALUATION 11-20 MIN: ICD-10-PCS | Mod: 95,,, | Performed by: PHYSICIAN ASSISTANT

## 2022-03-07 PROCEDURE — 1160F PR REVIEW ALL MEDS BY PRESCRIBER/CLIN PHARMACIST DOCUMENTED: ICD-10-PCS | Mod: CPTII,95,, | Performed by: PHYSICIAN ASSISTANT

## 2022-03-07 PROCEDURE — 1159F PR MEDICATION LIST DOCUMENTED IN MEDICAL RECORD: ICD-10-PCS | Mod: CPTII,95,, | Performed by: PHYSICIAN ASSISTANT

## 2022-03-07 PROCEDURE — 1160F RVW MEDS BY RX/DR IN RCRD: CPT | Mod: CPTII,95,, | Performed by: PHYSICIAN ASSISTANT

## 2022-03-07 PROCEDURE — 1159F MED LIST DOCD IN RCRD: CPT | Mod: CPTII,95,, | Performed by: PHYSICIAN ASSISTANT

## 2022-03-07 RX ORDER — BUSPIRONE HYDROCHLORIDE 10 MG/1
10 TABLET ORAL 3 TIMES DAILY
Qty: 90 TABLET | Refills: 1 | Status: SHIPPED | OUTPATIENT
Start: 2022-03-07 | End: 2022-09-01

## 2022-03-08 NOTE — PROGRESS NOTES
Subjective:       Patient ID: Ynes Talavera is a 67 y.o. female.    Chief Complaint: Anxiety    Audio Only Telehealth Visit     The patient location is: [place], [place]  The chief complaint leading to consultation is: anxiety  Visit type: Virtual visit with audio only (telephone)  Total time spent with patient: 15     The reason for the audio only service rather than synchronous audio and video virtual visit was related to technical difficulties or patient preference/necessity.     Each patient to whom I provide medical services by telemedicine is:  (1) informed of the relationship between the physician and patient and the respective role of any other health care provider with respect to management of the patient; and (2) notified that they may decline to receive medical services by telemedicine and may withdraw from such care at any time. Patient verbally consented to receive this service via voice-only telephone call.                        This service was not originating from a related E/M service provided within the previous 7 days nor will  to an E/M service or procedure within the next 24 hours or my soonest available appointment.  Prevailing standard of care was able to be met in this audio-only visit.      Anxiety  Presents for initial visit. Onset was 6 to 12 months ago. The problem has been rapidly worsening. Symptoms include confusion, depressed mood, excessive worry, irritability, muscle tension, nervous/anxious behavior, palpitations and restlessness. Patient reports no chest pain. Symptoms occur most days. The severity of symptoms is causing significant distress and interfering with daily activities. The symptoms are aggravated by family issues. The quality of sleep is fair.     Risk factors include prior traumatic experience. Her past medical history is significant for anxiety/panic attacks and depression. Past treatments include SSRIs and non-SSRI antidepressants. Compliance with prior  treatments has been good.     Past Medical History:   Diagnosis Date    Adrenal adenoma     Diverticulosis of the colon     Hypertension     REJI (obstructive sleep apnea)     uses cpap    Venous insufficiency Sept 2014    right leg, denies Hx clot       Review of Systems   Constitutional: Positive for activity change and irritability. Negative for unexpected weight change.   HENT: Negative for hearing loss, rhinorrhea and trouble swallowing.    Eyes: Negative for discharge and visual disturbance.   Respiratory: Positive for chest tightness. Negative for wheezing.    Cardiovascular: Positive for palpitations. Negative for chest pain.   Gastrointestinal: Negative for blood in stool, constipation, diarrhea and vomiting.   Endocrine: Negative for polydipsia and polyuria.   Genitourinary: Negative for difficulty urinating, dysuria, hematuria and menstrual problem.   Musculoskeletal: Negative for arthralgias, joint swelling and neck pain.   Neurological: Negative for weakness and headaches.   Psychiatric/Behavioral: Positive for confusion and dysphoric mood. The patient is nervous/anxious.          Objective:      Physical Exam    Assessment:       Problem List Items Addressed This Visit    None     Visit Diagnoses     Anxiety    -  Primary          Plan:       Anxiety    Other orders  -     busPIRone (BUSPAR) 10 MG tablet; Take 1 tablet (10 mg total) by mouth 3 (three) times daily.  Dispense: 90 tablet; Refill: 1

## 2022-04-14 ENCOUNTER — PATIENT MESSAGE (OUTPATIENT)
Dept: FAMILY MEDICINE | Facility: CLINIC | Age: 67
End: 2022-04-14
Payer: MEDICARE

## 2022-04-14 DIAGNOSIS — M25.511 RIGHT SHOULDER PAIN, UNSPECIFIED CHRONICITY: Primary | ICD-10-CM

## 2022-04-14 NOTE — TELEPHONE ENCOUNTER
Requesting to see Orthopedics for right shoulder pain.   Referral pended for review.     LOV with JAMES 8/2021

## 2022-04-18 ENCOUNTER — TELEPHONE (OUTPATIENT)
Dept: ORTHOPEDICS | Facility: CLINIC | Age: 67
End: 2022-04-18
Payer: MEDICARE

## 2022-05-10 DIAGNOSIS — M25.511 RIGHT SHOULDER PAIN: Primary | ICD-10-CM

## 2022-05-12 ENCOUNTER — HOSPITAL ENCOUNTER (OUTPATIENT)
Dept: RADIOLOGY | Facility: HOSPITAL | Age: 67
Discharge: HOME | End: 2022-05-12
Attending: ORTHOPAEDIC SURGERY
Payer: MEDICARE

## 2022-05-12 ENCOUNTER — OFFICE VISIT (OUTPATIENT)
Dept: ORTHOPEDICS | Facility: CLINIC | Age: 67
End: 2022-05-12
Payer: MEDICARE

## 2022-05-12 VITALS — BODY MASS INDEX: 26.03 KG/M2 | WEIGHT: 162 LBS | HEIGHT: 66 IN

## 2022-05-12 DIAGNOSIS — M25.511 RIGHT SHOULDER PAIN: ICD-10-CM

## 2022-05-12 DIAGNOSIS — M19.011 DJD OF RIGHT SHOULDER: ICD-10-CM

## 2022-05-12 DIAGNOSIS — M25.511 RIGHT SHOULDER PAIN: Primary | ICD-10-CM

## 2022-05-12 PROCEDURE — 1101F PT FALLS ASSESS-DOCD LE1/YR: CPT | Mod: CPTII,S$GLB,, | Performed by: ORTHOPAEDIC SURGERY

## 2022-05-12 PROCEDURE — 3288F FALL RISK ASSESSMENT DOCD: CPT | Mod: CPTII,S$GLB,, | Performed by: ORTHOPAEDIC SURGERY

## 2022-05-12 PROCEDURE — 20610 LARGE JOINT ASPIRATION/INJECTION: R SUBACROMIAL BURSA: ICD-10-PCS | Mod: RT,S$GLB,, | Performed by: ORTHOPAEDIC SURGERY

## 2022-05-12 PROCEDURE — 99214 PR OFFICE/OUTPT VISIT, EST, LEVL IV, 30-39 MIN: ICD-10-PCS | Mod: 25,S$GLB,, | Performed by: ORTHOPAEDIC SURGERY

## 2022-05-12 PROCEDURE — 99999 PR PBB SHADOW E&M-EST. PATIENT-LVL III: ICD-10-PCS | Mod: PBBFAC,,, | Performed by: ORTHOPAEDIC SURGERY

## 2022-05-12 PROCEDURE — 73030 X-RAY EXAM OF SHOULDER: CPT | Mod: 26,RT,, | Performed by: RADIOLOGY

## 2022-05-12 PROCEDURE — 1160F PR REVIEW ALL MEDS BY PRESCRIBER/CLIN PHARMACIST DOCUMENTED: ICD-10-PCS | Mod: CPTII,S$GLB,, | Performed by: ORTHOPAEDIC SURGERY

## 2022-05-12 PROCEDURE — 1125F PR PAIN SEVERITY QUANTIFIED, PAIN PRESENT: ICD-10-PCS | Mod: CPTII,S$GLB,, | Performed by: ORTHOPAEDIC SURGERY

## 2022-05-12 PROCEDURE — 73030 XR SHOULDER TRAUMA 3 VIEW RIGHT: ICD-10-PCS | Mod: 26,RT,, | Performed by: RADIOLOGY

## 2022-05-12 PROCEDURE — 3288F PR FALLS RISK ASSESSMENT DOCUMENTED: ICD-10-PCS | Mod: CPTII,S$GLB,, | Performed by: ORTHOPAEDIC SURGERY

## 2022-05-12 PROCEDURE — 99999 PR PBB SHADOW E&M-EST. PATIENT-LVL III: CPT | Mod: PBBFAC,,, | Performed by: ORTHOPAEDIC SURGERY

## 2022-05-12 PROCEDURE — 20610 DRAIN/INJ JOINT/BURSA W/O US: CPT | Mod: RT,S$GLB,, | Performed by: ORTHOPAEDIC SURGERY

## 2022-05-12 PROCEDURE — 1125F AMNT PAIN NOTED PAIN PRSNT: CPT | Mod: CPTII,S$GLB,, | Performed by: ORTHOPAEDIC SURGERY

## 2022-05-12 PROCEDURE — 99214 OFFICE O/P EST MOD 30 MIN: CPT | Mod: 25,S$GLB,, | Performed by: ORTHOPAEDIC SURGERY

## 2022-05-12 PROCEDURE — 1160F RVW MEDS BY RX/DR IN RCRD: CPT | Mod: CPTII,S$GLB,, | Performed by: ORTHOPAEDIC SURGERY

## 2022-05-12 PROCEDURE — 1159F PR MEDICATION LIST DOCUMENTED IN MEDICAL RECORD: ICD-10-PCS | Mod: CPTII,S$GLB,, | Performed by: ORTHOPAEDIC SURGERY

## 2022-05-12 PROCEDURE — 73030 X-RAY EXAM OF SHOULDER: CPT | Mod: TC,PO,RT

## 2022-05-12 PROCEDURE — 3008F PR BODY MASS INDEX (BMI) DOCUMENTED: ICD-10-PCS | Mod: CPTII,S$GLB,, | Performed by: ORTHOPAEDIC SURGERY

## 2022-05-12 PROCEDURE — 1159F MED LIST DOCD IN RCRD: CPT | Mod: CPTII,S$GLB,, | Performed by: ORTHOPAEDIC SURGERY

## 2022-05-12 PROCEDURE — 1101F PR PT FALLS ASSESS DOC 0-1 FALLS W/OUT INJ PAST YR: ICD-10-PCS | Mod: CPTII,S$GLB,, | Performed by: ORTHOPAEDIC SURGERY

## 2022-05-12 PROCEDURE — 3008F BODY MASS INDEX DOCD: CPT | Mod: CPTII,S$GLB,, | Performed by: ORTHOPAEDIC SURGERY

## 2022-05-12 RX ORDER — TRIAMCINOLONE ACETONIDE 40 MG/ML
80 INJECTION, SUSPENSION INTRA-ARTICULAR; INTRAMUSCULAR
Status: DISCONTINUED | OUTPATIENT
Start: 2022-05-12 | End: 2022-05-12 | Stop reason: HOSPADM

## 2022-05-12 RX ADMIN — TRIAMCINOLONE ACETONIDE 80 MG: 40 INJECTION, SUSPENSION INTRA-ARTICULAR; INTRAMUSCULAR at 01:05

## 2022-05-12 NOTE — PROGRESS NOTES
67 years old right shoulder pain for the past 2 and half months.  Pain at times lies on that side.  Pain with overhead activity reaching behind her back.  We had given her injection about 6 years ago with good relief requesting injection today as she is having similar symptoms    Exam shows positive [place] impingement sign, weak and painful cuff strength, no signs infection instability    X-rays show AC arthrosis    Assessment:  Right shoulder AC arthrosis probable rotator cuff tear    Plan:  Kenalog injection right shoulder subacromial space, home exercise program, follow-up as needed    Imaging studies ordered and reviewed by me    Further History  Aching pain  Worse with activity  Relieved with rest  No other associated symptoms  No other radiation    Further Exam  Alert and oriented  Pleasant  Contralateral limb has appropriate range of motion for age and condition  Contralateral limb has appropriate strength for age and condition  Contralateral limb has appropriate stability  for age and condition  No adenopathy  Pulses are appropriate for current condition  Skin is intact        Chief Complaint    Chief Complaint   Patient presents with    Right Shoulder - Pain       HPI  Ynes Talavera is a 67 y.o.  female who presents with       Past Medical History  Past Medical History:   Diagnosis Date    Adrenal adenoma     Diverticulosis of the colon     Hypertension     REJI (obstructive sleep apnea)     uses cpap    Venous insufficiency 2014    right leg, denies Hx clot       Past Surgical History  Past Surgical History:   Procedure Laterality Date    APPENDECTOMY      BREAST BIOPSY Right 2002    benign needle biopsy    BREAST BIOPSY Left     benign needle biopsy     SECTION, LOW TRANSVERSE      CHOLECYSTECTOMY      COLONOSCOPY N/A 9/3/2020    Procedure: COLONOSCOPY;  Surgeon: Neville Galvan MD;  [facility];  Service: Endoscopy;  Laterality: N/A;    EPIDURAL STEROID  INJECTION INTO CERVICAL SPINE N/A 6/4/2019    Procedure: Injection-steroid-epidural-cervical C7/T1;  Surgeon: Ronnell Echeverria MD;  Location: [facility] OR;  Service: Pain Management;  Laterality: N/A;    ESOPHAGOGASTRODUODENOSCOPY N/A 8/20/2020    Procedure: ESOPHAGOGASTRODUODENOSCOPY (EGD);  Surgeon: Neville Galvan MD;  Location: [facility] ENDO;  Service: Endoscopy;  Laterality: N/A;    INJECTION OF FACET JOINT Left 8/22/2019    Procedure: INJECTION, FACET JOINT, C1-C2 Medial Branch;  Surgeon: Samantha Willoughby MD;  Location: [facility]GT;  Service: Pain Management;  Laterality: Left;    THUMB ARTHROSCOPY      both thumbs, trigger finger release.    TONSILLECTOMY      TUBAL LIGATION      UMBILICAL HERNIA REPAIR  2014    VEIN LIGATION AND STRIPPING      Left       Medications  Current Outpatient Medications   Medication Sig    aspirin (ECOTRIN) 81 MG EC tablet Take 81 mg by mouth once daily.    busPIRone (BUSPAR) 10 MG tablet Take 1 tablet (10 mg total) by mouth 3 (three) times daily.    cholecalciferol, vitamin D3, (VITAMIN D3) 25 mcg (1,000 unit) capsule Take 1,000 Units by mouth once daily.    EScitalopram oxalate (LEXAPRO) 10 MG tablet Take 1 tablet (10 mg total) by mouth once daily.    metoprolol tartrate (LOPRESSOR) 25 MG tablet Take 1 tablet (25 mg total) by mouth once daily.    multivitamin capsule Take 1 capsule by mouth once daily.    traZODone (DESYREL) 100 MG tablet TAKE 1 TABLET(100 MG) BY MOUTH EVERY NIGHT     No current facility-administered medications for this visit.       Allergies  Review of patient's allergies indicates:  No Known Allergies    Family History  Family History   Problem Relation Age of Onset    Cancer Mother     Cancer Father     Heart disease Father     Hypertension Father     Diabetes Maternal Grandmother     Heart disease Maternal Grandmother     Diabetes Maternal Grandfather     Heart disease Maternal Grandfather     Breast cancer Paternal Grandmother 42        Social History  Social History     Socioeconomic History    Marital status:     Number of children: 3   Occupational History    Occupation: Dental hygienist     Comment: Working full-time     Employer:  [employer]   Tobacco Use    Smoking status: Former Smoker     Packs/day: 0.25     Years: 25.00     Pack years: 6.25     Quit date: 1993     Years since quittin.2    Smokeless tobacco: Never Used   Substance and Sexual Activity    Alcohol use: No     Alcohol/week: 0.0 standard drinks    Drug use: No    Sexual activity: Yes     Partners: Male   Social History Narrative    Her  has had a CVA. She is major support for him.        Walks regularly     Social Determinants of Health     Financial Resource Strain: Low Risk     Difficulty of Paying Living Expenses: Not hard at all   Food Insecurity: No Food Insecurity    Worried About Running Out of Food in the Last Year: Never true    Ran Out of Food in the Last Year: Never true   Transportation Needs: Unmet Transportation Needs    Lack of Transportation (Medical): No    Lack of Transportation (Non-Medical): Yes   Physical Activity: Insufficiently Active    Days of Exercise per Week: 1 day    Minutes of Exercise per Session: 30 min   Stress: Stress Concern Present    Feeling of Stress : Very much   Social Connections: Unknown    Frequency of Communication with Friends and Family: More than three times a week    Frequency of Social Gatherings with Friends and Family: More than three times a week    Active Member of Clubs or Organizations: Yes    Attends Club or Organization Meetings: 1 to 4 times per year    Marital Status:    Housing Stability: Low Risk     Unable to Pay for Housing in the Last Year: No    Number of Places Lived in the Last Year: 1    Unstable Housing in the Last Year: No               Review of Systems     Constitutional: Negative    HENT: Negative  Eyes: Negative  Respiratory:  Negative  Cardiovascular: Negative  Musculoskeletal: HPI  Skin: Negative  Neurological: Negative  Hematological: Negative  Endocrine: Negative                 Physical Exam    There were no vitals filed for this visit.  Body mass index is 26.15 kg/m².  Physical Examination:     General appearance -  well appearing, and in no distress  Mental status - awake  Neck - supple  Chest -  symmetric air entry  Heart - normal rate   Abdomen - soft      Assessment     1. Right shoulder pain    2. DJD of right shoulder          Plan

## 2022-05-12 NOTE — PROCEDURES
Large Joint Aspiration/Injection: R subacromial bursa    Date/Time: 5/12/2022 1:30 PM  Performed by: Keven Piedra MD  Authorized by: Keven Piedra MD     Consent Done?:  Yes (Verbal)  Site marked: the procedure site was marked    Prep: patient was prepped and draped in usual sterile fashion      Details:  Needle Size:  21 G  Approach:  Posterior  Location:  Shoulder  Site:  R subacromial bursa  Medications:  80 mg triamcinolone acetonide 40 mg/mL  Patient tolerance:  Patient tolerated the procedure well with no immediate complications

## 2022-07-07 ENCOUNTER — OFFICE VISIT (OUTPATIENT)
Dept: ORTHOPEDICS | Facility: CLINIC | Age: 67
End: 2022-07-07
Payer: MEDICARE

## 2022-07-07 VITALS — HEIGHT: 66 IN | WEIGHT: 162 LBS | BODY MASS INDEX: 26.03 KG/M2

## 2022-07-07 DIAGNOSIS — M19.011 PRIMARY OSTEOARTHRITIS OF RIGHT SHOULDER: ICD-10-CM

## 2022-07-07 DIAGNOSIS — M25.511 RIGHT SHOULDER PAIN, UNSPECIFIED CHRONICITY: Primary | ICD-10-CM

## 2022-07-07 PROCEDURE — 99999 PR PBB SHADOW E&M-EST. PATIENT-LVL III: ICD-10-PCS | Mod: PBBFAC,,, | Performed by: ORTHOPAEDIC SURGERY

## 2022-07-07 PROCEDURE — 20610 LARGE JOINT ASPIRATION/INJECTION: R SUBACROMIAL BURSA: ICD-10-PCS | Mod: RT,S$GLB,, | Performed by: ORTHOPAEDIC SURGERY

## 2022-07-07 PROCEDURE — 1159F PR MEDICATION LIST DOCUMENTED IN MEDICAL RECORD: ICD-10-PCS | Mod: CPTII,S$GLB,, | Performed by: ORTHOPAEDIC SURGERY

## 2022-07-07 PROCEDURE — 1101F PT FALLS ASSESS-DOCD LE1/YR: CPT | Mod: CPTII,S$GLB,, | Performed by: ORTHOPAEDIC SURGERY

## 2022-07-07 PROCEDURE — 1160F PR REVIEW ALL MEDS BY PRESCRIBER/CLIN PHARMACIST DOCUMENTED: ICD-10-PCS | Mod: CPTII,S$GLB,, | Performed by: ORTHOPAEDIC SURGERY

## 2022-07-07 PROCEDURE — 99214 PR OFFICE/OUTPT VISIT, EST, LEVL IV, 30-39 MIN: ICD-10-PCS | Mod: 25,S$GLB,, | Performed by: ORTHOPAEDIC SURGERY

## 2022-07-07 PROCEDURE — 20610 DRAIN/INJ JOINT/BURSA W/O US: CPT | Mod: RT,S$GLB,, | Performed by: ORTHOPAEDIC SURGERY

## 2022-07-07 PROCEDURE — 3288F FALL RISK ASSESSMENT DOCD: CPT | Mod: CPTII,S$GLB,, | Performed by: ORTHOPAEDIC SURGERY

## 2022-07-07 PROCEDURE — 3288F PR FALLS RISK ASSESSMENT DOCUMENTED: ICD-10-PCS | Mod: CPTII,S$GLB,, | Performed by: ORTHOPAEDIC SURGERY

## 2022-07-07 PROCEDURE — 3008F BODY MASS INDEX DOCD: CPT | Mod: CPTII,S$GLB,, | Performed by: ORTHOPAEDIC SURGERY

## 2022-07-07 PROCEDURE — 1160F RVW MEDS BY RX/DR IN RCRD: CPT | Mod: CPTII,S$GLB,, | Performed by: ORTHOPAEDIC SURGERY

## 2022-07-07 PROCEDURE — 1125F PR PAIN SEVERITY QUANTIFIED, PAIN PRESENT: ICD-10-PCS | Mod: CPTII,S$GLB,, | Performed by: ORTHOPAEDIC SURGERY

## 2022-07-07 PROCEDURE — 1159F MED LIST DOCD IN RCRD: CPT | Mod: CPTII,S$GLB,, | Performed by: ORTHOPAEDIC SURGERY

## 2022-07-07 PROCEDURE — 1101F PR PT FALLS ASSESS DOC 0-1 FALLS W/OUT INJ PAST YR: ICD-10-PCS | Mod: CPTII,S$GLB,, | Performed by: ORTHOPAEDIC SURGERY

## 2022-07-07 PROCEDURE — 3008F PR BODY MASS INDEX (BMI) DOCUMENTED: ICD-10-PCS | Mod: CPTII,S$GLB,, | Performed by: ORTHOPAEDIC SURGERY

## 2022-07-07 PROCEDURE — 1125F AMNT PAIN NOTED PAIN PRSNT: CPT | Mod: CPTII,S$GLB,, | Performed by: ORTHOPAEDIC SURGERY

## 2022-07-07 PROCEDURE — 99999 PR PBB SHADOW E&M-EST. PATIENT-LVL III: CPT | Mod: PBBFAC,,, | Performed by: ORTHOPAEDIC SURGERY

## 2022-07-07 PROCEDURE — 99214 OFFICE O/P EST MOD 30 MIN: CPT | Mod: 25,S$GLB,, | Performed by: ORTHOPAEDIC SURGERY

## 2022-07-07 RX ORDER — TRIAMCINOLONE ACETONIDE 40 MG/ML
80 INJECTION, SUSPENSION INTRA-ARTICULAR; INTRAMUSCULAR
Status: DISCONTINUED | OUTPATIENT
Start: 2022-07-07 | End: 2022-07-07 | Stop reason: HOSPADM

## 2022-07-07 RX ADMIN — TRIAMCINOLONE ACETONIDE 80 MG: 40 INJECTION, SUSPENSION INTRA-ARTICULAR; INTRAMUSCULAR at 02:07

## 2022-07-07 NOTE — PROGRESS NOTES
67 years old recurrence of her right shoulder pain.  Two months ago we had given her injection into her shoulder and she was doing quite well until about 4 days ago she was pushing a grocery cart jammed her shoulder when it hit a bump in the road having recurrence of shoulder pain difficulty reaching out to her side up overhead.  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    Exam shows positive [place] impingement sign, weak and painful cuff strength    X-rays show AC arthrosis    Assessment:  Degenerative rotator cuff disease AC arthrosis right shoulder    Plan:  Kenalog injection right shoulder, continue the home exercise program, follow-up as needed    Imaging studies ordered and reviewed by me    Further History  Aching pain  Worse with activity  Relieved with rest  No other associated symptoms  No other radiation    Further Exam  Alert and oriented  Pleasant  Contralateral limb has appropriate range of motion for age and condition  Contralateral limb has appropriate strength for age and condition  Contralateral limb has appropriate stability  for age and condition  No adenopathy  Pulses are appropriate for current condition  Skin is intact        Chief Complaint    Chief Complaint   Patient presents with    Right Shoulder - Pain       HPI  Ynes Talavera is a 67 y.o.  female who presents with       Past Medical History  Past Medical History:   Diagnosis Date    Adrenal adenoma     Diverticulosis of the colon     Hypertension     REJI (obstructive sleep apnea)     uses cpap    Venous insufficiency 2014    right leg, denies Hx clot       Past Surgical History  Past Surgical History:   Procedure Laterality Date    APPENDECTOMY      BREAST BIOPSY Right     benign needle biopsy    BREAST BIOPSY Left     benign needle biopsy     SECTION, LOW TRANSVERSE      CHOLECYSTECTOMY      COLONOSCOPY N/A 9/3/2020     Procedure: COLONOSCOPY;  Surgeon: Neville Galvan MD;  Location: [facility] ENDO;  Service: Endoscopy;  Laterality: N/A;    EPIDURAL STEROID INJECTION INTO CERVICAL SPINE N/A 6/4/2019    Procedure: Injection-steroid-epidural-cervical C7/T1;  Surgeon: Ronnell Echeverria MD;  Location: [facility] OR;  Service: Pain Management;  Laterality: N/A;    ESOPHAGOGASTRODUODENOSCOPY N/A 8/20/2020    Procedure: ESOPHAGOGASTRODUODENOSCOPY (EGD);  Surgeon: Neville Galvan MD;  Location: [facility] ENDO;  Service: Endoscopy;  Laterality: N/A;    INJECTION OF FACET JOINT Left 8/22/2019    Procedure: INJECTION, FACET JOINT, C1-C2 Medial Branch;  Surgeon: Samantha Willoughby MD;  Location: [facility] MGT;  Service: Pain Management;  Laterality: Left;    THUMB ARTHROSCOPY      both thumbs, trigger finger release.    TONSILLECTOMY      TUBAL LIGATION      UMBILICAL HERNIA REPAIR  2014    VEIN LIGATION AND STRIPPING      Left       Medications  Current Outpatient Medications   Medication Sig    aspirin (ECOTRIN) 81 MG EC tablet Take 81 mg by mouth once daily.    busPIRone (BUSPAR) 10 MG tablet Take 1 tablet (10 mg total) by mouth 3 (three) times daily.    cholecalciferol, vitamin D3, (VITAMIN D3) 25 mcg (1,000 unit) capsule Take 1,000 Units by mouth once daily.    EScitalopram oxalate (LEXAPRO) 10 MG tablet Take 1 tablet (10 mg total) by mouth once daily.    metoprolol tartrate (LOPRESSOR) 25 MG tablet Take 1 tablet (25 mg total) by mouth once daily.    multivitamin capsule Take 1 capsule by mouth once daily.    traZODone (DESYREL) 100 MG tablet TAKE 1 TABLET(100 MG) BY MOUTH EVERY NIGHT     No current facility-administered medications for this visit.       Allergies  Review of patient's allergies indicates:  No Known Allergies    Family History  Family History   Problem Relation Age of Onset    Cancer Mother     Cancer Father     Heart disease Father     Hypertension Father     Diabetes Maternal Grandmother     Heart disease Maternal  Grandmother     Diabetes Maternal Grandfather     Heart disease Maternal Grandfather     Breast cancer Paternal Grandmother 42       Social History  Social History     Socioeconomic History    Marital status:     Number of children: 3   Occupational History    Occupation: Dental hygienist     Comment: Working full-time     Employer:  [employer]   Tobacco Use    Smoking status: Former Smoker     Packs/day: 0.25     Years: 25.00     Pack years: 6.25     Quit date: 1993     Years since quittin.4    Smokeless tobacco: Never Used   Substance and Sexual Activity    Alcohol use: No     Alcohol/week: 0.0 standard drinks    Drug use: No    Sexual activity: Yes     Partners: Male   Social History Narrative    Her  has had a CVA. She is major support for him.        Walks regularly     Social Determinants of Health     Financial Resource Strain: Low Risk     Difficulty of Paying Living Expenses: Not hard at all   Food Insecurity: No Food Insecurity    Worried About Running Out of Food in the Last Year: Never true    Ran Out of Food in the Last Year: Never true   Transportation Needs: Unmet Transportation Needs    Lack of Transportation (Medical): No    Lack of Transportation (Non-Medical): Yes   Physical Activity: Insufficiently Active    Days of Exercise per Week: 1 day    Minutes of Exercise per Session: 30 min   Stress: Stress Concern Present    Feeling of Stress : Very much   Social Connections: Unknown    Frequency of Communication with Friends and Family: More than three times a week    Frequency of Social Gatherings with Friends and Family: More than three times a week    Active Member of Clubs or Organizations: Yes    Attends Club or Organization Meetings: 1 to 4 times per year    Marital Status:    Housing Stability: Low Risk     Unable to Pay for Housing in the Last Year: No    Number of Places Lived in the Last Year: 1    Unstable Housing in  the Last Year: No               Review of Systems     Constitutional: Negative    HENT: Negative  Eyes: Negative  Respiratory: Negative  Cardiovascular: Negative  Musculoskeletal: HPI  Skin: Negative  Neurological: Negative  Hematological: Negative  Endocrine: Negative                 Physical Exam    There were no vitals filed for this visit.  Body mass index is 26.15 kg/m².  Physical Examination:     General appearance -  well appearing, and in no distress  Mental status - awake  Neck - supple  Chest -  symmetric air entry  Heart - normal rate   Abdomen - soft      Assessment     1. Right shoulder pain, unspecified chronicity          Plan

## 2022-08-07 ENCOUNTER — PATIENT MESSAGE (OUTPATIENT)
Dept: ORTHOPEDICS | Facility: CLINIC | Age: 67
End: 2022-08-07
Payer: MEDICARE

## 2022-08-16 DIAGNOSIS — M19.011 PRIMARY OSTEOARTHRITIS OF RIGHT SHOULDER: Primary | ICD-10-CM

## 2022-08-18 ENCOUNTER — PATIENT MESSAGE (OUTPATIENT)
Dept: ORTHOPEDICS | Facility: CLINIC | Age: 67
End: 2022-08-18
Payer: MEDICARE

## 2022-08-20 DIAGNOSIS — F32.9 REACTIVE DEPRESSION: ICD-10-CM

## 2022-08-22 RX ORDER — ESCITALOPRAM OXALATE 10 MG/1
TABLET ORAL
Qty: 30 TABLET | Refills: 0 | Status: SHIPPED | OUTPATIENT
Start: 2022-08-22 | End: 2022-09-01 | Stop reason: SDUPTHER

## 2022-08-25 ENCOUNTER — PATIENT MESSAGE (OUTPATIENT)
Dept: FAMILY MEDICINE | Facility: CLINIC | Age: 67
End: 2022-08-25
Payer: MEDICARE

## 2022-09-01 ENCOUNTER — OFFICE VISIT (OUTPATIENT)
Dept: FAMILY MEDICINE | Facility: CLINIC | Age: 67
End: 2022-09-01
Payer: MEDICARE

## 2022-09-01 VITALS
HEART RATE: 68 BPM | SYSTOLIC BLOOD PRESSURE: 128 MMHG | WEIGHT: 168.63 LBS | OXYGEN SATURATION: 99 % | DIASTOLIC BLOOD PRESSURE: 82 MMHG | BODY MASS INDEX: 27.22 KG/M2

## 2022-09-01 DIAGNOSIS — F32.9 REACTIVE DEPRESSION: ICD-10-CM

## 2022-09-01 DIAGNOSIS — Z00.00 PREVENTATIVE HEALTH CARE: Primary | ICD-10-CM

## 2022-09-01 DIAGNOSIS — G47.9 SLEEP DISTURBANCE: Chronic | ICD-10-CM

## 2022-09-01 DIAGNOSIS — I10 HYPERTENSION, UNSPECIFIED TYPE: ICD-10-CM

## 2022-09-01 PROCEDURE — 3079F DIAST BP 80-89 MM HG: CPT | Mod: CPTII,S$GLB,, | Performed by: NURSE PRACTITIONER

## 2022-09-01 PROCEDURE — 3008F BODY MASS INDEX DOCD: CPT | Mod: CPTII,S$GLB,, | Performed by: NURSE PRACTITIONER

## 2022-09-01 PROCEDURE — 99999 PR PBB SHADOW E&M-EST. PATIENT-LVL III: ICD-10-PCS | Mod: PBBFAC,,, | Performed by: NURSE PRACTITIONER

## 2022-09-01 PROCEDURE — 3074F SYST BP LT 130 MM HG: CPT | Mod: CPTII,S$GLB,, | Performed by: NURSE PRACTITIONER

## 2022-09-01 PROCEDURE — 99999 PR PBB SHADOW E&M-EST. PATIENT-LVL III: CPT | Mod: PBBFAC,,, | Performed by: NURSE PRACTITIONER

## 2022-09-01 PROCEDURE — 99214 OFFICE O/P EST MOD 30 MIN: CPT | Mod: S$GLB,,, | Performed by: NURSE PRACTITIONER

## 2022-09-01 PROCEDURE — 1126F PR PAIN SEVERITY QUANTIFIED, NO PAIN PRESENT: ICD-10-PCS | Mod: CPTII,S$GLB,, | Performed by: NURSE PRACTITIONER

## 2022-09-01 PROCEDURE — 3074F PR MOST RECENT SYSTOLIC BLOOD PRESSURE < 130 MM HG: ICD-10-PCS | Mod: CPTII,S$GLB,, | Performed by: NURSE PRACTITIONER

## 2022-09-01 PROCEDURE — 3008F PR BODY MASS INDEX (BMI) DOCUMENTED: ICD-10-PCS | Mod: CPTII,S$GLB,, | Performed by: NURSE PRACTITIONER

## 2022-09-01 PROCEDURE — 3079F PR MOST RECENT DIASTOLIC BLOOD PRESSURE 80-89 MM HG: ICD-10-PCS | Mod: CPTII,S$GLB,, | Performed by: NURSE PRACTITIONER

## 2022-09-01 PROCEDURE — 1126F AMNT PAIN NOTED NONE PRSNT: CPT | Mod: CPTII,S$GLB,, | Performed by: NURSE PRACTITIONER

## 2022-09-01 PROCEDURE — 99214 PR OFFICE/OUTPT VISIT, EST, LEVL IV, 30-39 MIN: ICD-10-PCS | Mod: S$GLB,,, | Performed by: NURSE PRACTITIONER

## 2022-09-01 RX ORDER — ESCITALOPRAM OXALATE 10 MG/1
10 TABLET ORAL DAILY
Qty: 90 TABLET | Refills: 3 | Status: SHIPPED | OUTPATIENT
Start: 2022-09-01 | End: 2022-09-19

## 2022-09-01 RX ORDER — TRAZODONE HYDROCHLORIDE 100 MG/1
TABLET ORAL
Qty: 90 TABLET | Refills: 3 | Status: SHIPPED | OUTPATIENT
Start: 2022-09-01 | End: 2023-07-05

## 2022-09-01 RX ORDER — METOPROLOL TARTRATE 25 MG/1
25 TABLET, FILM COATED ORAL DAILY
Qty: 90 TABLET | Refills: 3 | Status: SHIPPED | OUTPATIENT
Start: 2022-09-01 | End: 2023-09-11

## 2022-09-01 NOTE — PROGRESS NOTES
Subjective:       Patient ID: Ynes Talavera is a 67 y.o. female.    Chief Complaint: Annual Exam      Here for a check up. HTN stable, REJI stable, using sleep appliance. Taking lexapro, working for anxiety. Takes trazodone for sleep. Moderate assitance with insomnia, worse sleep since arm has been flared.   Right shoulder has caused issues for several years, was treated in may and July by orthopedics, 5 weeks ago started to fall and grabbed a bannister and re injured it. Called Dr Piedra and he started her on PT. Seeing Dr Piedra tomorrow.     Past Medical History:  No date: Adrenal adenoma  No date: Diverticulosis of the colon  No date: Hypertension  No date: REJI (obstructive sleep apnea)      Comment:  uses cpap  2014: Venous insufficiency      Comment:  right leg, denies Hx clot    Past Surgical History:  No date: APPENDECTOMY  2002: BREAST BIOPSY; Right      Comment:  benign needle biopsy  2006: BREAST BIOPSY; Left      Comment:  benign needle biopsy  No date:  SECTION, LOW TRANSVERSE  No date: CHOLECYSTECTOMY  9/3/2020: COLONOSCOPY; N/A      Comment:  Procedure: COLONOSCOPY;  Surgeon: Neville Galvan MD;  Location: [facility] ENDO;  Service: Endoscopy;                 Laterality: N/A;  2019: EPIDURAL STEROID INJECTION INTO CERVICAL SPINE; N/A      Comment:  Procedure: Injection-steroid-epidural-cervical C7/T1;                 Surgeon: Ronnell Echeverria MD;  Location: [facility] OR;                 Service: Pain Management;  Laterality: N/A;  2020: ESOPHAGOGASTRODUODENOSCOPY; N/A      Comment:  Procedure: ESOPHAGOGASTRODUODENOSCOPY (EGD);  Surgeon:                Neville Galvan MD;  Location: [facility] ENDO;  Service:                Endoscopy;  Laterality: N/A;  2019: INJECTION OF FACET JOINT; Left      Comment:  Procedure: INJECTION, FACET JOINT, C1-C2 Medial Branch;                Surgeon: Samantha Willoughby MD;  Location: [facility] PAIN MGT;                 Service: Pain Management;   Laterality: Left;  No date: THUMB ARTHROSCOPY      Comment:  both thumbs, trigger finger release.  No date: TONSILLECTOMY  No date: TUBAL LIGATION  2014: UMBILICAL HERNIA REPAIR  No date: VEIN LIGATION AND STRIPPING      Comment:  Left    Review of patient's family history indicates:      Social History    Socioeconomic History      Marital status:       Number of children: 3    Occupational History      Occupation: Dental hygienist        Comment: Working full-time        Employer:  [employer]    Tobacco Use      Smoking status: Former        Packs/day: 0.25        Years: 25.00        Pack years: 6.25        Types: Cigarettes        Quit date: 1993        Years since quittin.5      Smokeless tobacco: Never    Substance and Sexual Activity      Alcohol use: No        Alcohol/week: 0.0 standard drinks      Drug use: No      Sexual activity: Yes        Partners: Male    Social History Narrative      Her  has had a CVA. She is major support for him.            Walks regularly    Social Determinants of Health  Financial Resource Strain: Low Risk       Difficulty of Paying Living Expenses: Not hard at all  Food Insecurity: No Food Insecurity      Worried About Running Out of Food in the Last Year: Never true      Ran Out of Food in the Last Year: Never true  Transportation Needs: No Transportation Needs      Lack of Transportation (Medical): No      Lack of Transportation (Non-Medical): No  Physical Activity: Insufficiently Active      Days of Exercise per Week: 2 days      Minutes of Exercise per Session: 40 min  Stress: Stress Concern Present      Feeling of Stress : To some extent  Social Connections: Unknown      Frequency of Communication with Friends and Family: More than three times a week      Frequency of Social Gatherings with Friends and Family: More than three times a week      Active Member of Clubs or Organizations: Yes      Attends Club or Organization Meetings: More  than 4 times per year      Marital Status:   Housing Stability: Low Risk       Unable to Pay for Housing in the Last Year: No      Number of Places Lived in the Last Year: 1      Unstable Housing in the Last Year: No    Current Outpatient Medications:  aspirin (ECOTRIN) 81 MG EC tablet, Take 81 mg by mouth once daily., Disp: , Rfl:   cholecalciferol, vitamin D3, (VITAMIN D3) 25 mcg (1,000 unit) capsule, Take 1,000 Units by mouth once daily., Disp: , Rfl:   EScitalopram oxalate (LEXAPRO) 10 MG tablet, TAKE 1 TABLET(10 MG) BY MOUTH EVERY DAY, Disp: 30 tablet, Rfl: 0  metoprolol tartrate (LOPRESSOR) 25 MG tablet, Take 1 tablet (25 mg total) by mouth once daily., Disp: 90 tablet, Rfl: 3  multivitamin capsule, Take 1 capsule by mouth once daily., Disp: , Rfl:   traZODone (DESYREL) 100 MG tablet, TAKE 1 TABLET(100 MG) BY MOUTH EVERY NIGHT, Disp: 90 tablet, Rfl: 3  busPIRone (BUSPAR) 10 MG tablet, Take 1 tablet (10 mg total) by mouth 3 (three) times daily., Disp: 90 tablet, Rfl: 1    No current facility-administered medications for this visit.      Review of patient's allergies indicates:  No Known Allergies     Review of Systems   Constitutional: Negative.    HENT: Negative.     Respiratory: Negative.     Cardiovascular: Negative.    Genitourinary: Negative.    Musculoskeletal:  Positive for arthralgias.   Neurological: Negative.    Psychiatric/Behavioral:  Positive for sleep disturbance. Negative for decreased concentration. The patient is not nervous/anxious.        Objective:      Physical Exam  Constitutional:       Appearance: Normal appearance.   HENT:      Head: Normocephalic and atraumatic.   Cardiovascular:      Rate and Rhythm: Normal rate.   Pulmonary:      Effort: Pulmonary effort is normal. No respiratory distress.   Neurological:      Mental Status: She is alert and oriented to person, place, and time.      Cranial Nerves: No cranial nerve deficit.   Psychiatric:         Mood and Affect: Mood normal.          Behavior: Behavior normal.         Thought Content: Thought content normal.         Judgment: Judgment normal.       Assessment:       Problem List Items Addressed This Visit          Unprioritized    Sleep disturbance (Chronic)    Relevant Medications    traZODone (DESYREL) 100 MG tablet     Other Visit Diagnoses       Preventative health care    -  Primary    Relevant Orders    Comprehensive Metabolic Panel (Completed)    Lipid Panel (Completed)    CBC W/ AUTO DIFFERENTIAL (Completed)    TSH (Completed)    Hypertension, unspecified type        Relevant Medications    metoprolol tartrate (LOPRESSOR) 25 MG tablet    Other Relevant Orders    Comprehensive Metabolic Panel (Completed)    Lipid Panel (Completed)    CBC W/ AUTO DIFFERENTIAL (Completed)    TSH (Completed)    Reactive depression        Relevant Medications    EScitalopram oxalate (LEXAPRO) 10 MG tablet    traZODone (DESYREL) 100 MG tablet            Plan:       1. Preventative health care    - Comprehensive Metabolic Panel; Future  - Lipid Panel; Future  - CBC W/ AUTO DIFFERENTIAL; Future  - TSH; Future    2. Hypertension, unspecified type  Stable.  - Comprehensive Metabolic Panel; Future  - Lipid Panel; Future  - CBC W/ AUTO DIFFERENTIAL; Future  - TSH; Future  - metoprolol tartrate (LOPRESSOR) 25 MG tablet; Take 1 tablet (25 mg total) by mouth once daily.  Dispense: 90 tablet; Refill: 3    3. Reactive depression  Stable.  - EScitalopram oxalate (LEXAPRO) 10 MG tablet; Take 1 tablet (10 mg total) by mouth once daily.  Dispense: 90 tablet; Refill: 3  - traZODone (DESYREL) 100 MG tablet; TAKE 1 TABLET(100 MG) BY MOUTH EVERY NIGHT  Strength: 100 mg  Dispense: 90 tablet; Refill: 3    4. Sleep disturbance  Stable.  - traZODone (DESYREL) 100 MG tablet; TAKE 1 TABLET(100 MG) BY MOUTH EVERY NIGHT  Strength: 100 mg  Dispense: 90 tablet; Refill: 3

## 2022-09-02 ENCOUNTER — LAB VISIT (OUTPATIENT)
Dept: LAB | Facility: HOSPITAL | Age: 67
End: 2022-09-02
Payer: MEDICARE

## 2022-09-02 ENCOUNTER — OFFICE VISIT (OUTPATIENT)
Dept: ORTHOPEDICS | Facility: CLINIC | Age: 67
End: 2022-09-02
Payer: MEDICARE

## 2022-09-02 VITALS — HEIGHT: 66 IN | BODY MASS INDEX: 27 KG/M2 | WEIGHT: 168 LBS

## 2022-09-02 DIAGNOSIS — M25.511 RIGHT SHOULDER PAIN, UNSPECIFIED CHRONICITY: Primary | ICD-10-CM

## 2022-09-02 DIAGNOSIS — I10 HYPERTENSION, UNSPECIFIED TYPE: ICD-10-CM

## 2022-09-02 DIAGNOSIS — M25.511 ACUTE PAIN OF RIGHT SHOULDER: Primary | ICD-10-CM

## 2022-09-02 DIAGNOSIS — Z00.00 PREVENTATIVE HEALTH CARE: ICD-10-CM

## 2022-09-02 LAB
ALBUMIN SERPL BCP-MCNC: 3.8 G/DL (ref 3.5–5.2)
ALP SERPL-CCNC: 64 U/L (ref 55–135)
ALT SERPL W/O P-5'-P-CCNC: 16 U/L (ref 10–44)
ANION GAP SERPL CALC-SCNC: 9 MMOL/L (ref 8–16)
AST SERPL-CCNC: 20 U/L (ref 10–40)
BASOPHILS # BLD AUTO: 0.03 K/UL (ref 0–0.2)
BASOPHILS NFR BLD: 0.4 % (ref 0–1.9)
BILIRUB SERPL-MCNC: 0.5 MG/DL (ref 0.1–1)
BUN SERPL-MCNC: 20 MG/DL (ref 8–23)
CALCIUM SERPL-MCNC: 9.9 MG/DL (ref 8.7–10.5)
CHLORIDE SERPL-SCNC: 106 MMOL/L (ref 95–110)
CHOLEST SERPL-MCNC: 207 MG/DL (ref 120–199)
CHOLEST/HDLC SERPL: 3.2 {RATIO} (ref 2–5)
CO2 SERPL-SCNC: 27 MMOL/L (ref 23–29)
CREAT SERPL-MCNC: 0.8 MG/DL (ref 0.5–1.4)
DIFFERENTIAL METHOD: ABNORMAL
EOSINOPHIL # BLD AUTO: 0.2 K/UL (ref 0–0.5)
EOSINOPHIL NFR BLD: 2.5 % (ref 0–8)
ERYTHROCYTE [DISTWIDTH] IN BLOOD BY AUTOMATED COUNT: 11.9 % (ref 11.5–14.5)
EST. GFR  (NO RACE VARIABLE): >60 ML/MIN/1.73 M^2
GLUCOSE SERPL-MCNC: 110 MG/DL (ref 70–110)
HCT VFR BLD AUTO: 42.7 % (ref 37–48.5)
HDLC SERPL-MCNC: 64 MG/DL (ref 40–75)
HDLC SERPL: 30.9 % (ref 20–50)
HGB BLD-MCNC: 14.6 G/DL (ref 12–16)
IMM GRANULOCYTES # BLD AUTO: 0.02 K/UL (ref 0–0.04)
IMM GRANULOCYTES NFR BLD AUTO: 0.3 % (ref 0–0.5)
LDLC SERPL CALC-MCNC: 126.2 MG/DL (ref 63–159)
LYMPHOCYTES # BLD AUTO: 1.7 K/UL (ref 1–4.8)
LYMPHOCYTES NFR BLD: 23.7 % (ref 18–48)
MCH RBC QN AUTO: 35.2 PG (ref 27–31)
MCHC RBC AUTO-ENTMCNC: 34.2 G/DL (ref 32–36)
MCV RBC AUTO: 103 FL (ref 82–98)
MONOCYTES # BLD AUTO: 0.6 K/UL (ref 0.3–1)
MONOCYTES NFR BLD: 7.7 % (ref 4–15)
NEUTROPHILS # BLD AUTO: 4.8 K/UL (ref 1.8–7.7)
NEUTROPHILS NFR BLD: 65.4 % (ref 38–73)
NONHDLC SERPL-MCNC: 143 MG/DL
NRBC BLD-RTO: 0 /100 WBC
PLATELET # BLD AUTO: 207 K/UL (ref 150–450)
PMV BLD AUTO: 12 FL (ref 9.2–12.9)
POTASSIUM SERPL-SCNC: 4.4 MMOL/L (ref 3.5–5.1)
PROT SERPL-MCNC: 6.5 G/DL (ref 6–8.4)
RBC # BLD AUTO: 4.15 M/UL (ref 4–5.4)
SODIUM SERPL-SCNC: 142 MMOL/L (ref 136–145)
TRIGL SERPL-MCNC: 84 MG/DL (ref 30–150)
TSH SERPL DL<=0.005 MIU/L-ACNC: 1.24 UIU/ML (ref 0.4–4)
WBC # BLD AUTO: 7.3 K/UL (ref 3.9–12.7)

## 2022-09-02 PROCEDURE — 1159F PR MEDICATION LIST DOCUMENTED IN MEDICAL RECORD: ICD-10-PCS | Mod: CPTII,S$GLB,, | Performed by: ORTHOPAEDIC SURGERY

## 2022-09-02 PROCEDURE — 1101F PR PT FALLS ASSESS DOC 0-1 FALLS W/OUT INJ PAST YR: ICD-10-PCS | Mod: CPTII,S$GLB,, | Performed by: ORTHOPAEDIC SURGERY

## 2022-09-02 PROCEDURE — 1125F AMNT PAIN NOTED PAIN PRSNT: CPT | Mod: CPTII,S$GLB,, | Performed by: ORTHOPAEDIC SURGERY

## 2022-09-02 PROCEDURE — 36415 COLL VENOUS BLD VENIPUNCTURE: CPT | Mod: PO | Performed by: NURSE PRACTITIONER

## 2022-09-02 PROCEDURE — 1125F PR PAIN SEVERITY QUANTIFIED, PAIN PRESENT: ICD-10-PCS | Mod: CPTII,S$GLB,, | Performed by: ORTHOPAEDIC SURGERY

## 2022-09-02 PROCEDURE — 3008F PR BODY MASS INDEX (BMI) DOCUMENTED: ICD-10-PCS | Mod: CPTII,S$GLB,, | Performed by: ORTHOPAEDIC SURGERY

## 2022-09-02 PROCEDURE — 3008F BODY MASS INDEX DOCD: CPT | Mod: CPTII,S$GLB,, | Performed by: ORTHOPAEDIC SURGERY

## 2022-09-02 PROCEDURE — 99999 PR PBB SHADOW E&M-EST. PATIENT-LVL III: ICD-10-PCS | Mod: PBBFAC,,, | Performed by: ORTHOPAEDIC SURGERY

## 2022-09-02 PROCEDURE — 1160F PR REVIEW ALL MEDS BY PRESCRIBER/CLIN PHARMACIST DOCUMENTED: ICD-10-PCS | Mod: CPTII,S$GLB,, | Performed by: ORTHOPAEDIC SURGERY

## 2022-09-02 PROCEDURE — 99213 PR OFFICE/OUTPT VISIT, EST, LEVL III, 20-29 MIN: ICD-10-PCS | Mod: S$GLB,,, | Performed by: ORTHOPAEDIC SURGERY

## 2022-09-02 PROCEDURE — 99213 OFFICE O/P EST LOW 20 MIN: CPT | Mod: S$GLB,,, | Performed by: ORTHOPAEDIC SURGERY

## 2022-09-02 PROCEDURE — 99999 PR PBB SHADOW E&M-EST. PATIENT-LVL III: CPT | Mod: PBBFAC,,, | Performed by: ORTHOPAEDIC SURGERY

## 2022-09-02 PROCEDURE — 3288F PR FALLS RISK ASSESSMENT DOCUMENTED: ICD-10-PCS | Mod: CPTII,S$GLB,, | Performed by: ORTHOPAEDIC SURGERY

## 2022-09-02 PROCEDURE — 1159F MED LIST DOCD IN RCRD: CPT | Mod: CPTII,S$GLB,, | Performed by: ORTHOPAEDIC SURGERY

## 2022-09-02 PROCEDURE — 80053 COMPREHEN METABOLIC PANEL: CPT | Performed by: NURSE PRACTITIONER

## 2022-09-02 PROCEDURE — 1160F RVW MEDS BY RX/DR IN RCRD: CPT | Mod: CPTII,S$GLB,, | Performed by: ORTHOPAEDIC SURGERY

## 2022-09-02 PROCEDURE — 85025 COMPLETE CBC W/AUTO DIFF WBC: CPT | Performed by: NURSE PRACTITIONER

## 2022-09-02 PROCEDURE — 1101F PT FALLS ASSESS-DOCD LE1/YR: CPT | Mod: CPTII,S$GLB,, | Performed by: ORTHOPAEDIC SURGERY

## 2022-09-02 PROCEDURE — 84443 ASSAY THYROID STIM HORMONE: CPT | Performed by: NURSE PRACTITIONER

## 2022-09-02 PROCEDURE — 3288F FALL RISK ASSESSMENT DOCD: CPT | Mod: CPTII,S$GLB,, | Performed by: ORTHOPAEDIC SURGERY

## 2022-09-02 PROCEDURE — 80061 LIPID PANEL: CPT | Performed by: NURSE PRACTITIONER

## 2022-09-02 NOTE — PROGRESS NOTES
67 years old recurrence of right shoulder pain.  We had diagnosed her with degenerative rotator cuff disease and AC arthrosis treated her with injection and physical therapies have partial relief but has recurrent episodes of straining to her shoulder.  She is having difficulty work difficulty upper arm overhead    Exam shows weak and painful cuff strength, positive [place] impingement sign     X-rays show AC arthrosis     Assessment: Degenerative rotator cuff disease AC arthrosis right shoulder     Plan:  MRI of the shoulder, follow-up to discuss different treatment options including continued therapy, versus arthroscopic rotator cuff repair

## 2022-09-15 ENCOUNTER — PATIENT MESSAGE (OUTPATIENT)
Dept: ORTHOPEDICS | Facility: CLINIC | Age: 67
End: 2022-09-15

## 2022-09-15 ENCOUNTER — OFFICE VISIT (OUTPATIENT)
Dept: ORTHOPEDICS | Facility: CLINIC | Age: 67
End: 2022-09-15
Payer: MEDICARE

## 2022-09-15 VITALS — HEIGHT: 66 IN | WEIGHT: 168 LBS | BODY MASS INDEX: 27 KG/M2

## 2022-09-15 DIAGNOSIS — M75.101 TEAR OF RIGHT SUPRASPINATUS TENDON: ICD-10-CM

## 2022-09-15 DIAGNOSIS — Z71.2 ENCOUNTER TO DISCUSS TEST RESULTS: Primary | ICD-10-CM

## 2022-09-15 DIAGNOSIS — S46.811A TEAR OF RIGHT INFRASPINATUS TENDON, INITIAL ENCOUNTER: ICD-10-CM

## 2022-09-15 PROCEDURE — 1159F MED LIST DOCD IN RCRD: CPT | Mod: CPTII,S$GLB,, | Performed by: ORTHOPAEDIC SURGERY

## 2022-09-15 PROCEDURE — 99214 OFFICE O/P EST MOD 30 MIN: CPT | Mod: 57,S$GLB,, | Performed by: ORTHOPAEDIC SURGERY

## 2022-09-15 PROCEDURE — 3008F PR BODY MASS INDEX (BMI) DOCUMENTED: ICD-10-PCS | Mod: CPTII,S$GLB,, | Performed by: ORTHOPAEDIC SURGERY

## 2022-09-15 PROCEDURE — 3288F FALL RISK ASSESSMENT DOCD: CPT | Mod: CPTII,S$GLB,, | Performed by: ORTHOPAEDIC SURGERY

## 2022-09-15 PROCEDURE — 3288F PR FALLS RISK ASSESSMENT DOCUMENTED: ICD-10-PCS | Mod: CPTII,S$GLB,, | Performed by: ORTHOPAEDIC SURGERY

## 2022-09-15 PROCEDURE — 99214 PR OFFICE/OUTPT VISIT, EST, LEVL IV, 30-39 MIN: ICD-10-PCS | Mod: 57,S$GLB,, | Performed by: ORTHOPAEDIC SURGERY

## 2022-09-15 PROCEDURE — 1125F PR PAIN SEVERITY QUANTIFIED, PAIN PRESENT: ICD-10-PCS | Mod: CPTII,S$GLB,, | Performed by: ORTHOPAEDIC SURGERY

## 2022-09-15 PROCEDURE — 1159F PR MEDICATION LIST DOCUMENTED IN MEDICAL RECORD: ICD-10-PCS | Mod: CPTII,S$GLB,, | Performed by: ORTHOPAEDIC SURGERY

## 2022-09-15 PROCEDURE — 1160F RVW MEDS BY RX/DR IN RCRD: CPT | Mod: CPTII,S$GLB,, | Performed by: ORTHOPAEDIC SURGERY

## 2022-09-15 PROCEDURE — 99999 PR PBB SHADOW E&M-EST. PATIENT-LVL III: ICD-10-PCS | Mod: PBBFAC,,, | Performed by: ORTHOPAEDIC SURGERY

## 2022-09-15 PROCEDURE — 1160F PR REVIEW ALL MEDS BY PRESCRIBER/CLIN PHARMACIST DOCUMENTED: ICD-10-PCS | Mod: CPTII,S$GLB,, | Performed by: ORTHOPAEDIC SURGERY

## 2022-09-15 PROCEDURE — 1101F PT FALLS ASSESS-DOCD LE1/YR: CPT | Mod: CPTII,S$GLB,, | Performed by: ORTHOPAEDIC SURGERY

## 2022-09-15 PROCEDURE — 1125F AMNT PAIN NOTED PAIN PRSNT: CPT | Mod: CPTII,S$GLB,, | Performed by: ORTHOPAEDIC SURGERY

## 2022-09-15 PROCEDURE — 1101F PR PT FALLS ASSESS DOC 0-1 FALLS W/OUT INJ PAST YR: ICD-10-PCS | Mod: CPTII,S$GLB,, | Performed by: ORTHOPAEDIC SURGERY

## 2022-09-15 PROCEDURE — 99999 PR PBB SHADOW E&M-EST. PATIENT-LVL III: CPT | Mod: PBBFAC,,, | Performed by: ORTHOPAEDIC SURGERY

## 2022-09-15 PROCEDURE — 3008F BODY MASS INDEX DOCD: CPT | Mod: CPTII,S$GLB,, | Performed by: ORTHOPAEDIC SURGERY

## 2022-09-15 NOTE — PROGRESS NOTES
67 years old recurrence of right shoulder pain.  We had diagnosed her with degenerative rotator cuff disease and AC arthrosis treated her with injection and physical therapies have partial relief but has recurrent episodes of straining to her shoulder.  She is having difficulty work difficulty upper arm overhead    Exam shows weak and painful cuff strength, positive [place] impingement sign     X-rays show AC arthrosis     MRI shows degenerative rotator cuff tear with retraction, elevation of humeral head     Assessment: Chronic degenerative rotator cuff disease    Plan:  We will schedule the patient for right shoulder arthroscopy with decompression and hopefully rotator cuff repair if or and able to get a rotator cuff repair we will go ahead and proceed with balloon spacer.  Patient is aware the risks and limitations surgery and still wants to proceed     Imaging studies ordered and reviewed by me    Further History  Aching pain  Worse with activity  Relieved with rest  No other associated symptoms  No other radiation    Further Exam  Alert and oriented  Pleasant  Contralateral limb has appropriate range of motion for age and condition  Contralateral limb has appropriate strength for age and condition  Contralateral limb has appropriate stability  for age and condition  No adenopathy  Pulses are appropriate for current condition  Skin is intact        Chief Complaint    Chief Complaint   Patient presents with    Right Shoulder - Pain       HPI  Ynes Talavera is a 67 y.o.  female who presents with       Past Medical History  Past Medical History:   Diagnosis Date    Adrenal adenoma     Diverticulosis of the colon     Hypertension     REJI (obstructive sleep apnea)     uses cpap    Venous insufficiency Sept 2014    right leg, denies Hx clot       Past Surgical History  Past Surgical History:   Procedure Laterality Date    APPENDECTOMY      BREAST BIOPSY Right 2002    benign needle biopsy    BREAST BIOPSY  Left 2006    benign needle biopsy     SECTION, LOW TRANSVERSE      CHOLECYSTECTOMY      COLONOSCOPY N/A 9/3/2020    Procedure: COLONOSCOPY;  Surgeon: Neville Galvan MD;  Location: [facility] ENDO;  Service: Endoscopy;  Laterality: N/A;    EPIDURAL STEROID INJECTION INTO CERVICAL SPINE N/A 2019    Procedure: Injection-steroid-epidural-cervical C7/T1;  Surgeon: Ronnell Echeverria MD;  Location: [facility] OR;  Service: Pain Management;  Laterality: N/A;    ESOPHAGOGASTRODUODENOSCOPY N/A 2020    Procedure: ESOPHAGOGASTRODUODENOSCOPY (EGD);  Surgeon: Neville Galvan MD;  Location: [facility] ENDO;  Service: Endoscopy;  Laterality: N/A;    INJECTION OF FACET JOINT Left 2019    Procedure: INJECTION, FACET JOINT, C1-C2 Medial Branch;  Surgeon: Samantha Willoughby MD;  Location: [facility] [place] PAIN MGT;  Service: Pain Management;  Laterality: Left;    THUMB ARTHROSCOPY      both thumbs, trigger finger release.    TONSILLECTOMY      TUBAL LIGATION      UMBILICAL HERNIA REPAIR      VEIN LIGATION AND STRIPPING      Left       Medications  Current Outpatient Medications   Medication Sig    aspirin (ECOTRIN) 81 MG EC tablet Take 81 mg by mouth once daily.    cholecalciferol, vitamin D3, (VITAMIN D3) 25 mcg (1,000 unit) capsule Take 1,000 Units by mouth once daily.    EScitalopram oxalate (LEXAPRO) 10 MG tablet Take 1 tablet (10 mg total) by mouth once daily.    metoprolol tartrate (LOPRESSOR) 25 MG tablet Take 1 tablet (25 mg total) by mouth once daily.    multivitamin capsule Take 1 capsule by mouth once daily.    traZODone (DESYREL) 100 MG tablet TAKE 1 TABLET(100 MG) BY MOUTH EVERY NIGHT  Strength: 100 mg     No current facility-administered medications for this visit.       Allergies  Review of patient's allergies indicates:  No Known Allergies    Family History  Family History   Problem Relation Age of Onset    Cancer Mother     Cancer Father     Heart disease Father     Hypertension Father     Diabetes Maternal Grandmother      Heart disease Maternal Grandmother     Diabetes Maternal Grandfather     Heart disease Maternal Grandfather     Breast cancer Paternal Grandmother 42       Social History  Social History     Socioeconomic History    Marital status:     Number of children: 3   Occupational History    Occupation: Dental hygienist     Comment: Working full-time     Employer:  [employer]   Tobacco Use    Smoking status: Former     Packs/day: 0.25     Years: 25.00     Pack years: 6.25     Types: Cigarettes     Quit date: 1993     Years since quittin.5    Smokeless tobacco: Never   Substance and Sexual Activity    Alcohol use: No     Alcohol/week: 0.0 standard drinks    Drug use: No    Sexual activity: Yes     Partners: Male   Social History Narrative    Her  has had a CVA. She is major support for him.        Walks regularly     Social Determinants of Health     Financial Resource Strain: Low Risk     Difficulty of Paying Living Expenses: Not hard at all   Food Insecurity: No Food Insecurity    Worried About Running Out of Food in the Last Year: Never true    Ran Out of Food in the Last Year: Never true   Transportation Needs: No Transportation Needs    Lack of Transportation (Medical): No    Lack of Transportation (Non-Medical): No   Physical Activity: Insufficiently Active    Days of Exercise per Week: 2 days    Minutes of Exercise per Session: 40 min   Stress: Stress Concern Present    Feeling of Stress : To some extent   Social Connections: Unknown    Frequency of Communication with Friends and Family: More than three times a week    Frequency of Social Gatherings with Friends and Family: More than three times a week    Active Member of Clubs or Organizations: Yes    Attends Club or Organization Meetings: More than 4 times per year    Marital Status:    Housing Stability: Low Risk     Unable to Pay for Housing in the Last Year: No    Number of Places Lived in the Last Year: 1     Unstable Housing in the Last Year: No               Review of Systems     Constitutional: Negative    HENT: Negative  Eyes: Negative  Respiratory: Negative  Cardiovascular: Negative  Musculoskeletal: HPI  Skin: Negative  Neurological: Negative  Hematological: Negative  Endocrine: Negative                 Physical Exam    There were no vitals filed for this visit.  Body mass index is 27.11 kg/m².  Physical Examination:     General appearance -  well appearing, and in no distress  Mental status - awake  Neck - supple  Chest -  symmetric air entry  Heart - normal rate   Abdomen - soft      Assessment     1. Encounter to discuss test results    2. Tear of right infraspinatus tendon, initial encounter    3. Tear of right supraspinatus tendon          Plan

## 2022-09-15 NOTE — H&P (VIEW-ONLY)
67 years old recurrence of right shoulder pain.  We had diagnosed her with degenerative rotator cuff disease and AC arthrosis treated her with injection and physical therapies have partial relief but has recurrent episodes of straining to her shoulder.  She is having difficulty work difficulty upper arm overhead    Exam shows weak and painful cuff strength, positive [place] impingement sign     X-rays show AC arthrosis     MRI shows degenerative rotator cuff tear with retraction, elevation of humeral head     Assessment: Chronic degenerative rotator cuff disease    Plan:  We will schedule the patient for right shoulder arthroscopy with decompression and hopefully rotator cuff repair if or and able to get a rotator cuff repair we will go ahead and proceed with balloon spacer.  Patient is aware the risks and limitations surgery and still wants to proceed     Imaging studies ordered and reviewed by me    Further History  Aching pain  Worse with activity  Relieved with rest  No other associated symptoms  No other radiation    Further Exam  Alert and oriented  Pleasant  Contralateral limb has appropriate range of motion for age and condition  Contralateral limb has appropriate strength for age and condition  Contralateral limb has appropriate stability  for age and condition  No adenopathy  Pulses are appropriate for current condition  Skin is intact        Chief Complaint    Chief Complaint   Patient presents with    Right Shoulder - Pain       HPI  Ynes Talavera is a 67 y.o.  female who presents with       Past Medical History  Past Medical History:   Diagnosis Date    Adrenal adenoma     Diverticulosis of the colon     Hypertension     REJI (obstructive sleep apnea)     uses cpap    Venous insufficiency Sept 2014    right leg, denies Hx clot       Past Surgical History  Past Surgical History:   Procedure Laterality Date    APPENDECTOMY      BREAST BIOPSY Right 2002    benign needle biopsy    BREAST BIOPSY  Left 2006    benign needle biopsy     SECTION, LOW TRANSVERSE      CHOLECYSTECTOMY      COLONOSCOPY N/A 9/3/2020    Procedure: COLONOSCOPY;  Surgeon: Neville Galvan MD;  Location: [facility] ENDO;  Service: Endoscopy;  Laterality: N/A;    EPIDURAL STEROID INJECTION INTO CERVICAL SPINE N/A 2019    Procedure: Injection-steroid-epidural-cervical C7/T1;  Surgeon: Ronnell Echeverria MD;  Location: [facility] OR;  Service: Pain Management;  Laterality: N/A;    ESOPHAGOGASTRODUODENOSCOPY N/A 2020    Procedure: ESOPHAGOGASTRODUODENOSCOPY (EGD);  Surgeon: Neville aGlvan MD;  Location: [facility] ENDO;  Service: Endoscopy;  Laterality: N/A;    INJECTION OF FACET JOINT Left 2019    Procedure: INJECTION, FACET JOINT, C1-C2 Medial Branch;  Surgeon: Samantha Willoughby MD;  Location: [facility] PAIN MGT;  Service: Pain Management;  Laterality: Left;    THUMB ARTHROSCOPY      both thumbs, trigger finger release.    TONSILLECTOMY      TUBAL LIGATION      UMBILICAL HERNIA REPAIR      VEIN LIGATION AND STRIPPING      Left       Medications  Current Outpatient Medications   Medication Sig    aspirin (ECOTRIN) 81 MG EC tablet Take 81 mg by mouth once daily.    cholecalciferol, vitamin D3, (VITAMIN D3) 25 mcg (1,000 unit) capsule Take 1,000 Units by mouth once daily.    EScitalopram oxalate (LEXAPRO) 10 MG tablet Take 1 tablet (10 mg total) by mouth once daily.    metoprolol tartrate (LOPRESSOR) 25 MG tablet Take 1 tablet (25 mg total) by mouth once daily.    multivitamin capsule Take 1 capsule by mouth once daily.    traZODone (DESYREL) 100 MG tablet TAKE 1 TABLET(100 MG) BY MOUTH EVERY NIGHT  Strength: 100 mg     No current facility-administered medications for this visit.       Allergies  Review of patient's allergies indicates:  No Known Allergies    Family History  Family History   Problem Relation Age of Onset    Cancer Mother     Cancer Father     Heart disease Father     Hypertension Father     Diabetes Maternal Grandmother      Heart disease Maternal Grandmother     Diabetes Maternal Grandfather     Heart disease Maternal Grandfather     Breast cancer Paternal Grandmother 42       Social History  Social History     Socioeconomic History    Marital status:     Number of children: 3   Occupational History    Occupation: Dental hygienist     Comment: Working full-time     Employer:  [employer]   Tobacco Use    Smoking status: Former     Packs/day: 0.25     Years: 25.00     Pack years: 6.25     Types: Cigarettes     Quit date: 1993     Years since quittin.5    Smokeless tobacco: Never   Substance and Sexual Activity    Alcohol use: No     Alcohol/week: 0.0 standard drinks    Drug use: No    Sexual activity: Yes     Partners: Male   Social History Narrative    Her  has had a CVA. She is major support for him.        Walks regularly     Social Determinants of Health     Financial Resource Strain: Low Risk     Difficulty of Paying Living Expenses: Not hard at all   Food Insecurity: No Food Insecurity    Worried About Running Out of Food in the Last Year: Never true    Ran Out of Food in the Last Year: Never true   Transportation Needs: No Transportation Needs    Lack of Transportation (Medical): No    Lack of Transportation (Non-Medical): No   Physical Activity: Insufficiently Active    Days of Exercise per Week: 2 days    Minutes of Exercise per Session: 40 min   Stress: Stress Concern Present    Feeling of Stress : To some extent   Social Connections: Unknown    Frequency of Communication with Friends and Family: More than three times a week    Frequency of Social Gatherings with Friends and Family: More than three times a week    Active Member of Clubs or Organizations: Yes    Attends Club or Organization Meetings: More than 4 times per year    Marital Status:    Housing Stability: Low Risk     Unable to Pay for Housing in the Last Year: No    Number of Places Lived in the Last Year: 1     Unstable Housing in the Last Year: No               Review of Systems     Constitutional: Negative    HENT: Negative  Eyes: Negative  Respiratory: Negative  Cardiovascular: Negative  Musculoskeletal: HPI  Skin: Negative  Neurological: Negative  Hematological: Negative  Endocrine: Negative                 Physical Exam    There were no vitals filed for this visit.  Body mass index is 27.11 kg/m².  Physical Examination:     General appearance -  well appearing, and in no distress  Mental status - awake  Neck - supple  Chest -  symmetric air entry  Heart - normal rate   Abdomen - soft      Assessment     1. Encounter to discuss test results    2. Tear of right infraspinatus tendon, initial encounter    3. Tear of right supraspinatus tendon          Plan

## 2022-09-16 ENCOUNTER — TELEPHONE (OUTPATIENT)
Dept: ORTHOPEDICS | Facility: CLINIC | Age: 67
End: 2022-09-16
Payer: MEDICARE

## 2022-09-16 NOTE — TELEPHONE ENCOUNTER
----- Message from Ap Manuel sent at 9/16/2022 10:48 AM CDT -----  Type: Needs Medical Advice  Who Called:  Patient    Best Call Back Number: [phone]  Additional Information: Patient states that she would like a callback regarding rescheduling her surgery        
Contacted patient. Rescheduled surgery to 10/4/22. Patient verbalized understanding.    
Opt out

## 2022-09-21 DIAGNOSIS — M75.101 TEAR OF RIGHT SUPRASPINATUS TENDON: ICD-10-CM

## 2022-09-21 DIAGNOSIS — M25.511 RIGHT SHOULDER PAIN: ICD-10-CM

## 2022-09-21 DIAGNOSIS — M75.121 COMPLETE TEAR OF RIGHT ROTATOR CUFF: ICD-10-CM

## 2022-09-21 DIAGNOSIS — S46.811A TEAR OF RIGHT INFRASPINATUS TENDON, INITIAL ENCOUNTER: Primary | ICD-10-CM

## 2022-09-21 RX ORDER — MUPIROCIN 20 MG/G
OINTMENT TOPICAL
Status: CANCELLED | OUTPATIENT
Start: 2022-09-21

## 2022-09-29 DIAGNOSIS — S46.811A TEAR OF RIGHT INFRASPINATUS TENDON, INITIAL ENCOUNTER: Primary | ICD-10-CM

## 2022-09-29 RX ORDER — CEPHALEXIN 500 MG/1
500 CAPSULE ORAL EVERY 6 HOURS
Qty: 20 CAPSULE | Refills: 0 | Status: SHIPPED | OUTPATIENT
Start: 2022-09-29 | End: 2023-01-26

## 2022-09-29 RX ORDER — OXYCODONE AND ACETAMINOPHEN 5; 325 MG/1; MG/1
1 TABLET ORAL
Qty: 42 TABLET | Refills: 0 | Status: SHIPPED | OUTPATIENT
Start: 2022-09-29 | End: 2023-01-26

## 2022-10-03 ENCOUNTER — ANESTHESIA EVENT (OUTPATIENT)
Dept: SURGERY | Facility: HOSPITAL | Age: 67
End: 2022-10-03
Payer: MEDICARE

## 2022-10-04 ENCOUNTER — ANESTHESIA (OUTPATIENT)
Dept: SURGERY | Facility: HOSPITAL | Age: 67
End: 2022-10-04
Payer: MEDICARE

## 2022-10-04 ENCOUNTER — PATIENT MESSAGE (OUTPATIENT)
Dept: SURGERY | Facility: HOSPITAL | Age: 67
End: 2022-10-04
Payer: MEDICARE

## 2022-10-04 ENCOUNTER — HOSPITAL ENCOUNTER (OUTPATIENT)
Facility: HOSPITAL | Age: 67
Discharge: HOME | End: 2022-10-04
Attending: ORTHOPAEDIC SURGERY | Admitting: ORTHOPAEDIC SURGERY
Payer: MEDICARE

## 2022-10-04 DIAGNOSIS — S46.811A TEAR OF RIGHT INFRASPINATUS TENDON, INITIAL ENCOUNTER: ICD-10-CM

## 2022-10-04 DIAGNOSIS — M25.511 RIGHT SHOULDER PAIN: ICD-10-CM

## 2022-10-04 DIAGNOSIS — M75.101 TEAR OF RIGHT SUPRASPINATUS TENDON: ICD-10-CM

## 2022-10-04 DIAGNOSIS — M75.121 COMPLETE TEAR OF RIGHT ROTATOR CUFF: ICD-10-CM

## 2022-10-04 PROCEDURE — C1713 ANCHOR/SCREW BN/BN,TIS/BN: HCPCS | Mod: PO | Performed by: ORTHOPAEDIC SURGERY

## 2022-10-04 PROCEDURE — 63600175 PHARM REV CODE 636 W HCPCS: Mod: PO | Performed by: ANESTHESIOLOGY

## 2022-10-04 PROCEDURE — 36000710: Mod: PO | Performed by: ORTHOPAEDIC SURGERY

## 2022-10-04 PROCEDURE — 37000009 HC ANESTHESIA EA ADD 15 MINS: Mod: PO | Performed by: ORTHOPAEDIC SURGERY

## 2022-10-04 PROCEDURE — 64415 NJX AA&/STRD BRCH PLXS IMG: CPT | Mod: 59,RT,, | Performed by: ANESTHESIOLOGY

## 2022-10-04 PROCEDURE — D9220A PRA ANESTHESIA: ICD-10-PCS | Mod: CRNA,,, | Performed by: NURSE ANESTHETIST, CERTIFIED REGISTERED

## 2022-10-04 PROCEDURE — 25000003 PHARM REV CODE 250: Mod: PO | Performed by: ANESTHESIOLOGY

## 2022-10-04 PROCEDURE — D9220A PRA ANESTHESIA: ICD-10-PCS | Mod: ANES,,, | Performed by: ANESTHESIOLOGY

## 2022-10-04 PROCEDURE — 76942 PR U/S GUIDANCE FOR NEEDLE GUIDANCE: ICD-10-PCS | Mod: 26,,, | Performed by: ANESTHESIOLOGY

## 2022-10-04 PROCEDURE — 71000015 HC POSTOP RECOV 1ST HR: Mod: PO | Performed by: ORTHOPAEDIC SURGERY

## 2022-10-04 PROCEDURE — 63600175 PHARM REV CODE 636 W HCPCS: Mod: PO | Performed by: ORTHOPAEDIC SURGERY

## 2022-10-04 PROCEDURE — 25000003 PHARM REV CODE 250: Mod: PO | Performed by: ORTHOPAEDIC SURGERY

## 2022-10-04 PROCEDURE — 76942 ECHO GUIDE FOR BIOPSY: CPT | Mod: PO | Performed by: ANESTHESIOLOGY

## 2022-10-04 PROCEDURE — 71000033 HC RECOVERY, INTIAL HOUR: Mod: PO | Performed by: ORTHOPAEDIC SURGERY

## 2022-10-04 PROCEDURE — 63600175 PHARM REV CODE 636 W HCPCS: Mod: PO | Performed by: NURSE ANESTHETIST, CERTIFIED REGISTERED

## 2022-10-04 PROCEDURE — 76942 ECHO GUIDE FOR BIOPSY: CPT | Mod: 26,,, | Performed by: ANESTHESIOLOGY

## 2022-10-04 PROCEDURE — 29827 PR SHLDR ARTHROSCOP,SURG,W/ROTAT CUFF REPR: ICD-10-PCS | Mod: RT,,, | Performed by: ORTHOPAEDIC SURGERY

## 2022-10-04 PROCEDURE — 64415 PR NERVE BLOCK INJ, ANES/STEROID, BRACHIAL PLEXUS, INCL IMAG GUIDANCE: ICD-10-PCS | Mod: 59,RT,, | Performed by: ANESTHESIOLOGY

## 2022-10-04 PROCEDURE — 27200750 HC INSULATED NEEDLE/ STIMUPLEX: Mod: PO | Performed by: ANESTHESIOLOGY

## 2022-10-04 PROCEDURE — D9220A PRA ANESTHESIA: Mod: ANES,,, | Performed by: ANESTHESIOLOGY

## 2022-10-04 PROCEDURE — 27201423 OPTIME MED/SURG SUP & DEVICES STERILE SUPPLY: Mod: PO | Performed by: ORTHOPAEDIC SURGERY

## 2022-10-04 PROCEDURE — 29827 SHO ARTHRS SRG RT8TR CUF RPR: CPT | Mod: RT,,, | Performed by: ORTHOPAEDIC SURGERY

## 2022-10-04 PROCEDURE — D9220A PRA ANESTHESIA: Mod: CRNA,,, | Performed by: NURSE ANESTHETIST, CERTIFIED REGISTERED

## 2022-10-04 PROCEDURE — 37000008 HC ANESTHESIA 1ST 15 MINUTES: Mod: PO | Performed by: ORTHOPAEDIC SURGERY

## 2022-10-04 PROCEDURE — 25000003 PHARM REV CODE 250: Mod: PO | Performed by: NURSE ANESTHETIST, CERTIFIED REGISTERED

## 2022-10-04 PROCEDURE — C9290 INJ, BUPIVACAINE LIPOSOME: HCPCS | Mod: PO | Performed by: ANESTHESIOLOGY

## 2022-10-04 PROCEDURE — 36000711: Mod: PO | Performed by: ORTHOPAEDIC SURGERY

## 2022-10-04 DEVICE — IMPLANTABLE DEVICE: Type: IMPLANTABLE DEVICE | Site: SHOULDER | Status: FUNCTIONAL

## 2022-10-04 RX ORDER — PROPOFOL 10 MG/ML
VIAL (ML) INTRAVENOUS
Status: DISCONTINUED | OUTPATIENT
Start: 2022-10-04 | End: 2022-10-04

## 2022-10-04 RX ORDER — LIDOCAINE HYDROCHLORIDE 20 MG/ML
INJECTION INTRAVENOUS
Status: DISCONTINUED | OUTPATIENT
Start: 2022-10-04 | End: 2022-10-04

## 2022-10-04 RX ORDER — LIDOCAINE HYDROCHLORIDE 10 MG/ML
1 INJECTION, SOLUTION EPIDURAL; INFILTRATION; INTRACAUDAL; PERINEURAL ONCE
Status: COMPLETED | OUTPATIENT
Start: 2022-10-04 | End: 2022-10-04

## 2022-10-04 RX ORDER — CEFAZOLIN SODIUM 1 G/3ML
INJECTION, POWDER, FOR SOLUTION INTRAMUSCULAR; INTRAVENOUS
Status: DISCONTINUED | OUTPATIENT
Start: 2022-10-04 | End: 2022-10-04

## 2022-10-04 RX ORDER — ROCURONIUM BROMIDE 10 MG/ML
INJECTION, SOLUTION INTRAVENOUS
Status: DISCONTINUED | OUTPATIENT
Start: 2022-10-04 | End: 2022-10-04

## 2022-10-04 RX ORDER — DEXAMETHASONE SODIUM PHOSPHATE 4 MG/ML
8 INJECTION, SOLUTION INTRA-ARTICULAR; INTRALESIONAL; INTRAMUSCULAR; INTRAVENOUS; SOFT TISSUE
Status: COMPLETED | OUTPATIENT
Start: 2022-10-04 | End: 2022-10-04

## 2022-10-04 RX ORDER — ONDANSETRON 2 MG/ML
INJECTION INTRAMUSCULAR; INTRAVENOUS
Status: DISCONTINUED | OUTPATIENT
Start: 2022-10-04 | End: 2022-10-04

## 2022-10-04 RX ORDER — BUPIVACAINE HYDROCHLORIDE 5 MG/ML
INJECTION, SOLUTION EPIDURAL; INTRACAUDAL
Status: COMPLETED | OUTPATIENT
Start: 2022-10-04 | End: 2022-10-04

## 2022-10-04 RX ORDER — ACETAMINOPHEN 10 MG/ML
INJECTION, SOLUTION INTRAVENOUS
Status: DISCONTINUED | OUTPATIENT
Start: 2022-10-04 | End: 2022-10-04

## 2022-10-04 RX ORDER — PHENYLEPHRINE HYDROCHLORIDE 10 MG/ML
INJECTION INTRAVENOUS
Status: DISCONTINUED | OUTPATIENT
Start: 2022-10-04 | End: 2022-10-04

## 2022-10-04 RX ORDER — HYDROMORPHONE HYDROCHLORIDE 2 MG/ML
0.2 INJECTION, SOLUTION INTRAMUSCULAR; INTRAVENOUS; SUBCUTANEOUS EVERY 5 MIN PRN
Status: DISCONTINUED | OUTPATIENT
Start: 2022-10-04 | End: 2022-10-04 | Stop reason: HOSPADM

## 2022-10-04 RX ORDER — FENTANYL CITRATE 50 UG/ML
25 INJECTION, SOLUTION INTRAMUSCULAR; INTRAVENOUS EVERY 5 MIN PRN
Status: DISCONTINUED | OUTPATIENT
Start: 2022-10-04 | End: 2022-10-04 | Stop reason: HOSPADM

## 2022-10-04 RX ORDER — NEOSTIGMINE METHYLSULFATE 0.5 MG/ML
INJECTION, SOLUTION INTRAVENOUS
Status: DISCONTINUED | OUTPATIENT
Start: 2022-10-04 | End: 2022-10-04

## 2022-10-04 RX ORDER — SODIUM CHLORIDE 0.9 G/100ML
IRRIGANT IRRIGATION
Status: DISCONTINUED | OUTPATIENT
Start: 2022-10-04 | End: 2022-10-04 | Stop reason: HOSPADM

## 2022-10-04 RX ORDER — EPINEPHRINE 1 MG/ML
INJECTION, SOLUTION INTRACARDIAC; INTRAMUSCULAR; INTRAVENOUS; SUBCUTANEOUS
Status: DISCONTINUED | OUTPATIENT
Start: 2022-10-04 | End: 2022-10-04 | Stop reason: HOSPADM

## 2022-10-04 RX ORDER — FENTANYL CITRATE 50 UG/ML
INJECTION, SOLUTION INTRAMUSCULAR; INTRAVENOUS
Status: DISCONTINUED | OUTPATIENT
Start: 2022-10-04 | End: 2022-10-04

## 2022-10-04 RX ORDER — MUPIROCIN 20 MG/G
OINTMENT TOPICAL
Status: DISCONTINUED | OUTPATIENT
Start: 2022-10-04 | End: 2022-10-04 | Stop reason: HOSPADM

## 2022-10-04 RX ORDER — OXYCODONE HYDROCHLORIDE 5 MG/1
5 TABLET ORAL
Status: DISCONTINUED | OUTPATIENT
Start: 2022-10-04 | End: 2022-10-04 | Stop reason: HOSPADM

## 2022-10-04 RX ORDER — MIDAZOLAM HYDROCHLORIDE 1 MG/ML
0.5 INJECTION INTRAMUSCULAR; INTRAVENOUS
Status: DISCONTINUED | OUTPATIENT
Start: 2022-10-04 | End: 2022-10-04 | Stop reason: HOSPADM

## 2022-10-04 RX ORDER — SODIUM CHLORIDE, SODIUM LACTATE, POTASSIUM CHLORIDE, CALCIUM CHLORIDE 600; 310; 30; 20 MG/100ML; MG/100ML; MG/100ML; MG/100ML
INJECTION, SOLUTION INTRAVENOUS CONTINUOUS
Status: DISCONTINUED | OUTPATIENT
Start: 2022-10-04 | End: 2022-10-04 | Stop reason: HOSPADM

## 2022-10-04 RX ADMIN — PHENYLEPHRINE HYDROCHLORIDE 100 MCG: 10 INJECTION INTRAVENOUS at 07:10

## 2022-10-04 RX ADMIN — GLYCOPYRROLATE 0.2 MG: 0.2 INJECTION, SOLUTION INTRAMUSCULAR; INTRAVITREAL at 07:10

## 2022-10-04 RX ADMIN — FENTANYL CITRATE 25 MCG: 50 INJECTION, SOLUTION INTRAMUSCULAR; INTRAVENOUS at 09:10

## 2022-10-04 RX ADMIN — PROPOFOL 150 MG: 10 INJECTION, EMULSION INTRAVENOUS at 07:10

## 2022-10-04 RX ADMIN — SODIUM CHLORIDE, SODIUM LACTATE, POTASSIUM CHLORIDE, AND CALCIUM CHLORIDE: .6; .31; .03; .02 INJECTION, SOLUTION INTRAVENOUS at 06:10

## 2022-10-04 RX ADMIN — NEOSTIGMINE METHYLSULFATE 3 MG: 0.5 INJECTION INTRAVENOUS at 08:10

## 2022-10-04 RX ADMIN — FENTANYL CITRATE 50 MCG: 50 INJECTION, SOLUTION INTRAMUSCULAR; INTRAVENOUS at 07:10

## 2022-10-04 RX ADMIN — ONDANSETRON 4 MG: 2 INJECTION, SOLUTION INTRAMUSCULAR; INTRAVENOUS at 07:10

## 2022-10-04 RX ADMIN — ROCURONIUM BROMIDE 40 MG: 10 INJECTION, SOLUTION INTRAVENOUS at 07:10

## 2022-10-04 RX ADMIN — ACETAMINOPHEN 1000 MG: 10 INJECTION, SOLUTION INTRAVENOUS at 07:10

## 2022-10-04 RX ADMIN — GLYCOPYRROLATE 0.4 MG: 0.2 INJECTION, SOLUTION INTRAMUSCULAR; INTRAVITREAL at 08:10

## 2022-10-04 RX ADMIN — DEXAMETHASONE SODIUM PHOSPHATE 8 MG: 4 INJECTION INTRA-ARTICULAR; INTRALESIONAL; INTRAMUSCULAR; INTRAVENOUS; SOFT TISSUE at 06:10

## 2022-10-04 RX ADMIN — LIDOCAINE HYDROCHLORIDE 75 MG: 20 INJECTION INTRAVENOUS at 07:10

## 2022-10-04 RX ADMIN — OXYCODONE 5 MG: 5 TABLET ORAL at 09:10

## 2022-10-04 RX ADMIN — BUPIVACAINE HYDROCHLORIDE 5 ML: 5 INJECTION, SOLUTION EPIDURAL; INTRACAUDAL; PERINEURAL at 07:10

## 2022-10-04 RX ADMIN — CEFAZOLIN 2 G: 330 INJECTION, POWDER, FOR SOLUTION INTRAMUSCULAR; INTRAVENOUS at 07:10

## 2022-10-04 RX ADMIN — MIDAZOLAM 2 MG: 1 INJECTION INTRAMUSCULAR; INTRAVENOUS at 06:10

## 2022-10-04 RX ADMIN — FENTANYL CITRATE 50 MCG: 50 INJECTION, SOLUTION INTRAMUSCULAR; INTRAVENOUS at 06:10

## 2022-10-04 RX ADMIN — BUPIVACAINE 10 ML: 13.3 INJECTION, SUSPENSION, LIPOSOMAL INFILTRATION at 07:10

## 2022-10-04 RX ADMIN — LIDOCAINE HYDROCHLORIDE 10 MG: 10 INJECTION, SOLUTION EPIDURAL; INFILTRATION; INTRACAUDAL; PERINEURAL at 06:10

## 2022-10-04 RX ADMIN — MUPIROCIN: 20 OINTMENT TOPICAL at 06:10

## 2022-10-04 NOTE — OP NOTE
[facility] - Surgery  Operative Note    SUMMARY     Date of Procedure: 10/4/2022     Pre-Operative Diagnosis: Tear of right infraspinatus tendon, initial encounter [S46.811A]  Tear of right supraspinatus tendon [M75.101]  Right shoulder pain [M25.511]    Post-Operative Diagnosis: Post-Op Diagnosis Codes:     * Tear of right infraspinatus tendon, initial encounter [S46.811A]     * Tear of right supraspinatus tendon [M75.101]     * Right shoulder pain [M25.511]  Right shoulder arthrosis    Procedure: Procedure(s) (LRB):  REPAIR, ROTATOR CUFF, ARTHROSCOPIC (Right)  DECOMPRESSION, SHOULDER, ARTHROSCOPIC (Right)     Limited debridement of shoulder joint right.  Placement of subacromial balloon spacer    Surgeon(s) and Role:     * Keven Piedra MD - Primary    Indications for procedure:      Procedure in Detail:  After obtaining consent and starting the patient on preoperative IV antibiotics, patient taken back to the operating room where general anesthesia was performed by the anesthesia team.  Patient positioned in beach chair position stabilized a captain's chair.  The right shoulder and upper extremity were prepped and draped in normal sterile fashion.  Posterior portal was established arthroscope introduced into the shoulder joint which showed areas of grade 3 and 4 chondrosis in glenohumeral joint remove loose bodies in axillary recess.  There was some fraying degeneration labrum anteriorly and superiorly biceps was intact we established anterior portal introduced a shaver through this portal performed a limited debridement of the shoulder joint without a good look at the cuff from below there was indeed a full-thickness retracted tear of the rotator cuff.  We then removed the arthroscope from the shoulder joint and placed into the subacromial space.  We established a lateral portal through the lateral portal placed 3 luggage tag stitches into the rotator cuff where with the mobilize the laterally we then went  ahead and fix it with a single knotless anchor at the footprint.  This point went ahead and placed a [place] subacromial plan spacer size small just above our repair into the subacromial space.  We were satisfied with our repair we evacuated saline closed portal sites with nylon stitches sterile dressing applied this concluded the operative procedure    Anesthesia: General/Regional    Complications: No    Estimated Blood Loss (EBL): * No values recorded between 10/4/2022  7:43 AM and 10/4/2022  8:25 AM *           Implants:   Implant Name Type Inv. Item Serial No.  Lot No. LRB No. Used Action   4.75 [place] knotless with 1mm hi-fi ribbon    [id number] Right 1 Implanted       Specimens:   Specimen (24h ago, onward)      None

## 2022-10-04 NOTE — DISCHARGE SUMMARY
Discharge Note  Short Stay      SUMMARY     Admit Date: 10/4/2022    Attending Physician: Keven Piedra MD     Discharge Physician: Keven Piedra MD    Discharge Date: 10/4/2022 8:28 AM    Final Diagnosis: Tear of right infraspinatus tendon, initial encounter [S46.811A]  Tear of right supraspinatus tendon [M75.101]  Right shoulder pain [M25.511]    Hospital Course: Patient tolerated procedure well.     Disposition: Home or Self Care    Patient Instructions:   Current Discharge Medication List        CONTINUE these medications which have NOT CHANGED    Details   aspirin (ECOTRIN) 81 MG EC tablet Take 81 mg by mouth once daily.      EScitalopram oxalate (LEXAPRO) 10 MG tablet TAKE 1 TABLET(10 MG) BY MOUTH EVERY DAY  Qty: 30 tablet, Refills: 11    Associated Diagnoses: Reactive depression      metoprolol tartrate (LOPRESSOR) 25 MG tablet Take 1 tablet (25 mg total) by mouth once daily.  Qty: 90 tablet, Refills: 3    Comments: .  Associated Diagnoses: Hypertension, unspecified type      multivitamin capsule Take 1 capsule by mouth every evening.      traZODone (DESYREL) 100 MG tablet TAKE 1 TABLET(100 MG) BY MOUTH EVERY NIGHT  Strength: 100 mg  Qty: 90 tablet, Refills: 3    Associated Diagnoses: Reactive depression; Sleep disturbance      cephALEXin (KEFLEX) 500 MG capsule Take 1 capsule (500 mg total) by mouth every 6 (six) hours.  Qty: 20 capsule, Refills: 0    Associated Diagnoses: Tear of right infraspinatus tendon, initial encounter      oxyCODONE-acetaminophen (PERCOCET) 5-325 mg per tablet Take 1 tablet by mouth every 4 to 6 hours as needed for Pain.  Qty: 42 tablet, Refills: 0    Comments: Quantity prescribed more than 7 day supply? Yes, quantity medically necessary  Associated Diagnoses: Tear of right infraspinatus tendon, initial encounter             Discharge Procedure Orders (must include Diet, Follow-up, Activity):   Discharge Procedure Orders (must include Diet, Follow-up, Activity)   Ice to  affected area     Lifting restrictions     Remove dressing in 48 hours     Activity as tolerated   Order Comments: sling        Follow Up:  Follow up as scheduled.  Resume routine diet.  Activity as tolerated.    No driving day of procedure.

## 2022-10-04 NOTE — TRANSFER OF CARE
"Anesthesia Transfer of Care Note    Patient: Ynes Talavera    Procedure(s) Performed: Procedure(s) (LRB):  REPAIR, ROTATOR CUFF, ARTHROSCOPIC (Right)  DECOMPRESSION, SHOULDER, ARTHROSCOPIC (Right)    Patient location: PACU    Anesthesia Type: general    Transport from OR: Transported from OR on 2-3 L/min O2 by NC with adequate spontaneous ventilation    Post pain: adequate analgesia    Post assessment: no apparent anesthetic complications and tolerated procedure well    Post vital signs: stable    Level of consciousness: awake    Nausea/Vomiting: no nausea/vomiting    Complications: none    Transfer of care protocol was followed      Last vitals:   Visit Vitals  BP (!) 156/78 (BP Location: Left arm, Patient Position: Lying)   Pulse 74   Temp 36.2 °C (97.1 °F) (Skin)   Resp 10   Ht 5' 6" (1.676 m)   Wt 76.2 kg (168 lb)   SpO2 (!) 94%   Breastfeeding No   BMI 27.12 kg/m²     "

## 2022-10-04 NOTE — ANESTHESIA PROCEDURE NOTES
Intubation    Date/Time: 10/4/2022 7:20 AM  Performed by: Zenobia Feliciano CRNA  Authorized by: Lorin Angeles MD     Intubation:     Induction:  Intravenous    Intubated:  Postinduction    Mask Ventilation:  Easy mask    Attempts:  1    Attempted By:  CRNA    Method of Intubation:  Direct    Blade:  [place] 2    Laryngeal View Grade: Grade IIA - cords partially seen      Difficult Airway Encountered?: No      Complications:  None    Airway Device:  Oral endotracheal tube    Airway Device Size:  7.0    Style/Cuff Inflation:  Cuffed (inflated to minimal occlusive pressure)    Tube secured:  21    Secured at:  The lips    Placement Verified By:  Capnometry    Complicating Factors:  None    Findings Post-Intubation:  BS equal bilateral and atraumatic/condition of teeth unchanged

## 2022-10-04 NOTE — ANESTHESIA PREPROCEDURE EVALUATION
10/04/2022  Ynes Talavera is a 67 y.o., female.    Pre-op Assessment    I have reviewed the Patient Summary Reports.    I have reviewed the Nursing Notes. I have reviewed the NPO Status.   I have reviewed the Medications.     Review of Systems  Social:  Non-Smoker    Cardiovascular:   Exercise tolerance: good Hypertension, well controlled    Pulmonary:   Sleep Apnea, CPAP Uses oral appliance   Hepatic/GI:   Bowel Prep. GERD, well controlled    Musculoskeletal:   Arthritis   Spine Disorders: lumbar    Neurological:   Neuromuscular Disease,   Peripheral Neuropathy    Endocrine:  Endocrine Normal        Physical Exam  General:  Well nourished      Airway/Jaw/Neck:  Airway Findings: Mouth Opening: Normal   Tongue: Normal   General Airway Assessment: Adult Mallampati: III  Improves to II with phonation.      Dental:  Dental Findings: In tact     Chest/Lungs:  Chest/Lungs Findings: Clear to auscultation, Normal Respiratory Rate          Mental Status:  Mental Status Findings:  Cooperative, Alert and Oriented         Anesthesia Plan  Type of Anesthesia, risks & benefits discussed:  Anesthesia Type:  Gen ETT    Patient's Preference:   Plan Factors:          Intra-op Monitoring Plan: Standard ASA Monitors  Intra-op Monitoring Plan Comments:   Post Op Pain Control Plan: multimodal analgesia, peripheral nerve block and IV/PO Opioids PRN  Post Op Pain Control Plan Comments:     Induction:   IV  Beta Blocker:  Patient is on a Beta-Blocker and has received one dose within the past 24 hours (No further documentation required).       Informed Consent: Informed consent signed with the Patient and all parties understand the risks and agree with anesthesia plan.  All questions answered.    ASA Score: 2     Day of Surgery Review of History & Physical:    H&P Update referred to the surgeon/provider.          Ready For Surgery  From Anesthesia Perspective.           Physical Exam  General: Well nourished    Airway:  Mallampati: III / II  Mouth Opening: Normal  Tongue: Normal    Dental:  In tact    Chest/Lungs:  Clear to auscultation, Normal Respiratory Rate          Anesthesia Plan  Type of Anesthesia, risks & benefits discussed:    Anesthesia Type: Gen ETT  Intra-op Monitoring Plan: Standard ASA Monitors  Post Op Pain Control Plan: multimodal analgesia, peripheral nerve block and IV/PO Opioids PRN  Induction:  IV  Airway Plan: Direct  Informed Consent: Informed consent signed with the Patient and all parties understand the risks and agree with anesthesia plan.  All questions answered.   ASA Score: 2  Day of Surgery Review of History & Physical: H&P Update referred to the surgeon/provider.    Ready For Surgery From Anesthesia Perspective.       .

## 2022-10-04 NOTE — ANESTHESIA PROCEDURE NOTES
Peripheral Block    Patient location during procedure: pre-op   Block not for primary anesthetic.  Reason for block: at surgeon's request and post-op pain management   Post-op Pain Location: right shoulder   Start time: 10/4/2022 6:55 AM  Timeout: 10/4/2022 6:55 AM   End time: 10/4/2022 7:00 AM    Staffing  Authorizing Provider: Lorin Angeles MD  Performing Provider: Lorin Angeles MD    Preanesthetic Checklist  Completed: patient identified, IV checked, site marked, risks and benefits discussed, surgical consent, monitors and equipment checked, pre-op evaluation and timeout performed  Peripheral Block  Patient position: sitting  Prep: ChloraPrep  Patient monitoring: heart rate, cardiac monitor, continuous pulse ox, continuous capnometry and frequent blood pressure checks  Block type: interscalene  Laterality: right  Injection technique: single shot  Needle  Needle type: Stimuplex   Needle gauge: 22 G  Needle length: 2 in  Needle localization: anatomical landmarks and ultrasound guidance   -ultrasound image captured on disc.  Assessment  Injection assessment: negative aspiration, negative parasthesia and local visualized surrounding nerve  Paresthesia pain: none  Heart rate change: no  Slow fractionated injection: yes  Pain Tolerance: comfortable throughout block and no complaints  Medications:    Medications: bupivacaine (pf) (MARCAINE) injection 0.5% - Perineural   5 mL - 10/4/2022 7:00:00 AM    Additional Notes  VSS.  [facility] RN monitoring vitals throughout procedure.  Patient tolerated procedure well.     Exparel 10mL

## 2022-10-04 NOTE — DISCHARGE INSTRUCTIONS
Shoulder Scope/Rotator Cuff    After surgery:    DO:   Minimal activity for the first 48 hours.  Advance diet as tolerated.    Keep operative site clean and dry for 3 days.   Remove bandages in 3 days. Then you may shower. Pat incisions dry, and place band-aids over surgical site. Reapply sling.   Wear sling at all times until block wears off.   Apply ice to shoulder for 20-30 minute intervals for next 3 days. Only apply ice while you are awake.    Move fingers and check circulation by pressing on nails. Color should be white to pink.   Resume all home medications.    DO NOT:   Do not soak in tub or pool until cleared by your physician.   Do not drive for 24 hours or while taking narcotic pain medication.  Do not take additional tylenol/acetaminophen while taking narcotic pain medication that contains tylenol/acetaminophen.     CALL DOCTOR FOR:  Signs of infection, such as redness, increased swelling, or drainage around the operative site.  Fever greater than 101.     Call your physician at [phone] for questions.

## 2022-10-05 VITALS
RESPIRATION RATE: 12 BRPM | SYSTOLIC BLOOD PRESSURE: 144 MMHG | BODY MASS INDEX: 27 KG/M2 | TEMPERATURE: 97 F | OXYGEN SATURATION: 100 % | DIASTOLIC BLOOD PRESSURE: 81 MMHG | WEIGHT: 168 LBS | HEART RATE: 63 BPM | HEIGHT: 66 IN

## 2022-10-06 ENCOUNTER — PATIENT MESSAGE (OUTPATIENT)
Dept: ORTHOPEDICS | Facility: CLINIC | Age: 67
End: 2022-10-06
Payer: MEDICARE

## 2022-10-06 NOTE — ANESTHESIA POSTPROCEDURE EVALUATION
Anesthesia Post Evaluation    Patient: Ynes Talavera    Procedure(s) Performed: Procedure(s) (LRB):  REPAIR, ROTATOR CUFF, ARTHROSCOPIC (Right)    Final Anesthesia Type: general      Patient location during evaluation: PACU  Patient participation: Yes- Able to Participate  Level of consciousness: awake and alert  Post-procedure vital signs: reviewed and stable  Pain management: adequate  Airway patency: patent    PONV status at discharge: No PONV  Anesthetic complications: no      Cardiovascular status: blood pressure returned to baseline  Respiratory status: unassisted and room air  Hydration status: euvolemic  Follow-up not needed.          Vitals Value Taken Time   /81 10/04/22 0853   Temp  10/06/22 0845   Pulse 63 10/04/22 0853   Resp 12 10/04/22 0919   SpO2 100 % 10/04/22 0853         Event Time   Out of Recovery 08:50:00         Pain/Kirill Score: No data recorded

## 2022-10-10 ENCOUNTER — PATIENT MESSAGE (OUTPATIENT)
Dept: ORTHOPEDICS | Facility: CLINIC | Age: 67
End: 2022-10-10
Payer: MEDICARE

## 2022-10-14 ENCOUNTER — OFFICE VISIT (OUTPATIENT)
Dept: ORTHOPEDICS | Facility: CLINIC | Age: 67
End: 2022-10-14
Payer: MEDICARE

## 2022-10-14 VITALS — BODY MASS INDEX: 27 KG/M2 | WEIGHT: 168 LBS | HEIGHT: 66 IN

## 2022-10-14 DIAGNOSIS — Z98.890 S/P ARTHROSCOPY OF LEFT SHOULDER: Primary | ICD-10-CM

## 2022-10-14 PROCEDURE — 99024 POSTOP FOLLOW-UP VISIT: CPT | Mod: S$GLB,,, | Performed by: ORTHOPAEDIC SURGERY

## 2022-10-14 PROCEDURE — 3288F PR FALLS RISK ASSESSMENT DOCUMENTED: ICD-10-PCS | Mod: CPTII,S$GLB,, | Performed by: ORTHOPAEDIC SURGERY

## 2022-10-14 PROCEDURE — 1101F PT FALLS ASSESS-DOCD LE1/YR: CPT | Mod: CPTII,S$GLB,, | Performed by: ORTHOPAEDIC SURGERY

## 2022-10-14 PROCEDURE — 99024 PR POST-OP FOLLOW-UP VISIT: ICD-10-PCS | Mod: S$GLB,,, | Performed by: ORTHOPAEDIC SURGERY

## 2022-10-14 PROCEDURE — 1159F MED LIST DOCD IN RCRD: CPT | Mod: CPTII,S$GLB,, | Performed by: ORTHOPAEDIC SURGERY

## 2022-10-14 PROCEDURE — 1101F PR PT FALLS ASSESS DOC 0-1 FALLS W/OUT INJ PAST YR: ICD-10-PCS | Mod: CPTII,S$GLB,, | Performed by: ORTHOPAEDIC SURGERY

## 2022-10-14 PROCEDURE — 3288F FALL RISK ASSESSMENT DOCD: CPT | Mod: CPTII,S$GLB,, | Performed by: ORTHOPAEDIC SURGERY

## 2022-10-14 PROCEDURE — 99999 PR PBB SHADOW E&M-EST. PATIENT-LVL II: ICD-10-PCS | Mod: PBBFAC,,, | Performed by: ORTHOPAEDIC SURGERY

## 2022-10-14 PROCEDURE — 1159F PR MEDICATION LIST DOCUMENTED IN MEDICAL RECORD: ICD-10-PCS | Mod: CPTII,S$GLB,, | Performed by: ORTHOPAEDIC SURGERY

## 2022-10-14 PROCEDURE — 99999 PR PBB SHADOW E&M-EST. PATIENT-LVL II: CPT | Mod: PBBFAC,,, | Performed by: ORTHOPAEDIC SURGERY

## 2022-10-14 NOTE — PROGRESS NOTES
67 years old 2 weeks out from arthroscopic rotator cuff repair as well as balloon spacer.  Currently reports her pain is 5 on the pain scale     Exam shows incision healing nicely, sutures removed hand is functioning well     Plan: Sling, gentle daily range of motion, follow-up in about 5 weeks time is a postoperative visit

## 2022-11-08 ENCOUNTER — PATIENT MESSAGE (OUTPATIENT)
Dept: ORTHOPEDICS | Facility: CLINIC | Age: 67
End: 2022-11-08
Payer: MEDICARE

## 2022-11-18 ENCOUNTER — OFFICE VISIT (OUTPATIENT)
Dept: ORTHOPEDICS | Facility: CLINIC | Age: 67
End: 2022-11-18
Payer: MEDICARE

## 2022-11-18 VITALS — WEIGHT: 168 LBS | BODY MASS INDEX: 27 KG/M2 | HEIGHT: 66 IN

## 2022-11-18 DIAGNOSIS — Z98.890 S/P ARTHROSCOPY OF LEFT SHOULDER: Primary | ICD-10-CM

## 2022-11-18 PROCEDURE — 99999 PR PBB SHADOW E&M-EST. PATIENT-LVL III: ICD-10-PCS | Mod: PBBFAC,,, | Performed by: ORTHOPAEDIC SURGERY

## 2022-11-18 PROCEDURE — 1125F PR PAIN SEVERITY QUANTIFIED, PAIN PRESENT: ICD-10-PCS | Mod: CPTII,S$GLB,, | Performed by: ORTHOPAEDIC SURGERY

## 2022-11-18 PROCEDURE — 3008F BODY MASS INDEX DOCD: CPT | Mod: CPTII,S$GLB,, | Performed by: ORTHOPAEDIC SURGERY

## 2022-11-18 PROCEDURE — 99999 PR PBB SHADOW E&M-EST. PATIENT-LVL III: CPT | Mod: PBBFAC,,, | Performed by: ORTHOPAEDIC SURGERY

## 2022-11-18 PROCEDURE — 1101F PT FALLS ASSESS-DOCD LE1/YR: CPT | Mod: CPTII,S$GLB,, | Performed by: ORTHOPAEDIC SURGERY

## 2022-11-18 PROCEDURE — 1125F AMNT PAIN NOTED PAIN PRSNT: CPT | Mod: CPTII,S$GLB,, | Performed by: ORTHOPAEDIC SURGERY

## 2022-11-18 PROCEDURE — 1159F PR MEDICATION LIST DOCUMENTED IN MEDICAL RECORD: ICD-10-PCS | Mod: CPTII,S$GLB,, | Performed by: ORTHOPAEDIC SURGERY

## 2022-11-18 PROCEDURE — 3008F PR BODY MASS INDEX (BMI) DOCUMENTED: ICD-10-PCS | Mod: CPTII,S$GLB,, | Performed by: ORTHOPAEDIC SURGERY

## 2022-11-18 PROCEDURE — 1101F PR PT FALLS ASSESS DOC 0-1 FALLS W/OUT INJ PAST YR: ICD-10-PCS | Mod: CPTII,S$GLB,, | Performed by: ORTHOPAEDIC SURGERY

## 2022-11-18 PROCEDURE — 99024 POSTOP FOLLOW-UP VISIT: CPT | Mod: S$GLB,,, | Performed by: ORTHOPAEDIC SURGERY

## 2022-11-18 PROCEDURE — 3288F FALL RISK ASSESSMENT DOCD: CPT | Mod: CPTII,S$GLB,, | Performed by: ORTHOPAEDIC SURGERY

## 2022-11-18 PROCEDURE — 3288F PR FALLS RISK ASSESSMENT DOCUMENTED: ICD-10-PCS | Mod: CPTII,S$GLB,, | Performed by: ORTHOPAEDIC SURGERY

## 2022-11-18 PROCEDURE — 99024 PR POST-OP FOLLOW-UP VISIT: ICD-10-PCS | Mod: S$GLB,,, | Performed by: ORTHOPAEDIC SURGERY

## 2022-11-18 PROCEDURE — 1159F MED LIST DOCD IN RCRD: CPT | Mod: CPTII,S$GLB,, | Performed by: ORTHOPAEDIC SURGERY

## 2022-11-21 ENCOUNTER — TELEPHONE (OUTPATIENT)
Dept: ORTHOPEDICS | Facility: CLINIC | Age: 67
End: 2022-11-21
Payer: MEDICARE

## 2022-11-21 NOTE — TELEPHONE ENCOUNTER
----- Message from Radha Olguin sent at 11/21/2022  9:50 AM CST -----  Contact: Lydia with [facility]  Type: Needs Medical Advice  Who Called:  Lydia with [facility]  Symptoms (please be specific):    How long has patient had these symptoms:    Pharmacy name and phone #:    Best Call Back Number: [phone]  Additional Information: Pat would like to speak to nurse regarding patient's PT. Please call Lydia to advise.Thanks!

## 2022-11-22 ENCOUNTER — TELEPHONE (OUTPATIENT)
Dept: ORTHOPEDICS | Facility: CLINIC | Age: 67
End: 2022-11-22
Payer: MEDICARE

## 2022-11-22 NOTE — TELEPHONE ENCOUNTER
----- Message from Beti Ortiz sent at 11/22/2022  2:47 PM CST -----  .Type:  Patient Call Back    Who Called: [facility]       Does the patient know what this is regarding?: THEY NEED THE PT'S SURGERY REPORT FAXED TO THEM    Would the patient rather a call back YES     Best Call Back Number: [phone]    Additional Information: Thank You

## 2022-12-19 ENCOUNTER — PATIENT MESSAGE (OUTPATIENT)
Dept: ORTHOPEDICS | Facility: CLINIC | Age: 67
End: 2022-12-19
Payer: MEDICARE

## 2023-01-04 DIAGNOSIS — Z98.890 S/P ARTHROSCOPY OF LEFT SHOULDER: Primary | ICD-10-CM

## 2023-01-05 ENCOUNTER — OFFICE VISIT (OUTPATIENT)
Dept: ORTHOPEDICS | Facility: CLINIC | Age: 68
End: 2023-01-05
Payer: MEDICARE

## 2023-01-05 ENCOUNTER — HOSPITAL ENCOUNTER (OUTPATIENT)
Dept: RADIOLOGY | Facility: HOSPITAL | Age: 68
Discharge: HOME | End: 2023-01-05
Attending: ORTHOPAEDIC SURGERY
Payer: MEDICARE

## 2023-01-05 VITALS — HEIGHT: 66 IN | BODY MASS INDEX: 27 KG/M2 | WEIGHT: 168 LBS

## 2023-01-05 DIAGNOSIS — Z98.890 S/P ARTHROSCOPY OF RIGHT SHOULDER: ICD-10-CM

## 2023-01-05 DIAGNOSIS — Z98.890 S/P ARTHROSCOPY OF LEFT SHOULDER: ICD-10-CM

## 2023-01-05 DIAGNOSIS — Z98.890 S/P ARTHROSCOPY OF LEFT SHOULDER: Primary | ICD-10-CM

## 2023-01-05 PROCEDURE — 99999 PR PBB SHADOW E&M-EST. PATIENT-LVL III: ICD-10-PCS | Mod: PBBFAC,,, | Performed by: ORTHOPAEDIC SURGERY

## 2023-01-05 PROCEDURE — 1101F PT FALLS ASSESS-DOCD LE1/YR: CPT | Mod: CPTII,S$GLB,, | Performed by: ORTHOPAEDIC SURGERY

## 2023-01-05 PROCEDURE — 3288F FALL RISK ASSESSMENT DOCD: CPT | Mod: CPTII,S$GLB,, | Performed by: ORTHOPAEDIC SURGERY

## 2023-01-05 PROCEDURE — 73030 X-RAY EXAM OF SHOULDER: CPT | Mod: TC,PO,RT

## 2023-01-05 PROCEDURE — 1101F PR PT FALLS ASSESS DOC 0-1 FALLS W/OUT INJ PAST YR: ICD-10-PCS | Mod: CPTII,S$GLB,, | Performed by: ORTHOPAEDIC SURGERY

## 2023-01-05 PROCEDURE — 1125F AMNT PAIN NOTED PAIN PRSNT: CPT | Mod: CPTII,S$GLB,, | Performed by: ORTHOPAEDIC SURGERY

## 2023-01-05 PROCEDURE — 99024 PR POST-OP FOLLOW-UP VISIT: ICD-10-PCS | Mod: S$GLB,,, | Performed by: ORTHOPAEDIC SURGERY

## 2023-01-05 PROCEDURE — 3008F BODY MASS INDEX DOCD: CPT | Mod: CPTII,S$GLB,, | Performed by: ORTHOPAEDIC SURGERY

## 2023-01-05 PROCEDURE — 1125F PR PAIN SEVERITY QUANTIFIED, PAIN PRESENT: ICD-10-PCS | Mod: CPTII,S$GLB,, | Performed by: ORTHOPAEDIC SURGERY

## 2023-01-05 PROCEDURE — 3288F PR FALLS RISK ASSESSMENT DOCUMENTED: ICD-10-PCS | Mod: CPTII,S$GLB,, | Performed by: ORTHOPAEDIC SURGERY

## 2023-01-05 PROCEDURE — 73030 XR SHOULDER TRAUMA 3 VIEW RIGHT: ICD-10-PCS | Mod: 26,RT,, | Performed by: RADIOLOGY

## 2023-01-05 PROCEDURE — 1159F MED LIST DOCD IN RCRD: CPT | Mod: CPTII,S$GLB,, | Performed by: ORTHOPAEDIC SURGERY

## 2023-01-05 PROCEDURE — 99999 PR PBB SHADOW E&M-EST. PATIENT-LVL III: CPT | Mod: PBBFAC,,, | Performed by: ORTHOPAEDIC SURGERY

## 2023-01-05 PROCEDURE — 1159F PR MEDICATION LIST DOCUMENTED IN MEDICAL RECORD: ICD-10-PCS | Mod: CPTII,S$GLB,, | Performed by: ORTHOPAEDIC SURGERY

## 2023-01-05 PROCEDURE — 73030 X-RAY EXAM OF SHOULDER: CPT | Mod: 26,RT,, | Performed by: RADIOLOGY

## 2023-01-05 PROCEDURE — 3008F PR BODY MASS INDEX (BMI) DOCUMENTED: ICD-10-PCS | Mod: CPTII,S$GLB,, | Performed by: ORTHOPAEDIC SURGERY

## 2023-01-05 PROCEDURE — 99024 POSTOP FOLLOW-UP VISIT: CPT | Mod: S$GLB,,, | Performed by: ORTHOPAEDIC SURGERY

## 2023-01-05 NOTE — PROGRESS NOTES
Three months out from arthroscopic rotator cuff repair with balloon spacer.  Still having discomfort in the shoulder.  Patient is going to try to go back to work next week     Exam shows hand is functioning well no deficits limited motion of the shoulder     X-rays show degenerative type changes    Assessment:  3 months status post arthroscopic rotator cuff repair with balloon spacer for degenerative rotator cuff disease     Plan:  Continue with therapy, follow-up in 2 months time

## 2023-01-18 ENCOUNTER — OFFICE VISIT (OUTPATIENT)
Dept: URGENT CARE | Facility: CLINIC | Age: 68
End: 2023-01-18
Payer: MEDICARE

## 2023-01-18 VITALS
WEIGHT: 168 LBS | RESPIRATION RATE: 18 BRPM | HEART RATE: 73 BPM | DIASTOLIC BLOOD PRESSURE: 90 MMHG | SYSTOLIC BLOOD PRESSURE: 153 MMHG | TEMPERATURE: 98 F | OXYGEN SATURATION: 97 % | BODY MASS INDEX: 27 KG/M2 | HEIGHT: 66 IN

## 2023-01-18 DIAGNOSIS — M54.32 SCIATICA OF LEFT SIDE: Primary | ICD-10-CM

## 2023-01-18 PROBLEM — M54.30 SCIATICA: Status: ACTIVE | Noted: 2023-01-18

## 2023-01-18 PROCEDURE — 99214 PR OFFICE/OUTPT VISIT, EST, LEVL IV, 30-39 MIN: ICD-10-PCS | Mod: 25,S$GLB,, | Performed by: INTERNAL MEDICINE

## 2023-01-18 PROCEDURE — 96372 THER/PROPH/DIAG INJ SC/IM: CPT | Mod: S$GLB,,, | Performed by: INTERNAL MEDICINE

## 2023-01-18 PROCEDURE — 1160F PR REVIEW ALL MEDS BY PRESCRIBER/CLIN PHARMACIST DOCUMENTED: ICD-10-PCS | Mod: CPTII,S$GLB,, | Performed by: INTERNAL MEDICINE

## 2023-01-18 PROCEDURE — 1125F AMNT PAIN NOTED PAIN PRSNT: CPT | Mod: CPTII,S$GLB,, | Performed by: INTERNAL MEDICINE

## 2023-01-18 PROCEDURE — 96372 PR INJECTION,THERAP/PROPH/DIAG2ST, IM OR SUBCUT: ICD-10-PCS | Mod: S$GLB,,, | Performed by: INTERNAL MEDICINE

## 2023-01-18 PROCEDURE — 1159F MED LIST DOCD IN RCRD: CPT | Mod: CPTII,S$GLB,, | Performed by: INTERNAL MEDICINE

## 2023-01-18 PROCEDURE — 3077F PR MOST RECENT SYSTOLIC BLOOD PRESSURE >= 140 MM HG: ICD-10-PCS | Mod: CPTII,S$GLB,, | Performed by: INTERNAL MEDICINE

## 2023-01-18 PROCEDURE — 1159F PR MEDICATION LIST DOCUMENTED IN MEDICAL RECORD: ICD-10-PCS | Mod: CPTII,S$GLB,, | Performed by: INTERNAL MEDICINE

## 2023-01-18 PROCEDURE — 99214 OFFICE O/P EST MOD 30 MIN: CPT | Mod: 25,S$GLB,, | Performed by: INTERNAL MEDICINE

## 2023-01-18 PROCEDURE — 3080F DIAST BP >= 90 MM HG: CPT | Mod: CPTII,S$GLB,, | Performed by: INTERNAL MEDICINE

## 2023-01-18 PROCEDURE — 3008F BODY MASS INDEX DOCD: CPT | Mod: CPTII,S$GLB,, | Performed by: INTERNAL MEDICINE

## 2023-01-18 PROCEDURE — 1125F PR PAIN SEVERITY QUANTIFIED, PAIN PRESENT: ICD-10-PCS | Mod: CPTII,S$GLB,, | Performed by: INTERNAL MEDICINE

## 2023-01-18 PROCEDURE — 3008F PR BODY MASS INDEX (BMI) DOCUMENTED: ICD-10-PCS | Mod: CPTII,S$GLB,, | Performed by: INTERNAL MEDICINE

## 2023-01-18 PROCEDURE — 3080F PR MOST RECENT DIASTOLIC BLOOD PRESSURE >= 90 MM HG: ICD-10-PCS | Mod: CPTII,S$GLB,, | Performed by: INTERNAL MEDICINE

## 2023-01-18 PROCEDURE — 3077F SYST BP >= 140 MM HG: CPT | Mod: CPTII,S$GLB,, | Performed by: INTERNAL MEDICINE

## 2023-01-18 PROCEDURE — 1160F RVW MEDS BY RX/DR IN RCRD: CPT | Mod: CPTII,S$GLB,, | Performed by: INTERNAL MEDICINE

## 2023-01-18 RX ORDER — DICLOFENAC SODIUM 75 MG/1
75 TABLET, DELAYED RELEASE ORAL 2 TIMES DAILY PRN
Qty: 14 TABLET | Refills: 0 | Status: SHIPPED | OUTPATIENT
Start: 2023-01-18 | End: 2023-01-25

## 2023-01-18 RX ORDER — KETOROLAC TROMETHAMINE 30 MG/ML
30 INJECTION, SOLUTION INTRAMUSCULAR; INTRAVENOUS
Status: COMPLETED | OUTPATIENT
Start: 2023-01-18 | End: 2023-01-18

## 2023-01-18 RX ADMIN — KETOROLAC TROMETHAMINE 30 MG: 30 INJECTION, SOLUTION INTRAMUSCULAR; INTRAVENOUS at 06:01

## 2023-01-18 NOTE — PATIENT INSTRUCTIONS
If your condition worsens we recommend that you receive another evaluation at the emergency room immediately or contact your primary medical clinics after hours call service to discuss your concerns. You must understand that you've received an Urgent Care treatment only and that you may be released before all of your medical problems are known or treated. You, the patient, will arrange for follow up care as instructed.  Drink plenty of Fluids  Wash hands frequently using mild antibacterial soap lathering for at least 15 seconds then rinse  Get plenty of Rest  Follow up in 1-2 weeks with Primary Care physician if not significantly better.   If you are not allergic please take Tylenol every 4-6 hours as needed and/or Ibuprofen every 6-8 hours as needed, over the counter for pain or fever.

## 2023-01-18 NOTE — PROGRESS NOTES
"Subjective:       Patient ID: Ynes Talavera is a 67 y.o. female.    Vitals:  height is 5' 6" (1.676 m) and weight is 76.2 kg (168 lb). Her temperature is 98.2 °F (36.8 °C). Her blood pressure is 153/90 (abnormal) and her pulse is 73. Her respiration is 18 and oxygen saturation is 97%.     Chief Complaint: Hip Pain    Pt presents to the clinic today with left hip pain that started on Friday morning and has not resolved since. OTC Naproxen Sodium taken with no relief. When sitting there is soreness but without pain, Standing causes 10/10 pain  level in left hip area. No mechanism of injury.     Hip Pain   The incident occurred 5 to 7 days ago. The incident occurred at home. There was no injury mechanism. The pain is present in the left hip. The pain is severe. The pain has been Constant since onset. Associated symptoms include an inability to bear weight. The symptoms are aggravated by movement. She has tried acetaminophen and NSAIDs for the symptoms. The treatment provided no relief.   ROS    Objective:      Physical Exam   Constitutional: She is oriented to person, place, and time. She appears well-developed. She is cooperative.  Non-toxic appearance. She does not appear ill. No distress.   HENT:   Head: Normocephalic and atraumatic.   Ears:   Right Ear: Hearing, tympanic membrane, external ear and ear canal normal.   Left Ear: Hearing, tympanic membrane, external ear and ear canal normal.   Nose: Nose normal. No mucosal edema, rhinorrhea or nasal deformity. No epistaxis. Right sinus exhibits no maxillary sinus tenderness and no frontal sinus tenderness. Left sinus exhibits no maxillary sinus tenderness and no frontal sinus tenderness.   Mouth/Throat: Uvula is midline, oropharynx is clear and moist and mucous membranes are normal. No trismus in the jaw. Normal dentition. No uvula swelling. No oropharyngeal exudate, posterior oropharyngeal edema or posterior oropharyngeal erythema.   Eyes: Conjunctivae and lids " are normal. No scleral icterus.   Neck: Trachea normal and phonation normal. Neck supple. No edema present. No erythema present. No neck rigidity present.   Cardiovascular: Normal rate, regular rhythm, normal heart sounds and normal pulses.   Pulmonary/Chest: Effort normal and breath sounds normal. No respiratory distress. She has no decreased breath sounds. She has no rhonchi.   Abdominal: Normal appearance.   Musculoskeletal: Normal range of motion.         General: Tenderness (left hip area) present. No deformity. Normal range of motion.   Neurological: She is alert and oriented to person, place, and time. She exhibits normal muscle tone. Coordination normal.   Skin: Skin is warm, dry, intact, not diaphoretic and not pale.   Psychiatric: Her speech is normal and behavior is normal. Judgment and thought content normal.   Nursing note and vitals reviewed.      Assessment:       1. Sciatica of left side          Plan:         Sciatica of left side  -     ketorolac injection 30 mg  -     diclofenac (VOLTAREN) 75 MG EC tablet; Take 1 tablet (75 mg total) by mouth 2 (two) times daily as needed.  Dispense: 14 tablet; Refill: 0         Patient Instructions   If your condition worsens we recommend that you receive another evaluation at the emergency room immediately or contact your primary medical clinics after hours call service to discuss your concerns. You must understand that you've received an Urgent Care treatment only and that you may be released before all of your medical problems are known or treated. You, the patient, will arrange for follow up care as instructed.  Drink plenty of Fluids  Wash hands frequently using mild antibacterial soap lathering for at least 15 seconds then rinse  Get plenty of Rest  Follow up in 1-2 weeks with Primary Care physician if not significantly better.   If you are not allergic please take Tylenol every 4-6 hours as needed and/or Ibuprofen every 6-8 hours as needed, over the  counter for pain or fever.     My notes were dictated with [employer]. Any misspellings or nonsensical grammar should be attributed to its use and allowances made for errors and typographic syntactical error(s).

## 2023-01-20 ENCOUNTER — PATIENT MESSAGE (OUTPATIENT)
Dept: ORTHOPEDICS | Facility: CLINIC | Age: 68
End: 2023-01-20
Payer: MEDICARE

## 2023-01-22 ENCOUNTER — PATIENT MESSAGE (OUTPATIENT)
Dept: FAMILY MEDICINE | Facility: CLINIC | Age: 68
End: 2023-01-22
Payer: MEDICARE

## 2023-01-24 ENCOUNTER — PATIENT MESSAGE (OUTPATIENT)
Dept: ORTHOPEDICS | Facility: CLINIC | Age: 68
End: 2023-01-24
Payer: MEDICARE

## 2023-01-24 DIAGNOSIS — M25.552 LEFT HIP PAIN: Primary | ICD-10-CM

## 2023-01-25 ENCOUNTER — OFFICE VISIT (OUTPATIENT)
Dept: ORTHOPEDICS | Facility: CLINIC | Age: 68
End: 2023-01-25
Payer: MEDICARE

## 2023-01-25 ENCOUNTER — HOSPITAL ENCOUNTER (OUTPATIENT)
Dept: RADIOLOGY | Facility: HOSPITAL | Age: 68
Discharge: HOME | End: 2023-01-25
Attending: ORTHOPAEDIC SURGERY
Payer: MEDICARE

## 2023-01-25 VITALS — BODY MASS INDEX: 27 KG/M2 | WEIGHT: 168 LBS | HEIGHT: 66 IN

## 2023-01-25 DIAGNOSIS — M25.552 LEFT HIP PAIN: ICD-10-CM

## 2023-01-25 DIAGNOSIS — M25.552 LEFT HIP PAIN: Primary | ICD-10-CM

## 2023-01-25 PROCEDURE — 1101F PT FALLS ASSESS-DOCD LE1/YR: CPT | Mod: CPTII,S$GLB,, | Performed by: ORTHOPAEDIC SURGERY

## 2023-01-25 PROCEDURE — 99213 OFFICE O/P EST LOW 20 MIN: CPT | Mod: S$GLB,,, | Performed by: ORTHOPAEDIC SURGERY

## 2023-01-25 PROCEDURE — 99213 PR OFFICE/OUTPT VISIT, EST, LEVL III, 20-29 MIN: ICD-10-PCS | Mod: S$GLB,,, | Performed by: ORTHOPAEDIC SURGERY

## 2023-01-25 PROCEDURE — 1160F PR REVIEW ALL MEDS BY PRESCRIBER/CLIN PHARMACIST DOCUMENTED: ICD-10-PCS | Mod: CPTII,S$GLB,, | Performed by: ORTHOPAEDIC SURGERY

## 2023-01-25 PROCEDURE — 73502 XR HIP WITH PELVIS WHEN PERFORMED, 2 OR 3 VIEWS LEFT: ICD-10-PCS | Mod: 26,LT,, | Performed by: RADIOLOGY

## 2023-01-25 PROCEDURE — 73502 X-RAY EXAM HIP UNI 2-3 VIEWS: CPT | Mod: TC,PO,LT

## 2023-01-25 PROCEDURE — 1159F PR MEDICATION LIST DOCUMENTED IN MEDICAL RECORD: ICD-10-PCS | Mod: CPTII,S$GLB,, | Performed by: ORTHOPAEDIC SURGERY

## 2023-01-25 PROCEDURE — 3008F BODY MASS INDEX DOCD: CPT | Mod: CPTII,S$GLB,, | Performed by: ORTHOPAEDIC SURGERY

## 2023-01-25 PROCEDURE — 1125F PR PAIN SEVERITY QUANTIFIED, PAIN PRESENT: ICD-10-PCS | Mod: CPTII,S$GLB,, | Performed by: ORTHOPAEDIC SURGERY

## 2023-01-25 PROCEDURE — 3288F FALL RISK ASSESSMENT DOCD: CPT | Mod: CPTII,S$GLB,, | Performed by: ORTHOPAEDIC SURGERY

## 2023-01-25 PROCEDURE — 3008F PR BODY MASS INDEX (BMI) DOCUMENTED: ICD-10-PCS | Mod: CPTII,S$GLB,, | Performed by: ORTHOPAEDIC SURGERY

## 2023-01-25 PROCEDURE — 1159F MED LIST DOCD IN RCRD: CPT | Mod: CPTII,S$GLB,, | Performed by: ORTHOPAEDIC SURGERY

## 2023-01-25 PROCEDURE — 99999 PR PBB SHADOW E&M-EST. PATIENT-LVL III: CPT | Mod: PBBFAC,,, | Performed by: ORTHOPAEDIC SURGERY

## 2023-01-25 PROCEDURE — 1125F AMNT PAIN NOTED PAIN PRSNT: CPT | Mod: CPTII,S$GLB,, | Performed by: ORTHOPAEDIC SURGERY

## 2023-01-25 PROCEDURE — 1101F PR PT FALLS ASSESS DOC 0-1 FALLS W/OUT INJ PAST YR: ICD-10-PCS | Mod: CPTII,S$GLB,, | Performed by: ORTHOPAEDIC SURGERY

## 2023-01-25 PROCEDURE — 73502 X-RAY EXAM HIP UNI 2-3 VIEWS: CPT | Mod: 26,LT,, | Performed by: RADIOLOGY

## 2023-01-25 PROCEDURE — 3288F PR FALLS RISK ASSESSMENT DOCUMENTED: ICD-10-PCS | Mod: CPTII,S$GLB,, | Performed by: ORTHOPAEDIC SURGERY

## 2023-01-25 PROCEDURE — 1160F RVW MEDS BY RX/DR IN RCRD: CPT | Mod: CPTII,S$GLB,, | Performed by: ORTHOPAEDIC SURGERY

## 2023-01-25 PROCEDURE — 99999 PR PBB SHADOW E&M-EST. PATIENT-LVL III: ICD-10-PCS | Mod: PBBFAC,,, | Performed by: ORTHOPAEDIC SURGERY

## 2023-01-25 NOTE — PROGRESS NOTES
67 years old pain in the lower back left hip area started about 10 days ago has gotten little bit better.  Points to the left low back as location for pain seven on good days 10 on bad days.      Exam shows hip range of motion is painless somewhat tender lower lumbar spine area straight leg raise testing weakly positive     X-rays of the hip show relatively well-preserved joint spacing, degenerative changes noted of the lumbar spine    Assessment: Lumbar spine arthritis with radicular symptoms to the left side    Plan:  Physical therapy, follow up as needed

## 2023-01-26 ENCOUNTER — OFFICE VISIT (OUTPATIENT)
Dept: PAIN MEDICINE | Facility: CLINIC | Age: 68
End: 2023-01-26
Payer: MEDICARE

## 2023-01-26 ENCOUNTER — PATIENT MESSAGE (OUTPATIENT)
Dept: ORTHOPEDICS | Facility: CLINIC | Age: 68
End: 2023-01-26
Payer: MEDICARE

## 2023-01-26 VITALS
SYSTOLIC BLOOD PRESSURE: 166 MMHG | BODY MASS INDEX: 27 KG/M2 | DIASTOLIC BLOOD PRESSURE: 88 MMHG | WEIGHT: 168 LBS | HEART RATE: 72 BPM | HEIGHT: 66 IN

## 2023-01-26 DIAGNOSIS — M54.16 LUMBAR RADICULOPATHY: ICD-10-CM

## 2023-01-26 DIAGNOSIS — M54.9 BACK PAIN, UNSPECIFIED BACK LOCATION, UNSPECIFIED BACK PAIN LATERALITY, UNSPECIFIED CHRONICITY: Primary | ICD-10-CM

## 2023-01-26 DIAGNOSIS — M47.816 LUMBAR SPONDYLOSIS: Primary | ICD-10-CM

## 2023-01-26 DIAGNOSIS — M41.9 SCOLIOSIS, UNSPECIFIED SCOLIOSIS TYPE, UNSPECIFIED SPINAL REGION: ICD-10-CM

## 2023-01-26 PROCEDURE — 3008F PR BODY MASS INDEX (BMI) DOCUMENTED: ICD-10-PCS | Mod: CPTII,S$GLB,, | Performed by: PHYSICIAN ASSISTANT

## 2023-01-26 PROCEDURE — 1101F PR PT FALLS ASSESS DOC 0-1 FALLS W/OUT INJ PAST YR: ICD-10-PCS | Mod: CPTII,S$GLB,, | Performed by: PHYSICIAN ASSISTANT

## 2023-01-26 PROCEDURE — 99204 PR OFFICE/OUTPT VISIT, NEW, LEVL IV, 45-59 MIN: ICD-10-PCS | Mod: 25,S$GLB,, | Performed by: PHYSICIAN ASSISTANT

## 2023-01-26 PROCEDURE — 1125F AMNT PAIN NOTED PAIN PRSNT: CPT | Mod: CPTII,S$GLB,, | Performed by: PHYSICIAN ASSISTANT

## 2023-01-26 PROCEDURE — 3288F PR FALLS RISK ASSESSMENT DOCUMENTED: ICD-10-PCS | Mod: CPTII,S$GLB,, | Performed by: PHYSICIAN ASSISTANT

## 2023-01-26 PROCEDURE — 99999 PR PBB SHADOW E&M-EST. PATIENT-LVL III: CPT | Mod: PBBFAC,,, | Performed by: PHYSICIAN ASSISTANT

## 2023-01-26 PROCEDURE — 96372 PR INJECTION,THERAP/PROPH/DIAG2ST, IM OR SUBCUT: ICD-10-PCS | Mod: S$GLB,,, | Performed by: PHYSICIAN ASSISTANT

## 2023-01-26 PROCEDURE — 1125F PR PAIN SEVERITY QUANTIFIED, PAIN PRESENT: ICD-10-PCS | Mod: CPTII,S$GLB,, | Performed by: PHYSICIAN ASSISTANT

## 2023-01-26 PROCEDURE — 3288F FALL RISK ASSESSMENT DOCD: CPT | Mod: CPTII,S$GLB,, | Performed by: PHYSICIAN ASSISTANT

## 2023-01-26 PROCEDURE — 96372 THER/PROPH/DIAG INJ SC/IM: CPT | Mod: S$GLB,,, | Performed by: PHYSICIAN ASSISTANT

## 2023-01-26 PROCEDURE — 99999 PR PBB SHADOW E&M-EST. PATIENT-LVL III: ICD-10-PCS | Mod: PBBFAC,,, | Performed by: PHYSICIAN ASSISTANT

## 2023-01-26 PROCEDURE — 99204 OFFICE O/P NEW MOD 45 MIN: CPT | Mod: 25,S$GLB,, | Performed by: PHYSICIAN ASSISTANT

## 2023-01-26 PROCEDURE — 3077F SYST BP >= 140 MM HG: CPT | Mod: CPTII,S$GLB,, | Performed by: PHYSICIAN ASSISTANT

## 2023-01-26 PROCEDURE — 1159F MED LIST DOCD IN RCRD: CPT | Mod: CPTII,S$GLB,, | Performed by: PHYSICIAN ASSISTANT

## 2023-01-26 PROCEDURE — 1101F PT FALLS ASSESS-DOCD LE1/YR: CPT | Mod: CPTII,S$GLB,, | Performed by: PHYSICIAN ASSISTANT

## 2023-01-26 PROCEDURE — 3008F BODY MASS INDEX DOCD: CPT | Mod: CPTII,S$GLB,, | Performed by: PHYSICIAN ASSISTANT

## 2023-01-26 PROCEDURE — 3077F PR MOST RECENT SYSTOLIC BLOOD PRESSURE >= 140 MM HG: ICD-10-PCS | Mod: CPTII,S$GLB,, | Performed by: PHYSICIAN ASSISTANT

## 2023-01-26 PROCEDURE — 3079F DIAST BP 80-89 MM HG: CPT | Mod: CPTII,S$GLB,, | Performed by: PHYSICIAN ASSISTANT

## 2023-01-26 PROCEDURE — 1159F PR MEDICATION LIST DOCUMENTED IN MEDICAL RECORD: ICD-10-PCS | Mod: CPTII,S$GLB,, | Performed by: PHYSICIAN ASSISTANT

## 2023-01-26 PROCEDURE — 3079F PR MOST RECENT DIASTOLIC BLOOD PRESSURE 80-89 MM HG: ICD-10-PCS | Mod: CPTII,S$GLB,, | Performed by: PHYSICIAN ASSISTANT

## 2023-01-26 RX ORDER — KETOROLAC TROMETHAMINE 30 MG/ML
30 INJECTION, SOLUTION INTRAMUSCULAR; INTRAVENOUS ONCE
Status: COMPLETED | OUTPATIENT
Start: 2023-01-26 | End: 2023-01-26

## 2023-01-26 RX ORDER — TIZANIDINE 4 MG/1
4 TABLET ORAL NIGHTLY PRN
Qty: 40 TABLET | Refills: 0 | Status: SHIPPED | OUTPATIENT
Start: 2023-01-26 | End: 2023-02-27 | Stop reason: SDUPTHER

## 2023-01-26 RX ORDER — METHYLPREDNISOLONE 4 MG/1
TABLET ORAL
Qty: 1 EACH | Refills: 0 | Status: SHIPPED | OUTPATIENT
Start: 2023-01-26 | End: 2023-02-16

## 2023-01-26 RX ADMIN — KETOROLAC TROMETHAMINE 30 MG: 30 INJECTION, SOLUTION INTRAMUSCULAR; INTRAVENOUS at 02:01

## 2023-01-26 NOTE — PROGRESS NOTES
[facility] New Patient Evaluation      Referred by: Dr. Grant Hawkins Jr.    PCP:  Grant Hawkins MD    CC:   Chief Complaint   Patient presents with    Low-back Pain     Pain is left-sided and radiates down the left leg to the left calf.      Last 3 PDI Scores 9/4/2019 7/23/2019 6/20/2019   Pain Disability Index (PDI) 28 31 0         HPI:   Ynes Talavera is a 67 y.o. female with history of hypertension and osteopenia  presents with lower back and left leg pain. She has had intermittent back pain over the last 30 years.  Current pain started 2 weeks ago without injury. She has pain in the left lower back and buttock.  Today pain started to radiate into the left posterior thigh and claf.  She describes pain as throbbing and burning.  She has tried ice, otc medications, volateran 75mg.  Pain persists.        Past and current medications:  Antineuropathics:  NSAIDs:  volatern 75mg po (last dose yesterday)  Antidepressants:  lexapro  Muscle relaxers:  Opioids:  oxycodone  Antiplatelets/Anticoagulants:    Physical therapy/ Chiropractic care:  None, scheudled to start PT for the left back/ leg tomorrow    Pain Intervention History:  none    Past Spine Surgical History:  none      History:    Current Outpatient Medications:     aspirin (ECOTRIN) 81 MG EC tablet, Take 81 mg by mouth once daily., Disp: , Rfl:     cephALEXin (KEFLEX) 500 MG capsule, Take 1 capsule (500 mg total) by mouth every 6 (six) hours., Disp: 20 capsule, Rfl: 0    EScitalopram oxalate (LEXAPRO) 10 MG tablet, TAKE 1 TABLET(10 MG) BY MOUTH EVERY DAY (Patient taking differently: every evening.), Disp: 30 tablet, Rfl: 11    metoprolol tartrate (LOPRESSOR) 25 MG tablet, Take 1 tablet (25 mg total) by mouth once daily. (Patient taking differently: Take 25 mg by mouth every evening.), Disp: 90 tablet, Rfl: 3    multivitamin capsule, Take 1 capsule by mouth every evening., Disp: , Rfl:     oxyCODONE-acetaminophen (PERCOCET) 5-325 mg per tablet,  Take 1 tablet by mouth every 4 to 6 hours as needed for Pain., Disp: 42 tablet, Rfl: 0    traZODone (DESYREL) 100 MG tablet, TAKE 1 TABLET(100 MG) BY MOUTH EVERY NIGHT Strength: 100 mg (Patient not taking: Reported on 2023), Disp: 90 tablet, Rfl: 3    Past Medical History:   Diagnosis Date    Adrenal adenoma     Diverticulosis of the colon     Hypertension     REJI (obstructive sleep apnea)     uses cpap    Venous insufficiency 2014    right leg, denies Hx clot       Past Surgical History:   Procedure Laterality Date    APPENDECTOMY      ARTHROSCOPIC REPAIR OF ROTATOR CUFF OF SHOULDER Right 10/4/2022    Procedure: REPAIR, ROTATOR CUFF, ARTHROSCOPIC;  Surgeon: Keven Piedra MD;  Location: [facility] OR;  Service: Orthopedics;  Laterality: Right;  R shoulder arthroscopy with rotator cuff repair + balloon spacer    BREAST BIOPSY Right     benign needle biopsy    BREAST BIOPSY Left     benign needle biopsy     SECTION, LOW TRANSVERSE      CHOLECYSTECTOMY      COLONOSCOPY N/A 9/3/2020    Procedure: COLONOSCOPY;  Surgeon: Neville Galvan MD;  Location: [facility] ENDO;  Service: Endoscopy;  Laterality: N/A;    EPIDURAL STEROID INJECTION INTO CERVICAL SPINE N/A 2019    Procedure: Injection-steroid-epidural-cervical C7/T1;  Surgeon: Ronnell Echeverria MD;  Location: [facility] OR;  Service: Pain Management;  Laterality: N/A;    ESOPHAGOGASTRODUODENOSCOPY N/A 2020    Procedure: ESOPHAGOGASTRODUODENOSCOPY (EGD);  Surgeon: Neville Galvan MD;  Location: [facility] ENDO;  Service: Endoscopy;  Laterality: N/A;    INJECTION OF FACET JOINT Left 2019    Procedure: INJECTION, FACET JOINT, C1-C2 Medial Branch;  Surgeon: Samantha Willoughby MD;  Location: [facility] PAIN MGT;  Service: Pain Management;  Laterality: Left;    THUMB ARTHROSCOPY      both thumbs, trigger finger release.    TONSILLECTOMY      TUBAL LIGATION      UMBILICAL HERNIA REPAIR      VEIN LIGATION AND STRIPPING      Left       Family History   Problem  Relation Age of Onset    Cancer Mother     Cancer Father     Heart disease Father     Hypertension Father     Diabetes Maternal Grandmother     Heart disease Maternal Grandmother     Diabetes Maternal Grandfather     Heart disease Maternal Grandfather     Breast cancer Paternal Grandmother 42       Social History     Socioeconomic History    Marital status:     Number of children: 3   Occupational History    Occupation: Dental hygienist     Comment: Working full-time     Employer:  [employer]   Tobacco Use    Smoking status: Former     Packs/day: 0.25     Years: 25.00     Pack years: 6.25     Types: Cigarettes     Quit date: 1993     Years since quittin.9    Smokeless tobacco: Never   Substance and Sexual Activity    Alcohol use: No     Alcohol/week: 0.0 standard drinks    Drug use: No    Sexual activity: Yes     Partners: Male   Social History Narrative    Her  has had a CVA. She is major support for him.        Walks regularly     Social Determinants of Health     Financial Resource Strain: Low Risk     Difficulty of Paying Living Expenses: Not hard at all   Food Insecurity: No Food Insecurity    Worried About Running Out of Food in the Last Year: Never true    Ran Out of Food in the Last Year: Never true   Transportation Needs: No Transportation Needs    Lack of Transportation (Medical): No    Lack of Transportation (Non-Medical): No   Physical Activity: Insufficiently Active    Days of Exercise per Week: 2 days    Minutes of Exercise per Session: 40 min   Stress: Stress Concern Present    Feeling of Stress : To some extent   Social Connections: Unknown    Frequency of Communication with Friends and Family: More than three times a week    Frequency of Social Gatherings with Friends and Family: More than three times a week    Active Member of Clubs or Organizations: Yes    Attends Club or Organization Meetings: More than 4 times per year    Marital Status:    Housing  Stability: Low Risk     Unable to Pay for Housing in the Last Year: No    Number of Places Lived in the Last Year: 1    Unstable Housing in the Last Year: No       Review of patient's allergies indicates:  No Known Allergies    Review of Systems:  Low back pain.  Left leg pain.  Balance of review of systems is negative.    Physical Exam:  There were no vitals filed for this visit.  There is no height or weight on file to calculate BMI.    Gen: NAD  Psych: mood appropriate for given condition  HEENT: eyes anicteric   CV: RRR, 2+ radial pulse  HEENT: anicteric   Respiratory: non-labored, no signs of respiratory distress  Abd: non-distended  Skin: warm, dry and intact.  Gait: Able to heel walk, toe walk. No antalgic gait.     Coordination:   Romberg: negative  Finger to nose coordination: normal  Heel to shin coordination: normal  Tandem walking coordination: normal    Cervical spine:   ROM is full in flexion, extension and lateral rotation without increased pain.  Spurling's maneuver causes no neck pain to either side.  Myofascial exam: No Tenderness to palpation across cervical paraspinous region bilaterally.    Lumbar spine:   ROM is full with flexion extension and oblique extension with no increased pain.    Cameron's test causes no increased pain on either side.    Supine straight leg raise is negative bilaterally.    Internal and external rotation of the hip causes no increased pain on either side.  Myofascial exam: No tenderness to palpation across lumbar paraspinous muscles. No tenderness to palpation over the bilateral greater trochanters and bilateral SI joint    Sensory:  Intact and symmetrical to light touch in C4-T1 dermatomes bilaterally. Intact and symmetrical to light touch in L1-S1 dermatomes bilaterally.    Motor:    Right Left   C4 Shoulder Abduction  5  5   C5 Elbow Flexion    5  5   C6 Wrist Extension  5  5   C7 Elbow Extension   5  5   C8/T1 Hand Intrinsics   5  5        Right Left   L2/3 Iliacus  Hip flexion  5  5   L3/4 Qudratus Femoris Knee Extension  5  5   L4/5 Tib Anterior Ankle Dorsiflexion   5  5   L5/S1 Extensor Hallicus Longus Great toe extension  5  5   S1/S2 Gastroc/Soleus Plantar Flexion  5  5      Right Left   Triceps DTR 2+ 2+   Biceps DTR 2+ 2+   Brachioradialis DTR 2+ 2+   Patellar DTR 2+ 2+   Achilles DTR 2+ 2+   Weiner Absent  Absent   Clonus Absent Absent   Babinski Absent Absent     Imaging:    MRI lumbar spine from 11-24-18 reviewed:  mild dextro scoliosis.  L2/3 retrolisthesis of L2 on L3 and facet hypertrophy ith some left foraminal narrowing.  L3/4 retrolisthesis of L3 on L4 with facet hypertrophy.  L4/5 and L5/S1 facet arthropathy with mild foraminal narrowing.    Labs:  Lab Results   Component Value Date    HGBA1C 5.4 08/25/2020       Lab Results   Component Value Date    WBC 7.30 09/02/2022    HGB 14.6 09/02/2022    HCT 42.7 09/02/2022     (H) 09/02/2022     09/02/2022           Assessment:     Ms. Quick has chronic intermittent lower lumbar back pain with acute onset/ exacerbation of left lower back and leg pain in an S1 pattern likely related to known degenerative changes/ possible new neural compression.  We discussed medications, PT, interventional proceduers.  At this time, will try IM toradol today, zanaflex, and start medrol taper tomorrown.  Ok to start PT tomorrow.  Follow up in 4-6 weeks to monitor progress. She is agreeable..      Problem List Items Addressed This Visit    None        Follow Up: RTC 4-6 weeks..    : Not viewed.          Loan Blancas PA-C  [facility]      This note was completed with dictation software and grammatical errors may exist.

## 2023-01-31 ENCOUNTER — PATIENT MESSAGE (OUTPATIENT)
Dept: PAIN MEDICINE | Facility: CLINIC | Age: 68
End: 2023-01-31
Payer: MEDICARE

## 2023-02-01 ENCOUNTER — PATIENT MESSAGE (OUTPATIENT)
Dept: PAIN MEDICINE | Facility: CLINIC | Age: 68
End: 2023-02-01
Payer: MEDICARE

## 2023-02-03 ENCOUNTER — PATIENT MESSAGE (OUTPATIENT)
Dept: PAIN MEDICINE | Facility: CLINIC | Age: 68
End: 2023-02-03
Payer: MEDICARE

## 2023-02-03 ENCOUNTER — TELEPHONE (OUTPATIENT)
Dept: PAIN MEDICINE | Facility: CLINIC | Age: 68
End: 2023-02-03
Payer: MEDICARE

## 2023-02-03 DIAGNOSIS — M47.816 LUMBAR SPONDYLOSIS: ICD-10-CM

## 2023-02-03 DIAGNOSIS — M54.16 LUMBAR RADICULOPATHY, CHRONIC: Primary | ICD-10-CM

## 2023-02-03 DIAGNOSIS — M41.9 SCOLIOSIS, UNSPECIFIED SCOLIOSIS TYPE, UNSPECIFIED SPINAL REGION: ICD-10-CM

## 2023-02-03 NOTE — TELEPHONE ENCOUNTER
Please schedule MRI lumbar spine.  Pain continues and unable to participate in PT.    I am just seeing her message to proceed with MRI.  She sent a message 2/1/23, but it was not sent to me.

## 2023-02-16 ENCOUNTER — HOSPITAL ENCOUNTER (OUTPATIENT)
Dept: RADIOLOGY | Facility: HOSPITAL | Age: 68
Discharge: HOME | End: 2023-02-16
Attending: PHYSICIAN ASSISTANT
Payer: MEDICARE

## 2023-02-16 DIAGNOSIS — M47.816 LUMBAR SPONDYLOSIS: ICD-10-CM

## 2023-02-16 DIAGNOSIS — M41.9 SCOLIOSIS, UNSPECIFIED SCOLIOSIS TYPE, UNSPECIFIED SPINAL REGION: ICD-10-CM

## 2023-02-16 DIAGNOSIS — M54.16 LUMBAR RADICULOPATHY, CHRONIC: ICD-10-CM

## 2023-02-16 PROCEDURE — 72148 MRI LUMBAR SPINE W/O DYE: CPT | Mod: 26,,, | Performed by: RADIOLOGY

## 2023-02-16 PROCEDURE — 72148 MRI LUMBAR SPINE WITHOUT CONTRAST: ICD-10-PCS | Mod: 26,,, | Performed by: RADIOLOGY

## 2023-02-16 PROCEDURE — 72148 MRI LUMBAR SPINE W/O DYE: CPT | Mod: TC,PO

## 2023-02-17 ENCOUNTER — OFFICE VISIT (OUTPATIENT)
Dept: PAIN MEDICINE | Facility: CLINIC | Age: 68
End: 2023-02-17
Payer: MEDICARE

## 2023-02-17 VITALS
SYSTOLIC BLOOD PRESSURE: 129 MMHG | HEART RATE: 77 BPM | DIASTOLIC BLOOD PRESSURE: 78 MMHG | WEIGHT: 175.5 LBS | HEIGHT: 66 IN | BODY MASS INDEX: 28.21 KG/M2

## 2023-02-17 DIAGNOSIS — M54.16 LUMBAR RADICULOPATHY, CHRONIC: ICD-10-CM

## 2023-02-17 DIAGNOSIS — M47.816 LUMBAR SPONDYLOSIS: Primary | ICD-10-CM

## 2023-02-17 DIAGNOSIS — M84.48XA SACRAL INSUFFICIENCY FRACTURE, INITIAL ENCOUNTER: ICD-10-CM

## 2023-02-17 PROCEDURE — 1159F MED LIST DOCD IN RCRD: CPT | Mod: CPTII,S$GLB,, | Performed by: PHYSICIAN ASSISTANT

## 2023-02-17 PROCEDURE — 3008F PR BODY MASS INDEX (BMI) DOCUMENTED: ICD-10-PCS | Mod: CPTII,S$GLB,, | Performed by: PHYSICIAN ASSISTANT

## 2023-02-17 PROCEDURE — 1101F PR PT FALLS ASSESS DOC 0-1 FALLS W/OUT INJ PAST YR: ICD-10-PCS | Mod: CPTII,S$GLB,, | Performed by: PHYSICIAN ASSISTANT

## 2023-02-17 PROCEDURE — 99214 OFFICE O/P EST MOD 30 MIN: CPT | Mod: S$GLB,,, | Performed by: PHYSICIAN ASSISTANT

## 2023-02-17 PROCEDURE — 1101F PT FALLS ASSESS-DOCD LE1/YR: CPT | Mod: CPTII,S$GLB,, | Performed by: PHYSICIAN ASSISTANT

## 2023-02-17 PROCEDURE — 1125F AMNT PAIN NOTED PAIN PRSNT: CPT | Mod: CPTII,S$GLB,, | Performed by: PHYSICIAN ASSISTANT

## 2023-02-17 PROCEDURE — 1160F PR REVIEW ALL MEDS BY PRESCRIBER/CLIN PHARMACIST DOCUMENTED: ICD-10-PCS | Mod: CPTII,S$GLB,, | Performed by: PHYSICIAN ASSISTANT

## 2023-02-17 PROCEDURE — 3074F PR MOST RECENT SYSTOLIC BLOOD PRESSURE < 130 MM HG: ICD-10-PCS | Mod: CPTII,S$GLB,, | Performed by: PHYSICIAN ASSISTANT

## 2023-02-17 PROCEDURE — 1160F RVW MEDS BY RX/DR IN RCRD: CPT | Mod: CPTII,S$GLB,, | Performed by: PHYSICIAN ASSISTANT

## 2023-02-17 PROCEDURE — 99214 PR OFFICE/OUTPT VISIT, EST, LEVL IV, 30-39 MIN: ICD-10-PCS | Mod: S$GLB,,, | Performed by: PHYSICIAN ASSISTANT

## 2023-02-17 PROCEDURE — 3074F SYST BP LT 130 MM HG: CPT | Mod: CPTII,S$GLB,, | Performed by: PHYSICIAN ASSISTANT

## 2023-02-17 PROCEDURE — 3008F BODY MASS INDEX DOCD: CPT | Mod: CPTII,S$GLB,, | Performed by: PHYSICIAN ASSISTANT

## 2023-02-17 PROCEDURE — 99999 PR PBB SHADOW E&M-EST. PATIENT-LVL III: ICD-10-PCS | Mod: PBBFAC,,, | Performed by: PHYSICIAN ASSISTANT

## 2023-02-17 PROCEDURE — 3288F PR FALLS RISK ASSESSMENT DOCUMENTED: ICD-10-PCS | Mod: CPTII,S$GLB,, | Performed by: PHYSICIAN ASSISTANT

## 2023-02-17 PROCEDURE — 3078F PR MOST RECENT DIASTOLIC BLOOD PRESSURE < 80 MM HG: ICD-10-PCS | Mod: CPTII,S$GLB,, | Performed by: PHYSICIAN ASSISTANT

## 2023-02-17 PROCEDURE — 3288F FALL RISK ASSESSMENT DOCD: CPT | Mod: CPTII,S$GLB,, | Performed by: PHYSICIAN ASSISTANT

## 2023-02-17 PROCEDURE — 99999 PR PBB SHADOW E&M-EST. PATIENT-LVL III: CPT | Mod: PBBFAC,,, | Performed by: PHYSICIAN ASSISTANT

## 2023-02-17 PROCEDURE — 1125F PR PAIN SEVERITY QUANTIFIED, PAIN PRESENT: ICD-10-PCS | Mod: CPTII,S$GLB,, | Performed by: PHYSICIAN ASSISTANT

## 2023-02-17 PROCEDURE — 3078F DIAST BP <80 MM HG: CPT | Mod: CPTII,S$GLB,, | Performed by: PHYSICIAN ASSISTANT

## 2023-02-17 PROCEDURE — 1159F PR MEDICATION LIST DOCUMENTED IN MEDICAL RECORD: ICD-10-PCS | Mod: CPTII,S$GLB,, | Performed by: PHYSICIAN ASSISTANT

## 2023-02-17 NOTE — PROGRESS NOTES
[facility] Follow Up      Referred by: Grant Hawkins MD    PCP:  Grant Hawkins MD    CC:   Chief Complaint   Patient presents with    Low-back Pain     C/o pain in lower back       Last 3 PDI Scores 9/4/2019 7/23/2019 6/20/2019   Pain Disability Index (PDI) 28 31 0         HPI:     Ms. Quick returns for follow up of back and left leg pain after PT and undergoing imaging.  She initially presented with pain started mid to early January 2023 in the left lwero back and buttock to the left posterior thigh and calf.  With PT and medications, pain has eased in the thigh.  Currently with pain in the lower lumbar sacral region midline and paraspinal bilaterally.  She has some left buttock and posterior superior buttock/ thigh pain and tingling sensation.  Pain is worse as the day goes on.  She works a [employer].  No injury, but she did return to work after recovery from shoulder procedure around the same time of back pain onset.  She continues with PT.    Initial HPI:  Ynes Talavera is a 67 y.o. female with history of hypertension and osteopenia  presents with lower back and left leg pain. She has had intermittent back pain over the last 30 years.  Current pain started 2 weeks ago without injury. She has pain in the left lower back and buttock.  Today pain started to radiate into the left posterior thigh and claf.  She describes pain as throbbing and burning.  She has tried ice, otc medications, volateran 75mg.  Pain persists.        Past and current medications:  Antineuropathics:  NSAIDs:  volatern 75mg po (last dose yesterday), IM toradol  Antidepressants:  lexapro  Muscle relaxers: zanaflex  Opioids:  oxycodone  Antiplatelets/Anticoagulants:  Others:  medrol taper (started 1-26-23 and completed )    Physical therapy/ Chiropractic care:  Undergoign PT    Pain Intervention History:  none    Past Spine Surgical History:  none      History:    Current Outpatient Medications:     aspirin (ECOTRIN) 81  MG EC tablet, Take 81 mg by mouth once daily., Disp: , Rfl:     EScitalopram oxalate (LEXAPRO) 10 MG tablet, TAKE 1 TABLET(10 MG) BY MOUTH EVERY DAY (Patient taking differently: every evening.), Disp: 30 tablet, Rfl: 11    metoprolol tartrate (LOPRESSOR) 25 MG tablet, Take 1 tablet (25 mg total) by mouth once daily. (Patient taking differently: Take 25 mg by mouth every evening.), Disp: 90 tablet, Rfl: 3    multivitamin capsule, Take 1 capsule by mouth every evening., Disp: , Rfl:     tiZANidine (ZANAFLEX) 4 MG tablet, Take 1 tablet (4 mg total) by mouth nightly as needed (muscle spasms/ pain)., Disp: 40 tablet, Rfl: 0    traZODone (DESYREL) 100 MG tablet, TAKE 1 TABLET(100 MG) BY MOUTH EVERY NIGHT Strength: 100 mg, Disp: 90 tablet, Rfl: 3    Past Medical History:   Diagnosis Date    Adrenal adenoma     Diverticulosis of the colon     Hypertension     REJI (obstructive sleep apnea)     uses cpap    Venous insufficiency 2014    right leg, denies Hx clot       Past Surgical History:   Procedure Laterality Date    APPENDECTOMY      ARTHROSCOPIC REPAIR OF ROTATOR CUFF OF SHOULDER Right 10/4/2022    Procedure: REPAIR, ROTATOR CUFF, ARTHROSCOPIC;  Surgeon: Keven Piedra MD;  Location: [facility] OR;  Service: Orthopedics;  Laterality: Right;  R shoulder arthroscopy with rotator cuff repair + balloon spacer    BREAST BIOPSY Right     benign needle biopsy    BREAST BIOPSY Left     benign needle biopsy     SECTION, LOW TRANSVERSE      CHOLECYSTECTOMY      COLONOSCOPY N/A 9/3/2020    Procedure: COLONOSCOPY;  Surgeon: Neville Galvan MD;  Location: [facility] ENDO;  Service: Endoscopy;  Laterality: N/A;    EPIDURAL STEROID INJECTION INTO CERVICAL SPINE N/A 2019    Procedure: Injection-steroid-epidural-cervical C7/T1;  Surgeon: Ronnell Ecehverria MD;  Location: [facility] OR;  Service: Pain Management;  Laterality: N/A;    ESOPHAGOGASTRODUODENOSCOPY N/A 2020    Procedure: ESOPHAGOGASTRODUODENOSCOPY (EGD);   Surgeon: Neville Galvan MD;  Location: [facility] ENDO;  Service: Endoscopy;  Laterality: N/A;    INJECTION OF FACET JOINT Left 2019    Procedure: INJECTION, FACET JOINT, C1-C2 Medial Branch;  Surgeon: Samantha Willoughby MD;  Location: [facility] PAIN MGT;  Service: Pain Management;  Laterality: Left;    THUMB ARTHROSCOPY      both thumbs, trigger finger release.    TONSILLECTOMY      TUBAL LIGATION      UMBILICAL HERNIA REPAIR  2014    VEIN LIGATION AND STRIPPING      Left       Family History   Problem Relation Age of Onset    Cancer Mother     Cancer Father     Heart disease Father     Hypertension Father     Diabetes Maternal Grandmother     Heart disease Maternal Grandmother     Diabetes Maternal Grandfather     Heart disease Maternal Grandfather     Breast cancer Paternal Grandmother 42       Social History     Socioeconomic History    Marital status:     Number of children: 3   Occupational History    Occupation: Dental hygienist     Comment: Working full-time     Employer:  [employer]   Tobacco Use    Smoking status: Former     Packs/day: 0.25     Years: 25.00     Pack years: 6.25     Types: Cigarettes     Quit date: 1993     Years since quittin.0    Smokeless tobacco: Never   Substance and Sexual Activity    Alcohol use: No     Alcohol/week: 0.0 standard drinks    Drug use: No    Sexual activity: Yes     Partners: Male   Social History Narrative    Her  has had a CVA. She is major support for him.        Walks regularly     Social Determinants of Health     Financial Resource Strain: Low Risk     Difficulty of Paying Living Expenses: Not hard at all   Food Insecurity: No Food Insecurity    Worried About Running Out of Food in the Last Year: Never true    Ran Out of Food in the Last Year: Never true   Transportation Needs: No Transportation Needs    Lack of Transportation (Medical): No    Lack of Transportation (Non-Medical): No   Physical Activity: Insufficiently Active     "Days of Exercise per Week: 2 days    Minutes of Exercise per Session: 40 min   Stress: Stress Concern Present    Feeling of Stress : To some extent   Social Connections: Unknown    Frequency of Communication with Friends and Family: More than three times a week    Frequency of Social Gatherings with Friends and Family: More than three times a week    Active Member of Clubs or Organizations: Yes    Attends Club or Organization Meetings: More than 4 times per year    Marital Status:    Housing Stability: Low Risk     Unable to Pay for Housing in the Last Year: No    Number of Places Lived in the Last Year: 1    Unstable Housing in the Last Year: No       Review of patient's allergies indicates:  No Known Allergies    Review of Systems:  Low back pain.  Left leg pain.  Balance of review of systems is negative.    Physical Exam:  Vitals:    02/17/23 0827   BP: 129/78   Pulse: 77   Weight: 79.6 kg (175 lb 7.8 oz)   Height: 5' 6" (1.676 m)   PainSc:   7   PainLoc: Back     Body mass index is 28.32 kg/m².    Gen: NAD  Psych: mood appropriate for given condition  HEENT: eyes anicteric   CV: RRR, 2+ radial pulse  HEENT: anicteric   Respiratory: non-labored, no signs of respiratory distress  Abd: non-distended  Skin: warm, dry and intact.  Gait: Able to heel walk, toe walk. No antalgic gait.     Coordination:   Romberg: negative  Finger to nose coordination: normal  Heel to shin coordination: normal  Tandem walking coordination: normal    Cervical spine:   ROM is full in flexion, extension and lateral rotation without increased pain.  Spurling's maneuver causes no neck pain to either side.  Myofascial exam: No Tenderness to palpation across cervical paraspinous region bilaterally.    Lumbar spine:   ROM is full with flexion extension and oblique extension with no increased pain.    Cameron's test causes no increased pain on either side.    Supine straight leg raise is negative bilaterally.    Internal and external " rotation of the hip causes no increased pain on either side.  Myofascial exam: No tenderness to palpation across lumbar paraspinous muscles. No tenderness to palpation over the bilateral greater trochanters and bilateral SI joint    Sensory:  Intact and symmetrical to light touch in C4-T1 dermatomes bilaterally. Intact and symmetrical to light touch in L1-S1 dermatomes bilaterally.    Motor:    Right Left   C4 Shoulder Abduction  5  5   C5 Elbow Flexion    5  5   C6 Wrist Extension  5  5   C7 Elbow Extension   5  5   C8/T1 Hand Intrinsics   5  5        Right Left   L2/3 Iliacus Hip flexion  5  5   L3/4 Qudratus Femoris Knee Extension  5  5   L4/5 Tib Anterior Ankle Dorsiflexion   5  5   L5/S1 Extensor Hallicus Longus Great toe extension  5  5   S1/S2 Gastroc/Soleus Plantar Flexion  5  5      Right Left   Triceps DTR 2+ 2+   Biceps DTR 2+ 2+   Brachioradialis DTR 2+ 2+   Patellar DTR 2+ 2+   Achilles DTR 2+ 2+   Weiner Absent  Absent   Clonus Absent Absent   Babinski Absent Absent     Imaging:    MRI lumbar spine from 11-24-18 reviewed:  mild dextro scoliosis.  L2/3 retrolisthesis of L2 on L3 and facet hypertrophy ith some left foraminal narrowing.  L3/4 retrolisthesis of L3 on L4 with facet hypertrophy.  L4/5 and L5/S1 facet arthropathy with mild foraminal narrowing.    MRI lumbar spine 2/16/23:  L2/3 severe DDD, left facet hypertrophy with moderate left foraminal narrowing.  L3/4 right facet effusion, facet hypertrophy with moderate bilateral foraminal narrowing.  L4/5 anterolistehsis of L4 on L4, facet hypertrophy with mild bilateral foraminal narrowing and moderate central canal narrowing.  L5/s1 facet arthrtophy with mild bilateral foraminal narrowing.  Possible and more likely bilateral sacral insufficiency fractures at S3, S3 than an infltrative process.    Labs:  Lab Results   Component Value Date    HGBA1C 5.4 08/25/2020       Lab Results   Component Value Date    WBC 7.30 09/02/2022    HGB 14.6 09/02/2022     HCT 42.7 09/02/2022     (H) 09/02/2022     09/02/2022           Assessment:     Ms. Quick has chronic intermittent lower lumbar back pain with acute onset/ exacerbation of left lower back and leg pain in an S1 pattern.  Overall S1 radicular pain improving and pain centralizing in the lower back with PT.  Pain from degeenrative chagnes (particularly L4/5 and L23/) and sacral linsufficincy fracture.   lWe discussed medications, PT, interventional proceduers.  At this time, will try further PT.  If no improvement, we will can obtain further sacral imaging and have her follow up with pain management.  Follow up with me as scheduld 3-9-23 to further assess.    Problem List Items Addressed This Visit    None  Visit Diagnoses       Lumbar spondylosis    -  Primary    Lumbar radiculopathy, chronic        Sacral insufficiency fracture, initial encounter                  Follow Up: RTC  3-9-23 as scheduled.    : Not viewed.          Loan Blancas PA-C  [facility]      This note was completed with dictation software and grammatical errors may exist.

## 2023-02-19 ENCOUNTER — PATIENT MESSAGE (OUTPATIENT)
Dept: PAIN MEDICINE | Facility: CLINIC | Age: 68
End: 2023-02-19
Payer: MEDICARE

## 2023-02-20 ENCOUNTER — PATIENT MESSAGE (OUTPATIENT)
Dept: FAMILY MEDICINE | Facility: CLINIC | Age: 68
End: 2023-02-20
Payer: MEDICARE

## 2023-02-20 ENCOUNTER — PATIENT MESSAGE (OUTPATIENT)
Dept: PAIN MEDICINE | Facility: CLINIC | Age: 68
End: 2023-02-20
Payer: MEDICARE

## 2023-02-23 ENCOUNTER — OFFICE VISIT (OUTPATIENT)
Dept: FAMILY MEDICINE | Facility: CLINIC | Age: 68
End: 2023-02-23
Payer: MEDICARE

## 2023-02-23 VITALS
WEIGHT: 174.19 LBS | DIASTOLIC BLOOD PRESSURE: 88 MMHG | BODY MASS INDEX: 27.99 KG/M2 | HEIGHT: 66 IN | SYSTOLIC BLOOD PRESSURE: 130 MMHG | HEART RATE: 75 BPM | OXYGEN SATURATION: 98 %

## 2023-02-23 DIAGNOSIS — F32.9 REACTIVE DEPRESSION: ICD-10-CM

## 2023-02-23 DIAGNOSIS — D35.00 ADRENAL ADENOMA, UNSPECIFIED LATERALITY: Primary | Chronic | ICD-10-CM

## 2023-02-23 PROCEDURE — 3079F PR MOST RECENT DIASTOLIC BLOOD PRESSURE 80-89 MM HG: ICD-10-PCS | Mod: CPTII,S$GLB,, | Performed by: FAMILY MEDICINE

## 2023-02-23 PROCEDURE — 3079F DIAST BP 80-89 MM HG: CPT | Mod: CPTII,S$GLB,, | Performed by: FAMILY MEDICINE

## 2023-02-23 PROCEDURE — 99214 PR OFFICE/OUTPT VISIT, EST, LEVL IV, 30-39 MIN: ICD-10-PCS | Mod: S$GLB,,, | Performed by: FAMILY MEDICINE

## 2023-02-23 PROCEDURE — 3075F SYST BP GE 130 - 139MM HG: CPT | Mod: CPTII,S$GLB,, | Performed by: FAMILY MEDICINE

## 2023-02-23 PROCEDURE — 1101F PT FALLS ASSESS-DOCD LE1/YR: CPT | Mod: CPTII,S$GLB,, | Performed by: FAMILY MEDICINE

## 2023-02-23 PROCEDURE — 1125F AMNT PAIN NOTED PAIN PRSNT: CPT | Mod: CPTII,S$GLB,, | Performed by: FAMILY MEDICINE

## 2023-02-23 PROCEDURE — 3075F PR MOST RECENT SYSTOLIC BLOOD PRESS GE 130-139MM HG: ICD-10-PCS | Mod: CPTII,S$GLB,, | Performed by: FAMILY MEDICINE

## 2023-02-23 PROCEDURE — 1160F PR REVIEW ALL MEDS BY PRESCRIBER/CLIN PHARMACIST DOCUMENTED: ICD-10-PCS | Mod: CPTII,S$GLB,, | Performed by: FAMILY MEDICINE

## 2023-02-23 PROCEDURE — 3008F BODY MASS INDEX DOCD: CPT | Mod: CPTII,S$GLB,, | Performed by: FAMILY MEDICINE

## 2023-02-23 PROCEDURE — 99999 PR PBB SHADOW E&M-EST. PATIENT-LVL IV: CPT | Mod: PBBFAC,,, | Performed by: FAMILY MEDICINE

## 2023-02-23 PROCEDURE — 99214 OFFICE O/P EST MOD 30 MIN: CPT | Mod: S$GLB,,, | Performed by: FAMILY MEDICINE

## 2023-02-23 PROCEDURE — 1159F PR MEDICATION LIST DOCUMENTED IN MEDICAL RECORD: ICD-10-PCS | Mod: CPTII,S$GLB,, | Performed by: FAMILY MEDICINE

## 2023-02-23 PROCEDURE — 3008F PR BODY MASS INDEX (BMI) DOCUMENTED: ICD-10-PCS | Mod: CPTII,S$GLB,, | Performed by: FAMILY MEDICINE

## 2023-02-23 PROCEDURE — 1125F PR PAIN SEVERITY QUANTIFIED, PAIN PRESENT: ICD-10-PCS | Mod: CPTII,S$GLB,, | Performed by: FAMILY MEDICINE

## 2023-02-23 PROCEDURE — 1160F RVW MEDS BY RX/DR IN RCRD: CPT | Mod: CPTII,S$GLB,, | Performed by: FAMILY MEDICINE

## 2023-02-23 PROCEDURE — 1159F MED LIST DOCD IN RCRD: CPT | Mod: CPTII,S$GLB,, | Performed by: FAMILY MEDICINE

## 2023-02-23 PROCEDURE — 3288F PR FALLS RISK ASSESSMENT DOCUMENTED: ICD-10-PCS | Mod: CPTII,S$GLB,, | Performed by: FAMILY MEDICINE

## 2023-02-23 PROCEDURE — 1101F PR PT FALLS ASSESS DOC 0-1 FALLS W/OUT INJ PAST YR: ICD-10-PCS | Mod: CPTII,S$GLB,, | Performed by: FAMILY MEDICINE

## 2023-02-23 PROCEDURE — 99999 PR PBB SHADOW E&M-EST. PATIENT-LVL IV: ICD-10-PCS | Mod: PBBFAC,,, | Performed by: FAMILY MEDICINE

## 2023-02-23 PROCEDURE — 3288F FALL RISK ASSESSMENT DOCD: CPT | Mod: CPTII,S$GLB,, | Performed by: FAMILY MEDICINE

## 2023-02-23 RX ORDER — ESCITALOPRAM OXALATE 5 MG/1
5 TABLET ORAL DAILY
Qty: 30 TABLET | Refills: 0 | Status: SHIPPED | OUTPATIENT
Start: 2023-02-23 | End: 2023-03-25

## 2023-02-23 NOTE — PROGRESS NOTES
"Subjective:       Patient ID: Ynes Talavera is a 67 y.o. female.    Chief Complaint: Establish Care (Concerns about fractures in sacral area)    Here today to establish care with a new PCP.   She has been a patient of Dr. Hawkins who recently retired.  She has recently been suffering from back pain.  Seeing SHANICE Blancas, had MRI which showed sacral insuffiencey fracture.  She denies trauma.  She had a bone density in Dec which was normal.  She notes that she was found to have adrenal adenoma in 2012, stable with repeat CT in 2017 and 2020    HTN:  She is on Metoprolol  LACY/Insomnia:  She is on Lexapro 10 mg with Trazodone for sleep.  She had done very well on the medication and is interested in a trial off the medication.  She has tried to get off and got "brain zaps"    Review of Systems   Constitutional:  Negative for activity change, appetite change, fatigue and fever.   Respiratory:  Negative for cough, shortness of breath and wheezing.    Cardiovascular:  Negative for chest pain and palpitations.   Gastrointestinal:  Negative for abdominal pain, constipation, diarrhea and nausea.   Skin:  Negative for pallor and rash.   Neurological:  Negative for dizziness, syncope, light-headedness and headaches.     Objective:      Vitals:    02/23/23 1403   BP: 130/88   Pulse: 75   SpO2: 98%   Weight: 79 kg (174 lb 2.6 oz)   Height: 5' 6" (1.676 m)      Physical Exam  Constitutional:       General: She is not in acute distress.  Cardiovascular:      Rate and Rhythm: Normal rate and regular rhythm.      Heart sounds: Normal heart sounds. No murmur heard.  Pulmonary:      Effort: Pulmonary effort is normal. No respiratory distress.      Breath sounds: Normal breath sounds. No wheezing, rhonchi or rales.   Skin:     General: Skin is warm and dry.   Neurological:      General: No focal deficit present.      Mental Status: She is alert.   Psychiatric:         Mood and Affect: Mood normal.         Behavior: Behavior normal.         " Thought Content: Thought content normal.       Results for orders placed or performed in visit on 09/02/22   Comprehensive Metabolic Panel   Result Value Ref Range    Sodium 142 136 - 145 mmol/L    Potassium 4.4 3.5 - 5.1 mmol/L    Chloride 106 95 - 110 mmol/L    CO2 27 23 - 29 mmol/L    Glucose 110 70 - 110 mg/dL    BUN 20 8 - 23 mg/dL    Creatinine 0.8 0.5 - 1.4 mg/dL    Calcium 9.9 8.7 - 10.5 mg/dL    Total Protein 6.5 6.0 - 8.4 g/dL    Albumin 3.8 3.5 - 5.2 g/dL    Total Bilirubin 0.5 0.1 - 1.0 mg/dL    Alkaline Phosphatase 64 55 - 135 U/L    AST 20 10 - 40 U/L    ALT 16 10 - 44 U/L    Anion Gap 9 8 - 16 mmol/L    eGFR >60.0 >60 mL/min/1.73 m^2   Lipid Panel   Result Value Ref Range    Cholesterol 207 (H) 120 - 199 mg/dL    Triglycerides 84 30 - 150 mg/dL    HDL 64 40 - 75 mg/dL    LDL Cholesterol 126.2 63.0 - 159.0 mg/dL    HDL/Cholesterol Ratio 30.9 20.0 - 50.0 %    Total Cholesterol/HDL Ratio 3.2 2.0 - 5.0    Non-HDL Cholesterol 143 mg/dL   CBC W/ AUTO DIFFERENTIAL   Result Value Ref Range    WBC 7.30 3.90 - 12.70 K/uL    RBC 4.15 4.00 - 5.40 M/uL    Hemoglobin 14.6 12.0 - 16.0 g/dL    Hematocrit 42.7 37.0 - 48.5 %     (H) 82 - 98 fL    MCH 35.2 (H) 27.0 - 31.0 pg    MCHC 34.2 32.0 - 36.0 g/dL    RDW 11.9 11.5 - 14.5 %    Platelets 207 150 - 450 K/uL    MPV 12.0 9.2 - 12.9 fL    Immature Granulocytes 0.3 0.0 - 0.5 %    Gran # (ANC) 4.8 1.8 - 7.7 K/uL    Immature Grans (Abs) 0.02 0.00 - 0.04 K/uL    Lymph # 1.7 1.0 - 4.8 K/uL    Mono # 0.6 0.3 - 1.0 K/uL    Eos # 0.2 0.0 - 0.5 K/uL    Baso # 0.03 0.00 - 0.20 K/uL    nRBC 0 0 /100 WBC    Gran % 65.4 38.0 - 73.0 %    Lymph % 23.7 18.0 - 48.0 %    Mono % 7.7 4.0 - 15.0 %    Eosinophil % 2.5 0.0 - 8.0 %    Basophil % 0.4 0.0 - 1.9 %    Differential Method Automated    TSH   Result Value Ref Range    TSH 1.238 0.400 - 4.000 uIU/mL      Assessment:       1. Adrenal adenoma, unspecified laterality    2. Reactive depression        Plan:       Adrenal adenoma,  unspecified laterality  -     Aldosterone/Renin Activity Ratio; Future; Expected date: 02/23/2023  -     Ambulatory referral/consult to Endocrinology; Future; Expected date: 03/02/2023    Reactive depression  -     EScitalopram oxalate (LEXAPRO) 5 MG Tab; Take 1 tablet (5 mg total) by mouth once daily.  Dispense: 30 tablet; Refill: 0    Due to her unique situation, will start Adrenal testing for hyperaldosteronism and refer to Endocrine.  Discussed a taper off the Lexapro. (Decrease to 5 mg at this time and taper down from there).      Medication List with Changes/Refills   Current Medications    ASPIRIN (ECOTRIN) 81 MG EC TABLET    Take 81 mg by mouth once daily.    METOPROLOL TARTRATE (LOPRESSOR) 25 MG TABLET    Take 1 tablet (25 mg total) by mouth once daily.    MULTIVITAMIN CAPSULE    Take 1 capsule by mouth every evening.    TIZANIDINE (ZANAFLEX) 4 MG TABLET    Take 1 tablet (4 mg total) by mouth nightly as needed (muscle spasms/ pain).    TRAZODONE (DESYREL) 100 MG TABLET    TAKE 1 TABLET(100 MG) BY MOUTH EVERY NIGHT  Strength: 100 mg   Changed and/or Refilled Medications    Modified Medication Previous Medication    ESCITALOPRAM OXALATE (LEXAPRO) 5 MG TAB EScitalopram oxalate (LEXAPRO) 10 MG tablet       Take 1 tablet (5 mg total) by mouth once daily.    TAKE 1 TABLET(10 MG) BY MOUTH EVERY DAY

## 2023-02-24 ENCOUNTER — LAB VISIT (OUTPATIENT)
Dept: LAB | Facility: HOSPITAL | Age: 68
End: 2023-02-24
Attending: FAMILY MEDICINE
Payer: MEDICARE

## 2023-02-24 DIAGNOSIS — D35.00 ADRENAL ADENOMA, UNSPECIFIED LATERALITY: Chronic | ICD-10-CM

## 2023-02-24 PROCEDURE — 36415 COLL VENOUS BLD VENIPUNCTURE: CPT | Mod: PN | Performed by: FAMILY MEDICINE

## 2023-02-24 PROCEDURE — 84244 ASSAY OF RENIN: CPT | Performed by: FAMILY MEDICINE

## 2023-02-27 ENCOUNTER — PATIENT MESSAGE (OUTPATIENT)
Dept: PAIN MEDICINE | Facility: CLINIC | Age: 68
End: 2023-02-27
Payer: MEDICARE

## 2023-02-27 ENCOUNTER — PATIENT MESSAGE (OUTPATIENT)
Dept: FAMILY MEDICINE | Facility: CLINIC | Age: 68
End: 2023-02-27
Payer: MEDICARE

## 2023-02-27 DIAGNOSIS — M47.816 LUMBAR SPONDYLOSIS: ICD-10-CM

## 2023-02-27 DIAGNOSIS — M54.16 LUMBAR RADICULOPATHY: ICD-10-CM

## 2023-02-27 RX ORDER — TIZANIDINE 4 MG/1
4 TABLET ORAL NIGHTLY PRN
Qty: 40 TABLET | Refills: 0 | Status: SHIPPED | OUTPATIENT
Start: 2023-02-27 | End: 2023-07-21 | Stop reason: SDUPTHER

## 2023-03-01 LAB
ALDOST SERPL-MCNC: 7.6 NG/DL
ALDOST/RENIN PLAS-RTO: NORMAL RATIO
RENIN PLAS-CCNC: <0.1 NG/ML/HR

## 2023-03-02 ENCOUNTER — PATIENT MESSAGE (OUTPATIENT)
Dept: FAMILY MEDICINE | Facility: CLINIC | Age: 68
End: 2023-03-02
Payer: MEDICARE

## 2023-03-09 ENCOUNTER — OFFICE VISIT (OUTPATIENT)
Dept: PAIN MEDICINE | Facility: CLINIC | Age: 68
End: 2023-03-09
Payer: MEDICARE

## 2023-03-09 ENCOUNTER — OFFICE VISIT (OUTPATIENT)
Dept: ORTHOPEDICS | Facility: CLINIC | Age: 68
End: 2023-03-09
Payer: MEDICARE

## 2023-03-09 VITALS
SYSTOLIC BLOOD PRESSURE: 150 MMHG | HEART RATE: 72 BPM | WEIGHT: 174.19 LBS | HEIGHT: 66 IN | BODY MASS INDEX: 27.97 KG/M2 | HEIGHT: 66 IN | BODY MASS INDEX: 27.99 KG/M2 | WEIGHT: 174 LBS | DIASTOLIC BLOOD PRESSURE: 88 MMHG

## 2023-03-09 DIAGNOSIS — M84.48XA SACRAL INSUFFICIENCY FRACTURE, INITIAL ENCOUNTER: Primary | ICD-10-CM

## 2023-03-09 DIAGNOSIS — M54.16 LUMBAR RADICULOPATHY: ICD-10-CM

## 2023-03-09 DIAGNOSIS — M47.816 LUMBAR SPONDYLOSIS: ICD-10-CM

## 2023-03-09 DIAGNOSIS — Z98.890 S/P ARTHROSCOPY OF LEFT SHOULDER: Primary | ICD-10-CM

## 2023-03-09 PROCEDURE — 1160F PR REVIEW ALL MEDS BY PRESCRIBER/CLIN PHARMACIST DOCUMENTED: ICD-10-PCS | Mod: CPTII,S$GLB,, | Performed by: PHYSICIAN ASSISTANT

## 2023-03-09 PROCEDURE — 99213 OFFICE O/P EST LOW 20 MIN: CPT | Mod: S$GLB,,, | Performed by: PHYSICIAN ASSISTANT

## 2023-03-09 PROCEDURE — 3077F PR MOST RECENT SYSTOLIC BLOOD PRESSURE >= 140 MM HG: ICD-10-PCS | Mod: CPTII,S$GLB,, | Performed by: PHYSICIAN ASSISTANT

## 2023-03-09 PROCEDURE — 1101F PR PT FALLS ASSESS DOC 0-1 FALLS W/OUT INJ PAST YR: ICD-10-PCS | Mod: CPTII,S$GLB,, | Performed by: ORTHOPAEDIC SURGERY

## 2023-03-09 PROCEDURE — 3288F FALL RISK ASSESSMENT DOCD: CPT | Mod: CPTII,S$GLB,, | Performed by: ORTHOPAEDIC SURGERY

## 2023-03-09 PROCEDURE — 3077F SYST BP >= 140 MM HG: CPT | Mod: CPTII,S$GLB,, | Performed by: PHYSICIAN ASSISTANT

## 2023-03-09 PROCEDURE — 99213 PR OFFICE/OUTPT VISIT, EST, LEVL III, 20-29 MIN: ICD-10-PCS | Mod: S$GLB,,, | Performed by: PHYSICIAN ASSISTANT

## 2023-03-09 PROCEDURE — 99999 PR PBB SHADOW E&M-EST. PATIENT-LVL III: ICD-10-PCS | Mod: PBBFAC,,, | Performed by: ORTHOPAEDIC SURGERY

## 2023-03-09 PROCEDURE — 99999 PR PBB SHADOW E&M-EST. PATIENT-LVL III: ICD-10-PCS | Mod: PBBFAC,,, | Performed by: PHYSICIAN ASSISTANT

## 2023-03-09 PROCEDURE — 1159F PR MEDICATION LIST DOCUMENTED IN MEDICAL RECORD: ICD-10-PCS | Mod: CPTII,S$GLB,, | Performed by: PHYSICIAN ASSISTANT

## 2023-03-09 PROCEDURE — 99213 PR OFFICE/OUTPT VISIT, EST, LEVL III, 20-29 MIN: ICD-10-PCS | Mod: S$GLB,,, | Performed by: ORTHOPAEDIC SURGERY

## 2023-03-09 PROCEDURE — 3008F PR BODY MASS INDEX (BMI) DOCUMENTED: ICD-10-PCS | Mod: CPTII,S$GLB,, | Performed by: PHYSICIAN ASSISTANT

## 2023-03-09 PROCEDURE — 99213 OFFICE O/P EST LOW 20 MIN: CPT | Mod: S$GLB,,, | Performed by: ORTHOPAEDIC SURGERY

## 2023-03-09 PROCEDURE — 1160F RVW MEDS BY RX/DR IN RCRD: CPT | Mod: CPTII,S$GLB,, | Performed by: ORTHOPAEDIC SURGERY

## 2023-03-09 PROCEDURE — 3079F DIAST BP 80-89 MM HG: CPT | Mod: CPTII,S$GLB,, | Performed by: PHYSICIAN ASSISTANT

## 2023-03-09 PROCEDURE — 1101F PT FALLS ASSESS-DOCD LE1/YR: CPT | Mod: CPTII,S$GLB,, | Performed by: ORTHOPAEDIC SURGERY

## 2023-03-09 PROCEDURE — 1125F PR PAIN SEVERITY QUANTIFIED, PAIN PRESENT: ICD-10-PCS | Mod: CPTII,S$GLB,, | Performed by: PHYSICIAN ASSISTANT

## 2023-03-09 PROCEDURE — 3288F FALL RISK ASSESSMENT DOCD: CPT | Mod: CPTII,S$GLB,, | Performed by: PHYSICIAN ASSISTANT

## 2023-03-09 PROCEDURE — 3079F PR MOST RECENT DIASTOLIC BLOOD PRESSURE 80-89 MM HG: ICD-10-PCS | Mod: CPTII,S$GLB,, | Performed by: PHYSICIAN ASSISTANT

## 2023-03-09 PROCEDURE — 1101F PR PT FALLS ASSESS DOC 0-1 FALLS W/OUT INJ PAST YR: ICD-10-PCS | Mod: CPTII,S$GLB,, | Performed by: PHYSICIAN ASSISTANT

## 2023-03-09 PROCEDURE — 3008F PR BODY MASS INDEX (BMI) DOCUMENTED: ICD-10-PCS | Mod: CPTII,S$GLB,, | Performed by: ORTHOPAEDIC SURGERY

## 2023-03-09 PROCEDURE — 1126F AMNT PAIN NOTED NONE PRSNT: CPT | Mod: CPTII,S$GLB,, | Performed by: ORTHOPAEDIC SURGERY

## 2023-03-09 PROCEDURE — 3008F BODY MASS INDEX DOCD: CPT | Mod: CPTII,S$GLB,, | Performed by: PHYSICIAN ASSISTANT

## 2023-03-09 PROCEDURE — 1160F RVW MEDS BY RX/DR IN RCRD: CPT | Mod: CPTII,S$GLB,, | Performed by: PHYSICIAN ASSISTANT

## 2023-03-09 PROCEDURE — 99999 PR PBB SHADOW E&M-EST. PATIENT-LVL III: CPT | Mod: PBBFAC,,, | Performed by: ORTHOPAEDIC SURGERY

## 2023-03-09 PROCEDURE — 1159F MED LIST DOCD IN RCRD: CPT | Mod: CPTII,S$GLB,, | Performed by: PHYSICIAN ASSISTANT

## 2023-03-09 PROCEDURE — 3288F PR FALLS RISK ASSESSMENT DOCUMENTED: ICD-10-PCS | Mod: CPTII,S$GLB,, | Performed by: ORTHOPAEDIC SURGERY

## 2023-03-09 PROCEDURE — 3008F BODY MASS INDEX DOCD: CPT | Mod: CPTII,S$GLB,, | Performed by: ORTHOPAEDIC SURGERY

## 2023-03-09 PROCEDURE — 3288F PR FALLS RISK ASSESSMENT DOCUMENTED: ICD-10-PCS | Mod: CPTII,S$GLB,, | Performed by: PHYSICIAN ASSISTANT

## 2023-03-09 PROCEDURE — 1160F PR REVIEW ALL MEDS BY PRESCRIBER/CLIN PHARMACIST DOCUMENTED: ICD-10-PCS | Mod: CPTII,S$GLB,, | Performed by: ORTHOPAEDIC SURGERY

## 2023-03-09 PROCEDURE — 99999 PR PBB SHADOW E&M-EST. PATIENT-LVL III: CPT | Mod: PBBFAC,,, | Performed by: PHYSICIAN ASSISTANT

## 2023-03-09 PROCEDURE — 1159F PR MEDICATION LIST DOCUMENTED IN MEDICAL RECORD: ICD-10-PCS | Mod: CPTII,S$GLB,, | Performed by: ORTHOPAEDIC SURGERY

## 2023-03-09 PROCEDURE — 1101F PT FALLS ASSESS-DOCD LE1/YR: CPT | Mod: CPTII,S$GLB,, | Performed by: PHYSICIAN ASSISTANT

## 2023-03-09 PROCEDURE — 1125F AMNT PAIN NOTED PAIN PRSNT: CPT | Mod: CPTII,S$GLB,, | Performed by: PHYSICIAN ASSISTANT

## 2023-03-09 PROCEDURE — 1126F PR PAIN SEVERITY QUANTIFIED, NO PAIN PRESENT: ICD-10-PCS | Mod: CPTII,S$GLB,, | Performed by: ORTHOPAEDIC SURGERY

## 2023-03-09 PROCEDURE — 1159F MED LIST DOCD IN RCRD: CPT | Mod: CPTII,S$GLB,, | Performed by: ORTHOPAEDIC SURGERY

## 2023-03-09 NOTE — PROGRESS NOTES
Five months out from arthroscopic rotator cuff repair with balloon spacer for degenerative rotator cuff disease she is doing well she is back to work reports 0 pain     Exam shows relatively well-preserved motion of the shoulder hand is functioning well     Plan: Continue with activities to tolerance, continue long-term strengthening, follow-up as needed

## 2023-03-09 NOTE — PROGRESS NOTES
[facility]        PCP:  Jonah Walker MD    CC:   Chief Complaint   Patient presents with    Follow-up     6 wk f/u for lbp.      Last 3 PDI Scores 9/4/2019 7/23/2019 6/20/2019   Pain Disability Index (PDI) 28 31 0         HPI:     Ms. Quick returns for follow up of back and left leg pain after further PT.  She initially presented with pain that started mid to early January 2023 in the left lower back and buttock to the left posterior thigh and calf.  With PT and medications, pain eased in the thigh.  Pain has lingered in the lower lumbar sacral region midline and paraspinal bilaterally.  She has pain and tingling sensation across the lower lower into the left buttock and left posterior superior buttock/ thigh .  Pain is worse as the day goes on, but overall improving.  She works a [employer].  No injury, but she did return to work after recovery from shoulder procedure around the same time of back pain onset.  She continues with PT and tizanidine.    Initial HPI:  Ynes Talavera is a 68 y.o. female with history of hypertension and osteopenia  presents with lower back and left leg pain. She has had intermittent back pain over the last 30 years.  Current pain started 2 weeks ago without injury. She has pain in the left lower back and buttock.  Today pain started to radiate into the left posterior thigh and claf.  She describes pain as throbbing and burning.  She has tried ice, otc medications, volateran 75mg.  Pain persists.        Past and current medications:  Antineuropathics:  NSAIDs:  tried volatern 75mg po , tried IM toradol  Antidepressants:  lexapro  Muscle relaxers: zanaflex  Opioids:  oxycodone in past  Antiplatelets/Anticoagulants:  Others:  medrol taper (started 1-26-23 and completed )    Physical therapy/ Chiropractic care:  Undergoign PT with benefit.    Pain Intervention History:  none    Past Spine Surgical History:  none      History:    Current Outpatient  Medications:     aspirin (ECOTRIN) 81 MG EC tablet, Take 81 mg by mouth once daily., Disp: , Rfl:     EScitalopram oxalate (LEXAPRO) 5 MG Tab, Take 1 tablet (5 mg total) by mouth once daily., Disp: 30 tablet, Rfl: 0    metoprolol tartrate (LOPRESSOR) 25 MG tablet, Take 1 tablet (25 mg total) by mouth once daily. (Patient taking differently: Take 25 mg by mouth every evening.), Disp: 90 tablet, Rfl: 3    multivitamin capsule, Take 1 capsule by mouth every evening., Disp: , Rfl:     tiZANidine (ZANAFLEX) 4 MG tablet, Take 1 tablet (4 mg total) by mouth nightly as needed (muscle spasms/ pain)., Disp: 40 tablet, Rfl: 0    traZODone (DESYREL) 100 MG tablet, TAKE 1 TABLET(100 MG) BY MOUTH EVERY NIGHT Strength: 100 mg, Disp: 90 tablet, Rfl: 3    Past Medical History:   Diagnosis Date    Adrenal adenoma     Diverticulosis of the colon     Hypertension     REJI (obstructive sleep apnea)     uses cpap    Venous insufficiency 2014    right leg, denies Hx clot       Past Surgical History:   Procedure Laterality Date    APPENDECTOMY      ARTHROSCOPIC REPAIR OF ROTATOR CUFF OF SHOULDER Right 10/4/2022    Procedure: REPAIR, ROTATOR CUFF, ARTHROSCOPIC;  Surgeon: Keven Piedra MD;  Location: [facility] OR;  Service: Orthopedics;  Laterality: Right;  R shoulder arthroscopy with rotator cuff repair + balloon spacer    BREAST BIOPSY Right     benign needle biopsy    BREAST BIOPSY Left     benign needle biopsy     SECTION, LOW TRANSVERSE      CHOLECYSTECTOMY      COLONOSCOPY N/A 9/3/2020    Procedure: COLONOSCOPY;  Surgeon: Neville Galvan MD;  Location: [facility] ENDO;  Service: Endoscopy;  Laterality: N/A;    EPIDURAL STEROID INJECTION INTO CERVICAL SPINE N/A 2019    Procedure: Injection-steroid-epidural-cervical C7/T1;  Surgeon: Ronnell Echeverria MD;  Location: [facility] OR;  Service: Pain Management;  Laterality: N/A;    ESOPHAGOGASTRODUODENOSCOPY N/A 2020    Procedure: ESOPHAGOGASTRODUODENOSCOPY (EGD);  Surgeon:  Neville Galvan MD;  Location: [facility] ENDO;  Service: Endoscopy;  Laterality: N/A;    INJECTION OF FACET JOINT Left 2019    Procedure: INJECTION, FACET JOINT, C1-C2 Medial Branch;  Surgeon: Samantha Willoughby MD;  Location: [facility] PAIN MGT;  Service: Pain Management;  Laterality: Left;    THUMB ARTHROSCOPY      both thumbs, trigger finger release.    TONSILLECTOMY      TUBAL LIGATION      UMBILICAL HERNIA REPAIR      VEIN LIGATION AND STRIPPING      Left       Family History   Problem Relation Age of Onset    Cancer Mother     Cancer Father     Heart disease Father     Hypertension Father     Diabetes Maternal Grandmother     Heart disease Maternal Grandmother     Diabetes Maternal Grandfather     Heart disease Maternal Grandfather     Breast cancer Paternal Grandmother 42       Social History     Socioeconomic History    Marital status:     Number of children: 3   Occupational History    Occupation: Dental hygienist     Comment: Working full-time     Employer:  [employer]   Tobacco Use    Smoking status: Former     Packs/day: 0.25     Years: 25.00     Pack years: 6.25     Types: Cigarettes     Quit date: 1993     Years since quittin.0    Smokeless tobacco: Never   Substance and Sexual Activity    Alcohol use: No     Alcohol/week: 0.0 standard drinks    Drug use: No    Sexual activity: Yes     Partners: Male   Social History Narrative    Her  has had a CVA. She is major support for him.        Walks regularly     Social Determinants of Health     Financial Resource Strain: Low Risk     Difficulty of Paying Living Expenses: Not hard at all   Food Insecurity: No Food Insecurity    Worried About Running Out of Food in the Last Year: Never true    Ran Out of Food in the Last Year: Never true   Transportation Needs: No Transportation Needs    Lack of Transportation (Medical): No    Lack of Transportation (Non-Medical): No   Physical Activity: Insufficiently Active    Days of  "Exercise per Week: 1 day    Minutes of Exercise per Session: 40 min   Stress: No Stress Concern Present    Feeling of Stress : Only a little   Social Connections: Unknown    Frequency of Communication with Friends and Family: More than three times a week    Frequency of Social Gatherings with Friends and Family: Three times a week    Active Member of Clubs or Organizations: Yes    Attends Club or Organization Meetings: More than 4 times per year    Marital Status:    Housing Stability: Low Risk     Unable to Pay for Housing in the Last Year: No    Number of Places Lived in the Last Year: 1    Unstable Housing in the Last Year: No       Review of patient's allergies indicates:  No Known Allergies    Review of Systems:  Low back pain.  Left leg pain.  Balance of review of systems is negative.    Physical Exam:  Vitals:    03/09/23 1326 03/09/23 1403   BP: (!) 170/91 (!) 150/88   Pulse: 72    Weight: 79 kg (174 lb 2.6 oz)    Height: 5' 6" (1.676 m)    PainSc:   4    PainLoc: Back      Body mass index is 28.11 kg/m².    Gen: NAD  Psych: mood appropriate for given condition  HEENT: eyes anicteric   CV: RRR, 2+ radial pulse  HEENT: anicteric   Respiratory: non-labored, no signs of respiratory distress  Abd: non-distended  Skin: warm, dry and intact.  Gait: Able to heel walk, toe walk. No antalgic gait.     Coordination:   Romberg: negative  Finger to nose coordination: normal  Heel to shin coordination: normal  Tandem walking coordination: normal    Cervical spine:   ROM is full in flexion, extension and lateral rotation without increased pain.  Spurling's maneuver causes no neck pain to either side.  Myofascial exam: No Tenderness to palpation across cervical paraspinous region bilaterally.    Lumbar spine:   ROM is full with flexion extension and oblique extension with no increased pain.    Cameron's test causes no increased pain on either side.    Supine straight leg raise is negative bilaterally.    Internal " and external rotation of the hip causes no increased pain on either side.  Myofascial exam: No tenderness to palpation across lumbar paraspinous muscles. No tenderness to palpation over the bilateral greater trochanters and bilateral SI joint    Sensory:  Intact and symmetrical to light touch in C4-T1 dermatomes bilaterally. Intact and symmetrical to light touch in L1-S1 dermatomes bilaterally.    Motor:    Right Left   C4 Shoulder Abduction  5  5   C5 Elbow Flexion    5  5   C6 Wrist Extension  5  5   C7 Elbow Extension   5  5   C8/T1 Hand Intrinsics   5  5        Right Left   L2/3 Iliacus Hip flexion  5  5   L3/4 Qudratus Femoris Knee Extension  5  5   L4/5 Tib Anterior Ankle Dorsiflexion   5  5   L5/S1 Extensor Hallicus Longus Great toe extension  5  5   S1/S2 Gastroc/Soleus Plantar Flexion  5  5      Right Left   Triceps DTR 2+ 2+   Biceps DTR 2+ 2+   Brachioradialis DTR 2+ 2+   Patellar DTR 2+ 2+   Achilles DTR 2+ 2+   Weiner Absent  Absent   Clonus Absent Absent   Babinski Absent Absent     Imaging:    MRI lumbar spine from 11-24-18 reviewed:  mild dextro scoliosis.  L2/3 retrolisthesis of L2 on L3 and facet hypertrophy ith some left foraminal narrowing.  L3/4 retrolisthesis of L3 on L4 with facet hypertrophy.  L4/5 and L5/S1 facet arthropathy with mild foraminal narrowing.    MRI lumbar spine 2/16/23:  L2/3 severe DDD, left facet hypertrophy with moderate left foraminal narrowing.  L3/4 right facet effusion, facet hypertrophy with moderate bilateral foraminal narrowing.  L4/5 anterolistehsis of L4 on L4, facet hypertrophy with mild bilateral foraminal narrowing and moderate central canal narrowing.  L5/s1 facet arthrtophy with mild bilateral foraminal narrowing.  Possible and more likely bilateral sacral insufficiency fractures at S3, S3 than an infltrative process.    Labs:  Lab Results   Component Value Date    HGBA1C 5.4 08/25/2020       Lab Results   Component Value Date    WBC 7.30 09/02/2022    HGB  14.6 09/02/2022    HCT 42.7 09/02/2022     (H) 09/02/2022     09/02/2022           Assessment:     Ms. Quick has chronic intermittent lower lumbar back pain with acute onset/ exacerbation of bilateral lower back and leg pain in an S1 pattern.  Overall pain improving and she is pleased with her progress  Pain from degeenrative chagnes (particularly L4/5 and L23/) and sacral linsufficincy fracture.   We discussed medications, PT, interventional proceduers.  At this time, she will continue with PT and zanaflex.  She would like to proceed with sacral MRI to confirm no infiltrative process in the sacrum.  We will call with resutls.    If sacral pain does not continfue to resolve may refer to ortho for surgical fixation evaluation.    Problem List Items Addressed This Visit       Lumbar radiculopathy     Other Visit Diagnoses       Sacral insufficiency fracture, initial encounter    -  Primary    Relevant Orders    MRI Sacrum/Coccyx (Bony) W W/O Contrast    Lumbar spondylosis                  Follow Up: RTC  as needed.     : Not viewed.          Loan Blancas PA-C  [facility]      This note was completed with dictation software and grammatical errors may exist.

## 2023-03-29 DIAGNOSIS — M84.48XA SACRAL INSUFFICIENCY FRACTURE, INITIAL ENCOUNTER: Primary | ICD-10-CM

## 2023-03-30 ENCOUNTER — HOSPITAL ENCOUNTER (OUTPATIENT)
Dept: RADIOLOGY | Facility: HOSPITAL | Age: 68
Discharge: HOME | End: 2023-03-30
Attending: PHYSICIAN ASSISTANT
Payer: MEDICARE

## 2023-03-30 DIAGNOSIS — M84.48XA SACRAL INSUFFICIENCY FRACTURE, INITIAL ENCOUNTER: ICD-10-CM

## 2023-03-30 PROCEDURE — 72197 MRI PELVIS W/O & W/DYE: CPT | Mod: TC,PO

## 2023-03-30 PROCEDURE — 25500020 PHARM REV CODE 255: Mod: PO | Performed by: PHYSICIAN ASSISTANT

## 2023-03-30 PROCEDURE — A9585 GADOBUTROL INJECTION: HCPCS | Mod: PO | Performed by: PHYSICIAN ASSISTANT

## 2023-03-30 PROCEDURE — 72197 MRI SACRUM/COCCYX (BONY) W WO CONTRAST: ICD-10-PCS | Mod: 26,,, | Performed by: RADIOLOGY

## 2023-03-30 PROCEDURE — 72197 MRI PELVIS W/O & W/DYE: CPT | Mod: 26,,, | Performed by: RADIOLOGY

## 2023-03-30 RX ORDER — GADOBUTROL 604.72 MG/ML
7 INJECTION INTRAVENOUS
Status: COMPLETED | OUTPATIENT
Start: 2023-03-30 | End: 2023-03-30

## 2023-03-30 RX ADMIN — GADOBUTROL 7 ML: 604.72 INJECTION INTRAVENOUS at 04:03

## 2023-05-14 NOTE — TELEPHONE ENCOUNTER
Spk to pt, will call Loan Blancas/back & spine in [place] to schedule for her back pain/jf 1/26/23   IMPROVE-DD Application Not Available

## 2023-07-04 DIAGNOSIS — G47.9 SLEEP DISTURBANCE: Chronic | ICD-10-CM

## 2023-07-04 DIAGNOSIS — F32.9 REACTIVE DEPRESSION: ICD-10-CM

## 2023-07-05 RX ORDER — TRAZODONE HYDROCHLORIDE 100 MG/1
TABLET ORAL
Qty: 90 TABLET | Refills: 3 | Status: SHIPPED | OUTPATIENT
Start: 2023-07-05

## 2023-07-21 ENCOUNTER — PATIENT MESSAGE (OUTPATIENT)
Dept: PAIN MEDICINE | Facility: CLINIC | Age: 68
End: 2023-07-21
Payer: MEDICARE

## 2023-07-21 DIAGNOSIS — M54.16 LUMBAR RADICULOPATHY: ICD-10-CM

## 2023-07-21 DIAGNOSIS — M47.816 LUMBAR SPONDYLOSIS: ICD-10-CM

## 2023-07-21 RX ORDER — TIZANIDINE 4 MG/1
4 TABLET ORAL NIGHTLY PRN
Qty: 40 TABLET | Refills: 0 | Status: SHIPPED | OUTPATIENT
Start: 2023-07-21 | End: 2023-11-02

## 2023-07-28 ENCOUNTER — OFFICE VISIT (OUTPATIENT)
Dept: PAIN MEDICINE | Facility: CLINIC | Age: 68
End: 2023-07-28
Payer: MEDICARE

## 2023-07-28 ENCOUNTER — PATIENT MESSAGE (OUTPATIENT)
Dept: PAIN MEDICINE | Facility: CLINIC | Age: 68
End: 2023-07-28
Payer: MEDICARE

## 2023-07-28 VITALS
HEART RATE: 76 BPM | DIASTOLIC BLOOD PRESSURE: 90 MMHG | WEIGHT: 173.94 LBS | SYSTOLIC BLOOD PRESSURE: 153 MMHG | BODY MASS INDEX: 27.95 KG/M2 | HEIGHT: 66 IN

## 2023-07-28 DIAGNOSIS — M47.816 LUMBAR SPONDYLOSIS: ICD-10-CM

## 2023-07-28 DIAGNOSIS — M54.16 LUMBAR RADICULOPATHY: Primary | ICD-10-CM

## 2023-07-28 PROCEDURE — 96372 THER/PROPH/DIAG INJ SC/IM: CPT | Mod: S$GLB,,, | Performed by: PHYSICIAN ASSISTANT

## 2023-07-28 PROCEDURE — 99999 PR PBB SHADOW E&M-EST. PATIENT-LVL IV: ICD-10-PCS | Mod: PBBFAC,,, | Performed by: PHYSICIAN ASSISTANT

## 2023-07-28 PROCEDURE — 1101F PR PT FALLS ASSESS DOC 0-1 FALLS W/OUT INJ PAST YR: ICD-10-PCS | Mod: CPTII,S$GLB,, | Performed by: PHYSICIAN ASSISTANT

## 2023-07-28 PROCEDURE — 96372 PR INJECTION,THERAP/PROPH/DIAG2ST, IM OR SUBCUT: ICD-10-PCS | Mod: S$GLB,,, | Performed by: PHYSICIAN ASSISTANT

## 2023-07-28 PROCEDURE — 3080F DIAST BP >= 90 MM HG: CPT | Mod: CPTII,S$GLB,, | Performed by: PHYSICIAN ASSISTANT

## 2023-07-28 PROCEDURE — 1125F PR PAIN SEVERITY QUANTIFIED, PAIN PRESENT: ICD-10-PCS | Mod: CPTII,S$GLB,, | Performed by: PHYSICIAN ASSISTANT

## 2023-07-28 PROCEDURE — 1159F MED LIST DOCD IN RCRD: CPT | Mod: CPTII,S$GLB,, | Performed by: PHYSICIAN ASSISTANT

## 2023-07-28 PROCEDURE — 1160F RVW MEDS BY RX/DR IN RCRD: CPT | Mod: CPTII,S$GLB,, | Performed by: PHYSICIAN ASSISTANT

## 2023-07-28 PROCEDURE — 3288F FALL RISK ASSESSMENT DOCD: CPT | Mod: CPTII,S$GLB,, | Performed by: PHYSICIAN ASSISTANT

## 2023-07-28 PROCEDURE — 99999 PR PBB SHADOW E&M-EST. PATIENT-LVL IV: CPT | Mod: PBBFAC,,, | Performed by: PHYSICIAN ASSISTANT

## 2023-07-28 PROCEDURE — 1101F PT FALLS ASSESS-DOCD LE1/YR: CPT | Mod: CPTII,S$GLB,, | Performed by: PHYSICIAN ASSISTANT

## 2023-07-28 PROCEDURE — 3080F PR MOST RECENT DIASTOLIC BLOOD PRESSURE >= 90 MM HG: ICD-10-PCS | Mod: CPTII,S$GLB,, | Performed by: PHYSICIAN ASSISTANT

## 2023-07-28 PROCEDURE — 1125F AMNT PAIN NOTED PAIN PRSNT: CPT | Mod: CPTII,S$GLB,, | Performed by: PHYSICIAN ASSISTANT

## 2023-07-28 PROCEDURE — 99213 PR OFFICE/OUTPT VISIT, EST, LEVL III, 20-29 MIN: ICD-10-PCS | Mod: 25,S$GLB,, | Performed by: PHYSICIAN ASSISTANT

## 2023-07-28 PROCEDURE — 3077F PR MOST RECENT SYSTOLIC BLOOD PRESSURE >= 140 MM HG: ICD-10-PCS | Mod: CPTII,S$GLB,, | Performed by: PHYSICIAN ASSISTANT

## 2023-07-28 PROCEDURE — 3008F PR BODY MASS INDEX (BMI) DOCUMENTED: ICD-10-PCS | Mod: CPTII,S$GLB,, | Performed by: PHYSICIAN ASSISTANT

## 2023-07-28 PROCEDURE — 3077F SYST BP >= 140 MM HG: CPT | Mod: CPTII,S$GLB,, | Performed by: PHYSICIAN ASSISTANT

## 2023-07-28 PROCEDURE — 1160F PR REVIEW ALL MEDS BY PRESCRIBER/CLIN PHARMACIST DOCUMENTED: ICD-10-PCS | Mod: CPTII,S$GLB,, | Performed by: PHYSICIAN ASSISTANT

## 2023-07-28 PROCEDURE — 99213 OFFICE O/P EST LOW 20 MIN: CPT | Mod: 25,S$GLB,, | Performed by: PHYSICIAN ASSISTANT

## 2023-07-28 PROCEDURE — 1159F PR MEDICATION LIST DOCUMENTED IN MEDICAL RECORD: ICD-10-PCS | Mod: CPTII,S$GLB,, | Performed by: PHYSICIAN ASSISTANT

## 2023-07-28 PROCEDURE — 3008F BODY MASS INDEX DOCD: CPT | Mod: CPTII,S$GLB,, | Performed by: PHYSICIAN ASSISTANT

## 2023-07-28 PROCEDURE — 3288F PR FALLS RISK ASSESSMENT DOCUMENTED: ICD-10-PCS | Mod: CPTII,S$GLB,, | Performed by: PHYSICIAN ASSISTANT

## 2023-07-28 RX ORDER — KETOROLAC TROMETHAMINE 30 MG/ML
30 INJECTION, SOLUTION INTRAMUSCULAR; INTRAVENOUS ONCE
Status: COMPLETED | OUTPATIENT
Start: 2023-07-28 | End: 2023-07-28

## 2023-07-28 RX ORDER — METHYLPREDNISOLONE 4 MG/1
TABLET ORAL
Qty: 1 EACH | Refills: 0 | Status: SHIPPED | OUTPATIENT
Start: 2023-07-29 | End: 2023-08-18

## 2023-07-28 RX ORDER — HYDROCODONE BITARTRATE AND ACETAMINOPHEN 5; 325 MG/1; MG/1
TABLET ORAL
COMMUNITY
Start: 2023-07-26 | End: 2023-08-02 | Stop reason: SDUPTHER

## 2023-07-28 RX ORDER — ESCITALOPRAM OXALATE 10 MG/1
10 TABLET ORAL
COMMUNITY
Start: 2023-06-19 | End: 2023-10-16 | Stop reason: SDUPTHER

## 2023-07-28 RX ADMIN — KETOROLAC TROMETHAMINE 30 MG: 30 INJECTION, SOLUTION INTRAMUSCULAR; INTRAVENOUS at 03:07

## 2023-07-31 ENCOUNTER — PATIENT MESSAGE (OUTPATIENT)
Dept: PAIN MEDICINE | Facility: CLINIC | Age: 68
End: 2023-07-31
Payer: MEDICARE

## 2023-07-31 DIAGNOSIS — M47.816 LUMBAR SPONDYLOSIS: ICD-10-CM

## 2023-07-31 DIAGNOSIS — M54.16 LUMBAR RADICULOPATHY: Primary | ICD-10-CM

## 2023-07-31 RX ORDER — GABAPENTIN 300 MG/1
CAPSULE ORAL
Qty: 99 CAPSULE | Refills: 0 | Status: SHIPPED | OUTPATIENT
Start: 2023-07-31 | End: 2024-02-15

## 2023-08-01 ENCOUNTER — PATIENT MESSAGE (OUTPATIENT)
Dept: PAIN MEDICINE | Facility: CLINIC | Age: 68
End: 2023-08-01
Payer: MEDICARE

## 2023-08-02 ENCOUNTER — HOSPITAL ENCOUNTER (OUTPATIENT)
Dept: RADIOLOGY | Facility: HOSPITAL | Age: 68
Discharge: HOME | End: 2023-08-02
Attending: PHYSICIAN ASSISTANT
Payer: MEDICARE

## 2023-08-02 ENCOUNTER — PATIENT MESSAGE (OUTPATIENT)
Dept: PAIN MEDICINE | Facility: CLINIC | Age: 68
End: 2023-08-02
Payer: MEDICARE

## 2023-08-02 DIAGNOSIS — M54.16 LUMBAR RADICULOPATHY: Primary | ICD-10-CM

## 2023-08-02 DIAGNOSIS — M47.816 LUMBAR SPONDYLOSIS: ICD-10-CM

## 2023-08-02 DIAGNOSIS — M54.16 LUMBAR RADICULOPATHY: ICD-10-CM

## 2023-08-02 PROCEDURE — 72148 MRI LUMBAR SPINE W/O DYE: CPT | Mod: TC

## 2023-08-02 PROCEDURE — 72148 MRI LUMBAR SPINE W/O DYE: CPT | Mod: 26,,, | Performed by: RADIOLOGY

## 2023-08-02 PROCEDURE — 72148 MRI LUMBAR SPINE WITHOUT CONTRAST: ICD-10-PCS | Mod: 26,,, | Performed by: RADIOLOGY

## 2023-08-02 RX ORDER — HYDROCODONE BITARTRATE AND ACETAMINOPHEN 5; 325 MG/1; MG/1
1 TABLET ORAL EVERY 8 HOURS PRN
Qty: 21 TABLET | Refills: 0 | Status: SHIPPED | OUTPATIENT
Start: 2023-08-02 | End: 2023-08-07

## 2023-08-02 NOTE — TELEPHONE ENCOUNTER
Called spoke with patient.    She is already at the [facility] for MRI.  It is 12:50 and MRi for 1:30.  Initially offered to call in Valium to [facility] the [place].    However she just took norco and ativan.  I do not recommend additional valium at this time.  She understoond.  
Strong peripheral pulses

## 2023-08-02 NOTE — TELEPHONE ENCOUNTER
Please call patient to schedule an appt with Dr. Echeverria.  She had MRI 8/2/23.  Follow up in clinic ASAP to discuss results.  Per Dr. Echeverria (see below) it is ok to double book his scheduule to work her in.    Thanks.   Loan Blancas PA-C  [facility] [place]

## 2023-08-02 NOTE — TELEPHONE ENCOUNTER
Dr. Echeverria -     Mr. Quick is experiencing significant pain, lumbar radiculopathy.  She is getting an updated MRI today.    Per her request, would you consider refilling hydrocodone she originally received from the ER?    If so, we can arrange for her to follow up in clinic with her MRI results to formally estabilish care with you.    Homero -   Loan Blancas PA-C  [facility]

## 2023-08-02 NOTE — TELEPHONE ENCOUNTER
Yes,  reviewed, hydrocodone sent.      Yes, please have her follow up with me.  Ok to double book on my schedule once her MRI is done.

## 2023-08-02 NOTE — TELEPHONE ENCOUNTER
Left voicemail for pt in regards to schedule appointment with Dr. Echeverria. Ok to double book per Dr. Echeverria.

## 2023-08-03 ENCOUNTER — TELEPHONE (OUTPATIENT)
Dept: PAIN MEDICINE | Facility: CLINIC | Age: 68
End: 2023-08-03
Payer: MEDICARE

## 2023-08-03 ENCOUNTER — PATIENT MESSAGE (OUTPATIENT)
Dept: PAIN MEDICINE | Facility: CLINIC | Age: 68
End: 2023-08-03
Payer: MEDICARE

## 2023-08-03 NOTE — TELEPHONE ENCOUNTER
Please see previous messages -     She is to follow up in clinic with Dr. Echeverria to review MRI and discuss any further treatments.  He prescribed pain medication yesterday for her.    Please schedule an appt with Dr. Echeverria. Per Dr. Echeverria - It is ok to double book him to fit her in very soon

## 2023-08-04 ENCOUNTER — PATIENT MESSAGE (OUTPATIENT)
Dept: PAIN MEDICINE | Facility: CLINIC | Age: 68
End: 2023-08-04
Payer: MEDICARE

## 2023-08-07 ENCOUNTER — TELEPHONE (OUTPATIENT)
Dept: PAIN MEDICINE | Facility: CLINIC | Age: 68
End: 2023-08-07
Payer: MEDICARE

## 2023-08-07 ENCOUNTER — TELEPHONE (OUTPATIENT)
Dept: FAMILY MEDICINE | Facility: CLINIC | Age: 68
End: 2023-08-07
Payer: MEDICARE

## 2023-08-07 ENCOUNTER — OFFICE VISIT (OUTPATIENT)
Dept: PAIN MEDICINE | Facility: CLINIC | Age: 68
End: 2023-08-07
Payer: MEDICARE

## 2023-08-07 VITALS
BODY MASS INDEX: 28.08 KG/M2 | DIASTOLIC BLOOD PRESSURE: 91 MMHG | HEIGHT: 66 IN | HEART RATE: 84 BPM | SYSTOLIC BLOOD PRESSURE: 187 MMHG

## 2023-08-07 DIAGNOSIS — M84.48XA SACRAL INSUFFICIENCY FRACTURE, INITIAL ENCOUNTER: Primary | ICD-10-CM

## 2023-08-07 PROCEDURE — 99214 PR OFFICE/OUTPT VISIT, EST, LEVL IV, 30-39 MIN: ICD-10-PCS | Mod: S$GLB,,, | Performed by: ANESTHESIOLOGY

## 2023-08-07 PROCEDURE — 1101F PT FALLS ASSESS-DOCD LE1/YR: CPT | Mod: CPTII,S$GLB,, | Performed by: ANESTHESIOLOGY

## 2023-08-07 PROCEDURE — 99999 PR PBB SHADOW E&M-EST. PATIENT-LVL III: ICD-10-PCS | Mod: PBBFAC,,, | Performed by: ANESTHESIOLOGY

## 2023-08-07 PROCEDURE — 99999 PR PBB SHADOW E&M-EST. PATIENT-LVL III: CPT | Mod: PBBFAC,,, | Performed by: ANESTHESIOLOGY

## 2023-08-07 PROCEDURE — 1159F PR MEDICATION LIST DOCUMENTED IN MEDICAL RECORD: ICD-10-PCS | Mod: CPTII,S$GLB,, | Performed by: ANESTHESIOLOGY

## 2023-08-07 PROCEDURE — 3008F BODY MASS INDEX DOCD: CPT | Mod: CPTII,S$GLB,, | Performed by: ANESTHESIOLOGY

## 2023-08-07 PROCEDURE — 3008F PR BODY MASS INDEX (BMI) DOCUMENTED: ICD-10-PCS | Mod: CPTII,S$GLB,, | Performed by: ANESTHESIOLOGY

## 2023-08-07 PROCEDURE — 1101F PR PT FALLS ASSESS DOC 0-1 FALLS W/OUT INJ PAST YR: ICD-10-PCS | Mod: CPTII,S$GLB,, | Performed by: ANESTHESIOLOGY

## 2023-08-07 PROCEDURE — 1160F RVW MEDS BY RX/DR IN RCRD: CPT | Mod: CPTII,S$GLB,, | Performed by: ANESTHESIOLOGY

## 2023-08-07 PROCEDURE — 1125F PR PAIN SEVERITY QUANTIFIED, PAIN PRESENT: ICD-10-PCS | Mod: CPTII,S$GLB,, | Performed by: ANESTHESIOLOGY

## 2023-08-07 PROCEDURE — 3080F PR MOST RECENT DIASTOLIC BLOOD PRESSURE >= 90 MM HG: ICD-10-PCS | Mod: CPTII,S$GLB,, | Performed by: ANESTHESIOLOGY

## 2023-08-07 PROCEDURE — 3077F PR MOST RECENT SYSTOLIC BLOOD PRESSURE >= 140 MM HG: ICD-10-PCS | Mod: CPTII,S$GLB,, | Performed by: ANESTHESIOLOGY

## 2023-08-07 PROCEDURE — 1160F PR REVIEW ALL MEDS BY PRESCRIBER/CLIN PHARMACIST DOCUMENTED: ICD-10-PCS | Mod: CPTII,S$GLB,, | Performed by: ANESTHESIOLOGY

## 2023-08-07 PROCEDURE — 3080F DIAST BP >= 90 MM HG: CPT | Mod: CPTII,S$GLB,, | Performed by: ANESTHESIOLOGY

## 2023-08-07 PROCEDURE — 3288F PR FALLS RISK ASSESSMENT DOCUMENTED: ICD-10-PCS | Mod: CPTII,S$GLB,, | Performed by: ANESTHESIOLOGY

## 2023-08-07 PROCEDURE — 99214 OFFICE O/P EST MOD 30 MIN: CPT | Mod: S$GLB,,, | Performed by: ANESTHESIOLOGY

## 2023-08-07 PROCEDURE — 3077F SYST BP >= 140 MM HG: CPT | Mod: CPTII,S$GLB,, | Performed by: ANESTHESIOLOGY

## 2023-08-07 PROCEDURE — 3288F FALL RISK ASSESSMENT DOCD: CPT | Mod: CPTII,S$GLB,, | Performed by: ANESTHESIOLOGY

## 2023-08-07 PROCEDURE — 1159F MED LIST DOCD IN RCRD: CPT | Mod: CPTII,S$GLB,, | Performed by: ANESTHESIOLOGY

## 2023-08-07 PROCEDURE — 1125F AMNT PAIN NOTED PAIN PRSNT: CPT | Mod: CPTII,S$GLB,, | Performed by: ANESTHESIOLOGY

## 2023-08-07 RX ORDER — HYDROCODONE BITARTRATE AND ACETAMINOPHEN 7.5; 325 MG/1; MG/1
1 TABLET ORAL EVERY 8 HOURS PRN
Qty: 21 TABLET | Refills: 0 | Status: SHIPPED | OUTPATIENT
Start: 2023-08-07 | End: 2023-08-11 | Stop reason: SDUPTHER

## 2023-08-07 NOTE — TELEPHONE ENCOUNTER
I spoke with patient and her Daughter is looking for a closer provider for now just so she can be checked out to ensure its nothing real bad

## 2023-08-07 NOTE — PROGRESS NOTES
[facility]        PCP:  Jonah Walker MD    CC:   Chief Complaint   Patient presents with    Back Pain    Low-back Pain    Leg Pain    Foot Pain      Last 3 PDI Scores 9/4/2019 7/23/2019 6/20/2019   Pain Disability Index (PDI) 28 31 0         Interval HPI 8/7/23: Ms. Talavera returns to the office for follow up.  He is with her son and daughter today.  She is lying down supine on the exam table underneath the sheet.  The family reports that she is been having some worsening lower back pain but about 2 weeks ago at the end of July her pain significantly worsened.  She gets radiating pain down into the anterior right thigh.  Her pain is 10/10.  So severe that she can not do any activities or get out of bed.  Only relief comes when she is lying flat in bed.    HPI:   Ms. Quick returns for follow up today.  Last seen in March 2023 for lower back and left leg pain in the setting of lumbar spondylosis and sacral insufficiency fracture.  She was treated with PT and tizandine.  Pain significnatly improved.  She was doing well.  About 5 weeks ago she did yardwork and 1.5 weeks later she developed lower back pain.  Pain progressed as she sat/ layed down in uncomfortable hospital chairs while her  had surgery and while she cared for him after.  She has pain in the right lower back with radiation into the right anterior lateral thigh and shin.  It is associated with numbness in the right shin area.  Pain was so severe she was seen in [place] ER 7/26/23; treated with IM steroid and prescribed norco.  She is taking norco and tizanidine.    Last HPI March 2023:  Ms. Quick returns for follow up of back and left leg pain after further PT.  She initially presented with pain that started mid to early January 2023 in the left lower back and buttock to the left posterior thigh and calf.  With PT and medications, pain eased in the thigh.  Pain has lingered in the lower lumbar sacral region midline and  paraspinal bilaterally.  She has pain and tingling sensation across the lower lower into the left buttock and left posterior superior buttock/ thigh .  Pain is worse as the day goes on, but overall improving.  She works a gental hygeniest.  No injury, but she did return to work after recovery from shoulder procedure around the same time of back pain onset.  She continues with PT and tizanidine.      Past and current medications:  Antineuropathics:  NSAIDs:    Antidepressants:  lexapro  Muscle relaxers: zanaflex  Opioids: norco  Antiplatelets/Anticoagulants:  Others:  medrol taper (started 1-26-23 and completed )    Physical therapy/ Chiropractic care:  PT in early 2023 with benefit for back pain    Pain Intervention History:  none    Past Spine Surgical History:  none      History:    Current Outpatient Medications:     aspirin (ECOTRIN) 81 MG EC tablet, Take 81 mg by mouth once daily., Disp: , Rfl:     EScitalopram oxalate (LEXAPRO) 10 MG tablet, Take 10 mg by mouth., Disp: , Rfl:     gabapentin (NEURONTIN) 300 MG capsule, Take 1 capsule (300 mg total) by mouth every evening for 3 days, THEN 1 capsule (300 mg total) 2 (two) times daily for 3 days, THEN 1 capsule (300 mg total) 3 (three) times daily., Disp: 99 capsule, Rfl: 0    methylPREDNISolone (MEDROL, JESSE,) 4 mg tablet, use as directed, Disp: 1 each, Rfl: 0    metoprolol tartrate (LOPRESSOR) 25 MG tablet, Take 1 tablet (25 mg total) by mouth once daily. (Patient taking differently: Take 25 mg by mouth every evening.), Disp: 90 tablet, Rfl: 3    multivitamin capsule, Take 1 capsule by mouth every evening., Disp: , Rfl:     tiZANidine (ZANAFLEX) 4 MG tablet, Take 1 tablet (4 mg total) by mouth nightly as needed (muscle spasms/ pain)., Disp: 40 tablet, Rfl: 0    traZODone (DESYREL) 100 MG tablet, TAKE 1 TABLET BY MOUTH EVERY NIGHT, Disp: 90 tablet, Rfl: 3    HYDROcodone-acetaminophen (NORCO) 7.5-325 mg per tablet, Take 1 tablet by mouth every 8 (eight) hours as  needed for Pain., Disp: 21 tablet, Rfl: 0    Past Medical History:   Diagnosis Date    Adrenal adenoma     Diverticulosis of the colon     Hypertension     REJI (obstructive sleep apnea)     uses cpap    Venous insufficiency 2014    right leg, denies Hx clot       Past Surgical History:   Procedure Laterality Date    APPENDECTOMY      ARTHROSCOPIC REPAIR OF ROTATOR CUFF OF SHOULDER Right 10/4/2022    Procedure: REPAIR, ROTATOR CUFF, ARTHROSCOPIC;  Surgeon: Keven Piedra MD;  Location: [facility] OR;  Service: Orthopedics;  Laterality: Right;  R shoulder arthroscopy with rotator cuff repair + balloon spacer    BREAST BIOPSY Right     benign needle biopsy    BREAST BIOPSY Left     benign needle biopsy     SECTION, LOW TRANSVERSE      CHOLECYSTECTOMY      COLONOSCOPY N/A 9/3/2020    Procedure: COLONOSCOPY;  Surgeon: Neville Galvan MD;  Location: [facility] ENDO;  Service: Endoscopy;  Laterality: N/A;    EPIDURAL STEROID INJECTION INTO CERVICAL SPINE N/A 2019    Procedure: Injection-steroid-epidural-cervical C7/T1;  Surgeon: Ronnell Echeverria MD;  Location: [facility] OR;  Service: Pain Management;  Laterality: N/A;    ESOPHAGOGASTRODUODENOSCOPY N/A 2020    Procedure: ESOPHAGOGASTRODUODENOSCOPY (EGD);  Surgeon: Neville Galvan MD;  Location: [facility] ENDO;  Service: Endoscopy;  Laterality: N/A;    INJECTION OF FACET JOINT Left 2019    Procedure: INJECTION, FACET JOINT, C1-C2 Medial Branch;  Surgeon: Samantha Willoughby MD;  Location: [facility] PAIN MGT;  Service: Pain Management;  Laterality: Left;    THUMB ARTHROSCOPY      both thumbs, trigger finger release.    TONSILLECTOMY      TUBAL LIGATION      UMBILICAL HERNIA REPAIR      VEIN LIGATION AND STRIPPING      Left       Family History   Problem Relation Age of Onset    Cancer Mother     Cancer Father     Heart disease Father     Hypertension Father     Diabetes Maternal Grandmother     Heart disease Maternal Grandmother     Diabetes Maternal  Grandfather     Heart disease Maternal Grandfather     Breast cancer Paternal Grandmother 42       Social History     Socioeconomic History    Marital status:     Number of children: 3   Occupational History    Occupation: Dental hygienist     Comment: Working full-time     Employer:  [employer]   Tobacco Use    Smoking status: Former     Current packs/day: 0.00     Average packs/day: 0.3 packs/day for 25.0 years (6.3 ttl pk-yrs)     Types: Cigarettes     Start date: 1968     Quit date: 1993     Years since quittin.4    Smokeless tobacco: Never   Substance and Sexual Activity    Alcohol use: No     Alcohol/week: 0.0 standard drinks of alcohol    Drug use: No    Sexual activity: Yes     Partners: Male   Social History Narrative    Her  has had a CVA. She is major support for him.        Walks regularly     Social Determinants of Health     Financial Resource Strain: Low Risk  (2023)    Overall Financial Resource Strain (CARDIA)     Difficulty of Paying Living Expenses: Not hard at all   Food Insecurity: No Food Insecurity (2023)    Hunger Vital Sign     Worried About Running Out of Food in the Last Year: Never true     Ran Out of Food in the Last Year: Never true   Transportation Needs: No Transportation Needs (2023)    PRAPARE - Transportation     Lack of Transportation (Medical): No     Lack of Transportation (Non-Medical): No   Physical Activity: Insufficiently Active (2023)    Exercise Vital Sign     Days of Exercise per Week: 1 day     Minutes of Exercise per Session: 40 min   Stress: No Stress Concern Present (2023)    [employer] [employer] - Occupational Stress Questionnaire     Feeling of Stress : Only a little   Social Connections: Unknown (2023)    Social Connection and Isolation Panel [NHANES]     Frequency of Communication with Friends and Family: More than three times a week     Frequency of Social Gatherings  "with Friends and Family: Three times a week     Active Member of Clubs or Organizations: Yes     Attends Club or Organization Meetings: More than 4 times per year     Marital Status:    Housing Stability: Low Risk  (2/20/2023)    Housing Stability Vital Sign     Unable to Pay for Housing in the Last Year: No     Number of Places Lived in the Last Year: 1     Unstable Housing in the Last Year: No       Review of patient's allergies indicates:  No Known Allergies    Review of Systems:  Low back pain.  right leg pain.  Balance of review of systems is negative.    Physical Exam:  Vitals:    08/07/23 1307   BP: (!) 187/91   Pulse: 84   Height: 5' 6" (1.676 m)   PainSc: 10-Worst pain ever   PainLoc: Back     Body mass index is 28.08 kg/m².    Gen:  Patient is lying supine in the bed.  Her pain seems out of proportion to her imaging  Psych: mood appropriate for given condition  HEENT: eyes anicteric   HEENT: anicteric   Respiratory: non-labored, no signs of respiratory distress  Abd: non-distended  Skin: warm, dry and intact.      Sensory:  Intact and symmetrical to light touch in L1-S1 dermatomes bilaterally, except decreased in a right anterior thigh    Motor:     Right Left   L2/3 Iliacus Hip flexion  5  5   L3/4 Qudratus Femoris Knee Extension  5  5   L4/5 Tib Anterior Ankle Dorsiflexion   5  5   L5/S1 Extensor Hallicus Longus Great toe extension  5  5   S1/S2 Gastroc/Soleus Plantar Flexion  5  5     Deferred reflexes, patient can not sit up    Imaging:  MRI lumbar spine 2/16/23  FINDINGS:  Alignment: Retrolisthesis of L2 on L3, 3 mm, unchanged.  Mild grade 1 anterolisthesis of L4 on L5 and L5 on S1.  Mild dextroconvex curvature of the lumbar spine.     Vertebral column: Abnormal heterogeneous marrow edema partially visualized within the left greater than right sacral ala at S1-S3 with involvement of the S2 and S3 vertebral bodies, concerning for acute insufficiency fractures.  Findings are best seen on sagittal " STIR weighted sequence and partially visualized on axial sequences.  Infiltrative process considered less likely.  Lumbar vertebral body heights are maintained.   Severe disc space narrowing with mixed degenerative endplate change at L2-3.     Cord: Normal.  Conus terminates at L1-2.     Degenerative findings:     T12-L1: Minimal diffuse disc bulge.  Mild bilateral facet arthropathy and ligamentum flavum thickening.  No neural foraminal or spinal canal stenosis.  L1-L2: Minimal diffuse disc bulge.  Mild bilateral facet arthropathy and ligamentum flavum thickening.  There is no neural foraminal stenosis.  There is no spinal canal stenosis.  L2-L3: Retrolisthesis.  Severe left asymmetric disc space narrowing.  Left asymmetric diffuse disc bulge with osteophytic ridging.  Moderate bilateral facet arthropathy.  Ligamentum flavum thickening.  Moderate left and mild right neural foraminal stenosis.  Mild spinal canal and lateral recess stenosis.  L3-L4: Right asymmetric diffuse disc bulge with osteophytic ridging and superimposed small central disc protrusion.  Moderate bilateral facet arthropathy and ligamentum flavum thickening.  Moderate right and mild-to-moderate left neural foraminal stenosis.    Mild spinal canal stenosis.  L4-L5: Anterolisthesis with uncovering the disc.  Diffuse disc bulge with superimposed small central disc protrusion.  Severe bilateral facet arthropathy.  Mild bilateral neural foraminal stenosis.  Mild-to-moderate spinal canal and lateral recess stenosis.  L5-S1: Anterolisthesis with uncovering the disc.  Diffuse disc bulge.  Severe bilateral facet arthropathy.  Mild bilateral neural foraminal stenosis.  There is no spinal canal stenosis.     Paraspinal muscles & soft tissues: Atrophy of the dorsal paraspinal musculature.  Bilateral probable peripelvic cysts again noted.    MRI sacrum/ coccyx 3-30-23:    FINDINGS:  There are imaging findings which are most suggestive of a sacral insufficiency  fracture involving both sacral ala and the S3 body (for example series 3 and 4 image 11-12).  There is also a possible nondisplaced fracture of the right posterior iliac bone along the posteroinferior margin of the right sacroiliac joint on series 3, image 11 and series 6, image 17.  There is a possible nondisplaced obliquely oriented fracture of the left iliac bone on series 3 and 4, image 9 and series 6, image 17.  Robust enhancement surrounding these presumed fractures is likely related to granulation tissue/healing response.  No sacroiliac joint effusion.  The fracture does not appear to involve any of the sacral neuro foramina.     There is advanced degenerative change of the lower lumbar spine in the form of disc space narrowing, degenerative disc desiccation, facet arthropathy, and marginal osteophyte formation.  There is a grade I retrolisthesis of L3 on L4.  There is a grade I anterolisthesis of L4 on L5 and L5 on S1.  There are sacral Tarlov cysts present S2.  There is diverticulosis coli involving the sigmoid colon.  There are possible parapelvic cysts observed within the left kidney, best appreciated on the  images.  The ovaries are not definitively identified on either the transabdominal or transvaginal portion of the examination.    MRI lumbar spine 8/2/23  FINDINGS:  Vertebral body heights maintained.  Stable grade 1 retrolisthesis of L2 on L3.  Stable mild grade 1 anterolisthesis L4 on L5 and L5 on S1.  Mild edema signal noted involving the L4 inferior endplate.  Marrow signal otherwise unremarkable with essentially complete resolution of previously noted sacral edema.     Conus terminates normally.  Visualized intra-abdominal/pelvic structures demonstrate no acute abnormality with bilateral peripelvic renal cysts.  Dorsal paraspinal musculature atrophy again noted.     L1-L2: No spinal canal stenosis or neural foraminal narrowing.  Facet arthropathy.  L2-L3: Retrolisthesis.  Stable severe disc  height loss asymmetric to the left.  Similar asymmetric left circumferential disc bulge with osteophytic ridging.  Facet arthropathy.  Similar mild spinal canal stenosis.  Similar moderate left and mild right neural foraminal narrowing.  L3-L4: Mild circumferential disc bulging present with small central annular fissure suspected.  Facet arthropathy.  Stable mild spinal canal stenosis.  Bilateral mild neural foraminal narrowing.  L4-L5: Similar anterolisthesis, disc uncovering and circumferential disc bulging.  Facet/ligamentum flavum degenerative hypertrophy.  Degree of spinal canal stenosis unchanged.  Similar minimal to mild bilateral neural foraminal narrowing.  L5-S1: Anterolisthesis with disc uncovering and diffuse disc bulge.  Facet arthropathy.  No significant spinal canal stenosis or neural foraminal narrowing.      Labs:  Lab Results   Component Value Date    HGBA1C 5.4 08/25/2020       Lab Results   Component Value Date    WBC 7.30 09/02/2022    HGB 14.6 09/02/2022    HCT 42.7 09/02/2022     (H) 09/02/2022     09/02/2022         Problem List Items Addressed This Visit    None  Visit Diagnoses       Sacral insufficiency fracture, initial encounter    -  Primary    Relevant Medications    HYDROcodone-acetaminophen (NORCO) 7.5-325 mg per tablet    Other Relevant Orders    MRI Sacrum/Coccyx ([place]) W/O Contrast            Assessment:   Ms. Quick has known lumbar spondylosis.  Actue onset new right lower back and right leg radicular pain with numbness and weakness in an L4 pattern.  Recommend MRI lumbar spine to further assess for new neural compression.  Encouraged walking and good posture at home.  IM Toradol today.  Start medrol taper tomorrow.  May continue with norco and tizanidine sparingly as needed.  Follow up after MRI.    8/7/23 - Ms. Talavera returns to the office for follow up.  He is with her son and daughter today.  She is lying down supine on the exam table underneath the sheet.   The family reports that she is been having some worsening lower back pain but about 2 weeks ago at the end of July her pain significantly worsened.  She gets radiating pain down into the anterior right thigh.  Her pain is 10/10.  So severe that she can not do any activities or get out of bed.  Only relief comes when she is lying flat in bed.    - She is lying down on my exam.  I have tried to assist her to sit up however she can not due to severe pain that she feels lower back  - she does not have any weakness on my exam lying flat.  She feels some decreased sensation over the right anterior thigh  - I independently reviewed her prior lumbar MRI from February 2023 and also her updated lumbar MRI.  The only interval change that I can appreciate is some edema in the inferior portion of the L4 vertebral body.  She does not have any height loss at this level to suggest compression fracture.  Radiology noted likely degenerative changes.  She denies any fevers.  There are no new acute or significant central disc bulges, central or foraminal narrowing  - she has a history of bilateral sacral insufficiency fractures.  Her pain is out of proportion based on her lumbar spine imaging  - I have ordered a updated sacral MRI to evaluate if she is had any worsening of previously noted sacral fractures.  She continues to use NSAIDs, muscle relaxants, gabapentin with some mild relief.  She was also using hydrocodone with some mild relief.  I have refilled her hydrocodone to use 2 to 3 times a day as needed for severe breakthrough pain.  I also discussed that if she continues to have severe and debilitating pain without relief then I would recommend to go to the ER if she needs inpatient care  - we will call her with the results of her imaging once complete    : Not viewed.

## 2023-08-07 NOTE — TELEPHONE ENCOUNTER
----- Message from Chayito Jeffers sent at 8/7/2023  8:17 AM CDT -----  Who Called:  daughter , Nelly Cortez       Does the patient already have the specialty appointment scheduled?:  no       If yes, what is the date of that appointment?:        Referral to What Specialty:  Ortho/Back & Spine       Reason for Referral:  Pt is having trouble walking. L3-5       Does the patient want the referral with a specific physician?:  First available       [facility] the [facility]  As long as pt ins is accepted they do not mind       Patient Requesting a Call Back?:  Yes       Best Call Back Number:  [phone]      Additional Information:

## 2023-08-10 ENCOUNTER — PATIENT MESSAGE (OUTPATIENT)
Dept: PAIN MEDICINE | Facility: CLINIC | Age: 68
End: 2023-08-10
Payer: MEDICARE

## 2023-08-10 ENCOUNTER — TELEPHONE (OUTPATIENT)
Dept: PAIN MEDICINE | Facility: CLINIC | Age: 68
End: 2023-08-10
Payer: MEDICARE

## 2023-08-10 DIAGNOSIS — M84.48XA SACRAL INSUFFICIENCY FRACTURE, INITIAL ENCOUNTER: ICD-10-CM

## 2023-08-11 DIAGNOSIS — M84.48XA SACRAL INSUFFICIENCY FRACTURE, INITIAL ENCOUNTER: ICD-10-CM

## 2023-08-11 RX ORDER — HYDROCODONE BITARTRATE AND ACETAMINOPHEN 7.5; 325 MG/1; MG/1
1 TABLET ORAL EVERY 8 HOURS PRN
Qty: 21 TABLET | Refills: 0 | Status: CANCELLED | OUTPATIENT
Start: 2023-08-11

## 2023-08-11 RX ORDER — HYDROCODONE BITARTRATE AND ACETAMINOPHEN 7.5; 325 MG/1; MG/1
1 TABLET ORAL EVERY 8 HOURS PRN
Qty: 21 TABLET | Refills: 0 | Status: SHIPPED | OUTPATIENT
Start: 2023-08-11 | End: 2023-11-02

## 2023-08-11 NOTE — TELEPHONE ENCOUNTER
----- Message from Jacek Patricia sent at 8/11/2023 11:33 AM CDT -----  Contact: Daughter  Type:  RX Refill Request    Who Called:  Nelly/Daughter  Refill or New Rx:  Refill  RX Name and Strength:  HYDROcodone-acetaminophen (NORCO) 7.5-325 mg per tablet    How is the patient currently taking it? (ex. 1XDay):  as directed  Is this a 30 day or 90 day RX:  as directed     Preferred Pharmacy with phone number:    [id number] - [facility] [address] [facility] [place] [address]  [address]  [facility] [address]  Phone: [phone] Fax: [phone]    Local or Mail Order:  Local  Ordering Provider:  Juwan Squires Call Back Number:  [phone]    Additional Information:  States pt need a refill and will be in area today would like to  due to living far away.Please call back

## 2023-08-11 NOTE — TELEPHONE ENCOUNTER
----- Message from Dory Montemayor MA sent at 8/10/2023 11:40 AM CDT -----  Contact: Nelly  [phone]    ----- Message -----  From: Yudi Schuler  Sent: 8/10/2023  11:04 AM CDT  To: Juwan Reynaga Staff    Type: Needs Medical Advice  Who Called:  Pts daughter Nelly     Pharmacy name and phone #:    [facility] - [place], [place] - [address]  [address]  [facility] [address]  Phone: [phone] Fax: [phone]      Best Call Back Number: [phone]  Additional Information: [facility] is out of stock on pts tHYDROcodone-acetaminophen (NORCO) 7.5-325 mg per table   Nelly requesting to have rx sent to [facility]. Pls call back and advise

## 2023-08-14 ENCOUNTER — TELEPHONE (OUTPATIENT)
Dept: PAIN MEDICINE | Facility: CLINIC | Age: 68
End: 2023-08-14
Payer: MEDICARE

## 2023-08-14 RX ORDER — HYDROCODONE BITARTRATE AND ACETAMINOPHEN 7.5; 325 MG/1; MG/1
1 TABLET ORAL EVERY 8 HOURS PRN
Qty: 21 TABLET | Refills: 0 | OUTPATIENT
Start: 2023-08-14

## 2023-08-16 ENCOUNTER — HOSPITAL ENCOUNTER (OUTPATIENT)
Dept: RADIOLOGY | Facility: HOSPITAL | Age: 68
Discharge: HOME | End: 2023-08-16
Attending: ANESTHESIOLOGY
Payer: MEDICARE

## 2023-08-16 DIAGNOSIS — M84.48XA SACRAL INSUFFICIENCY FRACTURE, INITIAL ENCOUNTER: ICD-10-CM

## 2023-08-16 PROCEDURE — 72195 MRI PELVIS W/O DYE: CPT | Mod: TC,PO

## 2023-08-16 PROCEDURE — 72195 MRI PELVIS W/O DYE: CPT | Mod: 26,,, | Performed by: RADIOLOGY

## 2023-08-16 PROCEDURE — 72195 MRI SACRUM/COCCYX (BONY) W/O CONTRAST: ICD-10-PCS | Mod: 26,,, | Performed by: RADIOLOGY

## 2023-08-17 ENCOUNTER — TELEPHONE (OUTPATIENT)
Dept: PAIN MEDICINE | Facility: CLINIC | Age: 68
End: 2023-08-17
Payer: MEDICARE

## 2023-08-17 DIAGNOSIS — M80.08XA AGE-RELATED OSTEOPOROSIS WITH CURRENT PATHOLOGICAL FRACTURE OF VERTEBRA, INITIAL ENCOUNTER: Primary | ICD-10-CM

## 2023-08-17 NOTE — TELEPHONE ENCOUNTER
I have placed an order for an LSO brace for this patient.      Can you please have her come in to get fitted for it

## 2023-08-17 NOTE — TELEPHONE ENCOUNTER
I spoke to the patient regarding her MRI results.  It appears that her previous sacral fractures are healing.  Although I felt like her pain is out of proportion given her previous lumbar imaging she does have some edema in the L4 vertebral body that may be the source of her pain.  I am going to get her set up with an LSO brace and have her follow up in 3-4 weeks.  She can follow up sooner if needed

## 2023-08-21 ENCOUNTER — PATIENT MESSAGE (OUTPATIENT)
Dept: PAIN MEDICINE | Facility: CLINIC | Age: 68
End: 2023-08-21
Payer: MEDICARE

## 2023-08-23 ENCOUNTER — PATIENT MESSAGE (OUTPATIENT)
Dept: PAIN MEDICINE | Facility: CLINIC | Age: 68
End: 2023-08-23
Payer: MEDICARE

## 2023-08-23 NOTE — TELEPHONE ENCOUNTER
Yes, she can get fitted for this anywhere that knows how to do it.  It's an LSO brace.      I also asked in my message, can we please get her a 3-4 week follow up in the office.

## 2023-09-11 DIAGNOSIS — I10 HYPERTENSION, UNSPECIFIED TYPE: ICD-10-CM

## 2023-09-11 RX ORDER — ESCITALOPRAM OXALATE 10 MG/1
10 TABLET ORAL
Qty: 90 TABLET | Refills: 1 | OUTPATIENT
Start: 2023-09-11

## 2023-09-11 RX ORDER — METOPROLOL TARTRATE 25 MG/1
25 TABLET, FILM COATED ORAL
Qty: 90 TABLET | Refills: 1 | Status: SHIPPED | OUTPATIENT
Start: 2023-09-11 | End: 2023-12-14

## 2023-09-11 NOTE — TELEPHONE ENCOUNTER
Refill Routing Note   Medication(s) are not appropriate for processing by [facility] for the following reason(s):      Required vitals abnormal  No active prescription written by provider    [facility] action(s):  Defer Care Due:  None identified     Medication Therapy Plan: ESCITALOPRAM 10MG TABLETS was entered as an historical medication; lco in august patient was still on ESCITALOPRAM 10MG TABLETS      Appointments  past 12m or future 3m with PCP    Date Provider   Last Visit   2/23/2023 Jonah Walker MD   Next Visit   Visit date not found Jonah Walker MD   ED visits in past 90 days: 0        Note composed:2:45 PM 09/11/2023

## 2023-09-19 ENCOUNTER — PATIENT MESSAGE (OUTPATIENT)
Dept: FAMILY MEDICINE | Facility: CLINIC | Age: 68
End: 2023-09-19
Payer: MEDICARE

## 2023-09-19 NOTE — TELEPHONE ENCOUNTER
[facility] stated she picked up 87 day supply on 7/3/23 so she cant refill until 10/1/23  Advised pt she received an 87 day supply and they cant refill it for 87 days. She stated she has taken 1.5 tablets at times and is going to run out of meds early. Advised she needs to take prescription as prescribed and the pharmacy cannot refill early. Verbalized understanding with no further questions at this time

## 2023-09-21 ENCOUNTER — PATIENT MESSAGE (OUTPATIENT)
Dept: ADMINISTRATIVE | Facility: HOSPITAL | Age: 68
End: 2023-09-21
Payer: MEDICARE

## 2023-10-08 ENCOUNTER — OFFICE VISIT (OUTPATIENT)
Dept: URGENT CARE | Facility: CLINIC | Age: 68
End: 2023-10-08
Payer: MEDICARE

## 2023-10-08 VITALS
DIASTOLIC BLOOD PRESSURE: 78 MMHG | BODY MASS INDEX: 28 KG/M2 | HEIGHT: 64 IN | OXYGEN SATURATION: 98 % | WEIGHT: 164 LBS | HEART RATE: 75 BPM | RESPIRATION RATE: 18 BRPM | TEMPERATURE: 99 F | SYSTOLIC BLOOD PRESSURE: 149 MMHG

## 2023-10-08 DIAGNOSIS — J01.90 ACUTE RHINOSINUSITIS: ICD-10-CM

## 2023-10-08 DIAGNOSIS — J02.9 SORE THROAT: Primary | ICD-10-CM

## 2023-10-08 LAB
CTP QC/QA: YES
CTP QC/QA: YES
MOLECULAR STREP A: NEGATIVE
SARS-COV-2 AG RESP QL IA.RAPID: NEGATIVE

## 2023-10-08 PROCEDURE — 87651 STREP A DNA AMP PROBE: CPT | Mod: QW,S$GLB,, | Performed by: INTERNAL MEDICINE

## 2023-10-08 PROCEDURE — 87811 SARS-COV-2 COVID19 W/OPTIC: CPT | Mod: QW,S$GLB,, | Performed by: INTERNAL MEDICINE

## 2023-10-08 PROCEDURE — 99213 OFFICE O/P EST LOW 20 MIN: CPT | Mod: S$GLB,,, | Performed by: INTERNAL MEDICINE

## 2023-10-08 PROCEDURE — 87651 POCT STREP A MOLECULAR: ICD-10-PCS | Mod: QW,S$GLB,, | Performed by: INTERNAL MEDICINE

## 2023-10-08 PROCEDURE — 99213 PR OFFICE/OUTPT VISIT, EST, LEVL III, 20-29 MIN: ICD-10-PCS | Mod: S$GLB,,, | Performed by: INTERNAL MEDICINE

## 2023-10-08 PROCEDURE — 87811 SARS CORONAVIRUS 2 ANTIGEN POCT, MANUAL READ: ICD-10-PCS | Mod: QW,S$GLB,, | Performed by: INTERNAL MEDICINE

## 2023-10-08 RX ORDER — PROMETHAZINE HYDROCHLORIDE AND DEXTROMETHORPHAN HYDROBROMIDE 6.25; 15 MG/5ML; MG/5ML
5 SYRUP ORAL 3 TIMES DAILY
Qty: 105 ML | Refills: 0 | Status: SHIPPED | OUTPATIENT
Start: 2023-10-08 | End: 2023-10-15

## 2023-10-08 NOTE — PROGRESS NOTES
"Subjective:      Patient ID: Ynes Talavera is a 68 y.o. female.    Vitals:  height is 5' 4" (1.626 m) and weight is 74.4 kg (164 lb). Her temporal temperature is 99 °F (37.2 °C). Her blood pressure is 149/78 (abnormal) and her pulse is 75. Her respiration is 18 and oxygen saturation is 98%.     Chief Complaint: Sore Throat    Patient reports sore throat, nasal/sinus congestion, body aches, and sinus pressure for about two days. Patient states she is having painful swallowing. She also reports R ear pain. Patient 4/10 pain. Patient has taken OTC sinus medication with mild relief.     Sinus Problem  This is a new problem. The current episode started yesterday. The problem is unchanged. The maximum temperature recorded prior to her arrival was 100.4 - 100.9 F. The fever has been present for Less than 1 day. Her pain is at a severity of 4/10. The pain is moderate. Associated symptoms include congestion, ear pain, headaches, sinus pressure and a sore throat. Pertinent negatives include no coughing. Past treatments include saline nose sprays and oral decongestants. The treatment provided mild relief.       HENT:  Positive for ear pain, congestion, sinus pressure and sore throat.    Respiratory:  Negative for cough.    Neurological:  Positive for headaches.      Objective:     Physical Exam   Constitutional: She is oriented to person, place, and time. She appears well-developed. She is cooperative.  Non-toxic appearance. She does not appear ill. No distress.   HENT:   Head: Normocephalic and atraumatic.   Ears:   Right Ear: Hearing, tympanic membrane, external ear and ear canal normal.   Left Ear: Hearing, tympanic membrane, external ear and ear canal normal.   Nose: Rhinorrhea and congestion present. No mucosal edema or nasal deformity. No epistaxis. Right sinus exhibits no maxillary sinus tenderness and no frontal sinus tenderness. Left sinus exhibits no maxillary sinus tenderness and no frontal sinus tenderness. "   Mouth/Throat: Uvula is midline, oropharynx is clear and moist and mucous membranes are normal. No trismus in the jaw. Normal dentition. No uvula swelling. No oropharyngeal exudate, posterior oropharyngeal edema or posterior oropharyngeal erythema.   Eyes: Conjunctivae and lids are normal. No scleral icterus.   Neck: Trachea normal and phonation normal. Neck supple. No edema present. No erythema present. No neck rigidity present.   Cardiovascular: Normal rate, regular rhythm, normal heart sounds and normal pulses.   Pulmonary/Chest: Effort normal and breath sounds normal. No respiratory distress. She has no decreased breath sounds. She has no rhonchi.   Abdominal: Normal appearance.   Musculoskeletal: Normal range of motion.         General: No deformity. Normal range of motion.   Neurological: She is alert and oriented to person, place, and time. She exhibits normal muscle tone. Coordination normal.   Skin: Skin is warm, dry, intact, not diaphoretic and not pale.   Psychiatric: Her speech is normal and behavior is normal. Judgment and thought content normal.   Nursing note and vitals reviewed.      Assessment:     1. Sore throat    2. Acute rhinosinusitis        Plan:       Sore throat  -     POCT Strep A, Molecular    Acute rhinosinusitis      Patient Instructions   If your condition worsens we recommend that you receive another evaluation at the emergency room immediately or contact your primary medical clinics after hours call service to discuss your concerns. You must understand that you've received an Urgent Care treatment only and that you may be released before all of your medical problems are known or treated. You, the patient, will arrange for follow up care as instructed.  Drink plenty of Fluids  Wash hands frequently using mild antibacterial soap lathering for at least 15 seconds then rinse  Get plenty of Rest  Salt water gargles  Follow up in 1-2 weeks with Primary Care physician if not significantly  better.   If you are not allergic please Tylenol every 4-6 hours as needed and/or Ibuprofen every 6-8 hours as needed, over the counter for pain or fever.  Take OTC Cough/Congestion medicine as needed. Talk to your pharmacist about the best option for you.

## 2023-10-15 ENCOUNTER — PATIENT MESSAGE (OUTPATIENT)
Dept: FAMILY MEDICINE | Facility: CLINIC | Age: 68
End: 2023-10-15
Payer: MEDICARE

## 2023-10-15 DIAGNOSIS — F32.9 REACTIVE DEPRESSION: Primary | ICD-10-CM

## 2023-10-17 RX ORDER — ESCITALOPRAM OXALATE 10 MG/1
10 TABLET ORAL DAILY
Qty: 90 TABLET | Refills: 0 | Status: SHIPPED | OUTPATIENT
Start: 2023-10-17 | End: 2023-11-02 | Stop reason: SDUPTHER

## 2023-10-17 NOTE — TELEPHONE ENCOUNTER
No care due was identified.  [facility] Embedded Care Due Messages. Reference number: [id number].   10/16/2023 9:40:58 PM CDT

## 2023-11-02 ENCOUNTER — OFFICE VISIT (OUTPATIENT)
Dept: FAMILY MEDICINE | Facility: CLINIC | Age: 68
End: 2023-11-02
Payer: MEDICARE

## 2023-11-02 VITALS
BODY MASS INDEX: 28 KG/M2 | OXYGEN SATURATION: 96 % | HEART RATE: 74 BPM | WEIGHT: 164 LBS | HEIGHT: 64 IN | DIASTOLIC BLOOD PRESSURE: 80 MMHG | SYSTOLIC BLOOD PRESSURE: 134 MMHG

## 2023-11-02 DIAGNOSIS — F32.9 REACTIVE DEPRESSION: ICD-10-CM

## 2023-11-02 DIAGNOSIS — R73.09 OTHER ABNORMAL GLUCOSE: ICD-10-CM

## 2023-11-02 DIAGNOSIS — I10 HYPERTENSION, UNSPECIFIED TYPE: Primary | ICD-10-CM

## 2023-11-02 PROCEDURE — 99999 PR PBB SHADOW E&M-EST. PATIENT-LVL III: CPT | Mod: PBBFAC,,, | Performed by: NURSE PRACTITIONER

## 2023-11-02 PROCEDURE — 99214 PR OFFICE/OUTPT VISIT, EST, LEVL IV, 30-39 MIN: ICD-10-PCS | Mod: S$GLB,,, | Performed by: NURSE PRACTITIONER

## 2023-11-02 PROCEDURE — 99999 PR PBB SHADOW E&M-EST. PATIENT-LVL III: ICD-10-PCS | Mod: PBBFAC,,, | Performed by: NURSE PRACTITIONER

## 2023-11-02 PROCEDURE — 99214 OFFICE O/P EST MOD 30 MIN: CPT | Mod: S$GLB,,, | Performed by: NURSE PRACTITIONER

## 2023-11-02 PROCEDURE — 3008F PR BODY MASS INDEX (BMI) DOCUMENTED: ICD-10-PCS | Mod: CPTII,S$GLB,, | Performed by: NURSE PRACTITIONER

## 2023-11-02 PROCEDURE — 1126F PR PAIN SEVERITY QUANTIFIED, NO PAIN PRESENT: ICD-10-PCS | Mod: CPTII,S$GLB,, | Performed by: NURSE PRACTITIONER

## 2023-11-02 PROCEDURE — 3079F DIAST BP 80-89 MM HG: CPT | Mod: CPTII,S$GLB,, | Performed by: NURSE PRACTITIONER

## 2023-11-02 PROCEDURE — 3008F BODY MASS INDEX DOCD: CPT | Mod: CPTII,S$GLB,, | Performed by: NURSE PRACTITIONER

## 2023-11-02 PROCEDURE — 3079F PR MOST RECENT DIASTOLIC BLOOD PRESSURE 80-89 MM HG: ICD-10-PCS | Mod: CPTII,S$GLB,, | Performed by: NURSE PRACTITIONER

## 2023-11-02 PROCEDURE — 1159F MED LIST DOCD IN RCRD: CPT | Mod: CPTII,S$GLB,, | Performed by: NURSE PRACTITIONER

## 2023-11-02 PROCEDURE — 3075F SYST BP GE 130 - 139MM HG: CPT | Mod: CPTII,S$GLB,, | Performed by: NURSE PRACTITIONER

## 2023-11-02 PROCEDURE — 3075F PR MOST RECENT SYSTOLIC BLOOD PRESS GE 130-139MM HG: ICD-10-PCS | Mod: CPTII,S$GLB,, | Performed by: NURSE PRACTITIONER

## 2023-11-02 PROCEDURE — 1159F PR MEDICATION LIST DOCUMENTED IN MEDICAL RECORD: ICD-10-PCS | Mod: CPTII,S$GLB,, | Performed by: NURSE PRACTITIONER

## 2023-11-02 PROCEDURE — 1126F AMNT PAIN NOTED NONE PRSNT: CPT | Mod: CPTII,S$GLB,, | Performed by: NURSE PRACTITIONER

## 2023-11-02 RX ORDER — ESCITALOPRAM OXALATE 10 MG/1
10 TABLET ORAL DAILY
Qty: 90 TABLET | Refills: 3 | Status: SHIPPED | OUTPATIENT
Start: 2023-11-02

## 2023-11-02 NOTE — PROGRESS NOTES
Subjective:       Patient ID: Ynes Talavera is a 68 y.o. female.    Chief Complaint: Follow-up (To discuss meds lexapro )    HPI  Patient presents for routine follow up of chronic conditions  Without complaints    Rotator cuff repair last year, lumbar spondylosis and sacral insufficiency fractures this year. Feeling good, improving     HTN: controlled on metoprolol   LACY/Insomnia:  She is on Lexapro 10 mg with Trazodone for sleep. Feeling good on these meds    Remains very active. Working as dental hygienist, works in garden    Discussed vaccinations--declines today     Vitals:    11/02/23 1052   BP: 134/80   Pulse: 74     Review of Systems   Constitutional:  Negative for activity change and unexpected weight change.   HENT:  Negative for hearing loss, rhinorrhea and trouble swallowing.    Eyes:  Negative for discharge and visual disturbance.   Respiratory:  Negative for chest tightness and wheezing.    Cardiovascular:  Negative for chest pain and palpitations.   Gastrointestinal:  Negative for blood in stool, constipation, diarrhea and vomiting.   Endocrine: Negative for polydipsia and polyuria.   Genitourinary:  Negative for difficulty urinating, dysuria, hematuria and menstrual problem.   Musculoskeletal:  Negative for arthralgias, joint swelling and neck pain.   Neurological:  Negative for weakness and headaches.   Psychiatric/Behavioral:  Negative for confusion and dysphoric mood.        Past Medical History:   Diagnosis Date    Adrenal adenoma     Diverticulosis of the colon     Hypertension     REJI (obstructive sleep apnea)     uses cpap    Venous insufficiency Sept 2014    right leg, denies Hx clot     Objective:      Physical Exam  Constitutional:       General: She is not in acute distress.     Appearance: She is well-developed. She is not ill-appearing, toxic-appearing or diaphoretic.   HENT:      Right Ear: Hearing normal.      Left Ear: Hearing normal.   Pulmonary:      Effort: No tachypnea or  respiratory distress.   Skin:     Coloration: Skin is not pale.   Neurological:      Mental Status: She is alert and oriented to person, place, and time.   Psychiatric:         Speech: Speech normal.         Behavior: Behavior normal.         Thought Content: Thought content normal.         Judgment: Judgment normal.         Assessment:       1. Hypertension, unspecified type    2. Other abnormal glucose    3. Reactive depression        Plan:       Hypertension, unspecified type  -     CBC Auto Differential; Future; Expected date: 11/02/2023  -     Comprehensive Metabolic Panel; Future; Expected date: 11/02/2023  -     Lipid Panel; Future; Expected date: 11/02/2023  -     TSH; Future; Expected date: 11/02/2023  -     Hemoglobin A1C; Future; Expected date: 11/02/2023    Other abnormal glucose  -     Hemoglobin A1C; Future; Expected date: 11/02/2023    Reactive depression  -     EScitalopram oxalate (LEXAPRO) 10 MG tablet; Take 1 tablet (10 mg total) by mouth once daily.  Dispense: 90 tablet; Refill: 3            Follow up in about 6 months (around 5/2/2024).    Medication List with Changes/Refills   Current Medications    ASPIRIN (ECOTRIN) 81 MG EC TABLET    Take 81 mg by mouth once daily.    GABAPENTIN (NEURONTIN) 300 MG CAPSULE    Take 1 capsule (300 mg total) by mouth every evening for 3 days, THEN 1 capsule (300 mg total) 2 (two) times daily for 3 days, THEN 1 capsule (300 mg total) 3 (three) times daily.    METOPROLOL TARTRATE (LOPRESSOR) 25 MG TABLET    TAKE 1 TABLET(25 MG) BY MOUTH EVERY DAY    MULTIVITAMIN CAPSULE    Take 1 capsule by mouth every evening.    TRAZODONE (DESYREL) 100 MG TABLET    TAKE 1 TABLET BY MOUTH EVERY NIGHT   Changed and/or Refilled Medications    Modified Medication Previous Medication    ESCITALOPRAM OXALATE (LEXAPRO) 10 MG TABLET EScitalopram oxalate (LEXAPRO) 10 MG tablet       Take 1 tablet (10 mg total) by mouth once daily.    Take 1 tablet (10 mg total) by mouth once daily.    Discontinued Medications    HYDROCODONE-ACETAMINOPHEN (NORCO) 7.5-325 MG PER TABLET    Take 1 tablet by mouth every 8 (eight) hours as needed for Pain.    TIZANIDINE (ZANAFLEX) 4 MG TABLET    Take 1 tablet (4 mg total) by mouth nightly as needed (muscle spasms/ pain).

## 2023-12-14 DIAGNOSIS — I10 HYPERTENSION, UNSPECIFIED TYPE: ICD-10-CM

## 2023-12-14 RX ORDER — METOPROLOL TARTRATE 25 MG/1
25 TABLET, FILM COATED ORAL
Qty: 90 TABLET | Refills: 0 | Status: SHIPPED | OUTPATIENT
Start: 2023-12-14 | End: 2024-02-15

## 2023-12-14 NOTE — TELEPHONE ENCOUNTER
No care due was identified.  [facility] Embedded Care Due Messages. Reference number: [id number].   12/14/2023 8:10:04 AM CST

## 2023-12-14 NOTE — TELEPHONE ENCOUNTER
Refill Decision Note   Ynes Talavera  is requesting a refill authorization.  Brief Assessment and Rationale for Refill:  Approve     Medication Therapy Plan:         Comments:     Note composed:11:43 AM 12/14/2023             Appointments     Last Visit   2/23/2023 Jonah Walker MD   Next Visit   5/2/2024 Jonah Walker MD

## 2023-12-20 DIAGNOSIS — M25.512 ACUTE PAIN OF LEFT SHOULDER: ICD-10-CM

## 2023-12-20 DIAGNOSIS — Z98.890 S/P ARTHROSCOPY OF LEFT SHOULDER: Primary | ICD-10-CM

## 2023-12-21 ENCOUNTER — HOSPITAL ENCOUNTER (OUTPATIENT)
Dept: RADIOLOGY | Facility: HOSPITAL | Age: 68
Discharge: HOME | End: 2023-12-21
Attending: ORTHOPAEDIC SURGERY
Payer: MEDICARE

## 2023-12-21 ENCOUNTER — OFFICE VISIT (OUTPATIENT)
Dept: ORTHOPEDICS | Facility: CLINIC | Age: 68
End: 2023-12-21
Payer: MEDICARE

## 2023-12-21 DIAGNOSIS — M25.512 LEFT SHOULDER PAIN, UNSPECIFIED CHRONICITY: Primary | ICD-10-CM

## 2023-12-21 DIAGNOSIS — M25.512 ACUTE PAIN OF LEFT SHOULDER: ICD-10-CM

## 2023-12-21 PROCEDURE — 20610 DRAIN/INJ JOINT/BURSA W/O US: CPT | Mod: LT,S$GLB,, | Performed by: ORTHOPAEDIC SURGERY

## 2023-12-21 PROCEDURE — 99999 PR PBB SHADOW E&M-EST. PATIENT-LVL II: CPT | Mod: PBBFAC,,, | Performed by: ORTHOPAEDIC SURGERY

## 2023-12-21 PROCEDURE — 99214 PR OFFICE/OUTPT VISIT, EST, LEVL IV, 30-39 MIN: ICD-10-PCS | Mod: 25,S$GLB,, | Performed by: ORTHOPAEDIC SURGERY

## 2023-12-21 PROCEDURE — 73030 X-RAY EXAM OF SHOULDER: CPT | Mod: TC,PO,LT

## 2023-12-21 PROCEDURE — 20610 LARGE JOINT ASPIRATION/INJECTION: L SUBACROMIAL BURSA: ICD-10-PCS | Mod: LT,S$GLB,, | Performed by: ORTHOPAEDIC SURGERY

## 2023-12-21 PROCEDURE — 1159F PR MEDICATION LIST DOCUMENTED IN MEDICAL RECORD: ICD-10-PCS | Mod: CPTII,S$GLB,, | Performed by: ORTHOPAEDIC SURGERY

## 2023-12-21 PROCEDURE — 1159F MED LIST DOCD IN RCRD: CPT | Mod: CPTII,S$GLB,, | Performed by: ORTHOPAEDIC SURGERY

## 2023-12-21 PROCEDURE — 1125F PR PAIN SEVERITY QUANTIFIED, PAIN PRESENT: ICD-10-PCS | Mod: CPTII,S$GLB,, | Performed by: ORTHOPAEDIC SURGERY

## 2023-12-21 PROCEDURE — 73030 X-RAY EXAM OF SHOULDER: CPT | Mod: 26,LT,, | Performed by: RADIOLOGY

## 2023-12-21 PROCEDURE — 1101F PT FALLS ASSESS-DOCD LE1/YR: CPT | Mod: CPTII,S$GLB,, | Performed by: ORTHOPAEDIC SURGERY

## 2023-12-21 PROCEDURE — 1125F AMNT PAIN NOTED PAIN PRSNT: CPT | Mod: CPTII,S$GLB,, | Performed by: ORTHOPAEDIC SURGERY

## 2023-12-21 PROCEDURE — 73030 XR SHOULDER TRAUMA 3 VIEW LEFT: ICD-10-PCS | Mod: 26,LT,, | Performed by: RADIOLOGY

## 2023-12-21 PROCEDURE — 99214 OFFICE O/P EST MOD 30 MIN: CPT | Mod: 25,S$GLB,, | Performed by: ORTHOPAEDIC SURGERY

## 2023-12-21 PROCEDURE — 1101F PR PT FALLS ASSESS DOC 0-1 FALLS W/OUT INJ PAST YR: ICD-10-PCS | Mod: CPTII,S$GLB,, | Performed by: ORTHOPAEDIC SURGERY

## 2023-12-21 PROCEDURE — 3288F PR FALLS RISK ASSESSMENT DOCUMENTED: ICD-10-PCS | Mod: CPTII,S$GLB,, | Performed by: ORTHOPAEDIC SURGERY

## 2023-12-21 PROCEDURE — 99999 PR PBB SHADOW E&M-EST. PATIENT-LVL II: ICD-10-PCS | Mod: PBBFAC,,, | Performed by: ORTHOPAEDIC SURGERY

## 2023-12-21 PROCEDURE — 3288F FALL RISK ASSESSMENT DOCD: CPT | Mod: CPTII,S$GLB,, | Performed by: ORTHOPAEDIC SURGERY

## 2023-12-21 RX ORDER — TRIAMCINOLONE ACETONIDE 40 MG/ML
80 INJECTION, SUSPENSION INTRA-ARTICULAR; INTRAMUSCULAR
Status: DISCONTINUED | OUTPATIENT
Start: 2023-12-21 | End: 2023-12-21 | Stop reason: HOSPADM

## 2023-12-21 RX ADMIN — TRIAMCINOLONE ACETONIDE 80 MG: 40 INJECTION, SUSPENSION INTRA-ARTICULAR; INTRAMUSCULAR at 02:12

## 2023-12-21 NOTE — PROCEDURES
Large Joint Aspiration/Injection: L subacromial bursa    Date/Time: 12/21/2023 2:30 PM    Performed by: Keven Piedra MD  Authorized by: Keven Piedra MD    Consent Done?:  Yes (Verbal)  Site marked: the procedure site was marked    Prep: patient was prepped and draped in usual sterile fashion      Details:  Needle Size:  21 G  Approach:  Posterior  Location:  Shoulder  Site:  L subacromial bursa  Medications:  80 mg triamcinolone acetonide 40 mg/mL  Patient tolerance:  Patient tolerated the procedure well with no immediate complications

## 2023-12-21 NOTE — PROGRESS NOTES
68 years old left shoulder pain for about 8 months with reaching behind her back or out to the side 7 on good days 10 on bad days.  Had responded favorably to surgery on the right shoulder     Exam shows positive [place] impingement sign, cuff strength appears to be intact no signs infection instability skin intact compartments soft     X-rays show AC arthrosis     Assessment:  Left shoulder AC arthrosis degenerative rotator cuff disease     Plan:  Kenalog injection left shoulder subacromial space, home exercise program, follow-up as needed

## 2024-01-08 PROBLEM — J01.90 ACUTE RHINOSINUSITIS: Status: RESOLVED | Noted: 2023-10-08 | Resolved: 2024-01-08

## 2024-01-11 DIAGNOSIS — Z00.00 ENCOUNTER FOR MEDICARE ANNUAL WELLNESS EXAM: ICD-10-CM

## 2024-01-26 PROBLEM — R06.00 DYSPNEA: Status: ACTIVE | Noted: 2024-01-26

## 2024-02-15 ENCOUNTER — OFFICE VISIT (OUTPATIENT)
Dept: FAMILY MEDICINE | Facility: CLINIC | Age: 69
End: 2024-02-15
Payer: MEDICARE

## 2024-02-15 VITALS
OXYGEN SATURATION: 95 % | BODY MASS INDEX: 27.6 KG/M2 | WEIGHT: 171.75 LBS | TEMPERATURE: 97 F | HEIGHT: 66 IN | SYSTOLIC BLOOD PRESSURE: 160 MMHG | DIASTOLIC BLOOD PRESSURE: 94 MMHG | HEART RATE: 64 BPM

## 2024-02-15 DIAGNOSIS — R73.09 OTHER ABNORMAL GLUCOSE: ICD-10-CM

## 2024-02-15 DIAGNOSIS — I10 HYPERTENSION, UNSPECIFIED TYPE: ICD-10-CM

## 2024-02-15 DIAGNOSIS — R06.02 SHORTNESS OF BREATH: ICD-10-CM

## 2024-02-15 DIAGNOSIS — R00.1 BRADYCARDIA: ICD-10-CM

## 2024-02-15 DIAGNOSIS — Z00.00 ANNUAL PHYSICAL EXAM: Primary | ICD-10-CM

## 2024-02-15 DIAGNOSIS — R55 PRE-SYNCOPE: ICD-10-CM

## 2024-02-15 PROBLEM — U07.1 COVID-19: Status: RESOLVED | Noted: 2022-01-26 | Resolved: 2024-02-15

## 2024-02-15 PROCEDURE — 99214 OFFICE O/P EST MOD 30 MIN: CPT | Mod: S$GLB,,, | Performed by: FAMILY MEDICINE

## 2024-02-15 PROCEDURE — 99999 PR PBB SHADOW E&M-EST. PATIENT-LVL V: CPT | Mod: PBBFAC,,, | Performed by: FAMILY MEDICINE

## 2024-02-15 RX ORDER — METOPROLOL SUCCINATE 25 MG/1
1 TABLET, EXTENDED RELEASE ORAL DAILY
COMMUNITY
End: 2024-04-18 | Stop reason: SDUPTHER

## 2024-02-15 NOTE — PROGRESS NOTES
"Subjective:       Patient ID: Ynes Talavera is a 68 y.o. female.    Chief Complaint: Shortness of Breath (Weakness, shortness of breath /Random feelings of "not herself" /EKG normal with cardiologist and normal stress test)    Patient here today for annual exam. She is also here today c/o shortness of breath.  She saw her Cardiologist and had a normal ECHO and stress test.  She was then sent for a PFT which was normal.  Also having episodes of lightheadedness where she gets warm, hearing decreases.  Happened at home on Sunday, BP was low, HR was low.    Immunizations: Due RSV, Shingrix, Flu and COVID booster  Last Lab work: Aug 2023  Colon Ca screening: Colonoscopy 2020  Breast Ca Screening: MMG 2024  Cervical Ca Screening: no longer recommended    Shortness of Breath  This is a chronic problem. The current episode started more than 1 month ago. The problem occurs daily. The problem has been gradually worsening. The average episode lasts 5 minutes. Pertinent negatives include no abdominal pain, chest pain, claudication, coryza, ear pain, fever, headaches, hemoptysis, leg pain, leg swelling, neck pain, orthopnea, PND, rash, rhinorrhea, sore throat, sputum production, swollen glands, syncope, vomiting or wheezing. Nothing aggravates the symptoms. The patient has no known risk factors for DVT/PE. She has tried nothing for the symptoms. The treatment provided no relief. There is no history of allergies, aspirin allergies, asthma, bronchiolitis, CAD, chronic lung disease, COPD, DVT, a heart failure, PE, pneumonia or a recent surgery.     Review of Systems   Constitutional:  Negative for fever.   HENT:  Negative for ear pain, rhinorrhea and sore throat.    Respiratory:  Positive for shortness of breath. Negative for hemoptysis, sputum production and wheezing.    Cardiovascular:  Negative for chest pain, orthopnea, claudication, leg swelling, syncope and PND.   Gastrointestinal:  Negative for abdominal pain and " "vomiting.   Musculoskeletal:  Negative for neck pain.   Skin:  Negative for rash.   Neurological:  Negative for headaches.       Objective:      Vitals:    02/15/24 1608 02/15/24 1612   BP: (!) 174/96 (!) 160/94   Pulse: 64    Temp: 97.4 °F (36.3 °C)    TempSrc: Oral    SpO2: 95%    Weight: 77.9 kg (171 lb 11.8 oz)    Height: 5' 6" (1.676 m)       Physical Exam  Constitutional:       General: She is not in acute distress.  Cardiovascular:      Rate and Rhythm: Normal rate and regular rhythm.      Heart sounds: Normal heart sounds. No murmur heard.  Pulmonary:      Effort: Pulmonary effort is normal. No respiratory distress.      Breath sounds: Normal breath sounds. No wheezing, rhonchi or rales.   Musculoskeletal:         General: No swelling.   Skin:     General: Skin is warm and dry.   Neurological:      General: No focal deficit present.      Mental Status: She is alert.   Psychiatric:         Mood and Affect: Mood normal.         Behavior: Behavior normal.         Thought Content: Thought content normal.         Results for orders placed or performed in visit on 10/08/23   POCT Strep A, Molecular   Result Value Ref Range    Molecular Strep A, POC Negative Negative     Acceptable Yes    SARS Coronavirus 2 Antigen, POCT Manual Read   Result Value Ref Range    SARS Coronavirus 2 Antigen Negative Negative     Acceptable Yes       Assessment:       1. Annual physical exam    2. Pre-syncope    3. Shortness of breath    4. Hypertension, unspecified type    5. Other abnormal glucose    6. Bradycardia        Plan:       Annual physical exam  Continue to work on dietary improvements (decrease overall calorie intake, decrease sugar and carb intake, decrease animal protein intake)  Continue to exercise at least 30-40 minutes, 3 times per week  Immunizations were discussed and Shingrix, RSV from pharamcy  Preventative exams were discussed and up to date   Pre-syncope  -     Holter monitor - 48 " hour; Future  Check holter for possible symptomatic bradycardia  Shortness of breath  -     CBC Auto Differential; Future; Expected date: 02/15/2024  -     Comprehensive Metabolic Panel; Future; Expected date: 02/15/2024  -     X-Ray Chest PA And Lateral; Future; Expected date: 02/15/2024  Check X-ray and Lab work  Hypertension, unspecified type  -     CBC Auto Differential; Future; Expected date: 02/15/2024  -     Comprehensive Metabolic Panel; Future; Expected date: 02/15/2024  -     Lipid Panel; Future; Expected date: 02/15/2024  -     TSH; Future; Expected date: 02/15/2024    Other abnormal glucose  -     Hemoglobin A1C; Future; Expected date: 02/15/2024    Bradycardia  -     Holter monitor - 48 hour; Future          Medication List with Changes/Refills   Current Medications    ASPIRIN (ECOTRIN) 81 MG EC TABLET    Take 81 mg by mouth once daily.    ESCITALOPRAM OXALATE (LEXAPRO) 10 MG TABLET    Take 1 tablet (10 mg total) by mouth once daily.    METOPROLOL SUCCINATE (TOPROL-XL) 25 MG 24 HR TABLET    Take 1 tablet by mouth once daily.    MULTIVITAMIN CAPSULE    Take 1 capsule by mouth every evening.    TRAZODONE (DESYREL) 100 MG TABLET    TAKE 1 TABLET BY MOUTH EVERY NIGHT   Discontinued Medications    GABAPENTIN (NEURONTIN) 300 MG CAPSULE    Take 1 capsule (300 mg total) by mouth every evening for 3 days, THEN 1 capsule (300 mg total) 2 (two) times daily for 3 days, THEN 1 capsule (300 mg total) 3 (three) times daily.    METOPROLOL TARTRATE (LOPRESSOR) 25 MG TABLET    TAKE 1 TABLET(25 MG) BY MOUTH EVERY DAY

## 2024-02-16 ENCOUNTER — HOSPITAL ENCOUNTER (OUTPATIENT)
Dept: RADIOLOGY | Facility: HOSPITAL | Age: 69
Discharge: HOME | End: 2024-02-16
Attending: FAMILY MEDICINE
Payer: MEDICARE

## 2024-02-16 ENCOUNTER — PATIENT MESSAGE (OUTPATIENT)
Dept: FAMILY MEDICINE | Facility: CLINIC | Age: 69
End: 2024-02-16
Payer: MEDICARE

## 2024-02-16 DIAGNOSIS — R06.02 SHORTNESS OF BREATH: ICD-10-CM

## 2024-02-16 PROCEDURE — 71046 X-RAY EXAM CHEST 2 VIEWS: CPT | Mod: TC,FY,PO

## 2024-02-16 PROCEDURE — 71046 X-RAY EXAM CHEST 2 VIEWS: CPT | Mod: 26,,, | Performed by: RADIOLOGY

## 2024-03-01 ENCOUNTER — HOSPITAL ENCOUNTER (OUTPATIENT)
Dept: CARDIOLOGY | Facility: HOSPITAL | Age: 69
Discharge: HOME | End: 2024-03-01
Attending: FAMILY MEDICINE
Payer: MEDICARE

## 2024-03-01 DIAGNOSIS — R00.1 BRADYCARDIA: ICD-10-CM

## 2024-03-01 DIAGNOSIS — R55 PRE-SYNCOPE: ICD-10-CM

## 2024-03-01 PROCEDURE — 93227 XTRNL ECG REC<48 HR R&I: CPT | Mod: ,,, | Performed by: INTERNAL MEDICINE

## 2024-03-01 PROCEDURE — 93226 XTRNL ECG REC<48 HR SCAN A/R: CPT | Mod: PO

## 2024-03-05 ENCOUNTER — PATIENT MESSAGE (OUTPATIENT)
Dept: FAMILY MEDICINE | Facility: CLINIC | Age: 69
End: 2024-03-05
Payer: MEDICARE

## 2024-03-05 LAB
OHS CV EVENT MONITOR DAY: 0
OHS CV HOLTER LENGTH DECIMAL HOURS: 48
OHS CV HOLTER LENGTH HOURS: 48
OHS CV HOLTER LENGTH MINUTES: 0
OHS CV HOLTER SINUS AVERAGE HR: 73
OHS CV HOLTER SINUS MAX HR: 90
OHS CV HOLTER SINUS MIN HR: 57

## 2024-03-06 ENCOUNTER — PATIENT MESSAGE (OUTPATIENT)
Dept: FAMILY MEDICINE | Facility: CLINIC | Age: 69
End: 2024-03-06
Payer: MEDICARE

## 2024-03-07 ENCOUNTER — PATIENT MESSAGE (OUTPATIENT)
Dept: FAMILY MEDICINE | Facility: CLINIC | Age: 69
End: 2024-03-07
Payer: MEDICARE

## 2024-03-28 ENCOUNTER — PATIENT MESSAGE (OUTPATIENT)
Dept: FAMILY MEDICINE | Facility: CLINIC | Age: 69
End: 2024-03-28
Payer: MEDICARE

## 2024-03-28 NOTE — TELEPHONE ENCOUNTER
No care due was identified.  [facility] Embedded Care Due Messages. Reference number: [id number].   3/28/2024 4:57:25 PM CDT

## 2024-03-30 NOTE — TELEPHONE ENCOUNTER
Refill Routing Note   Medication(s) are not appropriate for processing by [facility] for the following reason(s):        No active prescription written by provider  Clarification of medication (Rx) details: patient under impression she should be taking Metoprolol 50 mg daily; unable to confirm via patient charts  Required vitals abnormal: 160/94     [facility] action(s):  Defer               Appointments  past 12m or future 3m with PCP    Date Provider   Last Visit   2/15/2024 Jonah Walker MD   Next Visit   4/18/2024 Jonah Walker MD   ED visits in past 90 days: 0        Note composed:7:51 PM 03/29/2024

## 2024-04-04 RX ORDER — METOPROLOL SUCCINATE 25 MG/1
25 TABLET, EXTENDED RELEASE ORAL DAILY
OUTPATIENT
Start: 2024-04-04

## 2024-04-04 NOTE — TELEPHONE ENCOUNTER
Spoke with Pt & advised her PCP is out of office until next week  That since it's a historical med she should reach out to cardiologist that sent in Rx for her as she is still under that's providers care as well

## 2024-04-18 ENCOUNTER — OFFICE VISIT (OUTPATIENT)
Dept: FAMILY MEDICINE | Facility: CLINIC | Age: 69
End: 2024-04-18
Payer: MEDICARE

## 2024-04-18 VITALS
HEIGHT: 66 IN | DIASTOLIC BLOOD PRESSURE: 82 MMHG | HEART RATE: 71 BPM | SYSTOLIC BLOOD PRESSURE: 138 MMHG | BODY MASS INDEX: 27.67 KG/M2 | OXYGEN SATURATION: 95 % | TEMPERATURE: 98 F | WEIGHT: 172.19 LBS

## 2024-04-18 DIAGNOSIS — R55 PRE-SYNCOPE: ICD-10-CM

## 2024-04-18 DIAGNOSIS — I10 ESSENTIAL HYPERTENSION: Primary | Chronic | ICD-10-CM

## 2024-04-18 DIAGNOSIS — R42 DIZZINESS AND GIDDINESS: ICD-10-CM

## 2024-04-18 PROCEDURE — 3079F DIAST BP 80-89 MM HG: CPT | Mod: CPTII,S$GLB,, | Performed by: FAMILY MEDICINE

## 2024-04-18 PROCEDURE — 3008F BODY MASS INDEX DOCD: CPT | Mod: CPTII,S$GLB,, | Performed by: FAMILY MEDICINE

## 2024-04-18 PROCEDURE — 3288F FALL RISK ASSESSMENT DOCD: CPT | Mod: CPTII,S$GLB,, | Performed by: FAMILY MEDICINE

## 2024-04-18 PROCEDURE — 1159F MED LIST DOCD IN RCRD: CPT | Mod: CPTII,S$GLB,, | Performed by: FAMILY MEDICINE

## 2024-04-18 PROCEDURE — 1160F RVW MEDS BY RX/DR IN RCRD: CPT | Mod: CPTII,S$GLB,, | Performed by: FAMILY MEDICINE

## 2024-04-18 PROCEDURE — 99999 PR PBB SHADOW E&M-EST. PATIENT-LVL III: CPT | Mod: PBBFAC,,, | Performed by: FAMILY MEDICINE

## 2024-04-18 PROCEDURE — 99214 OFFICE O/P EST MOD 30 MIN: CPT | Mod: S$GLB,,, | Performed by: FAMILY MEDICINE

## 2024-04-18 PROCEDURE — 1101F PT FALLS ASSESS-DOCD LE1/YR: CPT | Mod: CPTII,S$GLB,, | Performed by: FAMILY MEDICINE

## 2024-04-18 PROCEDURE — 3044F HG A1C LEVEL LT 7.0%: CPT | Mod: CPTII,S$GLB,, | Performed by: FAMILY MEDICINE

## 2024-04-18 PROCEDURE — 3075F SYST BP GE 130 - 139MM HG: CPT | Mod: CPTII,S$GLB,, | Performed by: FAMILY MEDICINE

## 2024-04-18 PROCEDURE — 1126F AMNT PAIN NOTED NONE PRSNT: CPT | Mod: CPTII,S$GLB,, | Performed by: FAMILY MEDICINE

## 2024-04-18 RX ORDER — METOPROLOL SUCCINATE 50 MG/1
50 TABLET, EXTENDED RELEASE ORAL DAILY
Qty: 90 TABLET | Refills: 3 | Status: SHIPPED | OUTPATIENT
Start: 2024-04-18 | End: 2025-04-18

## 2024-04-18 NOTE — PROGRESS NOTES
"Subjective:       Patient ID: Ynes Talavera is a 69 y.o. female.    Chief Complaint: med check and presyncope (On tuesday)    Here today to f/u on her HTN and Palp.  We increased her Toprol to 50 MG daily.  Overall, she has been doing very well and had no other pre-syncopal events until 2 days ago.  On Tuesday she got home, got out of her car, walked to the mail box and felt lightheaded as she returned to her garage.      Review of Systems   Constitutional:  Negative for activity change, appetite change, fatigue and fever.   Respiratory:  Negative for cough, shortness of breath and wheezing.    Cardiovascular:  Negative for chest pain and palpitations.   Gastrointestinal:  Negative for abdominal pain, constipation, diarrhea and nausea.   Skin:  Negative for pallor and rash.   Neurological:  Negative for dizziness, syncope, light-headedness and headaches.       Objective:      Vitals:    04/18/24 1305   BP: 138/82   Pulse: 71   Temp: 97.7 °F (36.5 °C)   TempSrc: Oral   SpO2: 95%   Weight: 78.1 kg (172 lb 2.9 oz)   Height: 5' 6" (1.676 m)      Physical Exam  Constitutional:       General: She is not in acute distress.  Cardiovascular:      Rate and Rhythm: Normal rate and regular rhythm.      Heart sounds: Normal heart sounds. No murmur heard.  Pulmonary:      Effort: Pulmonary effort is normal. No respiratory distress.      Breath sounds: Normal breath sounds. No wheezing, rhonchi or rales.   Skin:     General: Skin is warm and dry.   Neurological:      General: No focal deficit present.      Mental Status: She is alert.   Psychiatric:         Mood and Affect: Mood normal.         Behavior: Behavior normal.         Thought Content: Thought content normal.         Results for orders placed or performed during the hospital encounter of 03/01/24   Holter monitor - 48 hour   Result Value Ref Range    Event Monitor Day 0     holter length minutes 0     Holter length hours 48     holter length dec hours 48.00     Sinus " min HR 57     Sinus max hr 90     Sinus avg hr 73       Assessment:       1. Essential hypertension    2. Pre-syncope    3. Dizziness and giddiness        Plan:       Essential hypertension  -     metoprolol succinate (TOPROL-XL) 50 MG 24 hr tablet; Take 1 tablet (50 mg total) by mouth once daily.  Dispense: 90 tablet; Refill: 3  Continue Toprol-XL 50 mg daily,.  Pre-syncope  -     CT Head Without Contrast; Future; Expected date: 04/18/2024  Cardiac work up has been normal.  Proceed with CT head.  Dizziness and giddiness  -     CT Head Without Contrast; Future; Expected date: 04/18/2024          Medication List with Changes/Refills   Current Medications    ASPIRIN (ECOTRIN) 81 MG EC TABLET    Take 81 mg by mouth once daily.    ESCITALOPRAM OXALATE (LEXAPRO) 10 MG TABLET    Take 1 tablet (10 mg total) by mouth once daily.    MULTIVITAMIN CAPSULE    Take 1 capsule by mouth every evening.    TRAZODONE (DESYREL) 100 MG TABLET    TAKE 1 TABLET BY MOUTH EVERY NIGHT   Changed and/or Refilled Medications    Modified Medication Previous Medication    METOPROLOL SUCCINATE (TOPROL-XL) 50 MG 24 HR TABLET metoprolol succinate (TOPROL-XL) 25 MG 24 hr tablet       Take 1 tablet (50 mg total) by mouth once daily.    Take 1 tablet by mouth once daily.

## 2024-05-02 ENCOUNTER — HOSPITAL ENCOUNTER (OUTPATIENT)
Dept: RADIOLOGY | Facility: HOSPITAL | Age: 69
Discharge: HOME | End: 2024-05-02
Attending: FAMILY MEDICINE
Payer: MEDICARE

## 2024-05-02 DIAGNOSIS — R55 PRE-SYNCOPE: ICD-10-CM

## 2024-05-02 DIAGNOSIS — R42 DIZZINESS AND GIDDINESS: ICD-10-CM

## 2024-05-02 PROCEDURE — 70450 CT HEAD/BRAIN W/O DYE: CPT | Mod: 26,,, | Performed by: RADIOLOGY

## 2024-05-02 PROCEDURE — 70450 CT HEAD/BRAIN W/O DYE: CPT | Mod: TC,PO

## 2024-06-25 DIAGNOSIS — F32.9 REACTIVE DEPRESSION: ICD-10-CM

## 2024-06-25 DIAGNOSIS — G47.9 SLEEP DISTURBANCE: Chronic | ICD-10-CM

## 2024-06-25 RX ORDER — TRAZODONE HYDROCHLORIDE 100 MG/1
TABLET ORAL
Qty: 90 TABLET | Refills: 3 | Status: SHIPPED | OUTPATIENT
Start: 2024-06-25

## 2024-06-25 NOTE — TELEPHONE ENCOUNTER
No care due was identified.  [facility] Embedded Care Due Messages. Reference number: [id number].   6/25/2024 8:10:05 AM CDT

## 2024-06-25 NOTE — TELEPHONE ENCOUNTER
Refill Decision Note   Ynes Talavera  is requesting a refill authorization.  Brief Assessment and Rationale for Refill:  Approve     Medication Therapy Plan:         Comments:     Note composed:8:10 AM 06/25/2024

## 2024-08-15 ENCOUNTER — OFFICE VISIT (OUTPATIENT)
Dept: CARDIOLOGY | Facility: CLINIC | Age: 69
End: 2024-08-15
Payer: MEDICARE

## 2024-08-15 VITALS
WEIGHT: 165.38 LBS | BODY MASS INDEX: 26.58 KG/M2 | HEIGHT: 66 IN | SYSTOLIC BLOOD PRESSURE: 144 MMHG | HEART RATE: 74 BPM | DIASTOLIC BLOOD PRESSURE: 90 MMHG

## 2024-08-15 DIAGNOSIS — R42 POSTURAL DIZZINESS WITH PRESYNCOPE: ICD-10-CM

## 2024-08-15 DIAGNOSIS — R55 POSTURAL DIZZINESS WITH PRESYNCOPE: ICD-10-CM

## 2024-08-15 DIAGNOSIS — I05.9 MITRAL VALVE DISEASE: ICD-10-CM

## 2024-08-15 DIAGNOSIS — I10 ESSENTIAL HYPERTENSION: Primary | Chronic | ICD-10-CM

## 2024-08-15 PROCEDURE — 3044F HG A1C LEVEL LT 7.0%: CPT | Mod: CPTII,S$GLB,, | Performed by: INTERNAL MEDICINE

## 2024-08-15 PROCEDURE — 99204 OFFICE O/P NEW MOD 45 MIN: CPT | Mod: S$GLB,,, | Performed by: INTERNAL MEDICINE

## 2024-08-15 PROCEDURE — 1126F AMNT PAIN NOTED NONE PRSNT: CPT | Mod: CPTII,S$GLB,, | Performed by: INTERNAL MEDICINE

## 2024-08-15 PROCEDURE — 3008F BODY MASS INDEX DOCD: CPT | Mod: CPTII,S$GLB,, | Performed by: INTERNAL MEDICINE

## 2024-08-15 PROCEDURE — 3288F FALL RISK ASSESSMENT DOCD: CPT | Mod: CPTII,S$GLB,, | Performed by: INTERNAL MEDICINE

## 2024-08-15 PROCEDURE — 1159F MED LIST DOCD IN RCRD: CPT | Mod: CPTII,S$GLB,, | Performed by: INTERNAL MEDICINE

## 2024-08-15 PROCEDURE — 1101F PT FALLS ASSESS-DOCD LE1/YR: CPT | Mod: CPTII,S$GLB,, | Performed by: INTERNAL MEDICINE

## 2024-08-15 PROCEDURE — 99999 PR PBB SHADOW E&M-EST. PATIENT-LVL III: CPT | Mod: PBBFAC,,, | Performed by: INTERNAL MEDICINE

## 2024-08-15 PROCEDURE — 3077F SYST BP >= 140 MM HG: CPT | Mod: CPTII,S$GLB,, | Performed by: INTERNAL MEDICINE

## 2024-08-15 PROCEDURE — 1160F RVW MEDS BY RX/DR IN RCRD: CPT | Mod: CPTII,S$GLB,, | Performed by: INTERNAL MEDICINE

## 2024-08-15 PROCEDURE — 3080F DIAST BP >= 90 MM HG: CPT | Mod: CPTII,S$GLB,, | Performed by: INTERNAL MEDICINE

## 2024-08-15 NOTE — PROGRESS NOTES
Subjective:    Patient ID:  Ynes Talavera is a 69 y.o. female patient here for evaluation Follow-up      History of Present Illness:  Cardiac evaluation.  Prior outside cardiac evaluation beginning of the year with negative nuclear Lexiscan, unremarkable echo by history.  No records.  Symptomatic with near syncopal episodes.  No sharla syncope.  No past documented ischemic heart disease.  Echo in the past by history is revealed mitral valve disease.  Recent Holter monitor with 2-3% benign ventricular ectopy, symptomatic.    Denies hypertension dyslipidemia, diabetes mellitus.     Concomitant medical problems incidental finding of bilateral adrenal adenomas, small splenic artery aneurysm, 1.2 cm, last imaging study .            Review of patient's allergies indicates:  No Known Allergies    Past Medical History:   Diagnosis Date    Adrenal adenoma     Diverticulosis of the colon     Hypertension     REJI (obstructive sleep apnea)     uses cpap    Venous insufficiency 2014    right leg, denies Hx clot     Past Surgical History:   Procedure Laterality Date    APPENDECTOMY      ARTHROSCOPIC REPAIR OF ROTATOR CUFF OF SHOULDER Right 10/4/2022    Procedure: REPAIR, ROTATOR CUFF, ARTHROSCOPIC;  Surgeon: Keven Piedra MD;  Location: [facility] OR;  Service: Orthopedics;  Laterality: Right;  R shoulder arthroscopy with rotator cuff repair + balloon spacer    BREAST BIOPSY Right     benign needle biopsy    BREAST BIOPSY Left     benign needle biopsy     SECTION, LOW TRANSVERSE      CHOLECYSTECTOMY      COLONOSCOPY N/A 9/3/2020    Procedure: COLONOSCOPY;  Surgeon: Neville Galvan MD;  Location: [facility] ENDO;  Service: Endoscopy;  Laterality: N/A;    EPIDURAL STEROID INJECTION INTO CERVICAL SPINE N/A 2019    Procedure: Injection-steroid-epidural-cervical C7/T1;  Surgeon: Ronnell Echeverria MD;  Location: [facility] OR;  Service: Pain Management;  Laterality: N/A;    ESOPHAGOGASTRODUODENOSCOPY N/A 2020     Procedure: ESOPHAGOGASTRODUODENOSCOPY (EGD);  Surgeon: Neville Galvan MD;  Location: [facility] ENDO;  Service: Endoscopy;  Laterality: N/A;    INJECTION OF FACET JOINT Left 2019    Procedure: INJECTION, FACET JOINT, C1-C2 Medial Branch;  Surgeon: Samantha Willoughby MD;  Location: [facility] PAIN MGT;  Service: Pain Management;  Laterality: Left;    THUMB ARTHROSCOPY      both thumbs, trigger finger release.    TONSILLECTOMY      TUBAL LIGATION      UMBILICAL HERNIA REPAIR      VEIN LIGATION AND STRIPPING      Left     Social History     Tobacco Use    Smoking status: Former     Current packs/day: 0.00     Average packs/day: 0.3 packs/day for 25.0 years (6.3 ttl pk-yrs)     Types: Cigarettes     Start date: 1968     Quit date: 1993     Years since quittin.5    Smokeless tobacco: Never   Substance Use Topics    Alcohol use: No     Alcohol/week: 0.0 standard drinks of alcohol    Drug use: No        Review of Systems:    As noted in HPI in addition         REVIEW OF SYSTEMS  Review of Systems   Constitutional: Negative for decreased appetite, diaphoresis, night sweats, weight gain and weight loss.   HENT:  Negative for nosebleeds and odynophagia.    Eyes:  Negative for double vision and photophobia.   Cardiovascular:  Positive for near-syncope. Negative for chest pain, claudication, cyanosis, dyspnea on exertion, irregular heartbeat, leg swelling, orthopnea, palpitations, paroxysmal nocturnal dyspnea and syncope.   Respiratory:  Negative for cough, hemoptysis, shortness of breath and wheezing.    Hematologic/Lymphatic: Negative for adenopathy.   Skin:  Negative for flushing, skin cancer and suspicious lesions.   Musculoskeletal:  Negative for gout, myalgias and neck pain.   Gastrointestinal:  Negative for abdominal pain, heartburn, hematemesis and hematochezia.   Genitourinary:  Negative for bladder incontinence, hesitancy and nocturia.   Neurological:  Negative for focal weakness, headaches,  light-headedness and paresthesias.   Psychiatric/Behavioral:  Negative for memory loss and substance abuse.        Objective:        Vitals:    08/15/24 1334   BP: (!) 144/90   Pulse: 74       Lab Results   Component Value Date    WBC 6.61 02/16/2024    HGB 14.0 02/16/2024    HCT 41.5 02/16/2024     02/16/2024    CHOL 169 02/16/2024    TRIG 63 02/16/2024    HDL 66 02/16/2024    ALT 14 02/16/2024    AST 18 02/16/2024     02/16/2024    K 4.3 02/16/2024     02/16/2024    CREATININE 0.7 02/16/2024    BUN 16 02/16/2024    CO2 28 02/16/2024    TSH 1.899 02/16/2024    HGBA1C 5.7 (H) 02/16/2024      CARDIOGRAM RESULTS  No results found for this or any previous visit.    No results found for this or any previous visit.          CURRENT/PREVIOUS VISIT EKG  Results for orders placed or performed during the hospital encounter of 08/25/20   EKG 12-lead    Collection Time: 08/25/20  5:32 PM    Narrative    Test Reason : R07.9,    Vent. Rate : 079 BPM     Atrial Rate : 079 BPM     P-R Int : 170 ms          QRS Dur : 058 ms      QT Int : 352 ms       P-R-T Axes : 062 048 043 degrees     QTc Int : 403 ms    Normal sinus rhythm  Normal ECG  When compared with ECG of 02-OCT-2019 08:55,  Premature supraventricular complexes are no longer Present  Confirmed by Angel Uriarte MD ([id number]) on 8/26/2020 9:05:05 AM    Referred By:             Confirmed By:Angel Uriarte MD     No valid procedures specified.   No results found for this or any previous visit.    No valid procedures specified.        PHYSICAL EXAM  GENERAL: well built, well nourished, well-developed in no apparent distress alert and oriented.   HEENT: Normocephalic. Pupils normal and conjunctivae normal.  Mucous membranes normal, no cyanosis or icterus, trachea central,no pallor or icterus is noted..   NECK: No JVD. No bruit..   THYROID: Thyroid not enlarged. No nodules present..   CARDIAC:  Normal S1-S2.  No murmur rub click or gallop.  PMI nondisplaced.  LUNGS: Clear  to auscultation. No wheezing or rhonchi..   ABDOMEN: Soft no masses or organomegaly.  No abdomen pulsations or bruits.  Normal bowel sounds. No pulsations and no masses felt, No guarding or rebound.   URINARY: No grimaldo catheter   EXTREMITIES: No cyanosis, clubbing or edema noted at this time., no calf tenderness bilaterally.   PERIPHERAL VASCULAR SYSTEM: Good palpable distal pulses.  2+ femoral, popliteal and pedal pulses.  No bruits    CENTRAL NERVOUS SYSTEM: No focal motor or sensory deficits noted.   SKIN: Skin without lesions, moist, well perfused.   MUSCLE STRENGTH & TONE: No noteable weakness, atrophy or abnormal movement.     I HAVE REVIEWED :    The vital signs, nurses notes, and all the pertinent radiology and labs.         Current Outpatient Medications   Medication Instructions    aspirin (ECOTRIN) 81 mg, Oral, Daily,      EScitalopram oxalate (LEXAPRO) 10 mg, Oral, Daily    metoprolol succinate (TOPROL-XL) 50 mg, Oral, Daily    multivitamin capsule 1 capsule, Oral, Nightly,      traZODone (DESYREL) 100 MG tablet TAKE 1 TABLET BY MOUTH EVERY NIGHT          Assessment:   Valvular heart disease with probable mild MR, preserved EF and unremarkable nuclear study as documented 01/2024, history only, no records.    Presyncope, no documented ischemic heart disease.  Holter monitor with 2/ 3% benign ventricular ectopy.  Symptomatic.    Incidental past history of bilateral adrenal adenomas, small splenic artery aneurysm, 1.2 cm, history of diverticulosis coli.      Plan:   Symptoms of presyncope or infrequent.  Overall ventricular ectopy does not.  EF caused LV systolic dysfunction major symptoms.  Remains on metoprolol 50 mg daily.    Obtain records from previous cardiology.  Labs follow-up primary care.    Return to clinic 4 months.  Patient improved clinically since weight loss.  EF symptoms worsened consider tilt-table examination, extended monitoring.          No follow-ups on file.

## 2024-10-17 ENCOUNTER — LAB VISIT (OUTPATIENT)
Dept: LAB | Facility: HOSPITAL | Age: 69
End: 2024-10-17
Attending: FAMILY MEDICINE
Payer: MEDICARE

## 2024-10-17 ENCOUNTER — OFFICE VISIT (OUTPATIENT)
Dept: FAMILY MEDICINE | Facility: CLINIC | Age: 69
End: 2024-10-17
Payer: MEDICARE

## 2024-10-17 VITALS
TEMPERATURE: 98 F | SYSTOLIC BLOOD PRESSURE: 116 MMHG | OXYGEN SATURATION: 98 % | DIASTOLIC BLOOD PRESSURE: 62 MMHG | BODY MASS INDEX: 24.52 KG/M2 | WEIGHT: 152.56 LBS | HEIGHT: 66 IN | HEART RATE: 85 BPM

## 2024-10-17 DIAGNOSIS — I10 ESSENTIAL HYPERTENSION: ICD-10-CM

## 2024-10-17 DIAGNOSIS — F51.04 CHRONIC INSOMNIA: ICD-10-CM

## 2024-10-17 DIAGNOSIS — F33.1 MODERATE EPISODE OF RECURRENT MAJOR DEPRESSIVE DISORDER: ICD-10-CM

## 2024-10-17 DIAGNOSIS — R73.09 OTHER ABNORMAL GLUCOSE: ICD-10-CM

## 2024-10-17 DIAGNOSIS — F32.9 REACTIVE DEPRESSION: ICD-10-CM

## 2024-10-17 DIAGNOSIS — I10 ESSENTIAL HYPERTENSION: Primary | ICD-10-CM

## 2024-10-17 DIAGNOSIS — F41.1 GAD (GENERALIZED ANXIETY DISORDER): ICD-10-CM

## 2024-10-17 DIAGNOSIS — G47.9 SLEEP DISTURBANCE: Chronic | ICD-10-CM

## 2024-10-17 LAB
ALBUMIN SERPL BCP-MCNC: 3.8 G/DL (ref 3.5–5.2)
ALP SERPL-CCNC: 59 U/L (ref 40–150)
ALT SERPL W/O P-5'-P-CCNC: 13 U/L (ref 10–44)
ANION GAP SERPL CALC-SCNC: 10 MMOL/L (ref 8–16)
AST SERPL-CCNC: 22 U/L (ref 10–40)
BILIRUB SERPL-MCNC: 0.6 MG/DL (ref 0.1–1)
BUN SERPL-MCNC: 16 MG/DL (ref 8–23)
CALCIUM SERPL-MCNC: 10.3 MG/DL (ref 8.7–10.5)
CHLORIDE SERPL-SCNC: 103 MMOL/L (ref 95–110)
CO2 SERPL-SCNC: 27 MMOL/L (ref 23–29)
CREAT SERPL-MCNC: 0.8 MG/DL (ref 0.5–1.4)
EST. GFR  (NO RACE VARIABLE): >60 ML/MIN/1.73 M^2
ESTIMATED AVG GLUCOSE: 94 MG/DL (ref 68–131)
GLUCOSE SERPL-MCNC: 120 MG/DL (ref 70–110)
HBA1C MFR BLD: 4.9 % (ref 4–5.6)
POTASSIUM SERPL-SCNC: 3.5 MMOL/L (ref 3.5–5.1)
PROT SERPL-MCNC: 6.3 G/DL (ref 6–8.4)
SODIUM SERPL-SCNC: 140 MMOL/L (ref 136–145)

## 2024-10-17 PROCEDURE — 3074F SYST BP LT 130 MM HG: CPT | Mod: CPTII,S$GLB,, | Performed by: FAMILY MEDICINE

## 2024-10-17 PROCEDURE — 1101F PT FALLS ASSESS-DOCD LE1/YR: CPT | Mod: CPTII,S$GLB,, | Performed by: FAMILY MEDICINE

## 2024-10-17 PROCEDURE — 3078F DIAST BP <80 MM HG: CPT | Mod: CPTII,S$GLB,, | Performed by: FAMILY MEDICINE

## 2024-10-17 PROCEDURE — 1159F MED LIST DOCD IN RCRD: CPT | Mod: CPTII,S$GLB,, | Performed by: FAMILY MEDICINE

## 2024-10-17 PROCEDURE — 36415 COLL VENOUS BLD VENIPUNCTURE: CPT | Mod: PO | Performed by: FAMILY MEDICINE

## 2024-10-17 PROCEDURE — 1160F RVW MEDS BY RX/DR IN RCRD: CPT | Mod: CPTII,S$GLB,, | Performed by: FAMILY MEDICINE

## 2024-10-17 PROCEDURE — 3044F HG A1C LEVEL LT 7.0%: CPT | Mod: CPTII,S$GLB,, | Performed by: FAMILY MEDICINE

## 2024-10-17 PROCEDURE — G2211 COMPLEX E/M VISIT ADD ON: HCPCS | Mod: S$GLB,,, | Performed by: FAMILY MEDICINE

## 2024-10-17 PROCEDURE — 80053 COMPREHEN METABOLIC PANEL: CPT | Performed by: FAMILY MEDICINE

## 2024-10-17 PROCEDURE — 1126F AMNT PAIN NOTED NONE PRSNT: CPT | Mod: CPTII,S$GLB,, | Performed by: FAMILY MEDICINE

## 2024-10-17 PROCEDURE — 99999 PR PBB SHADOW E&M-EST. PATIENT-LVL III: CPT | Mod: PBBFAC,,, | Performed by: FAMILY MEDICINE

## 2024-10-17 PROCEDURE — 99214 OFFICE O/P EST MOD 30 MIN: CPT | Mod: S$GLB,,, | Performed by: FAMILY MEDICINE

## 2024-10-17 PROCEDURE — 83036 HEMOGLOBIN GLYCOSYLATED A1C: CPT | Performed by: FAMILY MEDICINE

## 2024-10-17 PROCEDURE — 3008F BODY MASS INDEX DOCD: CPT | Mod: CPTII,S$GLB,, | Performed by: FAMILY MEDICINE

## 2024-10-17 PROCEDURE — 3288F FALL RISK ASSESSMENT DOCD: CPT | Mod: CPTII,S$GLB,, | Performed by: FAMILY MEDICINE

## 2024-10-17 RX ORDER — ESCITALOPRAM OXALATE 10 MG/1
10 TABLET ORAL DAILY
Qty: 90 TABLET | Refills: 3 | Status: SHIPPED | OUTPATIENT
Start: 2024-10-17

## 2024-10-17 RX ORDER — TRAZODONE HYDROCHLORIDE 100 MG/1
TABLET ORAL
Qty: 90 TABLET | Refills: 3 | Status: SHIPPED | OUTPATIENT
Start: 2024-10-17 | End: 2024-10-17 | Stop reason: SDUPTHER

## 2024-10-17 RX ORDER — METOPROLOL SUCCINATE 50 MG/1
50 TABLET, EXTENDED RELEASE ORAL DAILY
Qty: 90 TABLET | Refills: 3 | Status: SHIPPED | OUTPATIENT
Start: 2024-10-17 | End: 2025-10-17

## 2024-10-17 RX ORDER — TRAZODONE HYDROCHLORIDE 150 MG/1
TABLET ORAL
Qty: 90 TABLET | Refills: 3 | Status: SHIPPED | OUTPATIENT
Start: 2024-10-17

## 2024-10-17 NOTE — PROGRESS NOTES
"Subjective:       Patient ID: Ynes Talavera is a 69 y.o. female.    Chief Complaint: 6 month f/u    Here today to follow up on chronic medical conditions.     HTN:  She is on Metoprolol XL.  BP has been controlled.  MMD/LACY/Insomnia:  She is on Lexapro 10 mg with Trazodone for sleep.   Medication is working well for her overall.  Still some anxiety and at times still issues sleeping  Obesity:  she started GLP therapy in June.  She is on Semiglutide compounded.  She has lost about 20 lbs      Review of Systems   Constitutional:  Negative for activity change, appetite change, fatigue and fever.   Respiratory:  Negative for cough, shortness of breath and wheezing.    Cardiovascular:  Negative for chest pain and palpitations.   Gastrointestinal:  Negative for abdominal pain, constipation, diarrhea and nausea.   Skin:  Negative for pallor and rash.   Neurological:  Negative for dizziness, syncope, light-headedness and headaches.   Psychiatric/Behavioral:  The patient is not nervous/anxious.        Objective:      Vitals:    10/17/24 1450   BP: 116/62   Pulse: 85   Temp: 97.8 °F (36.6 °C)   TempSrc: Oral   SpO2: 98%   Weight: 69.2 kg (152 lb 8.9 oz)   Height: 5' 6" (1.676 m)      Physical Exam  Constitutional:       General: She is not in acute distress.  Cardiovascular:      Rate and Rhythm: Normal rate and regular rhythm.      Heart sounds: Normal heart sounds. No murmur heard.  Pulmonary:      Effort: Pulmonary effort is normal. No respiratory distress.      Breath sounds: Normal breath sounds. No wheezing, rhonchi or rales.   Skin:     General: Skin is warm and dry.   Neurological:      General: No focal deficit present.      Mental Status: She is alert.   Psychiatric:         Mood and Affect: Mood normal.         Behavior: Behavior normal.         Thought Content: Thought content normal.         Results for orders placed or performed during the hospital encounter of 03/01/24   Holter monitor - 48 hour    " Collection Time: 03/01/24  8:00 AM   Result Value Ref Range    Event Monitor Day 0     holter length minutes 0     Holter length hours 48     holter length dec hours 48.00     Sinus min HR 57     Sinus max hr 90     Sinus avg hr 73       Assessment:       1. Essential hypertension    2. Moderate episode of recurrent major depressive disorder    3. Chronic insomnia    4. LACY (generalized anxiety disorder)    5. Other abnormal glucose    6. Reactive depression    7. Sleep disturbance        Plan:       Essential hypertension  -     Comprehensive Metabolic Panel; Future; Expected date: 10/17/2024  -     metoprolol succinate (TOPROL-XL) 50 MG 24 hr tablet; Take 1 tablet (50 mg total) by mouth once daily.  Dispense: 90 tablet; Refill: 3  Continue Toprol  Moderate episode of recurrent major depressive disorder    Chronic insomnia  Increase Trazodone to 150 mg  LACY (generalized anxiety disorder)    Other abnormal glucose  -     Hemoglobin A1C; Future; Expected date: 10/17/2024    Reactive depression  -     EScitalopram oxalate (LEXAPRO) 10 MG tablet; Take 1 tablet (10 mg total) by mouth once daily.  Dispense: 90 tablet; Refill: 3  -     traZODone (DESYREL) 150 MG tablet; TAKE 1/3-1 TABLET BY MOUTH EVERY NIGHT  Dispense: 90 tablet; Refill: 3    We discussed a possible increase to 15 mg on her Lexapro.  If she tries it, she can message me for a refll on the higher amount.  Sleep disturbance    -     traZODone (DESYREL) 150 MG tablet; TAKE 1/3-1 TABLET BY MOUTH EVERY NIGHT  Dispense: 90 tablet; Refill: 3          Visit today included increased complexity associated with the care of the episodic problem HTN addressed and managing the longitudinal care of the patient due to the serious and/or complex managed problem(s) as above.       Medication List with Changes/Refills   Current Medications    ASPIRIN (ECOTRIN) 81 MG EC TABLET    Take 81 mg by mouth once daily.    MULTIVITAMIN CAPSULE    Take 1 capsule by mouth every evening.    Changed and/or Refilled Medications    Modified Medication Previous Medication    ESCITALOPRAM OXALATE (LEXAPRO) 10 MG TABLET EScitalopram oxalate (LEXAPRO) 10 MG tablet       Take 1 tablet (10 mg total) by mouth once daily.    Take 1 tablet (10 mg total) by mouth once daily.    METOPROLOL SUCCINATE (TOPROL-XL) 50 MG 24 HR TABLET metoprolol succinate (TOPROL-XL) 50 MG 24 hr tablet       Take 1 tablet (50 mg total) by mouth once daily.    Take 1 tablet (50 mg total) by mouth once daily.    TRAZODONE (DESYREL) 150 MG TABLET traZODone (DESYREL) 100 MG tablet       TAKE 1/3-1 TABLET BY MOUTH EVERY NIGHT    TAKE 1 TABLET BY MOUTH EVERY NIGHT

## 2024-11-03 DIAGNOSIS — F32.9 REACTIVE DEPRESSION: ICD-10-CM

## 2024-11-03 RX ORDER — ESCITALOPRAM OXALATE 10 MG/1
10 TABLET ORAL
Qty: 90 TABLET | Refills: 3 | OUTPATIENT
Start: 2024-11-03

## 2024-12-22 ENCOUNTER — OFFICE VISIT (OUTPATIENT)
Dept: URGENT CARE | Facility: CLINIC | Age: 69
End: 2024-12-22
Payer: MEDICARE

## 2024-12-22 VITALS
OXYGEN SATURATION: 97 % | DIASTOLIC BLOOD PRESSURE: 82 MMHG | HEIGHT: 66 IN | WEIGHT: 152 LBS | SYSTOLIC BLOOD PRESSURE: 133 MMHG | HEART RATE: 92 BPM | BODY MASS INDEX: 24.43 KG/M2 | RESPIRATION RATE: 16 BRPM | TEMPERATURE: 99 F

## 2024-12-22 DIAGNOSIS — J10.1 INFLUENZA A: ICD-10-CM

## 2024-12-22 DIAGNOSIS — R05.9 COUGH, UNSPECIFIED TYPE: Primary | ICD-10-CM

## 2024-12-22 LAB
CTP QC/QA: YES
CTP QC/QA: YES
POC MOLECULAR INFLUENZA A AGN: POSITIVE
POC MOLECULAR INFLUENZA B AGN: NEGATIVE
SARS-COV-2 AG RESP QL IA.RAPID: NEGATIVE

## 2024-12-22 PROCEDURE — 87502 INFLUENZA DNA AMP PROBE: CPT | Mod: QW,S$GLB,, | Performed by: NURSE PRACTITIONER

## 2024-12-22 PROCEDURE — 99213 OFFICE O/P EST LOW 20 MIN: CPT | Mod: S$GLB,,, | Performed by: NURSE PRACTITIONER

## 2024-12-22 PROCEDURE — 87811 SARS-COV-2 COVID19 W/OPTIC: CPT | Mod: QW,S$GLB,, | Performed by: NURSE PRACTITIONER

## 2024-12-22 RX ORDER — AZELASTINE 1 MG/ML
1 SPRAY, METERED NASAL 2 TIMES DAILY
Qty: 30 ML | Refills: 0 | Status: SHIPPED | OUTPATIENT
Start: 2024-12-22 | End: 2025-12-22

## 2024-12-22 RX ORDER — BENZONATATE 100 MG/1
100 CAPSULE ORAL 3 TIMES DAILY PRN
Qty: 30 CAPSULE | Refills: 0 | Status: SHIPPED | OUTPATIENT
Start: 2024-12-22 | End: 2025-01-01

## 2024-12-22 RX ORDER — BALOXAVIR MARBOXIL 40 MG/1
40 TABLET, FILM COATED ORAL ONCE
Qty: 1 TABLET | Refills: 0 | Status: SHIPPED | OUTPATIENT
Start: 2024-12-22 | End: 2024-12-22

## 2024-12-22 RX ORDER — PROMETHAZINE HYDROCHLORIDE AND DEXTROMETHORPHAN HYDROBROMIDE 6.25; 15 MG/5ML; MG/5ML
5 SYRUP ORAL EVERY 4 HOURS PRN
Qty: 50 ML | Refills: 0 | Status: SHIPPED | OUTPATIENT
Start: 2024-12-22 | End: 2025-01-01

## 2024-12-22 NOTE — PATIENT INSTRUCTIONS
Please drink plenty of fluids.  Please get plenty of rest.  Please return here or go to the Emergency Department for any concerns or worsening of condition.  Tamiflu prescription has been discussed and if prescribed, please take to completion unless you cannot tolerate the side effects.   If you were prescribed a narcotic medication, do not drive or operate heavy equipment or machinery while taking these medications.  If you were given a steroid shot in the clinic and have also been given a prescription for a steroid such as Prednisone or a Medrol Dose Pack, please begin taking them tomorrow.  If you do not have Hypertension or any history of palpitations, it is ok to take over the counter Sudafed or Mucinex D or Allegra-D or Claritin-D or Zyrtec-D.  If you do take one of the above, it is ok to combine that with plain over the counter Mucinex or Allegra or Claritin or Zyrtec.  If for example you are taking Zyrtec -D, you can combine that with Mucinex, but not Mucinex-D.  If you are taking Mucinex-D, you can combine that with plain Allegra or Claritin or Zyrtec.   If you do have Hypertension or palpitations, it is safe to take Coricidin HBP for relief of sinus symptoms.  If not allergic, please take over the counter Tylenol (Acetaminophen) and/or Motrin (Ibuprofen) as directed for control of pain and/or fever.  Please follow up with your primary care doctor or specialist as needed.    If you  smoke, please stop smoking.

## 2024-12-22 NOTE — PROGRESS NOTES
"Subjective:      Patient ID: Ynes Talavera is a 69 y.o. female.    Vitals:  height is 5' 6" (1.676 m) and weight is 68.9 kg (152 lb). Her oral temperature is 98.8 °F (37.1 °C). Her blood pressure is 133/82 and her pulse is 92. Her respiration is 16 and oxygen saturation is 97%.     Chief Complaint: Fever    Symptoms began 12/21/24. They include fever (101.8), chills, congestion, headache and dizziness. She has been taking Ibuprofen for her 6/10 headache, with mild relief. She is requesting a covid and flu test.    Fever   This is a new problem. The current episode started 1 day ago. The problem occurs cycles. The problem has been unchanged. She has not experienced a heat injury.The maximum temperature noted was 101 to 101.9 F. Associated symptoms include congestion, coughing and headaches. She has tried NSAIDs (Ibuprofen) for the symptoms. The treatment provided mild relief.       Constitution: Positive for fever.   HENT:  Positive for congestion.    Respiratory:  Positive for cough.    Neurological:  Positive for headaches.      Objective:     Physical Exam   Constitutional: She is oriented to person, place, and time. She appears well-developed. She is cooperative.  Non-toxic appearance. She does not appear ill. No distress.   HENT:   Head: Normocephalic and atraumatic.   Ears:   Right Ear: Hearing, tympanic membrane, external ear and ear canal normal.   Left Ear: Hearing, tympanic membrane, external ear and ear canal normal.   Nose: Nose normal. No mucosal edema, rhinorrhea or nasal deformity. No epistaxis. Right sinus exhibits no maxillary sinus tenderness and no frontal sinus tenderness. Left sinus exhibits no maxillary sinus tenderness and no frontal sinus tenderness.   Mouth/Throat: Uvula is midline, oropharynx is clear and moist and mucous membranes are normal. No trismus in the jaw. Normal dentition. No uvula swelling. No oropharyngeal exudate, posterior oropharyngeal edema or posterior oropharyngeal " erythema.   Eyes: Conjunctivae and lids are normal. No scleral icterus.   Neck: Trachea normal and phonation normal. Neck supple. No edema present. No erythema present. No neck rigidity present.   Cardiovascular: Normal rate, regular rhythm, normal heart sounds and normal pulses.   Pulmonary/Chest: Effort normal and breath sounds normal. No respiratory distress. She has no decreased breath sounds. She has no rhonchi.   Abdominal: Normal appearance.   Musculoskeletal: Normal range of motion.         General: No deformity. Normal range of motion.   Neurological: She is alert and oriented to person, place, and time. She exhibits normal muscle tone. Coordination normal.   Skin: Skin is warm, dry, intact, not diaphoretic and not pale.   Psychiatric: Her speech is normal and behavior is normal. Judgment and thought content normal.   Nursing note and vitals reviewed.      Assessment:     1. Cough, unspecified type    2. Influenza A      Results for orders placed or performed in visit on 12/22/24   POCT Influenza A/B MOLECULAR    Collection Time: 12/22/24 10:59 AM   Result Value Ref Range    POC Molecular Influenza A Ag Positive (A) Negative    POC Molecular Influenza B Ag Negative Negative     Acceptable Yes    SARS Coronavirus 2 Antigen, POCT Manual Read    Collection Time: 12/22/24 10:59 AM   Result Value Ref Range    SARS Coronavirus 2 Antigen Negative Negative     Acceptable Yes        Plan:       Cough, unspecified type  -     POCT Influenza A/B MOLECULAR  -     SARS Coronavirus 2 Antigen, POCT Manual Read    Influenza A  -     baloxavir marboxiL (XOFLUZA) 40 mg tablet; Take 1 tablet (40 mg total) by mouth once. for 1 dose  Dispense: 1 tablet; Refill: 0  -     benzonatate (TESSALON) 100 MG capsule; Take 1 capsule (100 mg total) by mouth 3 (three) times daily as needed.  Dispense: 30 capsule; Refill: 0  -     promethazine-dextromethorphan (PROMETHAZINE-DM) 6.25-15 mg/5 mL Syrp; Take 5 mLs  by mouth every 4 (four) hours as needed (cough).  Dispense: 50 mL; Refill: 0  -     azelastine (ASTELIN) 137 mcg (0.1 %) nasal spray; 1 spray (137 mcg total) by Nasal route 2 (two) times daily.  Dispense: 30 mL; Refill: 0      Please drink plenty of fluids.  Please get plenty of rest.  Please return here or go to the Emergency Department for any concerns or worsening of condition.  Tamiflu prescription has been discussed and if prescribed, please take to completion unless you cannot tolerate the side effects.   If you were prescribed a narcotic medication, do not drive or operate heavy equipment or machinery while taking these medications.  If you were given a steroid shot in the clinic and have also been given a prescription for a steroid such as Prednisone or a Medrol Dose Pack, please begin taking them tomorrow.  If you do not have Hypertension or any history of palpitations, it is ok to take over the counter Sudafed or Mucinex D or Allegra-D or Claritin-D or Zyrtec-D.  If you do take one of the above, it is ok to combine that with plain over the counter Mucinex or Allegra or Claritin or Zyrtec.  If for example you are taking Zyrtec -D, you can combine that with Mucinex, but not Mucinex-D.  If you are taking Mucinex-D, you can combine that with plain Allegra or Claritin or Zyrtec.   If you do have Hypertension or palpitations, it is safe to take Coricidin HBP for relief of sinus symptoms.  If not allergic, please take over the counter Tylenol (Acetaminophen) and/or Motrin (Ibuprofen) as directed for control of pain and/or fever.  Please follow up with your primary care doctor or specialist as needed.    If you  smoke, please stop smoking.

## 2024-12-23 ENCOUNTER — TELEPHONE (OUTPATIENT)
Dept: URGENT CARE | Facility: CLINIC | Age: 69
End: 2024-12-23
Payer: MEDICARE

## 2024-12-23 RX ORDER — OSELTAMIVIR PHOSPHATE 75 MG/1
75 CAPSULE ORAL 2 TIMES DAILY
Qty: 10 CAPSULE | Refills: 0 | Status: SHIPPED | OUTPATIENT
Start: 2024-12-23 | End: 2024-12-28

## 2024-12-23 NOTE — TELEPHONE ENCOUNTER
Patient called requesting Tamiflu over Xofluza at this time. Patient reports pharmacy is out of Xofluza, so she would rather take Tamiflu RX at this time. Patient aware, verbalized understanding and agreed with plan of care.

## 2025-01-20 NOTE — PROGRESS NOTES
Six weeks out from arthroscopic rotator cuff repair with balloon spacer.  Patient still having some discomfort pain is down to 3 on the pain scale from 5 on last visit     Exam shows incisions healing nicely no signs infection    Assessment: Status post arthroscopic rotator cuff repair with balloon spacer     Plan:  Physical therapy progressive range of motion, follow-up in 6 weeks time as a final postoperative visit   Detail Level: Zone

## 2025-02-07 ENCOUNTER — OFFICE VISIT (OUTPATIENT)
Dept: CARDIOLOGY | Facility: CLINIC | Age: 70
End: 2025-02-07
Payer: MEDICARE

## 2025-02-07 VITALS
WEIGHT: 147.06 LBS | HEART RATE: 67 BPM | DIASTOLIC BLOOD PRESSURE: 84 MMHG | BODY MASS INDEX: 23.08 KG/M2 | SYSTOLIC BLOOD PRESSURE: 147 MMHG | HEIGHT: 67 IN

## 2025-02-07 DIAGNOSIS — I49.3 PVC'S (PREMATURE VENTRICULAR CONTRACTIONS): Primary | ICD-10-CM

## 2025-02-07 DIAGNOSIS — R42 POSTURAL DIZZINESS WITH PRESYNCOPE: ICD-10-CM

## 2025-02-07 DIAGNOSIS — I10 ESSENTIAL HYPERTENSION: Chronic | ICD-10-CM

## 2025-02-07 DIAGNOSIS — R55 POSTURAL DIZZINESS WITH PRESYNCOPE: ICD-10-CM

## 2025-02-07 DIAGNOSIS — I05.9 MITRAL VALVE DISEASE: ICD-10-CM

## 2025-02-07 NOTE — PROGRESS NOTES
Subjective:    Patient ID:  Ynes Talavera is a 69 y.o. female patient here for evaluation Follow-up    History of Present Illness:     Ynes Talavera is a 69 y.o. female who follows with Dr. Jameson here today for follow up. Last seen in clinic 8/2024. She denies near syncope, syncope, CP, SOB/GOOD, palpitations, dizziness, fatigue, activity intolerance. She lost 30 lbs intentionally and has felt much better.     Focused Past History includes:  Frequent PVCs, symptomatic  48H Holter 3/2024: PVC burden 17.8%. Several episodes of palpitations correlated with PVCs.   Near syncope   Mitral valve prolapse (report)  REJI compliant with CPAP  Hypertension   Hyperlipidemia       Most Recent Echocardiogram Results  No results found for this or any previous visit.      Most Recent Nuclear Stress Test Results  No results found for this or any previous visit.      Most Recent Cardiac PET Stress Test Results  No results found for this or any previous visit.      Most Recent Cardiovascular Angiogram results  No results found for this or any previous visit.      Other Most Recent Cardiology Results  Results for orders placed during the hospital encounter of 03/01/24    Holter monitor - 48 hour    Interpretation Summary    Predominant Rhythm Sinus rhythm Heart rates varied between 57 and 90 BPM with an average of 73 BPM.    Ventricular Arrhythmias There were very frequent PVCs totalling 69575 and averaging 776.88 per hour. The ventricular arrhythmias percentage was 17.8%.    Supraventricular Arrhythmias There were very rare PACs totalling 45 and averaging 0.94 per hour.    Patient had several symptoms of palpitations that corresponded to PVCs.    Elevated PVC burden.      REVIEW OF SYSTEMS: As noted in HPI   CARDIOVASCULAR: No recent chest pain, palpitations, arm/neck/jaw pain, or edema.  RESPIRATORY: No recent fever, cough, SOB.  : No blood in the urine  GI: No reflux, nausea, vomiting, or blood in stool.   MUSCULOSKELETAL:  No falls.   NEURO: No headaches, syncope, or dizziness.  EYES: No sudden changes in vision.     Past Medical History:   Diagnosis Date    Adrenal adenoma     Diverticulosis of the colon     Hypertension     REJI (obstructive sleep apnea)     uses cpap    Venous insufficiency 2014    right leg, denies Hx clot     Past Surgical History:   Procedure Laterality Date    APPENDECTOMY      ARTHROSCOPIC REPAIR OF ROTATOR CUFF OF SHOULDER Right 10/4/2022    Procedure: REPAIR, ROTATOR CUFF, ARTHROSCOPIC;  Surgeon: Keven Piedra MD;  Location: [facility] OR;  Service: Orthopedics;  Laterality: Right;  R shoulder arthroscopy with rotator cuff repair + balloon spacer    BREAST BIOPSY Right 2002    benign needle biopsy    BREAST BIOPSY Left     benign needle biopsy     SECTION, LOW TRANSVERSE      CHOLECYSTECTOMY      COLONOSCOPY N/A 9/3/2020    Procedure: COLONOSCOPY;  Surgeon: Neville Galvan MD;  Location: [facility] ENDO;  Service: Endoscopy;  Laterality: N/A;    EPIDURAL STEROID INJECTION INTO CERVICAL SPINE N/A 2019    Procedure: Injection-steroid-epidural-cervical C7/T1;  Surgeon: Ronnell Echeverria MD;  Location: [facility] OR;  Service: Pain Management;  Laterality: N/A;    ESOPHAGOGASTRODUODENOSCOPY N/A 2020    Procedure: ESOPHAGOGASTRODUODENOSCOPY (EGD);  Surgeon: Neville Galvan MD;  Location: [facility] ENDO;  Service: Endoscopy;  Laterality: N/A;    INJECTION OF FACET JOINT Left 2019    Procedure: INJECTION, FACET JOINT, C1-C2 Medial Branch;  Surgeon: Samantha Willoughby MD;  Location: [facility] MGT;  Service: Pain Management;  Laterality: Left;    THUMB ARTHROSCOPY      both thumbs, trigger finger release.    TONSILLECTOMY      TUBAL LIGATION      UMBILICAL HERNIA REPAIR      VEIN LIGATION AND STRIPPING      Left     Social History[1]      Objective      Vitals:    25 1306   BP: (!) 147/84   Pulse: 67       The 10-year ASCVD risk score (Cruz HASSAN, et al., 2019) is: 16.9%    Values used to  "calculate the score:      Age: 69 years      Sex: Female      Is Non- : No      Diabetic: No      Tobacco smoker: No      Systolic Blood Pressure: 147 mmHg      Is BP treated: Yes      HDL Cholesterol: 66 mg/dL      Total Cholesterol: 169 mg/dL      LAST EKG  Results for orders placed or performed during the hospital encounter of 08/25/20   EKG 12-lead    Collection Time: 08/25/20  5:32 PM    Narrative    Test Reason : R07.9,    Vent. Rate : 079 BPM     Atrial Rate : 079 BPM     P-R Int : 170 ms          QRS Dur : 058 ms      QT Int : 352 ms       P-R-T Axes : 062 048 043 degrees     QTc Int : 403 ms    Normal sinus rhythm  Normal ECG  When compared with ECG of 02-OCT-2019 08:55,  Premature supraventricular complexes are no longer Present  Confirmed by Angel Uriarte MD ([id number]) on 8/26/2020 9:05:05 AM    Referred By:             Confirmed By:Angel Uriarte MD     LIPIDS - LAST 2   Lab Results   Component Value Date    CHOL 169 02/16/2024    CHOL 207 (H) 09/02/2022    HDL 66 02/16/2024    HDL 64 09/02/2022    LDLCALC 90.4 02/16/2024    LDLCALC 126.2 09/02/2022    TRIG 63 02/16/2024    TRIG 84 09/02/2022    CHOLHDL 39.1 02/16/2024    CHOLHDL 30.9 09/02/2022     CARDIAC PROFILE - LAST 2  Lab Results   Component Value Date    TROPONINI <0.012 08/25/2020      CBC - LAST 2  Lab Results   Component Value Date    WBC 6.61 02/16/2024    WBC 7.30 09/02/2022    HGB 14.0 02/16/2024    HGB 14.6 09/02/2022    HCT 41.5 02/16/2024    HCT 42.7 09/02/2022     02/16/2024     09/02/2022     No results found for: "LABPT", "INR", "APTT"  CHEMISTRY - LAST 2  Lab Results   Component Value Date     10/17/2024     02/16/2024    K 3.5 10/17/2024    K 4.3 02/16/2024    CO2 27 10/17/2024    CO2 28 02/16/2024    BUN 16 10/17/2024    BUN 16 02/16/2024    CREATININE 0.8 10/17/2024    CREATININE 0.7 02/16/2024     (H) 10/17/2024    GLU 94 02/16/2024    CALCIUM 10.3 10/17/2024    CALCIUM 9.5 02/16/2024 "    MG 2.0 08/25/2020    ALBUMIN 3.8 10/17/2024    ALBUMIN 3.5 02/16/2024    ALT 13 10/17/2024    ALT 14 02/16/2024    AST 22 10/17/2024    AST 18 02/16/2024      ENDOCRINE - LAST 2  Lab Results   Component Value Date    HGBA1C 4.9 10/17/2024    HGBA1C 5.7 (H) 02/16/2024    TSH 1.899 02/16/2024    TSH 1.238 09/02/2022        PHYSICAL EXAM  CONSTITUTIONAL: Well built, well nourished in no apparent distress  NECK: no carotid bruit, no JVD  LUNGS: CTA  CHEST WALL: no tenderness  HEART: regular rate and rhythm, S1, S2 normal, no murmur, click, rub or gallop   ABDOMEN: soft, non-tender; bowel sounds normal; no masses,  no organomegaly  EXTREMITIES: Extremities normal, no edema, no calf tenderness noted  NEURO: AAO X 3    I HAVE REVIEWED :    The vital signs, most recent cardiac testing, and most recent pertinent non-cardiology provider notes.    Current Outpatient Medications   Medication Instructions    aspirin (ECOTRIN) 81 mg, Daily    azelastine (ASTELIN) 137 mcg, Nasal, 2 times daily    EScitalopram oxalate (LEXAPRO) 10 mg, Oral, Daily    metoprolol succinate (TOPROL-XL) 50 mg, Oral, Daily    multivitamin capsule 1 capsule, Nightly    traZODone (DESYREL) 150 MG tablet TAKE 1/3-1 TABLET BY MOUTH EVERY NIGHT        Assessment & Plan   Frequent PVCs, symptomatic  Stable   Continue metoprolol succinate 50 mg daily    Essential hypertension  Stable     3. Mitral valve disease  Stable   Echo in 6 months    4. Postural dizziness with presyncope  Stable, much improved with weight loss         Emphasized the importance of modifying lifestyle related risk factors including tobacco avoidance, limiting alcohol intake, aerobic exercise, weight management and Mediterranean diet.      No follow-ups on file.     Corey Darvill, NP [facility]   Office: [phone]       [1]   Social History  Tobacco Use    Smoking status: Former     Current packs/day: 0.00     Average packs/day: 0.3 packs/day for 25.0 years (6.3 ttl  pk-yrs)     Types: Cigarettes     Start date: 1968     Quit date: 1993     Years since quittin.0    Smokeless tobacco: Never   Substance Use Topics    Alcohol use: No     Alcohol/week: 0.0 standard drinks of alcohol    Drug use: No

## 2025-03-07 ENCOUNTER — TELEPHONE (OUTPATIENT)
Dept: FAMILY MEDICINE | Facility: CLINIC | Age: 70
End: 2025-03-07
Payer: MEDICARE

## 2025-03-07 ENCOUNTER — PATIENT MESSAGE (OUTPATIENT)
Dept: FAMILY MEDICINE | Facility: CLINIC | Age: 70
End: 2025-03-07

## 2025-03-07 ENCOUNTER — OFFICE VISIT (OUTPATIENT)
Dept: FAMILY MEDICINE | Facility: CLINIC | Age: 70
End: 2025-03-07
Payer: MEDICARE

## 2025-03-07 ENCOUNTER — PATIENT MESSAGE (OUTPATIENT)
Dept: ADMINISTRATIVE | Facility: HOSPITAL | Age: 70
End: 2025-03-07
Payer: MEDICARE

## 2025-03-07 VITALS
DIASTOLIC BLOOD PRESSURE: 82 MMHG | HEART RATE: 60 BPM | WEIGHT: 146.81 LBS | SYSTOLIC BLOOD PRESSURE: 128 MMHG | OXYGEN SATURATION: 98 % | HEIGHT: 66 IN | BODY MASS INDEX: 23.59 KG/M2

## 2025-03-07 DIAGNOSIS — D64.9 ANEMIA, UNSPECIFIED TYPE: ICD-10-CM

## 2025-03-07 DIAGNOSIS — K83.8 COMMON BILE DUCT DILATION: ICD-10-CM

## 2025-03-07 DIAGNOSIS — R10.32 LEFT LOWER QUADRANT PAIN: Primary | ICD-10-CM

## 2025-03-07 PROCEDURE — 99214 OFFICE O/P EST MOD 30 MIN: CPT | Mod: S$GLB,,, | Performed by: INTERNAL MEDICINE

## 2025-03-07 PROCEDURE — 3008F BODY MASS INDEX DOCD: CPT | Mod: CPTII,S$GLB,, | Performed by: INTERNAL MEDICINE

## 2025-03-07 PROCEDURE — 3288F FALL RISK ASSESSMENT DOCD: CPT | Mod: CPTII,S$GLB,, | Performed by: INTERNAL MEDICINE

## 2025-03-07 PROCEDURE — 3079F DIAST BP 80-89 MM HG: CPT | Mod: CPTII,S$GLB,, | Performed by: INTERNAL MEDICINE

## 2025-03-07 PROCEDURE — 1126F AMNT PAIN NOTED NONE PRSNT: CPT | Mod: CPTII,S$GLB,, | Performed by: INTERNAL MEDICINE

## 2025-03-07 PROCEDURE — 3074F SYST BP LT 130 MM HG: CPT | Mod: CPTII,S$GLB,, | Performed by: INTERNAL MEDICINE

## 2025-03-07 PROCEDURE — 1159F MED LIST DOCD IN RCRD: CPT | Mod: CPTII,S$GLB,, | Performed by: INTERNAL MEDICINE

## 2025-03-07 PROCEDURE — 1160F RVW MEDS BY RX/DR IN RCRD: CPT | Mod: CPTII,S$GLB,, | Performed by: INTERNAL MEDICINE

## 2025-03-07 PROCEDURE — 99999 PR PBB SHADOW E&M-EST. PATIENT-LVL III: CPT | Mod: PBBFAC,,, | Performed by: INTERNAL MEDICINE

## 2025-03-07 PROCEDURE — 1101F PT FALLS ASSESS-DOCD LE1/YR: CPT | Mod: CPTII,S$GLB,, | Performed by: INTERNAL MEDICINE

## 2025-03-07 RX ORDER — CALCIUM ACETATE 667 MG/1
667 CAPSULE ORAL
COMMUNITY

## 2025-03-07 NOTE — PROGRESS NOTES
Patient ID: Ynes Talavera is a 70 y.o. female.    Chief Complaint: Abdominal Pain (Pt states the pain started Tuesday )       Assessment and plan   Left lower quadrant pain  -     Cancel: CT Abdomen Pelvis With IV Contrast Routine Oral Contrast; Future; Expected date: 03/07/2025  -     CBC Auto Differential; Future; Expected date: 03/07/2025  -     Comprehensive Metabolic Panel; Future; Expected date: 03/07/2025  -     Urinalysis, Reflex to Urine Culture Urine, Clean Catch; Future; Expected date: 03/07/2025  -     CT Abdomen Pelvis With IV Contrast Routine Oral Contrast; Future; Expected date: 03/07/2025    Common bile duct dilation    Anemia, unspecified type  -     CBC Auto Differential; Future; Expected date: 03/08/2025    Differential includes resolving viral gastroenteritis  Diverticulitis ruled out  Common bile duct dilation could be secondary to cholecystectomy.  Will query radiologist and get back with the patient hopefully by next week.   For symptomatic management recommend bland diet, plenty of fluids, 2 week course of Pepcid or Prilosec.  Low albumin could be secondary to decreased p.o. intake   Low calcium likely secondary to low albumin  Low RBC and hematocrit- CBC should be repeated in 1 month.     History of present illness     She is a 70-year-old female with a past medical history of hypertension, chronic insomnia, depression/anxiety.  She presents today for a 4 day history of abdominal discomfort.  At 1st it was intermittent then became more constant and now is stable.  It is diffuse but concentrated more in the right upper quadrant and left lower quadrant of the abdomen.  Prior to this episode she did have some nausea and 1 episode of diarrhea.  She does have a history of diverticulitis in 2012 and has had a cholecystectomy On exam she is mildly tender to palpation over the entire abdomen.      We checked a CT abdomen and pelvis with IV contrast today with the results  below    FINDINGS:  Lung bases are clear.  There is a small pericardial effusion.  Liver appears normal.  There is a faint 0 density lesion in the spleen stable from the previous study pancreas appears normal.  There is dilatation of the biliary system.  The common bile duct measures approximately 8 mm.  There are small nodules RV 3rd adrenal gland stable from the previous study there are parapelvic cyst of the left kidney.  Aorta shows no evidence of an aneurysm.  The there are diverticulum in the colon without evidence of acute diverticulitis there are degenerative changes in the lumbar spine.     Impression:     Diverticulosis without evidence of acute diverticulitis     Parapelvic cyst of the left kidney.     Small nodules of either adrenal gland.     Dilatation of the biliary system the etiology of this is unknown.     Small pericardial effusion    Review of Systems   Constitutional:  Negative for fever.   Respiratory:  Negative for shortness of breath.    Cardiovascular:  Negative for chest pain.   Gastrointestinal:  Positive for abdominal pain.       I personally reviewed past medical, family and social history.     Objective    Vitals:    03/07/25 1329   BP: 128/82   Pulse: 60      Wt Readings from Last 3 Encounters:   03/07/25 1329 66.6 kg (146 lb 13.2 oz)   02/13/25 1625 66.2 kg (146 lb)   02/07/25 1306 66.7 kg (147 lb 0.8 oz)      Body mass index is 23.7 kg/m².     Physical Exam  Cardiovascular:      Rate and Rhythm: Normal rate and regular rhythm.      Heart sounds: No murmur heard.     No gallop.   Pulmonary:      Breath sounds: Normal breath sounds. No wheezing or rhonchi.   Abdominal:      Palpations: Abdomen is soft.      Tenderness: There is abdominal tenderness.        Reference     : NA   Active Problem List with Overview Notes    Diagnosis Date Noted    Common bile duct dilation 03/08/2025    Near syncope 02/07/2025    PVC's (premature ventricular contractions) 02/07/2025    Moderate episode  of recurrent major depressive disorder 10/17/2024    Chronic insomnia 10/17/2024    LACY (generalized anxiety disorder) 10/17/2024    Mitral valve disease 08/15/2024    Postural dizziness with presyncope 08/15/2024    Dyspnea 01/26/2024    Sciatica 01/18/2023    Change in bowel habits 09/03/2020    Dysphagia 08/20/2020    OA (osteoarthritis of spine) 08/22/2019    Cervical radiculopathy 05/23/2019    Occipital neuralgia of left side 05/07/2019    Spondylosis of cervical region without myelopathy or radiculopathy 05/07/2019    Facet arthropathy, cervical 04/16/2019    Lumbar radiculopathy 12/13/2018    Chronic left-sided low back pain with left-sided sciatica 12/13/2018    Splenic artery aneurysm 09/09/2018     No change in serial CTs      Osteopenia 09/22/2016    Sleep disturbance 10/22/2013    Essential hypertension 06/01/2012    Adrenal adenoma 06/01/2012           Hypertension Medications              metoprolol succinate (TOPROL-XL) 50 MG 24 hr tablet Take 1 tablet (50 mg total) by mouth once daily.              Medication List with Changes/Refills   Current Medications    ASPIRIN (ECOTRIN) 81 MG EC TABLET    Take 81 mg by mouth once daily.    AZELASTINE (ASTELIN) 137 MCG (0.1 %) NASAL SPRAY    1 spray (137 mcg total) by Nasal route 2 (two) times daily.    CALCIUM ACETATE,PHOSPHAT BIND, (PHOSLO) 667 MG CAPSULE    Take 667 mg by mouth 3 (three) times daily with meals.    ESCITALOPRAM OXALATE (LEXAPRO) 10 MG TABLET    Take 1 tablet (10 mg total) by mouth once daily.    METOPROLOL SUCCINATE (TOPROL-XL) 50 MG 24 HR TABLET    Take 1 tablet (50 mg total) by mouth once daily.    MULTIVITAMIN CAPSULE    Take 1 capsule by mouth every evening.    TRAZODONE (DESYREL) 150 MG TABLET    TAKE 1/3-1 TABLET BY MOUTH EVERY NIGHT

## 2025-03-07 NOTE — TELEPHONE ENCOUNTER
----- Message from Chantale sent at 3/7/2025  9:43 AM CST -----  Contact: pt @ [phone]  Ynes Talavera calling regarding Same Day Appointment  (message) for #pt is calling to see if possible can be seen today due to abnormal pain, asking for call back

## 2025-03-08 ENCOUNTER — PATIENT MESSAGE (OUTPATIENT)
Dept: FAMILY MEDICINE | Facility: CLINIC | Age: 70
End: 2025-03-08
Payer: MEDICARE

## 2025-03-08 ENCOUNTER — RESULTS FOLLOW-UP (OUTPATIENT)
Dept: FAMILY MEDICINE | Facility: CLINIC | Age: 70
End: 2025-03-08

## 2025-03-08 DIAGNOSIS — R10.9 ABDOMINAL PAIN, UNSPECIFIED ABDOMINAL LOCATION: ICD-10-CM

## 2025-03-08 DIAGNOSIS — K83.8 COMMON BILE DUCT DILATION: Primary | ICD-10-CM

## 2025-03-20 ENCOUNTER — RESULTS FOLLOW-UP (OUTPATIENT)
Dept: FAMILY MEDICINE | Facility: CLINIC | Age: 70
End: 2025-03-20

## 2025-03-20 ENCOUNTER — HOSPITAL ENCOUNTER (OUTPATIENT)
Dept: RADIOLOGY | Facility: HOSPITAL | Age: 70
Discharge: HOME | End: 2025-03-20
Attending: INTERNAL MEDICINE
Payer: MEDICARE

## 2025-03-20 DIAGNOSIS — K83.8 COMMON BILE DUCT DILATION: ICD-10-CM

## 2025-03-20 DIAGNOSIS — R10.9 ABDOMINAL PAIN, UNSPECIFIED ABDOMINAL LOCATION: ICD-10-CM

## 2025-03-20 PROCEDURE — 74183 MRI ABD W/O CNTR FLWD CNTR: CPT | Mod: TC

## 2025-03-20 PROCEDURE — 25500020 PHARM REV CODE 255: Performed by: INTERNAL MEDICINE

## 2025-03-20 PROCEDURE — A9585 GADOBUTROL INJECTION: HCPCS | Performed by: INTERNAL MEDICINE

## 2025-03-20 RX ORDER — GADOBUTROL 604.72 MG/ML
10 INJECTION INTRAVENOUS
Status: COMPLETED | OUTPATIENT
Start: 2025-03-20 | End: 2025-03-20

## 2025-03-20 RX ADMIN — GADOBUTROL 10 ML: 604.72 INJECTION INTRAVENOUS at 12:03

## 2025-03-24 DIAGNOSIS — Z00.00 ENCOUNTER FOR MEDICARE ANNUAL WELLNESS EXAM: ICD-10-CM

## 2025-03-27 ENCOUNTER — PATIENT MESSAGE (OUTPATIENT)
Dept: FAMILY MEDICINE | Facility: CLINIC | Age: 70
End: 2025-03-27
Payer: MEDICARE

## 2025-04-03 ENCOUNTER — RESULTS FOLLOW-UP (OUTPATIENT)
Dept: FAMILY MEDICINE | Facility: CLINIC | Age: 70
End: 2025-04-03

## 2025-04-03 ENCOUNTER — LAB VISIT (OUTPATIENT)
Dept: LAB | Facility: HOSPITAL | Age: 70
End: 2025-04-03
Attending: INTERNAL MEDICINE
Payer: MEDICARE

## 2025-04-03 ENCOUNTER — OFFICE VISIT (OUTPATIENT)
Dept: FAMILY MEDICINE | Facility: CLINIC | Age: 70
End: 2025-04-03
Payer: MEDICARE

## 2025-04-03 VITALS
DIASTOLIC BLOOD PRESSURE: 79 MMHG | BODY MASS INDEX: 22.45 KG/M2 | TEMPERATURE: 98 F | HEART RATE: 68 BPM | RESPIRATION RATE: 16 BRPM | SYSTOLIC BLOOD PRESSURE: 138 MMHG | OXYGEN SATURATION: 98 % | WEIGHT: 143.06 LBS | HEIGHT: 67 IN

## 2025-04-03 DIAGNOSIS — M54.16 LUMBAR RADICULOPATHY: ICD-10-CM

## 2025-04-03 DIAGNOSIS — I10 ESSENTIAL HYPERTENSION: Chronic | ICD-10-CM

## 2025-04-03 DIAGNOSIS — F33.1 MODERATE EPISODE OF RECURRENT MAJOR DEPRESSIVE DISORDER: ICD-10-CM

## 2025-04-03 DIAGNOSIS — F51.04 CHRONIC INSOMNIA: ICD-10-CM

## 2025-04-03 DIAGNOSIS — M54.12 CERVICAL RADICULOPATHY: ICD-10-CM

## 2025-04-03 DIAGNOSIS — D64.9 ANEMIA, UNSPECIFIED TYPE: ICD-10-CM

## 2025-04-03 DIAGNOSIS — Z00.00 ENCOUNTER FOR MEDICARE ANNUAL WELLNESS EXAM: Primary | ICD-10-CM

## 2025-04-03 DIAGNOSIS — F41.1 GAD (GENERALIZED ANXIETY DISORDER): ICD-10-CM

## 2025-04-03 DIAGNOSIS — M47.812 SPONDYLOSIS OF CERVICAL REGION WITHOUT MYELOPATHY OR RADICULOPATHY: ICD-10-CM

## 2025-04-03 DIAGNOSIS — M47.812 FACET ARTHROPATHY, CERVICAL: ICD-10-CM

## 2025-04-03 DIAGNOSIS — I72.8 SPLENIC ARTERY ANEURYSM: Chronic | ICD-10-CM

## 2025-04-03 DIAGNOSIS — M85.80 OSTEOPENIA, UNSPECIFIED LOCATION: Chronic | ICD-10-CM

## 2025-04-03 PROBLEM — R55 NEAR SYNCOPE: Status: RESOLVED | Noted: 2025-02-07 | Resolved: 2025-04-03

## 2025-04-03 PROBLEM — R42 POSTURAL DIZZINESS WITH PRESYNCOPE: Status: RESOLVED | Noted: 2024-08-15 | Resolved: 2025-04-03

## 2025-04-03 PROBLEM — R55 POSTURAL DIZZINESS WITH PRESYNCOPE: Status: RESOLVED | Noted: 2024-08-15 | Resolved: 2025-04-03

## 2025-04-03 PROBLEM — M54.81 OCCIPITAL NEURALGIA OF LEFT SIDE: Status: RESOLVED | Noted: 2019-05-07 | Resolved: 2025-04-03

## 2025-04-03 LAB
ABSOLUTE EOSINOPHIL (OHS): 0.23 K/UL
ABSOLUTE MONOCYTE (OHS): 0.65 K/UL (ref 0.3–1)
ABSOLUTE NEUTROPHIL COUNT (OHS): 3.66 K/UL (ref 1.8–7.7)
BASOPHILS # BLD AUTO: 0.02 K/UL
BASOPHILS NFR BLD AUTO: 0.3 %
ERYTHROCYTE [DISTWIDTH] IN BLOOD BY AUTOMATED COUNT: 12.1 % (ref 11.5–14.5)
HCT VFR BLD AUTO: 42.1 % (ref 37–48.5)
HGB BLD-MCNC: 14 GM/DL (ref 12–16)
IMM GRANULOCYTES # BLD AUTO: 0.01 K/UL (ref 0–0.04)
IMM GRANULOCYTES NFR BLD AUTO: 0.1 % (ref 0–0.5)
LYMPHOCYTES # BLD AUTO: 3.21 K/UL (ref 1–4.8)
MCH RBC QN AUTO: 32.6 PG (ref 27–31)
MCHC RBC AUTO-ENTMCNC: 33.3 G/DL (ref 32–36)
MCV RBC AUTO: 98 FL (ref 82–98)
NUCLEATED RBC (/100WBC) (OHS): 0 /100 WBC
PLATELET # BLD AUTO: 198 K/UL (ref 150–450)
PMV BLD AUTO: 11.4 FL (ref 9.2–12.9)
RBC # BLD AUTO: 4.29 M/UL (ref 4–5.4)
RELATIVE EOSINOPHIL (OHS): 3 %
RELATIVE LYMPHOCYTE (OHS): 41.3 % (ref 18–48)
RELATIVE MONOCYTE (OHS): 8.4 % (ref 4–15)
RELATIVE NEUTROPHIL (OHS): 46.9 % (ref 38–73)
WBC # BLD AUTO: 7.78 K/UL (ref 3.9–12.7)

## 2025-04-03 PROCEDURE — 36415 COLL VENOUS BLD VENIPUNCTURE: CPT | Mod: PO

## 2025-04-03 PROCEDURE — 85025 COMPLETE CBC W/AUTO DIFF WBC: CPT

## 2025-04-03 PROCEDURE — 99999 PR PBB SHADOW E&M-EST. PATIENT-LVL IV: CPT | Mod: PBBFAC,,, | Performed by: NURSE PRACTITIONER

## 2025-04-03 NOTE — PATIENT INSTRUCTIONS
Counseling and Referral of Other Preventative  (Italic type indicates deductible and co-insurance are waived)    Patient Name: Ynes Talavera  Today's Date: 4/3/2025    Health Maintenance       Date Due Completion Date    RSV Vaccine (Age 60+ and Pregnant patients) (1 - Risk 60-74 years 1-dose series) Never done ---    Shingles Vaccine (2 of 3) 01/23/2015 11/28/2014    Influenza Vaccine (1) 09/01/2024 11/2/2021    COVID-19 Vaccine (3 - 2024-25 season) 09/01/2024 3/4/2021    Colorectal Cancer Screening 09/03/2025 9/3/2020    Mammogram 02/13/2026 2/13/2025    Override on 3/22/2013: Done    DEXA Scan 02/13/2028 2/13/2025    TETANUS VACCINE 08/23/2028 8/23/2018    Lipid Panel 02/16/2029 2/16/2024        No orders of the defined types were placed in this encounter.    The following information is provided to all patients.  This information is to help you find resources for any of the problems found today that may be affecting your health:                  Living healthy guide: www.[facility].gov      Understanding Diabetes: www.diabetes.org      Eating healthy: www.cdc.gov/healthyweight      CDC home safety checklist: www.cdc.gov/steadi/patient.html      Agency on Aging: www.goea.[place].[facility]      Alcoholics anonymous (AA): www.aa.org      Physical Activity: www.lino.nih.gov/uk8qigd      Tobacco use: www.quitwithusla.org

## 2025-04-03 NOTE — PROGRESS NOTES
I offered to discuss advanced care planning, including how to pick a person who would make decisions for you if you were unable to make them for yourself, called a health care power of , and what kind of decisions you might make such as use of life sustaining treatments such as ventilators and tube feeding when faced with a life limiting illness recorded on a living will that they will need to know. (How you want to be cared for as you near the end of your natural life)     X Patient is interested in learning more about how to make advanced directives.  I provided them paperwork and offered to discuss this with them.   Name band;

## 2025-04-10 NOTE — PROGRESS NOTES
"  Ynes Talavera presented for an initial Medicare AWV today. The following components were reviewed and updated:    Medical history  Family History  Social history  Allergies and Current Medications  Health Risk Assessment  Health Maintenance  Care Team    **See Completed Assessments for Annual Wellness visit with in the encounter summary    The following assessments were completed:  Depression Screening  Cognitive function Screening    Timed Get Up Test  Whisper Test      Opioid documentation:      Patient does not have a current opioid prescription.          Vitals:    04/03/25 1017   BP: 138/79   BP Location: Right arm   Patient Position: Sitting   Pulse: 68   Resp: 16   Temp: 97.7 °F (36.5 °C)   TempSrc: Oral   SpO2: 98%   Weight: 64.9 kg (143 lb 1.3 oz)   Height: 5' 6.5" (1.689 m)     Body mass index is 22.75 kg/m².       Physical Exam  Vitals reviewed.   HENT:      Head: Normocephalic.   Cardiovascular:      Rate and Rhythm: Normal rate.   Pulmonary:      Effort: Pulmonary effort is normal. No respiratory distress.   Skin:     General: Skin is warm.   Neurological:      General: No focal deficit present.      Mental Status: She is alert.           Diagnoses and health risks identified today and associated recommendations/orders:  1. Encounter for Medicare annual wellness exam  Reviewed health maintenance and provided recommendations    Recommend shingrix and RSV vaccine  - Referral to Enhanced Annual Wellness Visit (eAWV) W+1    2. Moderate episode of recurrent major depressive disorder  Continue to monitor  Followed by Jonah Walker MD   No si/hi.      3. LACY (generalized anxiety disorder)  Continue to monitor  Followed by Jonah Walker MD   lexapro.      4. Essential hypertension  Continue to monitor  Followed by Jonah Walker MD .      5. Splenic artery aneurysm  Continue to monitor  Followed by Dr Goodwin  MRI MRCP Abd 3/20/25.      6. Lumbar radiculopathy  Continue to monitor  Followed by " Jonah Walker MD .      7. Cervical radiculopathy  Continue to monitor  Followed by Dr Mckoy.      8. Facet arthropathy, cervical  Continue to monitor  Followed by Dr mckoy.      9. Spondylosis of cervical region without myelopathy or radiculopathy  Continue to monitor  Followed by Dr Mckoy.      10. Chronic insomnia  Continue to monitor  Followed by Jonah Walker MD .      11. Osteopenia, unspecified location  Continue to monitor  Followed by Jonah Walker MD   .        Provided Ynes with a 5-10 year written screening schedule and personal prevention plan. Recommendations were developed using the USPSTF age appropriate recommendations. Education, counseling, and referrals were provided as needed.  After Visit Summary printed and given to patient which includes a list of additional screenings\tests needed.    No follow-ups on file.      Genet Calderon NP

## 2025-04-15 DIAGNOSIS — I10 ESSENTIAL HYPERTENSION: ICD-10-CM

## 2025-04-15 RX ORDER — METOPROLOL SUCCINATE 50 MG/1
TABLET, EXTENDED RELEASE ORAL
Qty: 90 TABLET | Refills: 1 | Status: SHIPPED | OUTPATIENT
Start: 2025-04-15

## 2025-04-15 NOTE — TELEPHONE ENCOUNTER
No care due was identified.  [facility] Embedded Care Due Messages. Reference number: [id number].   4/15/2025 1:16:09 PM CDT

## 2025-04-16 NOTE — TELEPHONE ENCOUNTER
Refill Decision Note   Ynes Talavera  is requesting a refill authorization.  Brief Assessment and Rationale for Refill:  Approve     Medication Therapy Plan:         Comments:     Note composed:7:48 PM 04/15/2025             Appointments     Last Visit   10/17/2024 Jonah Walker MD   Next Visit   4/24/2025 Jonah Walker MD

## 2025-04-24 ENCOUNTER — OFFICE VISIT (OUTPATIENT)
Dept: FAMILY MEDICINE | Facility: CLINIC | Age: 70
End: 2025-04-24
Payer: MEDICARE

## 2025-04-24 ENCOUNTER — PATIENT MESSAGE (OUTPATIENT)
Dept: FAMILY MEDICINE | Facility: CLINIC | Age: 70
End: 2025-04-24

## 2025-04-24 VITALS
SYSTOLIC BLOOD PRESSURE: 132 MMHG | WEIGHT: 151.44 LBS | HEART RATE: 73 BPM | OXYGEN SATURATION: 97 % | DIASTOLIC BLOOD PRESSURE: 78 MMHG | HEIGHT: 67 IN | BODY MASS INDEX: 23.77 KG/M2

## 2025-04-24 DIAGNOSIS — F41.1 GAD (GENERALIZED ANXIETY DISORDER): ICD-10-CM

## 2025-04-24 DIAGNOSIS — R19.4 CHANGE IN BOWEL HABIT: ICD-10-CM

## 2025-04-24 DIAGNOSIS — I10 ESSENTIAL HYPERTENSION: ICD-10-CM

## 2025-04-24 DIAGNOSIS — Z00.00 ANNUAL PHYSICAL EXAM: Primary | ICD-10-CM

## 2025-04-24 DIAGNOSIS — F33.1 MODERATE EPISODE OF RECURRENT MAJOR DEPRESSIVE DISORDER: ICD-10-CM

## 2025-04-24 DIAGNOSIS — Z12.11 COLON CANCER SCREENING: ICD-10-CM

## 2025-04-24 PROCEDURE — 99999 PR PBB SHADOW E&M-EST. PATIENT-LVL IV: CPT | Mod: PBBFAC,,, | Performed by: FAMILY MEDICINE

## 2025-04-24 NOTE — PROGRESS NOTES
"Subjective:       Patient ID: Ynes Talavera is a 70 y.o. female.    Chief Complaint: Annual Exam    Here today for her annual exam     Immunizations: Due RSV, Shingrix,  Last Lab work: 2025  Colon Ca screening: Colonoscopy 2020  Breast Ca Screening: MMG 2025  Cervical Ca Screening: no longer recommended    HTN:  She is on Metoprolol XL.  BP has been controlled.  MMD/LACY/Insomnia:  She is on Lexapro 10 mg with Trazodone for sleep.   Medication is working well for her overall.  Still some anxiety and at times still issues sleeping  Obesity:  she was on GLP therapy.  She stopped it.  She has lost about 30 lbs        Review of Systems   Constitutional:  Negative for activity change, appetite change, fatigue and fever.   Respiratory:  Negative for cough, shortness of breath and wheezing.    Cardiovascular:  Negative for chest pain and palpitations.   Gastrointestinal:  Negative for abdominal pain, constipation, diarrhea and nausea.   Skin:  Negative for pallor and rash.   Neurological:  Negative for dizziness, syncope, light-headedness and headaches.   Psychiatric/Behavioral:  The patient is not nervous/anxious.        Objective:      Vitals:    04/24/25 1554   BP: 132/78   Pulse: 73   SpO2: 97%   Weight: 68.7 kg (151 lb 7.3 oz)   Height: 5' 6.5" (1.689 m)      Physical Exam  Constitutional:       General: She is not in acute distress.  Cardiovascular:      Rate and Rhythm: Normal rate and regular rhythm.      Heart sounds: Normal heart sounds. No murmur heard.  Pulmonary:      Effort: Pulmonary effort is normal. No respiratory distress.      Breath sounds: Normal breath sounds. No wheezing, rhonchi or rales.   Skin:     General: Skin is warm and dry.   Neurological:      General: No focal deficit present.      Mental Status: She is alert.   Psychiatric:         Mood and Affect: Mood normal.         Behavior: Behavior normal.         Thought Content: Thought content normal.         Results for orders placed or " performed in visit on 04/03/25   CBC with Differential    Collection Time: 04/03/25 12:00 PM   Result Value Ref Range    WBC 7.78 3.90 - 12.70 K/uL    RBC 4.29 4.00 - 5.40 M/uL    HGB 14.0 12.0 - 16.0 gm/dL    HCT 42.1 37.0 - 48.5 %    MCV 98 82 - 98 fL    MCH 32.6 (H) 27.0 - 31.0 pg    MCHC 33.3 32.0 - 36.0 g/dL    RDW 12.1 11.5 - 14.5 %    Platelet Count 198 150 - 450 K/uL    MPV 11.4 9.2 - 12.9 fL    Nucleated RBC 0 <=0 /100 WBC    Neut % 46.9 38 - 73 %    Lymph % 41.3 18 - 48 %    Mono % 8.4 4 - 15 %    Eos % 3.0 <=8 %    Basophil % 0.3 <=1.9 %    Imm Grans % 0.1 0.0 - 0.5 %    Neut # 3.66 1.8 - 7.7 K/uL    Lymph # 3.21 1 - 4.8 K/uL    Mono # 0.65 0.3 - 1 K/uL    Eos # 0.23 <=0.5 K/uL    Baso # 0.02 <=0.2 K/uL    Imm Grans # 0.01 0.00 - 0.04 K/uL      Assessment:       1. Annual physical exam    2. Essential hypertension    3. Moderate episode of recurrent major depressive disorder    4. LACY (generalized anxiety disorder)    5. Colon cancer screening    6. Change in bowel habit        Plan:       Annual physical exam  Continue to work on dietary improvements (decrease overall calorie intake, decrease sugar and carb intake, decrease animal protein intake)  Continue to exercise at least 30-40 minutes, 3 times per week  Immunizations were discussed and were up to date  Preventative exams were discussed and up to date   Essential hypertension  Continue Metoprolol  Moderate episode of recurrent major depressive disorder  Continue Lexapro  LACY (generalized anxiety disorder)    Colon cancer screening  -     Case Request Endoscopy: COLONOSCOPY    Change in bowel habit  -     Case Request Endoscopy: COLONOSCOPY    Visit today included increased complexity associated with the care of the episodic problem HTN addressed and managing the longitudinal care of the patient due to the serious and/or complex managed problem(s) as above.       Medication List with Changes/Refills   Current Medications    ASPIRIN (ECOTRIN) 81 MG EC  TABLET    Take 81 mg by mouth once daily.    CALCIUM ACETATE,PHOSPHAT BIND, (PHOSLO) 667 MG CAPSULE    Take 667 mg by mouth 3 (three) times daily with meals.    ESCITALOPRAM OXALATE (LEXAPRO) 10 MG TABLET    Take 1 tablet (10 mg total) by mouth once daily.    METOPROLOL SUCCINATE (TOPROL-XL) 50 MG 24 HR TABLET    TAKE 1 TABLET(50 MG) BY MOUTH DAILY    MULTIVITAMIN CAPSULE    Take 1 capsule by mouth every evening.    TRAZODONE (DESYREL) 150 MG TABLET    TAKE 1/3-1 TABLET BY MOUTH EVERY NIGHT

## 2025-04-30 ENCOUNTER — TELEPHONE (OUTPATIENT)
Dept: GASTROENTEROLOGY | Facility: CLINIC | Age: 70
End: 2025-04-30
Payer: MEDICARE

## 2025-04-30 NOTE — TELEPHONE ENCOUNTER
Screening due after 9/3/2025     Attempted to reach pt to schedule scope from case request. Left VM, call back number provided.

## 2025-05-02 ENCOUNTER — TELEPHONE (OUTPATIENT)
Dept: GASTROENTEROLOGY | Facility: CLINIC | Age: 70
End: 2025-05-02
Payer: MEDICARE

## 2025-05-22 ENCOUNTER — PATIENT MESSAGE (OUTPATIENT)
Dept: OBSTETRICS AND GYNECOLOGY | Facility: CLINIC | Age: 70
End: 2025-05-22
Payer: MEDICARE

## 2025-07-13 DIAGNOSIS — F32.9 REACTIVE DEPRESSION: ICD-10-CM

## 2025-07-13 DIAGNOSIS — G47.9 SLEEP DISTURBANCE: Chronic | ICD-10-CM

## 2025-07-13 NOTE — TELEPHONE ENCOUNTER
No care due was identified.  [facility] Embedded Care Due Messages. Reference number: [id number].   7/13/2025 5:19:08 AM CDT

## 2025-07-14 RX ORDER — TRAZODONE HYDROCHLORIDE 150 MG/1
TABLET ORAL
Qty: 90 TABLET | Refills: 3 | Status: SHIPPED | OUTPATIENT
Start: 2025-07-14

## 2025-07-14 NOTE — TELEPHONE ENCOUNTER
Refill Decision Note   Ynes Talavera  is requesting a refill authorization.  Brief Assessment and Rationale for Refill:  Approve     Medication Therapy Plan:         Comments:     Note composed:1:30 PM 07/14/2025

## 2025-08-07 ENCOUNTER — HOSPITAL ENCOUNTER (OUTPATIENT)
Dept: CARDIOLOGY | Facility: HOSPITAL | Age: 70
Discharge: HOME | End: 2025-08-07
Payer: MEDICARE

## 2025-08-07 VITALS — BODY MASS INDEX: 23.7 KG/M2 | WEIGHT: 151 LBS | HEIGHT: 67 IN

## 2025-08-07 DIAGNOSIS — I05.9 MITRAL VALVE DISEASE: ICD-10-CM

## 2025-08-07 LAB
AORTIC SIZE INDEX (SOV): 1.7 CM/M2
AORTIC SIZE INDEX: 1.9 CM/M2
ASCENDING AORTA: 3.3 CM
AV INDEX (PROSTH): 0.72
AV MEAN GRADIENT: 5 MMHG
AV PEAK GRADIENT: 8 MMHG
AV REGURGITATION PRESSURE HALF TIME: 722 MS
AV VALVE AREA BY VELOCITY RATIO: 2 CM²
AV VALVE AREA: 2 CM²
AV VELOCITY RATIO: 0.71
BSA FOR ECHO PROCEDURE: 1.79 M2
CV ECHO LV RWT: 0.41 CM
DOP CALC AO PEAK VEL: 1.4 M/S
DOP CALC AO VTI: 37 CM
DOP CALC LVOT AREA: 2.8 CM2
DOP CALC LVOT DIAMETER: 1.9 CM
DOP CALC LVOT PEAK VEL: 1 M/S
DOP CALCLVOT PEAK VEL VTI: 26.5 CM
E WAVE DECELERATION TIME: 146 MSEC
E/A RATIO: 0.89
E/E' RATIO: 10 M/S
ECHO LV POSTERIOR WALL: 1 CM (ref 0.6–1.1)
FRACTIONAL SHORTENING: 38.8 % (ref 28–44)
INTERVENTRICULAR SEPTUM: 1 CM (ref 0.6–1.1)
IVRT: 128 MSEC
LEFT ATRIUM AREA SYSTOLIC (APICAL 2 CHAMBER): 19.74 CM2
LEFT ATRIUM AREA SYSTOLIC (APICAL 4 CHAMBER): 21.48 CM2
LEFT ATRIUM SIZE: 4.3 CM
LEFT ATRIUM VOLUME INDEX MOD: 31 ML/M2
LEFT ATRIUM VOLUME MOD: 55 ML
LEFT INTERNAL DIMENSION IN SYSTOLE: 3 CM (ref 2.1–4)
LEFT VENTRICLE DIASTOLIC VOLUME INDEX: 64.61 ML/M2
LEFT VENTRICLE DIASTOLIC VOLUME: 115 ML
LEFT VENTRICLE END SYSTOLIC VOLUME APICAL 2 CHAMBER: 52.12 ML
LEFT VENTRICLE END SYSTOLIC VOLUME APICAL 4 CHAMBER: 58.11 ML
LEFT VENTRICLE MASS INDEX: 98.9 G/M2
LEFT VENTRICLE SYSTOLIC VOLUME INDEX: 19.7 ML/M2
LEFT VENTRICLE SYSTOLIC VOLUME: 35 ML
LEFT VENTRICULAR INTERNAL DIMENSION IN DIASTOLE: 4.9 CM (ref 3.5–6)
LEFT VENTRICULAR MASS: 176 G
LV LATERAL E/E' RATIO: 9.1 M/S
LV SEPTAL E/E' RATIO: 11.7 M/S
LVED V (TEICH): 114.63 ML
LVES V (TEICH): 35.12 ML
LVOT MG: 2.15 MMHG
LVOT MV: 0.7 CM/S
Lab: 1.8 CM/M
Lab: 2 CM/M
MV PEAK A VEL: 0.92 M/S
MV PEAK E VEL: 0.82 M/S
OHS CV CPX PATIENT HEIGHT IN: 66.5
OHS CV RV/LV RATIO: 0.73 CM
PISA AR MAX VEL: 4.21 M/S
PISA TR MAX VEL: 2.6 M/S
PULM VEIN S/D RATIO: 1.33
PV PEAK D VEL: 0.48 M/S
PV PEAK S VEL: 0.64 M/S
RA PRESSURE ESTIMATED: 3 MMHG
RA VOL SYS: 38.69 ML
RIGHT ATRIAL AREA: 14.7 CM2
RIGHT ATRIUM END SYSTOLIC VOLUME APICAL 4 CHAMBER INDEX BSA: 19.93 ML/M2
RIGHT ATRIUM VOLUME AREA LENGTH APICAL 4 CHAMBER: 35.48 ML
RIGHT VENTRICLE DIASTOLIC BASEL DIMENSION: 3.6 CM
RIGHT VENTRICLE DIASTOLIC LENGTH: 6.9 CM
RIGHT VENTRICLE DIASTOLIC MID DIMENSION: 2.3 CM
RIGHT VENTRICULAR END-DIASTOLIC DIMENSION: 3.61 CM
RIGHT VENTRICULAR LENGTH IN DIASTOLE (APICAL 4-CHAMBER VIEW): 6.89 CM
RV MID DIAMA: 2.29 CM
RV TB RVSP: 6 MMHG
RV TISSUE DOPPLER FREE WALL SYSTOLIC VELOCITY 1 (APICAL 4 CHAMBER VIEW): 13.94 CM/S
SINUS: 3 CM
STJ: 3.1 CM
TDI LATERAL: 0.09 M/S
TDI SEPTAL: 0.07 M/S
TDI: 0.08 M/S
TR MAX PG: 27 MMHG
TRICUSPID ANNULAR PLANE SYSTOLIC EXCURSION: 2.1 CM
TV REST PULMONARY ARTERY PRESSURE: 30 MMHG
Z-SCORE OF LEFT VENTRICULAR DIMENSION IN END DIASTOLE: -0.04
Z-SCORE OF LEFT VENTRICULAR DIMENSION IN END SYSTOLE: -0.11

## 2025-08-07 PROCEDURE — 93306 TTE W/DOPPLER COMPLETE: CPT | Mod: 26,,, | Performed by: INTERNAL MEDICINE

## 2025-08-07 PROCEDURE — 93306 TTE W/DOPPLER COMPLETE: CPT | Mod: PO

## 2025-08-15 ENCOUNTER — OFFICE VISIT (OUTPATIENT)
Dept: CARDIOLOGY | Facility: CLINIC | Age: 70
End: 2025-08-15
Payer: MEDICARE

## 2025-08-15 VITALS
HEART RATE: 59 BPM | DIASTOLIC BLOOD PRESSURE: 85 MMHG | SYSTOLIC BLOOD PRESSURE: 158 MMHG | BODY MASS INDEX: 25.79 KG/M2 | WEIGHT: 160.5 LBS | HEIGHT: 66 IN

## 2025-08-15 DIAGNOSIS — I49.3 PVC'S (PREMATURE VENTRICULAR CONTRACTIONS): ICD-10-CM

## 2025-08-15 DIAGNOSIS — I10 ESSENTIAL HYPERTENSION: Primary | Chronic | ICD-10-CM

## 2025-08-15 PROCEDURE — 99999 PR PBB SHADOW E&M-EST. PATIENT-LVL III: CPT | Mod: PBBFAC,,,

## 2025-08-22 ENCOUNTER — TELEPHONE (OUTPATIENT)
Dept: GASTROENTEROLOGY | Facility: CLINIC | Age: 70
End: 2025-08-22
Payer: MEDICARE

## 2025-08-30 ENCOUNTER — OFFICE VISIT (OUTPATIENT)
Dept: URGENT CARE | Facility: CLINIC | Age: 70
End: 2025-08-30
Payer: MEDICARE

## (undated) DEVICE — SEE MEDLINE ITEM 157216

## (undated) DEVICE — SLING ORTHOPEDIC LARGE

## (undated) DEVICE — SUT ETHILON 3-0 FS-1 30

## (undated) DEVICE — SEE MEDLINE ITEM 157161

## (undated) DEVICE — SOL SOD CHLORIDE 0.9% 10ML

## (undated) DEVICE — BLADE SHAVER 4.5 6/BX

## (undated) DEVICE — DRAPE THREE-QTR REINF 53X77IN

## (undated) DEVICE — DRESSING XEROFORM FOIL PK 1X8

## (undated) DEVICE — ABLATOR EXTENDED LENGTH

## (undated) DEVICE — DRESSING LEUKOPLAST FLEX 1X3IN

## (undated) DEVICE — SOL IRR NACL .9% 3000ML

## (undated) DEVICE — SUT PROLENE 0 CT1 30IN BLUE

## (undated) DEVICE — NEPTUNE 4 PORT MANIFOLD

## (undated) DEVICE — NDL SPINAL 18GX3.5 SPINOCAN

## (undated) DEVICE — TUBE SET INFLOW/OUTFLOW

## (undated) DEVICE — DRAPE STERI-DRAPE 1000 17X11IN

## (undated) DEVICE — CANNULA SHOULDER 10/BOX

## (undated) DEVICE — LOOP PASSING HI-FI

## (undated) DEVICE — NDL SUREFIRE SCORPION RC

## (undated) DEVICE — PAD K-THERMIA 24IN X 60IN

## (undated) DEVICE — TOWEL OR DISP STRL BLUE 4/PK

## (undated) DEVICE — SYR 30CC LUER LOCK

## (undated) DEVICE — Device

## (undated) DEVICE — ELECTRODE REM PLYHSV RETURN 9

## (undated) DEVICE — DRAPE INCISE IOBAN 2 23X17IN

## (undated) DEVICE — PAD ABD 8X10 STERILE

## (undated) DEVICE — APPLICATOR CHLORAPREP CLR 10.5

## (undated) DEVICE — MAT QUICK 40X30 FLOOR FLUID LF

## (undated) DEVICE — DRAPE STERI U-SHAPED 47X51IN

## (undated) DEVICE — SEE MEDLINE ITEM 154981

## (undated) DEVICE — MASK FACE TENT DISP

## (undated) DEVICE — BLADE SURG CARBON STEEL SZ11

## (undated) DEVICE — SYR GLASS 5CC LUER LOK

## (undated) DEVICE — NDL SPECTRUM AUTOPASS

## (undated) DEVICE — MAT EVAC SURGISAFE 36 X 48

## (undated) DEVICE — GLOVE 7.5 PROTEXIS PI MICRO

## (undated) DEVICE — BLADE GATOR 4.2

## (undated) DEVICE — BNDG COFLEX FOAM LF2 ST 6X5YD

## (undated) DEVICE — TUBING SUC UNIV W/CONN 12FT

## (undated) DEVICE — TRAY NERVE BLOCK

## (undated) DEVICE — DRAPE HALF SURGICAL 40X58IN

## (undated) DEVICE — PROBE ARTHO ENERGY 90 DEG

## (undated) DEVICE — GAUZE SPONGE 4X4 12PLY

## (undated) DEVICE — STAPLER SKIN ROTATING HEAD

## (undated) DEVICE — UNDERGLOVES BIOGEL PI SIZE 8